# Patient Record
Sex: MALE | Race: BLACK OR AFRICAN AMERICAN | NOT HISPANIC OR LATINO | Employment: OTHER | URBAN - METROPOLITAN AREA
[De-identification: names, ages, dates, MRNs, and addresses within clinical notes are randomized per-mention and may not be internally consistent; named-entity substitution may affect disease eponyms.]

---

## 2017-02-24 RX ORDER — LOSARTAN POTASSIUM AND HYDROCHLOROTHIAZIDE 25; 100 MG/1; MG/1
TABLET ORAL
Qty: 30 TABLET | Refills: 5 | Status: SHIPPED | OUTPATIENT
Start: 2017-02-24 | End: 2018-06-14 | Stop reason: SDUPTHER

## 2017-07-01 RX ORDER — ALBUTEROL SULFATE 90 UG/1
AEROSOL, METERED RESPIRATORY (INHALATION)
Qty: 8.5 G | Refills: 2 | Status: SHIPPED | OUTPATIENT
Start: 2017-07-01 | End: 2017-12-18 | Stop reason: SDUPTHER

## 2017-09-26 ENCOUNTER — OFFICE VISIT (OUTPATIENT)
Dept: INTERNAL MEDICINE | Facility: CLINIC | Age: 52
End: 2017-09-26
Payer: MEDICARE

## 2017-09-26 ENCOUNTER — LAB VISIT (OUTPATIENT)
Dept: LAB | Facility: HOSPITAL | Age: 52
End: 2017-09-26
Attending: INTERNAL MEDICINE
Payer: MEDICARE

## 2017-09-26 VITALS
SYSTOLIC BLOOD PRESSURE: 125 MMHG | DIASTOLIC BLOOD PRESSURE: 81 MMHG | WEIGHT: 175.69 LBS | HEART RATE: 57 BPM | BODY MASS INDEX: 26.63 KG/M2 | HEIGHT: 68 IN

## 2017-09-26 DIAGNOSIS — Z00.00 ANNUAL PHYSICAL EXAM: Primary | ICD-10-CM

## 2017-09-26 DIAGNOSIS — I10 ESSENTIAL HYPERTENSION: ICD-10-CM

## 2017-09-26 DIAGNOSIS — M79.602 PARESTHESIA AND PAIN OF BOTH UPPER EXTREMITIES: ICD-10-CM

## 2017-09-26 DIAGNOSIS — Z12.5 ENCOUNTER FOR SCREENING FOR MALIGNANT NEOPLASM OF PROSTATE: ICD-10-CM

## 2017-09-26 DIAGNOSIS — M79.601 PARESTHESIA AND PAIN OF BOTH UPPER EXTREMITIES: ICD-10-CM

## 2017-09-26 DIAGNOSIS — M47.816 SPONDYLOSIS OF LUMBAR REGION WITHOUT MYELOPATHY OR RADICULOPATHY: ICD-10-CM

## 2017-09-26 DIAGNOSIS — M17.11 PRIMARY OSTEOARTHRITIS OF RIGHT KNEE: ICD-10-CM

## 2017-09-26 DIAGNOSIS — R20.2 PARESTHESIA AND PAIN OF BOTH UPPER EXTREMITIES: ICD-10-CM

## 2017-09-26 DIAGNOSIS — M47.812 SPONDYLOSIS OF CERVICAL REGION WITHOUT MYELOPATHY OR RADICULOPATHY: ICD-10-CM

## 2017-09-26 DIAGNOSIS — Z00.00 ANNUAL PHYSICAL EXAM: ICD-10-CM

## 2017-09-26 DIAGNOSIS — Z12.11 SCREENING FOR COLON CANCER: ICD-10-CM

## 2017-09-26 LAB
ALBUMIN SERPL BCP-MCNC: 3.7 G/DL
ALP SERPL-CCNC: 53 U/L
ALT SERPL W/O P-5'-P-CCNC: 11 U/L
ANION GAP SERPL CALC-SCNC: 7 MMOL/L
AST SERPL-CCNC: 17 U/L
BASOPHILS # BLD AUTO: 0.04 K/UL
BASOPHILS NFR BLD: 0.5 %
BILIRUB SERPL-MCNC: 0.5 MG/DL
BUN SERPL-MCNC: 12 MG/DL
CALCIUM SERPL-MCNC: 9.1 MG/DL
CHLORIDE SERPL-SCNC: 104 MMOL/L
CHOLEST SERPL-MCNC: 181 MG/DL
CHOLEST/HDLC SERPL: 3.4 {RATIO}
CO2 SERPL-SCNC: 28 MMOL/L
COMPLEXED PSA SERPL-MCNC: 1.1 NG/ML
CREAT SERPL-MCNC: 1.2 MG/DL
DIFFERENTIAL METHOD: ABNORMAL
EOSINOPHIL # BLD AUTO: 0.1 K/UL
EOSINOPHIL NFR BLD: 0.7 %
ERYTHROCYTE [DISTWIDTH] IN BLOOD BY AUTOMATED COUNT: 15 %
EST. GFR  (AFRICAN AMERICAN): >60 ML/MIN/1.73 M^2
EST. GFR  (NON AFRICAN AMERICAN): >60 ML/MIN/1.73 M^2
GLUCOSE SERPL-MCNC: 83 MG/DL
HCT VFR BLD AUTO: 39.9 %
HDLC SERPL-MCNC: 53 MG/DL
HDLC SERPL: 29.3 %
HGB BLD-MCNC: 13.5 G/DL
LDLC SERPL CALC-MCNC: 111 MG/DL
LYMPHOCYTES # BLD AUTO: 2.9 K/UL
LYMPHOCYTES NFR BLD: 39.3 %
MCH RBC QN AUTO: 28.2 PG
MCHC RBC AUTO-ENTMCNC: 33.8 G/DL
MCV RBC AUTO: 83 FL
MONOCYTES # BLD AUTO: 0.6 K/UL
MONOCYTES NFR BLD: 7.7 %
NEUTROPHILS # BLD AUTO: 3.8 K/UL
NEUTROPHILS NFR BLD: 51.5 %
NONHDLC SERPL-MCNC: 128 MG/DL
PLATELET # BLD AUTO: 254 K/UL
PMV BLD AUTO: 9.6 FL
POTASSIUM SERPL-SCNC: 4.4 MMOL/L
PROT SERPL-MCNC: 6.8 G/DL
RBC # BLD AUTO: 4.79 M/UL
SODIUM SERPL-SCNC: 139 MMOL/L
TRIGL SERPL-MCNC: 85 MG/DL
TSH SERPL DL<=0.005 MIU/L-ACNC: 1.64 UIU/ML
WBC # BLD AUTO: 7.41 K/UL

## 2017-09-26 PROCEDURE — 84153 ASSAY OF PSA TOTAL: CPT

## 2017-09-26 PROCEDURE — 80053 COMPREHEN METABOLIC PANEL: CPT

## 2017-09-26 PROCEDURE — 84443 ASSAY THYROID STIM HORMONE: CPT

## 2017-09-26 PROCEDURE — 80061 LIPID PANEL: CPT

## 2017-09-26 PROCEDURE — 99213 OFFICE O/P EST LOW 20 MIN: CPT | Mod: PBBFAC,PO | Performed by: INTERNAL MEDICINE

## 2017-09-26 PROCEDURE — 85025 COMPLETE CBC W/AUTO DIFF WBC: CPT | Mod: PO

## 2017-09-26 PROCEDURE — 99214 OFFICE O/P EST MOD 30 MIN: CPT | Mod: S$PBB,,, | Performed by: INTERNAL MEDICINE

## 2017-09-26 PROCEDURE — 36415 COLL VENOUS BLD VENIPUNCTURE: CPT | Mod: PO

## 2017-09-26 PROCEDURE — 99999 PR PBB SHADOW E&M-EST. PATIENT-LVL III: CPT | Mod: PBBFAC,,, | Performed by: INTERNAL MEDICINE

## 2017-09-26 NOTE — PROGRESS NOTES
PAST MEDICAL HISTORY:  Hypertension.  Chronic asthma.  Osteoarthritis of knees.  In regard to the above, cervical degenerative disk disease for which he has had   a left cervical foraminotomy at C6-7 and known lumbar degenerative disk disease   with neuroforaminal stenosis at L4-5, worse on the left, and facet arthropathy   and neuroforaminal stenosis at L5-S1; all this is based on previous MRI.     SOCIAL HISTORY:  Tobacco use, none.  Alcohol use, a couple of beers a day.    REASON FOR VISIT:  This is a 51-year-old male who comes in for a follow-up   visit.  It has been two years since he has been seen.  He states that his   insurance got interrupted in the interim.    He still has ongoing chronic pain involving the right knee.  He has advanced   arthritis.  He has not responded well to Euflexxa injections in the past.    He was scheduled to have a colonoscopy, but again it got disrupted because of   technicalities with his insurance.     He still has problems waking up in the morning with tingling and numbness in his   hands that extends up into his forearms, but it can occur at any time in the   day.  He has a history of cervical degenerative disk disease with left cervical   foraminotomy.  An EMG study that was performed in August 2012 was unrevealing.    PAST MEDICAL HISTORY:  Hypertension.  Chronic asthma.  Osteoarthritis of the knee, right knee surgery with previous medial meniscectomy   and chondroplasty.  Cervical degenerative disk disease with left cervical foraminotomy at C6-7.  Lumbar degenerative disk disease with neural foraminal stenosis and facet   arthropathy.    CURRENT MEDICINES:  Losartan//25 mg a day.    SOCIAL HISTORY:  Tobacco use, none.  Alcohol use, limited.    REVIEW OF SYMPTOMS:  Reports no chest pain, shortness of breath, or abdominal   pain.  Regular bowel function.  Has no difficulty urinating.  No indigestion or   heartburn.  No chronic headaches.    PHYSICAL EXAMINATION:  VITAL  SIGNS:  Weight 175 pounds; it was 198 back in 2015.  HEENT:  Tympanic membranes normal.  Nasal mucosa is clear.  Oropharynx, no   abnormal findings.  NECK:  No thyromegaly.  No masses.  LUNGS:  Clear breath sounds.  Good effort.  HEART:  Regular rate and rhythm.  No murmurs or gallops.  ABDOMEN:  Active bowel sounds, soft, nontender.  No hepatosplenomegaly or   abdominal masses.  PULSES:  2+ carotid, 2+ pedal pulses.  EXTREMITIES:  No edema.  LYMPH GLAND:  No palpable adenopathy.  RECTAL:  Stool is brown, heme negative.  Prostate flat.  MUSCULOSKELETAL:  Negative Tinel's sign.  Does have right knee enlargement and   some warmth and swelling.    IMPRESSION:  1.  General examination.  2.  Hypertension.  3.  Osteoarthritis of the right knee.  4.  Upper extremity paresthesia.  5.  Cervical degenerative disk disease.  6.  Lumbar degenerative disk disease.    PLAN:  Routine labs today.  EMG study.  Screening colonoscopy.  Follow up with   Orthopedics.  Phone review to follow up.    /sc [id number] review      BURNO/ANU  dd: 09/26/2017 14:38:09 (CDT)  td: 09/27/2017 07:05:08 (CDT)  Doc ID   #[id number]  Job ID #[id number]    CC:

## 2017-12-06 RX ORDER — TIZANIDINE 4 MG/1
4 TABLET ORAL 3 TIMES DAILY PRN
Qty: 90 TABLET | Refills: 1 | Status: SHIPPED | OUTPATIENT
Start: 2017-12-06 | End: 2019-05-07

## 2017-12-06 NOTE — TELEPHONE ENCOUNTER
----- Message from Omar Topete sent at 12/6/2017 10:16 AM CST -----  Contact: self [phone]  Patient requesting  a call back from MD to discuss getting referral for knee. Please advise , Thanks

## 2017-12-07 ENCOUNTER — HOSPITAL ENCOUNTER (OUTPATIENT)
Dept: RADIOLOGY | Facility: HOSPITAL | Age: 52
Discharge: HOME | End: 2017-12-07
Attending: PHYSICIAN ASSISTANT
Payer: MEDICARE

## 2017-12-07 ENCOUNTER — OFFICE VISIT (OUTPATIENT)
Dept: SPORTS MEDICINE | Facility: CLINIC | Age: 52
End: 2017-12-07
Payer: MEDICARE

## 2017-12-07 VITALS
DIASTOLIC BLOOD PRESSURE: 73 MMHG | HEART RATE: 73 BPM | BODY MASS INDEX: 26.52 KG/M2 | HEIGHT: 68 IN | SYSTOLIC BLOOD PRESSURE: 123 MMHG | WEIGHT: 175 LBS

## 2017-12-07 DIAGNOSIS — M25.561 CHRONIC PAIN OF RIGHT KNEE: Primary | ICD-10-CM

## 2017-12-07 DIAGNOSIS — G89.29 CHRONIC PAIN OF RIGHT KNEE: Primary | ICD-10-CM

## 2017-12-07 DIAGNOSIS — M25.569 KNEE PAIN, UNSPECIFIED CHRONICITY, UNSPECIFIED LATERALITY: ICD-10-CM

## 2017-12-07 PROCEDURE — 73564 X-RAY EXAM KNEE 4 OR MORE: CPT | Mod: 26,59,RT, | Performed by: RADIOLOGY

## 2017-12-07 PROCEDURE — 99214 OFFICE O/P EST MOD 30 MIN: CPT | Mod: S$PBB,,, | Performed by: PHYSICIAN ASSISTANT

## 2017-12-07 PROCEDURE — 99999 PR PBB SHADOW E&M-EST. PATIENT-LVL III: CPT | Mod: PBBFAC,,, | Performed by: PHYSICIAN ASSISTANT

## 2017-12-07 PROCEDURE — 99213 OFFICE O/P EST LOW 20 MIN: CPT | Mod: PBBFAC,25,PO | Performed by: PHYSICIAN ASSISTANT

## 2017-12-07 PROCEDURE — 73564 X-RAY EXAM KNEE 4 OR MORE: CPT | Mod: 26,LT,, | Performed by: RADIOLOGY

## 2017-12-07 PROCEDURE — 73564 X-RAY EXAM KNEE 4 OR MORE: CPT | Mod: TC,50,PO

## 2017-12-07 NOTE — PROGRESS NOTES
Subjective:          Chief Complaint: Rishi Aranda is a 52 y.o. male who had concerns including Pain of the Right Knee.    Rishi Aranda is a retiree.The intermittent pain started 1-2 years ago and is becoming progressively worse. Had two series of Euflexxa; relief with first series and no relief with the second series. He reports the pain is mostly on the anterior and medial side of his knee. He reports that he received minimal relief from the surgery in 2011.       He reports that the pain is a 7 /10 aching, throbbing and radiating pain and not responding adequately to conservative measures which have included activity modifications, rest, and oral medication (aleve). Is affecting ADLs and limiting desired level of activity. Denies numbness, tingling, and inability to bear weight.  Pain is 10 /10 at its worst    Mechanical symptoms:  Subjective instability: (+)   Worse at the end of the day  Better with rest.   Nocturnal symptoms: (+)    RIGHT knee arthroscopy and medial meniscectomy, September 2011 Dereck Parks MD              Review of Systems   Constitution: Negative for chills and fever.   HENT: Negative for congestion and sore throat.    Eyes: Negative for discharge and double vision.   Cardiovascular: Negative for chest pain, palpitations and syncope.   Respiratory: Negative for cough and shortness of breath.    Endocrine: Negative for cold intolerance and heat intolerance.   Skin: Negative for dry skin and rash.   Musculoskeletal: Positive for joint pain and joint swelling. Negative for falls, muscle cramps and muscle weakness.   Gastrointestinal: Negative for abdominal pain, nausea and vomiting.   Neurological: Negative for focal weakness, numbness and paresthesias.       Pain Related Questions  Over the past 3 days, what was your average pain during activity? (I.e. running, jogging, walking, climbing stairs, getting dressed, ect.): 7  Over the past 3 days, what was your highest pain level?: 7  Over  the past 3 days, what was your lowest pain level? : 4    Other  How many nights a week are you awakened by your affected body part?: 7  Was the patient's HEIGHT measured or patient reported?: Patient Reported  Was the patient's WEIGHT measured or patient reported?: Patient Reported      Objective:        General: Rishi is well-developed, well-nourished, appears stated age, in no acute distress, alert and oriented to time, place and person.     General    Vitals reviewed.  Constitutional: He is oriented to person, place, and time. He appears well-developed and well-nourished. No distress.   Eyes: Conjunctivae and EOM are normal. Pupils are equal, round, and reactive to light.   Neck: Normal range of motion. Neck supple. No JVD present.   Cardiovascular: Normal heart sounds and intact distal pulses.    No murmur heard.  Pulmonary/Chest: Effort normal and breath sounds normal. No respiratory distress.   Abdominal: Soft. Bowel sounds are normal. He exhibits no distension. There is no tenderness.   Neurological: He is alert and oriented to person, place, and time. Coordination normal.   Psychiatric: He has a normal mood and affect. His behavior is normal. Judgment and thought content normal.     General Musculoskeletal Exam   Gait: normal       Right Knee Exam     Inspection   Erythema: absent  Scars: present  Swelling: present  Effusion: effusion  Deformity: deformity  Bruising: absent    Tenderness   The patient is tender to palpation of the medial joint line.    Crepitus   The patient has crepitus of the patella.    Range of Motion   Extension: 0   Right knee flexion: 115.     Tests   Meniscus   Fawn:  Medial - positive Lateral - negative  Ligament Examination Lachman: normal (-1 to 2mm) PCL-Posterior Drawer: normal (0 to 2mm)     MCL - Valgus: normal (0 to 2mm)  LCL - Varus: normalDial Test at 90 degrees: normal (< 5 degrees)  Patella   Patellar Glide (quadrants): Lateral - 2   Medial - 2  Patellar Grind:  negative    Other   Sensation: normal    Left Knee Exam     Inspection   Erythema: absent  Scars: absent  Swelling: absent  Effusion: absent  Deformity: deformity  Bruising: absent    Range of Motion   Extension: 0   Flexion: normal Left knee flexion: 125.     Tests   Meniscus   Fawn:  Medial - negative Lateral - negative  Stability Lachman: normal (-1 to 2mm) PCL-Posterior Drawer: normal (0 to 2mm)  MCL - Valgus: normal (0 to 2mm)  LCL - Varus: normal (0 to 2mm)Dial Test at 90 degrees: normal (< 5 degrees)  Patella   Patellar Glide (Quadrants): Lateral - 2 Medial - 2  Patellar Grind: negative    Other   Sensation: normal    Right Hip Exam     Tests   Bernabe: negative  Left Hip Exam     Tests   Bernabe: negative          Muscle Strength   Right Lower Extremity   Hip Abduction: 5/5   Quadriceps:  5/5   Hamstrin/5   Left Lower Extremity   Hip Abduction: 5/5   Quadriceps:  5/5   Hamstrin/5     Vascular Exam     Right Pulses  Dorsalis Pedis:      2+  Posterior Tibial:      2+        Left Pulses  Dorsalis Pedis:      2+  Posterior Tibial:      2+        Edema  Right Lower Leg: absent  Left Lower Leg: absent    Radiographic Findings 2017:    4 views bilateral    Right knee: DJD with significant narrowing of the patellofemoral and medial tibiofemoral joint space.  No fracture or bone destruction identified.    Left knee: Mild DJD and joint space narrowing medially.  Intramedullary calcification in the distal femur probably enchondroma or bone infarct.  No fracture or bone destruction identified    Xrays of the knees were ordered and reviewed by me today. These findings were discussed and reviewed with the patient.            Assessment:       Encounter Diagnosis   Name Primary?    Knee pain, unspecified chronicity, unspecified laterality Yes          Plan:       1. Will refer to Maxi Chaves MD for surgical options. I reviewed the pathology and natural history of his diagnosis. We have discussed a variety  of treatment options including medications, injections, physical therapy and other alternative treatments. I also explained the indications, risks and benefits of surgery.     The details of possible RIGHT total knee arthroplasty were explained, including the location of probable incisions and a description of likely hardware to be used.  The patient understands the likely convalescence after surgery.  Also, we have thoroughly discussed the risks, benefits and alternatives to surgery, including, but not limited to, the risk of infection, joint stiffness, blood clot (including DVT and/or pulmonary embolus), neurologic and vascular injury.  It was explained that, if tissue has been repaired or reconstructed, there is a chance of failure, which may require further management.    2. Continue to take OTC NSAIDs for pain management until follow-up.  3. Patient was given information on total knee arthroplasty (i.e., pre-op conditioning, anatomy, and recovery).  4. Ice affected area 2x a day for 15 minutes for 1 week, then 1x day for 15 minutes as needed for pain management.  5. Ambulatory Referral to physical therapy to [facility].  6. RTC to see Maxi Chaves MD for further treatment options.    All of the patient's questions were answered and the patient will contact us if they have any questions or concerns in the interim.                      Patient questionnaires may have been collected.

## 2017-12-19 RX ORDER — ALBUTEROL SULFATE 90 UG/1
AEROSOL, METERED RESPIRATORY (INHALATION)
Qty: 8.5 G | Refills: 5 | Status: SHIPPED | OUTPATIENT
Start: 2017-12-19 | End: 2018-04-30 | Stop reason: SDUPTHER

## 2017-12-26 ENCOUNTER — CLINICAL SUPPORT (OUTPATIENT)
Dept: REHABILITATION | Facility: HOSPITAL | Age: 52
End: 2017-12-26
Payer: MEDICARE

## 2017-12-26 DIAGNOSIS — M25.561 CHRONIC PAIN OF RIGHT KNEE: Primary | ICD-10-CM

## 2017-12-26 DIAGNOSIS — G89.29 CHRONIC PAIN OF RIGHT KNEE: Primary | ICD-10-CM

## 2017-12-26 PROCEDURE — G8979 MOBILITY GOAL STATUS: HCPCS | Mod: CK

## 2017-12-26 PROCEDURE — G8978 MOBILITY CURRENT STATUS: HCPCS | Mod: CK

## 2017-12-26 PROCEDURE — 97161 PT EVAL LOW COMPLEX 20 MIN: CPT

## 2017-12-26 NOTE — PROGRESS NOTES
Physical Therapy Evaluation    Name: Rishi Aranda  Clinic Number: [id number]        Diagnosis:   Encounter Diagnosis   Name Primary?    Chronic pain of right knee Yes     Physician: Humberto Royal, *  Treatment Orders: PT Eval and Treat    Past Medical History:   Diagnosis Date    Arthritis     Asthma     Cervical radiculopathy, BUE 8/17/2012    Chronic LBP 8/17/2012    Chronic neck pain 8/17/2012    HTN (hypertension) 8/17/2012    Hypertension      Current Outpatient Prescriptions   Medication Sig    gabapentin (NEURONTIN) 300 MG capsule TAKE ONE CAPSULE BY MOUTH THREE TIMES DAILY .    losartan-hydrochlorothiazide 100-25 mg (HYZAAR) 100-25 mg per tablet TAKE 1 TABLET BY MOUTH EVERY DAY.    PROAIR HFA 90 mcg/actuation inhaler INHALE 2 PUFFS INTO LUNGS FOUR TIMES DAILY AS NEEDED FOR WHEEZE    tiZANidine (ZANAFLEX) 4 MG tablet Take 1 tablet (4 mg total) by mouth 3 (three) times daily as needed.     Current Facility-Administered Medications   Medication    sodium hyaluronate (viscosup) injection 20 mg    sodium hyaluronate (viscosup) injection 20 mg     Review of patient's allergies indicates:   Allergen Reactions    Eric-dur     Theophylline      Other reaction(s): Hives     Precautions: none    Evaluation Date: 12/26/2017   Visit # authorized: 1/10  Authorization expiration: 12/31/2017    Subjective     Onset/JOHN: gradual    Primary concern/ Chief complaints:    Rishi is a 52 y.o. male with PMH of back pain, right knee arthroscopy in 2011 and right knee pain that presents to [facility] secondary to chronic right knee pain.  Injury/surgery occurred on years ago and has gotten worse.  X-ray and MRI was taken and revealed DJD with significant narrowing of the patellofemoral and medial tibiofemoral joint space. Previous treatment included 2 rounds of Euflexxa with minimal assistance, ice, PT, walking and stretching at home. Pt reports that walking too far, staying stationary too  long, sitting with feet unsupported, stair management, standing too long increases right knee pain and reports right knee pain at worst is a 9-10 on the VAS. Pt also reports that with sudden turns, he will feel like his knee wants to buckle and will sometimes snap. Pt reports that the last time he felt that was on the day before yesterday. Pt uses ice, rest to control right knee pain symptoms. Pt has a decreased ability to perform ADLs such as walking, standing, turning, going up and down stairs. Pt reports intermittent numbness and tingling in the right knee and shin. No cultural, environmental, or spiritual barriers identified to treatment or learning.    Pain Scale: Rishi rates pain on a scale of 0-10 to be 10 at worst; 6 currently; 6 at best .    Stairs in the Home: 0    Patient Goals: Pt would like to decrease pain and increase function so he can return to pain-free completion of all normal daily activities.      Objective     Observation: ambulates independently, no assistive device. Gait deviations include antalgic gait, decreased stance time on RLE.    Posture: WNL    Pitting edema: none    Range of Motion:   Knee Left active Left Passive Right Active Right Passive   Flexion 135 138 123 125   Extension 1 1 -3 -3       Lower Extremity Strength  Right LE  Left LE    Knee extension: 4/5 Knee extension: 4+/5   Knee flexion: *pain on lateral knee joint line  4/5 Knee flexion: 4+/5   Hip flexion: 4+/5 Hip flexion: 5/5   Hip extension:  4/5 Hip extension: 4/5   Hip abduction:  *pain at medial knee joint line 4/5 Hip abduction: 4/5   Hip adduction: 4+/5 Hip adduction 4+/5   Ankle dorsiflexion: 5/5 Ankle dorsiflexion: 5/5   Ankle plantarflexion: 5/5 Ankle plantarflexion: 5/5         Special Tests:   Left Right   Valgus Stress Test - -   Varus Stress test - -   Lachman's test - -   Posterior Lachman - -   Kamila's Test - +         Joint Mobility: decreased in R knee into flexion due to pain.  Patellar mobility  decreased in all directions in RLE.    Palpation: ttp at medial joint line of R knee    Sensation: WNL    Flexibility: decreased in quadriceps and hamstrings based on knee AROM/PROM assessment    G-code Reporting:  Category: Mobility  Tool: FOTO knee   Score: 59% impairment  Goal:  CK ( 40%-60% impairment)  Current:  CK ( 40%-60% impairment)      PT Evaluation Completed? Yes  Discussed Plan of Care with patient: Yes    TREATMENT:  Rishi received therapeutic exercises to develop strength and endurance, flexibility for 15 minutes including: hamstring stretch, quad stretch, standing hip flexion, standing hip abduction    Pt. Received cold pack x 10 min. To the R knee. Following treatment.    Instructed pt. Regarding: Proper technique with all exercises. Pt demo good understanding of the education provided. Rishi demonstrated good return demonstration of activities.      Assessment     This is a 52 y.o. male referred to outpatient physical therapy and presents with a medical diagnosis of chronic right knee pain and demonstrates limitations as described in the problem list. Pt will benefit from physcial therapy services in order to maximize pain free and/or functional use of right knee. The following goals were discussed with the patient and patient is in agreement with them as to be addressed in the treatment plan. Pt was given a HEP consisting of hamstring stretch, quad stretch, standing hip flexion, standing hip abduction with OTB (HEP2go code = [id number]). Pt verbally understood the instructions as they were given and demonstrated proper form and technique during therapy. Pt was advised to perform these exercises free of pain, and to stop performing them if pain occurs.     Pt presents with significantly decreased strength at the right knee, and decreased range of motion into flexion and extension of the right knee. Pt would benefit from stretching of quadriceps and hamstrings in order to allow for increases  of active and passive range of motion. Pt would benefit from effleurage to decrease edema as well as from soft tissue management of tight musculature in order to decrease muscle guarding in the right hamstrings, IT band, and quadriceps.    Patient History Examination Clinical Presentation Clinical Decision Making   Comorbidities:  Back pain, R knee arthroscopy in 2011    Personal Factors:  none         Activity and Participation Restriction:  Mobility  General tasks and demands    Body Systems:  Musculoskeletal    Body Regions:  Lower extremities Stable and uncomplicated   Low              Medical necessity is demonstrated by the following IMPAIRMENTS/PROBLEM LIST:   1)Increase in pain level limiting function   2)Decreased range of motion limiting function   3)Decreased strength limiting function   4)Impaired gait pattern   5)Lack of HEP    GOALS: Short Term Goals:  3 weeks  1. Report decreased right knee pain  <   / =  6  /10  to increase tolerance for completion of ADLs.  2. Pt to achieve 120 degrees flexion AROM in the R knee to demonstrate improvements in functional mobility.  3. Increased MMT  for  Quadriceps to 4+/5  to increase tolerance for ADL and work activities.  4. Pt to report a decreased incidence of instability in the right knee to demonstrate improvements in stability and safety in ADLs.  5. Pt to tolerate HEP to improve ROM and independence with ADL's    Long Term Goals: 6 weeks  1. Report decreased right knee pain  <   / =  5  /10  to increase tolerance for completion of ADLs.  2. Pt to achieve WNL degrees flexion AROM in the right knee to demonstrate improvements in functional mobility.  3. Increased MMT  for  Quadriceps to 5/5  to increase tolerance for ADL and work activities.  4. Pt will report at CK level (40%-60% impaired) on FOTO knee questionnaire to demonstrate increase in LE function with every day tasks.   5. Pt to be Independent with HEP to improve ROM and independence with  ADL's      Plan     Pt will be treated by physical therapy 1-3 times a week for 6 weeks for Pt Education, HEP, therapeutic exercises, neuromuscular re-education, joint mobilizations, modalities prn to achieve established goals. Rishi may at times be seen by a PTA as part of the Rehab Team.     Cont PT for 6 weeks.     Reshma Irene, DPT  12/26/2017    I certify the need for these services furnished under this plan of treatment and while under my care.    ______________________________ Physician/Referring Practitioner  Date of Signature

## 2017-12-27 NOTE — PLAN OF CARE
Physical Therapy Evaluation    Name: Rishi Aranda  Clinic Number: [id number]        Diagnosis:   Encounter Diagnosis   Name Primary?    Chronic pain of right knee Yes     Physician: Humberto Royal, *  Treatment Orders: PT Eval and Treat    Past Medical History:   Diagnosis Date    Arthritis     Asthma     Cervical radiculopathy, BUE 8/17/2012    Chronic LBP 8/17/2012    Chronic neck pain 8/17/2012    HTN (hypertension) 8/17/2012    Hypertension      Current Outpatient Prescriptions   Medication Sig    gabapentin (NEURONTIN) 300 MG capsule TAKE ONE CAPSULE BY MOUTH THREE TIMES DAILY .    losartan-hydrochlorothiazide 100-25 mg (HYZAAR) 100-25 mg per tablet TAKE 1 TABLET BY MOUTH EVERY DAY.    PROAIR HFA 90 mcg/actuation inhaler INHALE 2 PUFFS INTO LUNGS FOUR TIMES DAILY AS NEEDED FOR WHEEZE    tiZANidine (ZANAFLEX) 4 MG tablet Take 1 tablet (4 mg total) by mouth 3 (three) times daily as needed.     Current Facility-Administered Medications   Medication    sodium hyaluronate (viscosup) injection 20 mg    sodium hyaluronate (viscosup) injection 20 mg     Review of patient's allergies indicates:   Allergen Reactions    Eric-dur     Theophylline      Other reaction(s): Hives     Precautions: none    Evaluation Date: 12/26/2017   Visit # authorized: 1/10  Authorization expiration: 12/31/2017    Subjective     Onset/JOHN: gradual    Primary concern/ Chief complaints:    Rishi is a 52 y.o. male with PMH of back pain, right knee arthroscopy in 2011 and right knee pain that presents to [facility] secondary to chronic right knee pain.  Injury/surgery occurred on years ago and has gotten worse.  X-ray and MRI was taken and revealed DJD with significant narrowing of the patellofemoral and medial tibiofemoral joint space. Previous treatment included 2 rounds of Euflexxa with minimal assistance, ice, PT, walking and stretching at home. Pt reports that walking too far, staying stationary too  long, sitting with feet unsupported, stair management, standing too long increases right knee pain and reports right knee pain at worst is a 9-10 on the VAS. Pt also reports that with sudden turns, he will feel like his knee wants to buckle and will sometimes snap. Pt reports that the last time he felt that was on the day before yesterday. Pt uses ice, rest to control right knee pain symptoms. Pt has a decreased ability to perform ADLs such as walking, standing, turning, going up and down stairs. Pt reports intermittent numbness and tingling in the right knee and shin. No cultural, environmental, or spiritual barriers identified to treatment or learning.    Pain Scale: Rishi rates pain on a scale of 0-10 to be 10 at worst; 6 currently; 6 at best .    Stairs in the Home: 0    Patient Goals: Pt would like to decrease pain and increase function so he can return to pain-free completion of all normal daily activities.      Objective     Observation: ambulates independently, no assistive device. Gait deviations include antalgic gait, decreased stance time on RLE.    Posture: WNL    Pitting edema: none    Range of Motion:   Knee Left active Left Passive Right Active Right Passive   Flexion 135 138 123 125   Extension 1 1 -3 -3       Lower Extremity Strength  Right LE  Left LE    Knee extension: 4/5 Knee extension: 4+/5   Knee flexion: *pain on lateral knee joint line  4/5 Knee flexion: 4+/5   Hip flexion: 4+/5 Hip flexion: 5/5   Hip extension:  4/5 Hip extension: 4/5   Hip abduction:  *pain at medial knee joint line 4/5 Hip abduction: 4/5   Hip adduction: 4+/5 Hip adduction 4+/5   Ankle dorsiflexion: 5/5 Ankle dorsiflexion: 5/5   Ankle plantarflexion: 5/5 Ankle plantarflexion: 5/5         Special Tests:   Left Right   Valgus Stress Test - -   Varus Stress test - -   Lachman's test - -   Posterior Lachman - -   Kamila's Test - +         Joint Mobility: decreased in R knee into flexion due to pain.  Patellar mobility  decreased in all directions in RLE.    Palpation: ttp at medial joint line of R knee    Sensation: WNL    Flexibility: decreased in quadriceps and hamstrings based on knee AROM/PROM assessment    G-code Reporting:  Category: Mobility  Tool: FOTO knee   Score: 59% impairment  Goal:  CK ( 40%-60% impairment)  Current:  CK ( 40%-60% impairment)      PT Evaluation Completed? Yes  Discussed Plan of Care with patient: Yes    TREATMENT:  Rishi received therapeutic exercises to develop strength and endurance, flexibility for 15 minutes including: hamstring stretch, quad stretch, standing hip flexion, standing hip abduction    Pt. Received cold pack x 10 min. To the R knee. Following treatment.    Instructed pt. Regarding: Proper technique with all exercises. Pt demo good understanding of the education provided. Rishi demonstrated good return demonstration of activities.      Assessment     This is a 52 y.o. male referred to outpatient physical therapy and presents with a medical diagnosis of chronic right knee pain and demonstrates limitations as described in the problem list. Pt will benefit from physcial therapy services in order to maximize pain free and/or functional use of right knee. The following goals were discussed with the patient and patient is in agreement with them as to be addressed in the treatment plan. Pt was given a HEP consisting of hamstring stretch, quad stretch, standing hip flexion, standing hip abduction with OTB (HEP2go code = [id number]). Pt verbally understood the instructions as they were given and demonstrated proper form and technique during therapy. Pt was advised to perform these exercises free of pain, and to stop performing them if pain occurs.     Pt presents with significantly decreased strength at the right knee, and decreased range of motion into flexion and extension of the right knee. Pt would benefit from stretching of quadriceps and hamstrings in order to allow for increases  of active and passive range of motion. Pt would benefit from effleurage to decrease edema as well as from soft tissue management of tight musculature in order to decrease muscle guarding in the right hamstrings, IT band, and quadriceps.    Patient History Examination Clinical Presentation Clinical Decision Making   Comorbidities:  Back pain, R knee arthroscopy in 2011    Personal Factors:  none         Activity and Participation Restriction:  Mobility  General tasks and demands    Body Systems:  Musculoskeletal    Body Regions:  Lower extremities Stable and uncomplicated   Low              Medical necessity is demonstrated by the following IMPAIRMENTS/PROBLEM LIST:   1)Increase in pain level limiting function   2)Decreased range of motion limiting function   3)Decreased strength limiting function   4)Impaired gait pattern   5)Lack of HEP    GOALS: Short Term Goals:  3 weeks  1. Report decreased right knee pain  <   / =  6  /10  to increase tolerance for completion of ADLs.  2. Pt to achieve 120 degrees flexion AROM in the R knee to demonstrate improvements in functional mobility.  3. Increased MMT  for  Quadriceps to 4+/5  to increase tolerance for ADL and work activities.  4. Pt to report a decreased incidence of instability in the right knee to demonstrate improvements in stability and safety in ADLs.  5. Pt to tolerate HEP to improve ROM and independence with ADL's    Long Term Goals: 6 weeks  1. Report decreased right knee pain  <   / =  5  /10  to increase tolerance for completion of ADLs.  2. Pt to achieve WNL degrees flexion AROM in the right knee to demonstrate improvements in functional mobility.  3. Increased MMT  for  Quadriceps to 5/5  to increase tolerance for ADL and work activities.  4. Pt will report at CK level (40%-60% impaired) on FOTO knee questionnaire to demonstrate increase in LE function with every day tasks.   5. Pt to be Independent with HEP to improve ROM and independence with  ADL's      Plan     Pt will be treated by physical therapy 1-3 times a week for 6 weeks for Pt Education, HEP, therapeutic exercises, neuromuscular re-education, joint mobilizations, modalities prn to achieve established goals. Rishi may at times be seen by a PTA as part of the Rehab Team.     Cont PT for 6 weeks.     Reshma Irene, DPT  12/26/2017    I certify the need for these services furnished under this plan of treatment and while under my care.    ______________________________ Physician/Referring Practitioner  Date of Signature

## 2018-01-03 ENCOUNTER — CLINICAL SUPPORT (OUTPATIENT)
Dept: REHABILITATION | Facility: HOSPITAL | Age: 53
End: 2018-01-03
Payer: MEDICARE

## 2018-01-03 DIAGNOSIS — G89.29 CHRONIC PAIN OF RIGHT KNEE: Primary | ICD-10-CM

## 2018-01-03 DIAGNOSIS — M25.561 CHRONIC PAIN OF RIGHT KNEE: Primary | ICD-10-CM

## 2018-01-03 PROCEDURE — 97110 THERAPEUTIC EXERCISES: CPT

## 2018-01-03 PROCEDURE — 97010 HOT OR COLD PACKS THERAPY: CPT

## 2018-01-03 NOTE — PROGRESS NOTES
"                                                    Physical Therapy Progress Note     Name: Rishi Aranda  Clinic Number: [id number]  Diagnosis:   Encounter Diagnosis   Name Primary?    Chronic pain of right knee Yes     Physician: Humberto Royal, *  Treatment Orders: PT Evaluation and Treatment  Past Medical History:   Diagnosis Date    Arthritis     Asthma     Cervical radiculopathy, BUE 8/17/2012    Chronic LBP 8/17/2012    Chronic neck pain 8/17/2012    HTN (hypertension) 8/17/2012    Hypertension        Precautions: standard    Evaluation date: 12/26/17  Visit #: 1/1  Authorization period expiration: 01/03/19    Subjective     Pt reports: moderated R Knee pain that has been present since menisectomy years ago.     Pain Scale: before treatment: number was not reported currently; after treatment: NT    Objective   Therapeutic exercise: Rishi Aranda received therapeutic exercises to develop LLE strengthening/flexibility and hip stabilization for 40minutes including:     Supine:  TKE with strap, hand assist and quad set 15x5"  Quad set w/ heel prop 15x5"  SLR hip flexion w/ quad set 2x10x5"   Bridges w/ glut set 2x10x3"  HS stretch 2x45"    Sidelying:  AAROM SLR hip abduction 2x8x3"  Clamshells 2x10x3"    Prone:  Quad stretch 2x45"     Machine:  B Leg press 70#:  2x10    Manual therapy: Rishi Aranda received the following manual therapy techniques for 0 minutes: np    Modalities: Rishi Aranda received ice to R knee for 10 min post tx session.       Written Home Exercises Provided:   Pt demo good understanding of the education provided during the initial evaluation. Rishi Aranda demonstrated good return demonstration of activities.      Pt. education:  · Posture reeducation, body mechanics, HEP,activity modification/avoidance  · No spiritual or educational barriers to learning provided  · Pt has no cultural, educational or language barriers to learning provided.    Assessment   Tx focused on quad " "activation, hip stabilization and LE flexibility. It was noted that pt reported pain when attempting to perform 4" step ups, hence, exercise was not performed. Will progress as tolerated. Pt will continue to benefit from skilled outpatient physical therapy to address the remaining functional deficits, provide pt/family education, and to maximize pt's level of independence in the home and community environment.      Anticipated barriers to physical therapy: None    Medical necessity is demonstrated by the following IMPAIRMENTS/PROBLEM LIST:              1)Increase in pain level limiting function              2)Decreased range of motion limiting function              3)Decreased strength limiting function              4)Impaired gait pattern              5)Lack of HEP     GOALS: Short Term Goals:  3 weeks  1. Report decreased right knee pain  <   / =  6  /10  to increase tolerance for completion of ADLs.  2. Pt to achieve 120 degrees flexion AROM in the R knee to demonstrate improvements in functional mobility.  3. Increased MMT  for  Quadriceps to 4+/5  to increase tolerance for ADL and work activities.  4. Pt to report a decreased incidence of instability in the right knee to demonstrate improvements in stability and safety in ADLs.  5. Pt to tolerate HEP to improve ROM and independence with ADL's     Long Term Goals: 6 weeks  1. Report decreased right knee pain  <   / =  5  /10  to increase tolerance for completion of ADLs.  2. Pt to achieve WNL degrees flexion AROM in the right knee to demonstrate improvements in functional mobility.  3. Increased MMT  for  Quadriceps to 5/5  to increase tolerance for ADL and work activities.  4. Pt will report at CK level (40%-60% impaired) on FOTO knee questionnaire to demonstrate increase in LE function with every day tasks.   5. Pt to be Independent with HEP to improve ROM and independence with ADL's       · Pt's spiritual, cultural and educational needs considered and pt " agreeable to plan of care and goals as stated below:        Plan   Continue with established Plan of Care towards PT goals.

## 2018-01-05 ENCOUNTER — CLINICAL SUPPORT (OUTPATIENT)
Dept: REHABILITATION | Facility: HOSPITAL | Age: 53
End: 2018-01-05
Attending: PHYSICIAN ASSISTANT
Payer: MEDICARE

## 2018-01-05 DIAGNOSIS — M25.561 CHRONIC PAIN OF RIGHT KNEE: Primary | ICD-10-CM

## 2018-01-05 DIAGNOSIS — G89.29 CHRONIC PAIN OF RIGHT KNEE: Primary | ICD-10-CM

## 2018-01-05 PROCEDURE — 97110 THERAPEUTIC EXERCISES: CPT

## 2018-01-05 NOTE — PROGRESS NOTES
"                                                    Physical Therapy Progress Note     Name: Rishi Aranda  Clinic Number: [id number]  Diagnosis:   Encounter Diagnosis   Name Primary?    Chronic pain of right knee Yes     Physician: Humberto Royal, *  Treatment Orders: PT Evaluation and Treatment  Past Medical History:   Diagnosis Date    Arthritis     Asthma     Cervical radiculopathy, BUE 8/17/2012    Chronic LBP 8/17/2012    Chronic neck pain 8/17/2012    HTN (hypertension) 8/17/2012    Hypertension        Precautions: standard    Evaluation date: 12/26/17  Visit #: 2/12  Authorization period expiration: 01/03/19    Subjective     Pt reports: Feeling sore with the cold weather today, but felt like he did well after his last visit.    Pain Scale: before treatment: 7/10; after treatment: NT    Objective   Therapeutic exercise: Rishi Aranda received therapeutic exercises to develop RLE strengthening/flexibility and hip stabilization for 45 minutes including:     Recumbent bike x8 min, no resistance    Supine:  TKE with strap, hand assist and quad set 15x5"  Quad set w/ heel prop 15x5"  SLR hip flexion w/ quad set 2x10x5"   Bridges w/ glut set 2x10x3"  HS stretch 2x45"    Sidelying:  AAROM SLR hip abduction 2x8x3"  Clamshells 2x10x3"    Prone:  Quad stretch 2x45"     Machine:  B Leg press 70#:  2x10 NP due to time    Modalities: Rishi Aranda received ice to R knee for 5 min post tx session.       Written Home Exercises Provided:   Pt demo good understanding of the education provided during the initial evaluation. Rishi Aranda demonstrated good return demonstration of activities.      Pt. education:  · Posture reeducation, body mechanics, HEP,activity modification/avoidance  · No spiritual or educational barriers to learning provided  · Pt has no cultural, educational or language barriers to learning provided.    Assessment   Tx focused on quad activation, hip stabilization and LE flexibility. Minimal " assist was needed for sidelying SLR today. Will progress as tolerated. Pt will continue to benefit from skilled outpatient physical therapy to address the remaining functional deficits, provide pt/family education, and to maximize pt's level of independence in the home and community environment.      Anticipated barriers to physical therapy: None    Medical necessity is demonstrated by the following IMPAIRMENTS/PROBLEM LIST:              1)Increase in pain level limiting function              2)Decreased range of motion limiting function              3)Decreased strength limiting function              4)Impaired gait pattern              5)Lack of HEP    · Pt's spiritual, cultural and educational needs considered and pt agreeable to plan of care and goals as stated below:      GOALS: Short Term Goals:  3 weeks  1. Report decreased right knee pain  <   / =  6  /10  to increase tolerance for completion of ADLs.  2. Pt to achieve 120 degrees flexion AROM in the R knee to demonstrate improvements in functional mobility.  3. Increased MMT  for  Quadriceps to 4+/5  to increase tolerance for ADL and work activities.  4. Pt to report a decreased incidence of instability in the right knee to demonstrate improvements in stability and safety in ADLs.  5. Pt to tolerate HEP to improve ROM and independence with ADL's     Long Term Goals: 6 weeks  1. Report decreased right knee pain  <   / =  5  /10  to increase tolerance for completion of ADLs.  2. Pt to achieve WNL degrees flexion AROM in the right knee to demonstrate improvements in functional mobility.  3. Increased MMT  for  Quadriceps to 5/5  to increase tolerance for ADL and work activities.  4. Pt will report at CK level (40%-60% impaired) on FOTO knee questionnaire to demonstrate increase in LE function with every day tasks.   5. Pt to be Independent with HEP to improve ROM and independence with ADL's          Plan   Continue with established Plan of Care towards PT  goals.    Reshma Irene, PT  01/05/2018

## 2018-01-10 NOTE — PROGRESS NOTES
"                                                    Physical Therapy Progress Note     Name: Rishi Aranda  Clinic Number: [id number]  Diagnosis:   Encounter Diagnosis   Name Primary?    Chronic pain of right knee Yes     Physician: Humberto Royal, *  Treatment Orders: PT Evaluation and Treatment  Past Medical History:   Diagnosis Date    Arthritis     Asthma     Cervical radiculopathy, BUE 8/17/2012    Chronic LBP 8/17/2012    Chronic neck pain 8/17/2012    HTN (hypertension) 8/17/2012    Hypertension        Precautions: standard    Evaluation date: 12/26/17  Visit #: 3/12  Authorization period expiration: 01/03/19    Subjective     Pt reports: Still hurting, about the same as last time. Has been stretching but still has soreness.     Pain Scale: before treatment: 7/10; after treatment: NT    Objective   Therapeutic exercise: Rishi Aranda received therapeutic exercises to develop RLE strengthening/flexibility and hip stabilization for 45 minutes including:     Recumbent bike x8 min, no resistance    Supine:  TKE with strap, hand assist and quad set 15x5"  Quad set w/ heel prop 15x5"  SLR hip flexion w/ quad set 2x10x5"   Bridges w/ glut set 2x10x3"  HS stretch 2x45"    Sidelying:  AAROM SLR hip abduction 2x8x3"  Clamshells 2x10x3"    Prone:  Quad stretch 2x45"     Machine:  B Leg press 70#:  2x10 NP due to time    Modalities: Rishi Aranda received ice to R knee for 5 min post tx session.       Written Home Exercises Provided:   Pt demo good understanding of the education provided during the initial evaluation. Rishi Aranda demonstrated good return demonstration of activities.      Pt. education:  · Posture reeducation, body mechanics, HEP,activity modification/avoidance  · No spiritual or educational barriers to learning provided  · Pt has no cultural, educational or language barriers to learning provided.    Assessment   Tx focused on quad activation, hip stabilization and LE flexibility. No " assistance was needed for sidelying SLR today. Will progress as tolerated. Pt will continue to benefit from skilled outpatient physical therapy to address the remaining functional deficits, provide pt/family education, and to maximize pt's level of independence in the home and community environment.      Anticipated barriers to physical therapy: None    Medical necessity is demonstrated by the following IMPAIRMENTS/PROBLEM LIST:              1)Increase in pain level limiting function              2)Decreased range of motion limiting function              3)Decreased strength limiting function              4)Impaired gait pattern              5)Lack of HEP    · Pt's spiritual, cultural and educational needs considered and pt agreeable to plan of care and goals as stated below:      GOALS: Short Term Goals:  3 weeks  1. Report decreased right knee pain  <   / =  6  /10  to increase tolerance for completion of ADLs.  2. Pt to achieve 120 degrees flexion AROM in the R knee to demonstrate improvements in functional mobility.  3. Increased MMT  for  Quadriceps to 4+/5  to increase tolerance for ADL and work activities.  4. Pt to report a decreased incidence of instability in the right knee to demonstrate improvements in stability and safety in ADLs.  5. Pt to tolerate HEP to improve ROM and independence with ADL's     Long Term Goals: 6 weeks  1. Report decreased right knee pain  <   / =  5  /10  to increase tolerance for completion of ADLs.  2. Pt to achieve WNL degrees flexion AROM in the right knee to demonstrate improvements in functional mobility.  3. Increased MMT  for  Quadriceps to 5/5  to increase tolerance for ADL and work activities.  4. Pt will report at CK level (40%-60% impaired) on FOTO knee questionnaire to demonstrate increase in LE function with every day tasks.   5. Pt to be Independent with HEP to improve ROM and independence with ADL's          Plan   Continue with established Plan of Care towards PT  goals.    Reshma Irene, PT  01/11/2018

## 2018-01-11 ENCOUNTER — CLINICAL SUPPORT (OUTPATIENT)
Dept: REHABILITATION | Facility: HOSPITAL | Age: 53
End: 2018-01-11
Payer: MEDICARE

## 2018-01-11 DIAGNOSIS — M25.561 CHRONIC PAIN OF RIGHT KNEE: Primary | ICD-10-CM

## 2018-01-11 DIAGNOSIS — G89.29 CHRONIC PAIN OF RIGHT KNEE: Primary | ICD-10-CM

## 2018-01-11 PROCEDURE — 97110 THERAPEUTIC EXERCISES: CPT

## 2018-01-16 ENCOUNTER — CLINICAL SUPPORT (OUTPATIENT)
Dept: REHABILITATION | Facility: HOSPITAL | Age: 53
End: 2018-01-16
Payer: MEDICARE

## 2018-01-16 DIAGNOSIS — G89.29 CHRONIC PAIN OF RIGHT KNEE: Primary | ICD-10-CM

## 2018-01-16 DIAGNOSIS — M25.561 CHRONIC PAIN OF RIGHT KNEE: Primary | ICD-10-CM

## 2018-01-16 PROCEDURE — 97010 HOT OR COLD PACKS THERAPY: CPT

## 2018-01-16 PROCEDURE — 97110 THERAPEUTIC EXERCISES: CPT

## 2018-01-16 NOTE — PROGRESS NOTES
"                                                    Physical Therapy Treatment  Note     Name: Rishi Aranda  Clinic Number: [id number]  Diagnosis:   No diagnosis found.  Physician: Humberto Royal, *  Treatment Orders: PT Evaluation and Treatment  Past Medical History:   Diagnosis Date    Arthritis     Asthma     Cervical radiculopathy, BUE 8/17/2012    Chronic LBP 8/17/2012    Chronic neck pain 8/17/2012    HTN (hypertension) 8/17/2012    Hypertension        Precautions: standard    Evaluation date: 12/26/17  Visit #: 5/12  Authorization period expiration: 01/03/19    Subjective     Pt reports: feeling less pain than usual today, has been feeling intermittent pain in R knee through out the week.     Pain Scale: before treatment: 5/10; after treatment: NT    Objective     Function Status Measures:    Intake Score  1/16/18  Patient's Physical FS Primary Measure  41   37  Risk adjusted Statistical FOTO*   43      Therapeutic exercise: Rishi Aranda received therapeutic exercises to develop RLE strengthening/flexibility and hip stabilization for 60 minutes including:     Recumbent bike x7 min, no resistance    Supine:  TKE with strap, hand assist and quad set 15x5"  Quad set w/ heel prop 15x5"  SLR hip flexion w/ quad set 2x10x5"   Bridges w/ glut set 2x10x3"  HS stretch 2x45"    Sidelying:  AAROM SLR hip abduction 2x8x3"  Clamshells 2x10x3" bilaterally     Prone:  Quad stretch 2x45"     Machine:  B Leg press 80#:  2x10     Modalities: Rishi Aranda received ice to R knee for 10 min post tx session.       Written Home Exercises Provided:   Pt demo good understanding of the education provided during the initial evaluation. Rishi Aranda demonstrated good return demonstration of activities.      Pt. education:  · Posture reeducation, body mechanics, HEP,activity modification/avoidance  · No spiritual or educational barriers to learning provided  · Pt has no cultural, educational or language barriers to " "learning provided.    Assessment   Attempted 4" step ups but could not perform secondary to medial knee pain. Pt, however, did not c/o of any knee pain after any of the other therapeutic exercises attempted today. Tx focused on quad activation, hip stabilization and LE flexibility. No assistance was needed for sidelying SLR today. Will progress as tolerated. Pt will continue to benefit from skilled outpatient physical therapy to address the remaining functional deficits, provide pt/family education, and to maximize pt's level of independence in the home and community environment.      Anticipated barriers to physical therapy: None    Medical necessity is demonstrated by the following IMPAIRMENTS/PROBLEM LIST:              1)Increase in pain level limiting function              2)Decreased range of motion limiting function              3)Decreased strength limiting function              4)Impaired gait pattern              5)Lack of HEP    · Pt's spiritual, cultural and educational needs considered and pt agreeable to plan of care and goals as stated below:      GOALS: Short Term Goals:  3 weeks  1. Report decreased right knee pain  <   / =  6  /10  to increase tolerance for completion of ADLs.  2. Pt to achieve 120 degrees flexion AROM in the R knee to demonstrate improvements in functional mobility.  3. Increased MMT  for  Quadriceps to 4+/5  to increase tolerance for ADL and work activities.  4. Pt to report a decreased incidence of instability in the right knee to demonstrate improvements in stability and safety in ADLs.  5. Pt to tolerate HEP to improve ROM and independence with ADL's     Long Term Goals: 6 weeks  1. Report decreased right knee pain  <   / =  5  /10  to increase tolerance for completion of ADLs.  2. Pt to achieve WNL degrees flexion AROM in the right knee to demonstrate improvements in functional mobility.  3. Increased MMT  for  Quadriceps to 5/5  to increase tolerance for ADL and work " activities.  4. Pt will report at CK level (40%-60% impaired) on FOTO knee questionnaire to demonstrate increase in LE function with every day tasks.   5. Pt to be Independent with HEP to improve ROM and independence with ADL's          Plan   Continue with established Plan of Care towards PT goals.    Michael Edwards, PTA  01/16/2018

## 2018-02-01 ENCOUNTER — CLINICAL SUPPORT (OUTPATIENT)
Dept: REHABILITATION | Facility: HOSPITAL | Age: 53
End: 2018-02-01
Payer: MEDICARE

## 2018-02-01 DIAGNOSIS — G89.29 CHRONIC PAIN OF RIGHT KNEE: Primary | ICD-10-CM

## 2018-02-01 DIAGNOSIS — M25.561 CHRONIC PAIN OF RIGHT KNEE: Primary | ICD-10-CM

## 2018-02-01 PROCEDURE — 97110 THERAPEUTIC EXERCISES: CPT

## 2018-02-01 NOTE — PROGRESS NOTES
"                                                    Physical Therapy Treatment  Note     Name: Rishi Aranda  Clinic Number: [id number]  Diagnosis:   No diagnosis found.  Physician: Humberto Royal, *  Treatment Orders: PT Evaluation and Treatment  Past Medical History:   Diagnosis Date    Arthritis     Asthma     Cervical radiculopathy, BUE 8/17/2012    Chronic LBP 8/17/2012    Chronic neck pain 8/17/2012    HTN (hypertension) 8/17/2012    Hypertension        Precautions: standard    Evaluation date: 12/26/17  Visit #: 6/12  Authorization period expiration: 01/03/19    Subjective     Pt reports: feeling more pain by his kneecap, and weakness in his right quad    Pain Scale: before treatment: 5/10; after treatment: NT    Objective     Function Status Measures:    Intake Score  1/16/18  Patient's Physical FS Primary Measure  41   37  Risk adjusted Statistical FOTO*   43      Therapeutic exercise: Rishi Aranda received therapeutic exercises to develop RLE strengthening/flexibility and hip stabilization for 45 minutes including:     Recumbent bike x7 min, no resistance    Supine:  TKE with strap, hand assist and quad set 15x5"  Quad set w/ heel prop 15x5"  SLR hip flexion w/ quad set 2x10x5"   Bridges w/ glut set 2x10x3"  HS stretch 2x45"    Sidelying:  AAROM SLR hip abduction 2x8x3"  Clamshells 2x10x3" bilaterally     Prone:  Quad stretch 3x1 min"     Machine:  B Leg press 80#:  2x10     Kinesiotaping applied to R knee in teardrop shape to decrease pain and increase quad activation    Modalities: Rishi Aranda received ice to R knee for 10 min post tx session.       Written Home Exercises Provided:   Pt demo good understanding of the education provided during the initial evaluation. Rishi Aranda demonstrated good return demonstration of activities.      Pt. education:  · Posture reeducation, body mechanics, HEP,activity modification/avoidance  · No spiritual or educational barriers to learning " provided  · Pt has no cultural, educational or language barriers to learning provided.    Assessment   Added kinesiotaping to treatment today to decrease pain. Tolerated well, and pt was instructed on how to wear and when to remove. Tx focused on quad activation, hip stabilization and LE flexibility. No assistance was needed for sidelying SLR today. Will progress as tolerated. Pt will continue to benefit from skilled outpatient physical therapy to address the remaining functional deficits, provide pt/family education, and to maximize pt's level of independence in the home and community environment.      Anticipated barriers to physical therapy: None    Medical necessity is demonstrated by the following IMPAIRMENTS/PROBLEM LIST:              1)Increase in pain level limiting function              2)Decreased range of motion limiting function              3)Decreased strength limiting function              4)Impaired gait pattern              5)Lack of HEP    · Pt's spiritual, cultural and educational needs considered and pt agreeable to plan of care and goals as stated below:      GOALS: Short Term Goals:  3 weeks  1. Report decreased right knee pain  <   / =  6  /10  to increase tolerance for completion of ADLs.  2. Pt to achieve 120 degrees flexion AROM in the R knee to demonstrate improvements in functional mobility.  3. Increased MMT  for  Quadriceps to 4+/5  to increase tolerance for ADL and work activities.  4. Pt to report a decreased incidence of instability in the right knee to demonstrate improvements in stability and safety in ADLs.  5. Pt to tolerate HEP to improve ROM and independence with ADL's     Long Term Goals: 6 weeks  1. Report decreased right knee pain  <   / =  5  /10  to increase tolerance for completion of ADLs.  2. Pt to achieve WNL degrees flexion AROM in the right knee to demonstrate improvements in functional mobility.  3. Increased MMT  for  Quadriceps to 5/5  to increase tolerance for  ADL and work activities.  4. Pt will report at CK level (40%-60% impaired) on FOTO knee questionnaire to demonstrate increase in LE function with every day tasks.   5. Pt to be Independent with HEP to improve ROM and independence with ADL's          Plan   Continue with established Plan of Care towards PT goals.    Reshma Irene, PT  02/01/2018

## 2018-02-07 ENCOUNTER — OFFICE VISIT (OUTPATIENT)
Dept: SPORTS MEDICINE | Facility: CLINIC | Age: 53
End: 2018-02-07
Payer: MEDICARE

## 2018-02-07 VITALS
WEIGHT: 175 LBS | HEART RATE: 66 BPM | SYSTOLIC BLOOD PRESSURE: 119 MMHG | BODY MASS INDEX: 26.52 KG/M2 | DIASTOLIC BLOOD PRESSURE: 78 MMHG | HEIGHT: 68 IN

## 2018-02-07 DIAGNOSIS — M17.11 PRIMARY OSTEOARTHRITIS OF RIGHT KNEE: ICD-10-CM

## 2018-02-07 DIAGNOSIS — G89.29 CHRONIC NECK PAIN: ICD-10-CM

## 2018-02-07 DIAGNOSIS — M25.561 CHRONIC PAIN OF RIGHT KNEE: ICD-10-CM

## 2018-02-07 DIAGNOSIS — M54.2 CHRONIC NECK PAIN: ICD-10-CM

## 2018-02-07 DIAGNOSIS — M22.40 CHONDROMALACIA OF PATELLA, UNSPECIFIED LATERALITY: Primary | ICD-10-CM

## 2018-02-07 DIAGNOSIS — G89.29 CHRONIC PAIN OF RIGHT KNEE: ICD-10-CM

## 2018-02-07 PROCEDURE — 3008F BODY MASS INDEX DOCD: CPT | Mod: S$GLB,,, | Performed by: ORTHOPAEDIC SURGERY

## 2018-02-07 PROCEDURE — 99214 OFFICE O/P EST MOD 30 MIN: CPT | Mod: S$GLB,,, | Performed by: ORTHOPAEDIC SURGERY

## 2018-02-07 PROCEDURE — 99999 PR PBB SHADOW E&M-EST. PATIENT-LVL III: CPT | Mod: PBBFAC,,, | Performed by: ORTHOPAEDIC SURGERY

## 2018-02-07 NOTE — PROGRESS NOTES
Subjective:          Chief Complaint: Rishi Aranda is a 52 y.o. male who had concerns including Follow-up of the Right Knee.    Patient is here for an evaluation of his right knee following an appointment with Juan Royal PA-C.      The intermittent pain started 1-2 years ago and is becoming progressively worse. Had two series of Euflexxa; relief with first series and no relief with the second series. He reports the pain is mostly on the anterior and medial side of his knee. He reports that he received minimal relief from the surgery in 2011.       He reports that the pain is a 7 /10 aching, throbbing and radiating pain and not responding adequately to conservative measures which have included activity modifications, rest, and oral medication (aleve). Is affecting ADLs and limiting desired level of activity. Denies numbness, tingling, and inability to bear weight.  Pain is 10 /10 at its worst    Mechanical symptoms:  Subjective instability: (+)   Worse at the end of the day  Better with rest.   Nocturnal symptoms: (+)    RIGHT knee arthroscopy and medial meniscectomy, September 2011 Dereck Parks MD              Review of Systems   Constitution: Negative for chills and fever.   HENT: Negative for congestion and sore throat.    Eyes: Negative for discharge and double vision.   Cardiovascular: Negative for chest pain, palpitations and syncope.   Respiratory: Negative for cough and shortness of breath.    Endocrine: Negative for cold intolerance and heat intolerance.   Skin: Negative for dry skin and rash.   Musculoskeletal: Positive for joint pain and joint swelling. Negative for falls, muscle cramps and muscle weakness.   Gastrointestinal: Negative for abdominal pain, nausea and vomiting.   Neurological: Negative for focal weakness, numbness and paresthesias.       Pain Related Questions  Over the past 3 days, what was your average pain during activity? (I.e. running, jogging, walking, climbing stairs,  getting dressed, ect.): 10  Over the past 3 days, what was your highest pain level?: 10  Over the past 3 days, what was your lowest pain level? : 4    Other  How many nights a week are you awakened by your affected body part?: 7  Was the patient's HEIGHT measured or patient reported?: Patient Reported  Was the patient's WEIGHT measured or patient reported?: Measured      Objective:        General: Rishi is well-developed, well-nourished, appears stated age, in no acute distress, alert and oriented to time, place and person.     General    Vitals reviewed.  Constitutional: He is oriented to person, place, and time. He appears well-developed and well-nourished. No distress.   Eyes: Conjunctivae and EOM are normal. Pupils are equal, round, and reactive to light.   Neck: Normal range of motion. Neck supple. No JVD present.   Cardiovascular: Normal heart sounds and intact distal pulses.    No murmur heard.  Pulmonary/Chest: Effort normal and breath sounds normal. No respiratory distress.   Abdominal: Soft. Bowel sounds are normal. He exhibits no distension. There is no tenderness.   Neurological: He is alert and oriented to person, place, and time. Coordination normal.   Psychiatric: He has a normal mood and affect. His behavior is normal. Judgment and thought content normal.     General Musculoskeletal Exam   Gait: normal       Right Knee Exam     Inspection   Erythema: absent  Scars: present  Swelling: present  Effusion: effusion  Deformity: deformity  Bruising: absent    Tenderness   The patient is tender to palpation of the medial joint line.    Crepitus   The patient has crepitus of the patella.    Range of Motion   Extension: 10   Flexion: 110     Tests   Meniscus   Fawn:  Medial - positive Lateral - negative  Ligament Examination Lachman: normal (-1 to 2mm) PCL-Posterior Drawer: normal (0 to 2mm)     MCL - Valgus: normal (0 to 2mm)  LCL - Varus: normalDial Test at 90 degrees: normal (< 5 degrees)  Patella    Patellar Glide (quadrants): Lateral - 2   Medial - 2  Patellar Grind: negative    Other   Sensation: normal    Left Knee Exam     Inspection   Erythema: absent  Scars: absent  Swelling: absent  Effusion: absent  Deformity: deformity  Bruising: absent    Range of Motion   Extension: 0   Flexion:  130 normal     Tests   Meniscus   Fawn:  Medial - negative Lateral - negative  Stability Lachman: normal (-1 to 2mm) PCL-Posterior Drawer: normal (0 to 2mm)  MCL - Valgus: normal (0 to 2mm)  LCL - Varus: normal (0 to 2mm)Dial Test at 90 degrees: normal (< 5 degrees)  Patella   Patellar Glide (Quadrants): Lateral - 2 Medial - 2  Patellar Grind: negative    Other   Sensation: normal    Right Hip Exam     Tests   Bernabe: negative  Left Hip Exam     Tests   Bernabe: negative          Muscle Strength   Right Lower Extremity   Hip Abduction: 5/5   Quadriceps:  5/5   Hamstrin/5   Left Lower Extremity   Hip Abduction: 5/5   Quadriceps:  5/5   Hamstrin/5     Vascular Exam     Right Pulses  Dorsalis Pedis:      2+  Posterior Tibial:      2+        Left Pulses  Dorsalis Pedis:      2+  Posterior Tibial:      2+        Edema  Right Lower Leg: absent  Left Lower Leg: absent    Radiographic Findings 2018:    4 views bilateral    Right knee: DJD with significant narrowing of the patellofemoral and medial tibiofemoral joint space.  No fracture or bone destruction identified.    Left knee: Mild DJD and joint space narrowing medially.  Intramedullary calcification in the distal femur probably enchondroma or bone infarct.  No fracture or bone destruction identified    Xrays of the knees were ordered and reviewed by me today. These findings were discussed and reviewed with the patient.            Assessment:       Encounter Diagnoses   Name Primary?    Chondromalacia of patella, unspecified laterality Yes    Chronic neck pain     Chronic pain of right knee     Primary osteoarthritis of right knee           Plan:       1.  IKDC, SF-12 and KOOS was filled out today in clinic.     RTC in 6 weeks with Mid-level provider Patient will not fill out IKDC, SF-12 and KOOS on return for pre operative history and physical.    2. We reviewed with Rishi today, the pathology and natural history of his diagnosis. We have discussed a variety of treatment options including medications, physical therapy and other alternative treatments. I also explained the indications, risks and benefits of surgery. After discussion, Rishi decided to proceed with surgery. The decision was made to go forward with     1. Right total knee arthroplasty (conformis)    The details of the surgical procedure were explained, including the location of probable incisions and a description of likely hardware and/or grafts to be used.  The patient understands the likely convalescence after surgery.  Also, we have thoroughly discussed the risks, benefits and alternatives to surgery, including, but not limited to, the risk of infection, joint stiffness, blood clot (including DVT and/or pulmonary embolus), neurologic and vascular injury.  It was explained that, if tissue has been repaired or reconstructed, there is a chance of failure, which may require further management.      All of the patient's questions were answered and informed consent was obtained. The patient will contact us if they have any questions or concerns in the interim.    3. Pre operative clearance by Dr. Colten Carlisle                  Patient questionnaires may have been collected.

## 2018-02-07 NOTE — LETTER
February 7, 2018        Colten Carlisle MD  [address]  [facility] [address]             [facility]  [address]  [facility] [address]  Phone: [phone]   Patient: Rishi Aranda   MR Number: [id number]   YOB: 1965   Date of Visit: 2/7/2018       Dear Dr. Carlisle:    Thank you for referring Rishi Aranda to me for evaluation. Below are the relevant portions of my assessment and plan of care.        Encounter Diagnoses   Name Primary?    Chondromalacia of patella, unspecified laterality Yes    Chronic neck pain     Chronic pain of right knee     Primary osteoarthritis of right knee             1. IKDC, SF-12 and KOOS was filled out today in clinic.     RTC in 6 weeks with Mid-level provider Patient will not fill out IKDC, SF-12 and KOOS on return for pre operative history and physical.    2. We reviewed with Rishi today, the pathology and natural history of his diagnosis. We have discussed a variety of treatment options including medications, physical therapy and other alternative treatments. I also explained the indications, risks and benefits of surgery. After discussion, Rishi decided to proceed with surgery. The decision was made to go forward with     1. Right total knee arthroplasty (conformis)    The details of the surgical procedure were explained, including the location of probable incisions and a description of likely hardware and/or grafts to be used.  The patient understands the likely convalescence after surgery.  Also, we have thoroughly discussed the risks, benefits and alternatives to surgery, including, but not limited to, the risk of infection, joint stiffness, blood clot (including DVT and/or pulmonary embolus), neurologic and vascular injury.  It was explained that, if tissue has been repaired or reconstructed, there is a chance of failure, which may require further management.      All of the patient's questions were answered and informed  consent was obtained. The patient will contact us if they have any questions or concerns in the interim.    3. Pre operative clearance by Dr. Colten Carlisle          If you have questions, please do not hesitate to call me. I look forward to following Rishi along with you.    Sincerely,      Maxi Chaves MD           CC  No Recipients

## 2018-02-16 ENCOUNTER — HOSPITAL ENCOUNTER (OUTPATIENT)
Dept: RADIOLOGY | Facility: HOSPITAL | Age: 53
Discharge: HOME | End: 2018-02-16
Attending: ORTHOPAEDIC SURGERY
Payer: MEDICARE

## 2018-02-16 DIAGNOSIS — M17.11 PRIMARY OSTEOARTHRITIS OF RIGHT KNEE: ICD-10-CM

## 2018-02-16 DIAGNOSIS — G89.29 CHRONIC PAIN OF RIGHT KNEE: ICD-10-CM

## 2018-02-16 DIAGNOSIS — G89.29 CHRONIC NECK PAIN: ICD-10-CM

## 2018-02-16 DIAGNOSIS — M22.40 CHONDROMALACIA OF PATELLA, UNSPECIFIED LATERALITY: ICD-10-CM

## 2018-02-16 DIAGNOSIS — M54.2 CHRONIC NECK PAIN: ICD-10-CM

## 2018-02-16 DIAGNOSIS — M25.561 CHRONIC PAIN OF RIGHT KNEE: ICD-10-CM

## 2018-02-16 PROCEDURE — 73700 CT LOWER EXTREMITY W/O DYE: CPT | Mod: 26,RT,, | Performed by: RADIOLOGY

## 2018-02-16 PROCEDURE — 73700 CT LOWER EXTREMITY W/O DYE: CPT | Mod: TC,RT

## 2018-04-30 RX ORDER — ALBUTEROL SULFATE 90 UG/1
AEROSOL, METERED RESPIRATORY (INHALATION)
Qty: 8.5 G | Refills: 3 | Status: SHIPPED | OUTPATIENT
Start: 2018-04-30 | End: 2018-07-30 | Stop reason: SDUPTHER

## 2018-05-04 ENCOUNTER — TELEPHONE (OUTPATIENT)
Dept: SPORTS MEDICINE | Facility: CLINIC | Age: 53
End: 2018-05-04

## 2018-05-04 NOTE — TELEPHONE ENCOUNTER
----- Message from Sherita Tenorio MA sent at 5/4/2018 11:31 AM CDT -----  Contact: self  You can call him and schedule his surgery.   ----- Message -----  From: Serenity Arrieta  Sent: 5/4/2018  11:06 AM  To: Anastacio GANDARA Staff    Pt called requesting a call back from Sherita regarding possible surgery dates and times with Dr. Chaves. Pt could be reached at [phone]

## 2018-05-04 NOTE — TELEPHONE ENCOUNTER
Called patient and discussed available dates for his knee surgery. Said he will be back in touch once he chooses a date for me to schedule. ITA

## 2018-05-25 ENCOUNTER — PATIENT OUTREACH (OUTPATIENT)
Dept: ADMINISTRATIVE | Facility: HOSPITAL | Age: 53
End: 2018-05-25

## 2018-05-25 DIAGNOSIS — I10 ESSENTIAL HYPERTENSION: Primary | ICD-10-CM

## 2018-05-25 DIAGNOSIS — Z11.59 NEED FOR HEPATITIS C SCREENING TEST: Primary | ICD-10-CM

## 2018-05-25 NOTE — PROGRESS NOTES
Spoke with pt, made aware of need for  Physical in Sept, appt has been scheduled for lab and PCP, appts mailed, pt verbalizes understanding.

## 2018-06-15 RX ORDER — LOSARTAN POTASSIUM AND HYDROCHLOROTHIAZIDE 25; 100 MG/1; MG/1
TABLET ORAL
Qty: 30 TABLET | Refills: 11 | Status: SHIPPED | OUTPATIENT
Start: 2018-06-15 | End: 2019-05-07

## 2018-07-10 ENCOUNTER — TELEPHONE (OUTPATIENT)
Dept: ENDOSCOPY | Facility: HOSPITAL | Age: 53
End: 2018-07-10

## 2018-07-23 RX ORDER — LOSARTAN POTASSIUM AND HYDROCHLOROTHIAZIDE 25; 100 MG/1; MG/1
TABLET ORAL
Qty: 30 TABLET | Refills: 5 | Status: SHIPPED | OUTPATIENT
Start: 2018-07-23 | End: 2019-05-07

## 2018-07-30 RX ORDER — ALBUTEROL SULFATE 90 UG/1
AEROSOL, METERED RESPIRATORY (INHALATION)
Qty: 8.5 G | Refills: 0 | Status: SHIPPED | OUTPATIENT
Start: 2018-07-30 | End: 2018-08-25 | Stop reason: SDUPTHER

## 2018-08-25 RX ORDER — ALBUTEROL SULFATE 90 UG/1
AEROSOL, METERED RESPIRATORY (INHALATION)
Qty: 8.5 G | Refills: 1 | Status: SHIPPED | OUTPATIENT
Start: 2018-08-25 | End: 2018-10-13 | Stop reason: SDUPTHER

## 2018-09-19 ENCOUNTER — OFFICE VISIT (OUTPATIENT)
Dept: INTERNAL MEDICINE | Facility: CLINIC | Age: 53
End: 2018-09-19
Payer: MEDICARE

## 2018-09-19 ENCOUNTER — LAB VISIT (OUTPATIENT)
Dept: LAB | Facility: HOSPITAL | Age: 53
End: 2018-09-19
Attending: INTERNAL MEDICINE
Payer: MEDICARE

## 2018-09-19 VITALS
HEIGHT: 68 IN | SYSTOLIC BLOOD PRESSURE: 133 MMHG | HEART RATE: 51 BPM | OXYGEN SATURATION: 98 % | DIASTOLIC BLOOD PRESSURE: 77 MMHG | BODY MASS INDEX: 26.67 KG/M2 | WEIGHT: 176 LBS

## 2018-09-19 DIAGNOSIS — M47.812 SPONDYLOSIS OF CERVICAL REGION WITHOUT MYELOPATHY OR RADICULOPATHY: ICD-10-CM

## 2018-09-19 DIAGNOSIS — Z12.11 SCREENING FOR COLON CANCER: ICD-10-CM

## 2018-09-19 DIAGNOSIS — I10 ESSENTIAL HYPERTENSION: ICD-10-CM

## 2018-09-19 DIAGNOSIS — Z00.00 ANNUAL PHYSICAL EXAM: Primary | ICD-10-CM

## 2018-09-19 DIAGNOSIS — M17.11 PRIMARY OSTEOARTHRITIS OF RIGHT KNEE: ICD-10-CM

## 2018-09-19 DIAGNOSIS — Z11.59 NEED FOR HEPATITIS C SCREENING TEST: ICD-10-CM

## 2018-09-19 DIAGNOSIS — M47.816 SPONDYLOSIS OF LUMBAR REGION WITHOUT MYELOPATHY OR RADICULOPATHY: ICD-10-CM

## 2018-09-19 LAB
ALBUMIN SERPL BCP-MCNC: 3.7 G/DL
ALP SERPL-CCNC: 47 U/L
ALT SERPL W/O P-5'-P-CCNC: 12 U/L
ANION GAP SERPL CALC-SCNC: 7 MMOL/L
AST SERPL-CCNC: 19 U/L
BASOPHILS # BLD AUTO: 0.03 K/UL
BASOPHILS NFR BLD: 0.5 %
BILIRUB SERPL-MCNC: 0.5 MG/DL
BUN SERPL-MCNC: 11 MG/DL
CALCIUM SERPL-MCNC: 9.4 MG/DL
CHLORIDE SERPL-SCNC: 105 MMOL/L
CHOLEST SERPL-MCNC: 179 MG/DL
CHOLEST/HDLC SERPL: 3.7 {RATIO}
CO2 SERPL-SCNC: 28 MMOL/L
COMPLEXED PSA SERPL-MCNC: 1.1 NG/ML
CREAT SERPL-MCNC: 1.2 MG/DL
DIFFERENTIAL METHOD: ABNORMAL
EOSINOPHIL # BLD AUTO: 0.1 K/UL
EOSINOPHIL NFR BLD: 1.4 %
ERYTHROCYTE [DISTWIDTH] IN BLOOD BY AUTOMATED COUNT: 15 %
EST. GFR  (AFRICAN AMERICAN): >60 ML/MIN/1.73 M^2
EST. GFR  (NON AFRICAN AMERICAN): >60 ML/MIN/1.73 M^2
GLUCOSE SERPL-MCNC: 90 MG/DL
HCT VFR BLD AUTO: 41.4 %
HCV AB SERPL QL IA: NEGATIVE
HDLC SERPL-MCNC: 49 MG/DL
HDLC SERPL: 27.4 %
HGB BLD-MCNC: 13.6 G/DL
IMM GRANULOCYTES # BLD AUTO: 0.01 K/UL
IMM GRANULOCYTES NFR BLD AUTO: 0.2 %
LDLC SERPL CALC-MCNC: 110.8 MG/DL
LYMPHOCYTES # BLD AUTO: 2.7 K/UL
LYMPHOCYTES NFR BLD: 46.1 %
MCH RBC QN AUTO: 28.6 PG
MCHC RBC AUTO-ENTMCNC: 32.9 G/DL
MCV RBC AUTO: 87 FL
MONOCYTES # BLD AUTO: 0.5 K/UL
MONOCYTES NFR BLD: 8.6 %
NEUTROPHILS # BLD AUTO: 2.6 K/UL
NEUTROPHILS NFR BLD: 43.2 %
NONHDLC SERPL-MCNC: 130 MG/DL
NRBC BLD-RTO: 0 /100 WBC
PLATELET # BLD AUTO: 266 K/UL
PMV BLD AUTO: 10.1 FL
POTASSIUM SERPL-SCNC: 3.6 MMOL/L
PROT SERPL-MCNC: 6.6 G/DL
RBC # BLD AUTO: 4.76 M/UL
SODIUM SERPL-SCNC: 140 MMOL/L
TRIGL SERPL-MCNC: 96 MG/DL
TSH SERPL DL<=0.005 MIU/L-ACNC: 3.74 UIU/ML
WBC # BLD AUTO: 5.92 K/UL

## 2018-09-19 PROCEDURE — 84443 ASSAY THYROID STIM HORMONE: CPT

## 2018-09-19 PROCEDURE — 86803 HEPATITIS C AB TEST: CPT

## 2018-09-19 PROCEDURE — 99999 PR PBB SHADOW E&M-EST. PATIENT-LVL III: CPT | Mod: PBBFAC,,, | Performed by: INTERNAL MEDICINE

## 2018-09-19 PROCEDURE — 93010 ELECTROCARDIOGRAM REPORT: CPT | Mod: ,,, | Performed by: INTERNAL MEDICINE

## 2018-09-19 PROCEDURE — 36415 COLL VENOUS BLD VENIPUNCTURE: CPT | Mod: PO

## 2018-09-19 PROCEDURE — 84153 ASSAY OF PSA TOTAL: CPT

## 2018-09-19 PROCEDURE — 80061 LIPID PANEL: CPT

## 2018-09-19 PROCEDURE — 99396 PREV VISIT EST AGE 40-64: CPT | Mod: S$PBB,,, | Performed by: INTERNAL MEDICINE

## 2018-09-19 PROCEDURE — 99213 OFFICE O/P EST LOW 20 MIN: CPT | Mod: PBBFAC,PO,25 | Performed by: INTERNAL MEDICINE

## 2018-09-19 PROCEDURE — 85025 COMPLETE CBC W/AUTO DIFF WBC: CPT

## 2018-09-19 PROCEDURE — 3078F DIAST BP <80 MM HG: CPT | Mod: ,,, | Performed by: INTERNAL MEDICINE

## 2018-09-19 PROCEDURE — 80053 COMPREHEN METABOLIC PANEL: CPT

## 2018-09-19 PROCEDURE — 3075F SYST BP GE 130 - 139MM HG: CPT | Mod: ,,, | Performed by: INTERNAL MEDICINE

## 2018-09-19 PROCEDURE — 93005 ELECTROCARDIOGRAM TRACING: CPT | Mod: PBBFAC,PO | Performed by: INTERNAL MEDICINE

## 2018-09-19 NOTE — PROGRESS NOTES
PAST MEDICAL HISTORY:  Hypertension.  Chronic asthma.  Osteoarthritis of the knee, right knee surgery with previous medial meniscectomy   and chondroplasty.  Cervical degenerative disk disease with left cervical foraminotomy at C6-7.  Lumbar degenerative disk disease with neural foraminal stenosis and facet   arthropathy.    CURRENT MEDICINES:  Losartan//25 mg a day.    SOCIAL HISTORY:  Tobacco use, none.  Alcohol use 12 beers daily      REASON FOR VISIT:  This is a 52-year-old male who comes in for an annual routine   visit.  Overall in general, he has felt well.  Earlier in the year, he met with   Dr. Joshua Chaves of Orthopedics regarding osteoarthritis of the knees.  It was   recommended to pursue total knee replacement.  He still has to decide when he   wants to have it done.  He had some family medical issues to attend to during   the summer.    CURRENT MEDICATIONS:  Losartan /25 mg daily.  Zanaflex 4 mg once a day as needed.  ProAir as needed.    FAMILY HISTORY:  Mother is alive, history of hypertension and stroke.  Father is   , unknown.  One sister, one brother living.  Brother has hypertension.    One brother .    REVIEW OF SYMPTOMS:  Endorses no chest pain, palpitations, shortness of breath,   or abdominal pain.  The patient has regular bowel function.  No difficulty   urinating, no nocturia.  Occasional heartburn.  Still at times will have pain   involving his neck with tingling involving the extremities.  Occasional low back   pain, this is when he will take Zanaflex.    PHYSICAL EXAMINATION:  VITAL SIGNS:  Weight 176 pounds, pulse 52, blood pressure 128/72.  HEENT:  Tympanic membranes normal.  Nasal mucosa is clear.  Oropharynx, no   abnormal findings.  NECK:  No thyromegaly.  No masses.  LUNGS:  Clear breath sounds, good effort.  HEART:  Regular rate and rhythm.  ABDOMEN:  Active bowel sounds, soft, nontender.  No hepatosplenomegaly or   abdominal masses.  PULSES:  2+  carotid, 2+ pedal pulses.  EXTREMITIES:  No edema.  LYMPH GLAND:  No palpable adenopathy.  RECTAL:  Stool is brown.  Prostate minimally enlarged at most.    IMPRESSION:  1.  General examination.  2.  Hypertension.  3.  Cervical degenerative disc disease.  4.  Osteoarthritis of the knee.    PLAN:  Routine labs are pending.  We will put up new screening colonoscopy, also   get a baseline EKG, he has not had this and this is also in preparation for   potential surgery.          /ls [id number] review        BRUNO/ANU  dd: 09/19/2018 09:29:00 (CDT)  td: 09/19/2018 21:24:19 (CDT)  Doc ID   #[id number]  Job ID #[id number]    CC:

## 2018-10-14 RX ORDER — ALBUTEROL SULFATE 90 UG/1
AEROSOL, METERED RESPIRATORY (INHALATION)
Qty: 8.5 G | Refills: 5 | Status: SHIPPED | OUTPATIENT
Start: 2018-10-14 | End: 2019-04-04 | Stop reason: SDUPTHER

## 2018-11-07 ENCOUNTER — PES CALL (OUTPATIENT)
Dept: ADMINISTRATIVE | Facility: CLINIC | Age: 53
End: 2018-11-07

## 2018-12-03 ENCOUNTER — OFFICE VISIT (OUTPATIENT)
Dept: INTERNAL MEDICINE | Facility: CLINIC | Age: 53
End: 2018-12-03
Payer: MEDICARE

## 2018-12-03 VITALS
HEIGHT: 66 IN | BODY MASS INDEX: 26.54 KG/M2 | WEIGHT: 165.13 LBS | SYSTOLIC BLOOD PRESSURE: 124 MMHG | HEART RATE: 56 BPM | DIASTOLIC BLOOD PRESSURE: 76 MMHG

## 2018-12-03 DIAGNOSIS — M47.22 OSTEOARTHRITIS OF SPINE WITH RADICULOPATHY, CERVICAL REGION: ICD-10-CM

## 2018-12-03 DIAGNOSIS — I10 HYPERTENSION, UNSPECIFIED TYPE: ICD-10-CM

## 2018-12-03 DIAGNOSIS — M17.11 PRIMARY OSTEOARTHRITIS OF RIGHT KNEE: ICD-10-CM

## 2018-12-03 DIAGNOSIS — Z00.00 ENCOUNTER FOR PREVENTIVE HEALTH EXAMINATION: Primary | ICD-10-CM

## 2018-12-03 PROCEDURE — 3074F SYST BP LT 130 MM HG: CPT | Mod: S$GLB,,, | Performed by: NURSE PRACTITIONER

## 2018-12-03 PROCEDURE — 99999 PR PBB SHADOW E&M-EST. PATIENT-LVL IV: CPT | Mod: PBBFAC,,, | Performed by: NURSE PRACTITIONER

## 2018-12-03 PROCEDURE — 3078F DIAST BP <80 MM HG: CPT | Mod: S$GLB,,, | Performed by: NURSE PRACTITIONER

## 2018-12-03 PROCEDURE — G0439 PPPS, SUBSEQ VISIT: HCPCS | Mod: S$GLB,,, | Performed by: NURSE PRACTITIONER

## 2018-12-03 NOTE — PATIENT INSTRUCTIONS
Counseling and Referral of Other Preventative  (Italic type indicates deductible and co-insurance are waived)    Patient Name: Rishi Aranda  Today's Date: 12/3/2018    Health Maintenance       Date Due Completion Date    TETANUS VACCINE 10/11/1982 ---    Colonoscopy 10/11/2014 ---    Influenza Vaccine 08/01/2018 ---    Lipid Panel 09/19/2023 9/19/2018        No orders of the defined types were placed in this encounter.    The following information is provided to all patients.  This information is to help you find resources for any of the problems found today that may be affecting your health:                Living healthy guide: www.[facility].[place].[facility]      Understanding Diabetes: www.diabetes.org      Eating healthy: www.cdc.gov/healthyweight      CDC home safety checklist: www.cdc.gov/steadi/patient.html      Agency on Aging: www.goea.[place].[facility]      Alcoholics anonymous (AA): www.aa.org      Physical Activity: www.patricia.nih.gov/ev9yvei      Tobacco use: www.quitwithusla.org

## 2018-12-03 NOTE — PROGRESS NOTES
"Rishi Aranda presented for a  Medicare AWV and comprehensive Health Risk Assessment today. The following components were reviewed and updated:    · Medical history  · Family History  · Social history  · Allergies and Current Medications  · Health Risk Assessment  · Health Maintenance  · Care Team     ** See Completed Assessments for Annual Wellness Visit within the encounter summary.**       The following assessments were completed:  · Living Situation  · CAGE  · Depression Screening  · Timed Get Up and Go  · Whisper Test  · Cognitive Function Screening  · Nutrition Screening  · ADL Screening  · PAQ Screening        Vitals:    12/03/18 0943   BP: 124/76   BP Location: Right arm   Patient Position: Sitting   Pulse: (!) 56   Weight: 74.9 kg (165 lb 2 oz)   Height: 5' 6" (1.676 m)     Body mass index is 26.65 kg/m².  Physical Exam   Constitutional: He is oriented to person, place, and time. He appears well-developed and well-nourished. No distress.   HENT:   Head: Normocephalic and atraumatic.   Eyes: No scleral icterus.   Neck: Normal range of motion.   Cardiovascular: Regular rhythm, normal heart sounds and intact distal pulses. Bradycardia present.   Pulmonary/Chest: Effort normal and breath sounds normal. No respiratory distress.   Abdominal: He exhibits no distension.   Musculoskeletal: Normal range of motion. He exhibits no edema.   Neurological: He is alert and oriented to person, place, and time.   Skin: Skin is warm and dry. He is not diaphoretic.   Psychiatric: He has a normal mood and affect. His behavior is normal.       Diagnoses and health risks identified today and associated recommendations/orders:    1. Encounter for preventive health examination  Assessments completed  Preventative health recommendations reviewed    2. Hypertension, unspecified type  Stable.   Controlled with current medical therapy  Followed by PCP.     3. Osteoarthritis of spine with radiculopathy, cervical region  Stable.   Has hx " of neck surgery  Controlled with current medical therapy  Evaluated by physical medicine and followed by PCP.     4. Primary osteoarthritis of right knee  Stable.   Per patient, he is planing on having knee replaced soon  Followed by [facility]     5. BMI 26.0-26.9,adult  Stable.   Exercise recommendations discussed  Followed by PCP.     Provided Rishi with a 5-10 year written screening schedule and personal prevention plan. Recommendations were developed using the USPSTF age appropriate recommendations. Education, counseling, and referrals were provided as needed. After Visit Summary printed and given to patient which includes a list of additional screenings\tests needed.    Follow-up in about 3 months (around 3/3/2019) for a routine visit with your primary care provider or sooner if problems arise. .    Jennifer Orellana, NP

## 2019-03-25 ENCOUNTER — OFFICE VISIT (OUTPATIENT)
Dept: SPORTS MEDICINE | Facility: CLINIC | Age: 54
End: 2019-03-25
Payer: MEDICARE

## 2019-03-25 VITALS
DIASTOLIC BLOOD PRESSURE: 63 MMHG | BODY MASS INDEX: 26.52 KG/M2 | HEIGHT: 66 IN | HEART RATE: 73 BPM | WEIGHT: 165 LBS | SYSTOLIC BLOOD PRESSURE: 106 MMHG

## 2019-03-25 DIAGNOSIS — M54.40 CHRONIC MIDLINE LOW BACK PAIN WITH SCIATICA, SCIATICA LATERALITY UNSPECIFIED: ICD-10-CM

## 2019-03-25 DIAGNOSIS — I10 ESSENTIAL HYPERTENSION: ICD-10-CM

## 2019-03-25 DIAGNOSIS — G89.29 CHRONIC MIDLINE LOW BACK PAIN WITH SCIATICA, SCIATICA LATERALITY UNSPECIFIED: ICD-10-CM

## 2019-03-25 DIAGNOSIS — M21.161 ACQUIRED GENU VARUM, RIGHT: ICD-10-CM

## 2019-03-25 DIAGNOSIS — M17.11 PRIMARY OSTEOARTHRITIS OF RIGHT KNEE: Primary | ICD-10-CM

## 2019-03-25 PROCEDURE — 99999 PR PBB SHADOW E&M-EST. PATIENT-LVL III: ICD-10-PCS | Mod: PBBFAC,,, | Performed by: ORTHOPAEDIC SURGERY

## 2019-03-25 PROCEDURE — 99214 OFFICE O/P EST MOD 30 MIN: CPT | Mod: S$GLB,,, | Performed by: ORTHOPAEDIC SURGERY

## 2019-03-25 PROCEDURE — 99214 PR OFFICE/OUTPT VISIT, EST, LEVL IV, 30-39 MIN: ICD-10-PCS | Mod: S$GLB,,, | Performed by: ORTHOPAEDIC SURGERY

## 2019-03-25 PROCEDURE — 99999 PR PBB SHADOW E&M-EST. PATIENT-LVL III: CPT | Mod: PBBFAC,,, | Performed by: ORTHOPAEDIC SURGERY

## 2019-03-25 PROCEDURE — 3078F DIAST BP <80 MM HG: CPT | Mod: S$GLB,,, | Performed by: ORTHOPAEDIC SURGERY

## 2019-03-25 PROCEDURE — 3074F SYST BP LT 130 MM HG: CPT | Mod: S$GLB,,, | Performed by: ORTHOPAEDIC SURGERY

## 2019-03-25 PROCEDURE — 3074F PR MOST RECENT SYSTOLIC BLOOD PRESSURE < 130 MM HG: ICD-10-PCS | Mod: S$GLB,,, | Performed by: ORTHOPAEDIC SURGERY

## 2019-03-25 PROCEDURE — 3008F BODY MASS INDEX DOCD: CPT | Mod: S$GLB,,, | Performed by: ORTHOPAEDIC SURGERY

## 2019-03-25 PROCEDURE — 3078F PR MOST RECENT DIASTOLIC BLOOD PRESSURE < 80 MM HG: ICD-10-PCS | Mod: S$GLB,,, | Performed by: ORTHOPAEDIC SURGERY

## 2019-03-25 PROCEDURE — 3008F PR BODY MASS INDEX (BMI) DOCUMENTED: ICD-10-PCS | Mod: S$GLB,,, | Performed by: ORTHOPAEDIC SURGERY

## 2019-03-25 NOTE — PROGRESS NOTES
Subjective:          Chief Complaint: Rishi Aranda is a 53 y.o. male who had concerns including Pain of the Right Knee.    Patient is here for an evaluation of his right knee and to schedule right total knee replacement.      The intermittent pain started 1-2 years ago and is becoming progressively worse. Had two series of Euflexxa; relief with first series and no relief with the second series. He reports the pain is mostly on the anterior and medial side of his knee. He reports that he received minimal relief from the surgery in 2011.       He reports that the pain is a 7 /10 aching, throbbing and radiating pain and not responding adequately to conservative measures which have included activity modifications, rest, and oral medication (aleve). Is affecting ADLs and limiting desired level of activity. Denies numbness, tingling, and inability to bear weight.  Pain is 10 /10 at its worst    Mechanical symptoms:  Subjective instability: (+)   Worse at the end of the day  Better with rest.   Nocturnal symptoms: (+)    RIGHT knee arthroscopy and medial meniscectomy, September 2011 Dereck Parks MD            Review of Systems   Constitution: Negative for chills and fever.   HENT: Negative for congestion and sore throat.    Eyes: Negative for discharge and double vision.   Cardiovascular: Negative for chest pain, palpitations and syncope.   Respiratory: Negative for cough and shortness of breath.    Endocrine: Negative for cold intolerance and heat intolerance.   Skin: Negative for dry skin and rash.   Musculoskeletal: Positive for joint pain and joint swelling. Negative for falls, muscle cramps and muscle weakness.   Gastrointestinal: Negative for abdominal pain, nausea and vomiting.   Neurological: Negative for focal weakness, numbness and paresthesias.       Pain Related Questions  Over the past 3 days, what was your average pain during activity? (I.e. running, jogging, walking, climbing stairs, getting dressed,  ect.): 4  Over the past 3 days, what was your highest pain level?: 4  Over the past 3 days, what was your lowest pain level? : 4    Other  How many nights a week are you awakened by your affected body part?: 7      Objective:        General: Rishi is well-developed, well-nourished, appears stated age, in no acute distress, alert and oriented to time, place and person.     General    Vitals reviewed.  Constitutional: He is oriented to person, place, and time. He appears well-developed and well-nourished. No distress.   HENT:   Mouth/Throat: No oropharyngeal exudate.   Eyes: Conjunctivae and EOM are normal. Pupils are equal, round, and reactive to light. Right eye exhibits no discharge. Left eye exhibits no discharge.   Neck: Normal range of motion. Neck supple. No JVD present.   Cardiovascular: Normal heart sounds and intact distal pulses.    No murmur heard.  Pulmonary/Chest: Effort normal and breath sounds normal. No respiratory distress.   Abdominal: Soft. Bowel sounds are normal. He exhibits no distension. There is no tenderness.   Neurological: He is alert and oriented to person, place, and time. He has normal reflexes. No cranial nerve deficit. Coordination normal.   Psychiatric: He has a normal mood and affect. His behavior is normal. Judgment and thought content normal.     General Musculoskeletal Exam   Gait: normal       Right Knee Exam     Inspection   Erythema: absent  Scars: present  Swelling: present  Effusion: absent  Deformity: absent  Bruising: absent    Tenderness   The patient is tender to palpation of the medial joint line.    Crepitus   The patient has crepitus of the patella.    Range of Motion   Extension: 10   Flexion: 110     Tests   Meniscus   Fawn:  Medial - positive Lateral - negative  Ligament Examination Lachman: normal (-1 to 2mm) PCL-Posterior Drawer: normal (0 to 2mm)     MCL - Valgus: normal (0 to 2mm)  LCL - Varus: normalPivot Shift: normal (Equal)Reverse Pivot Shift: normal  (Equal)Dial Test at 30 degrees: normal (< 5 degrees)Dial Test at 90 degrees: normal (< 5 degrees)  Posterior Sag Test: negative  Posterolateral Corner: unstable (>15 degrees difference)  Patella   Patellar apprehension: negative  Passive Patellar Tilt: neutral  Patellar Tracking: normal  Patellar Glide (quadrants): Lateral - 2   Medial - 2  Q-Angle at 90 degrees: normal  Patellar Grind: negative  J-Sign: none    Other   Meniscal Cyst: absent  Popliteal (Baker's) Cyst: absent  Sensation: normal    Left Knee Exam     Inspection   Erythema: absent  Scars: absent  Swelling: absent  Effusion: absent  Deformity: absent  Bruising: absent    Tenderness   The patient is experiencing no tenderness.     Range of Motion   Extension: 0   Flexion:  130 normal     Tests   Meniscus   Fawn:  Medial - negative Lateral - negative  Stability Lachman: normal (-1 to 2mm) PCL-Posterior Drawer: normal (0 to 2mm)  MCL - Valgus: normal (0 to 2mm)  LCL - Varus: normal (0 to 2mm)Pivot Shift: normal (Equal)Reverse Pivot Shift: normal (Equal)Dial Test at 30 degrees: normal (< 5 degrees)Dial Test at 90 degrees: normal (< 5 degrees)  Posterior Sag Test: negative  Posterolateral Corner: unstable (>15 degrees difference)  Patella   Patellar apprehension: negative  Passive Patellar Tilt: neutral  Patellar Tracking: normal  Patellar Glide (Quadrants): Lateral - 2 Medial - 2  Q-Angle at 90 degrees: normal  Patellar Grind: negative  J-Sign: J sign absent    Other   Meniscal Cyst: absent  Popliteal (Baker's) Cyst: absent  Sensation: normal    Right Hip Exam     Tests   Bernabe: negative  Left Hip Exam     Tests   Bernabe: negative          Muscle Strength   Right Lower Extremity   Hip Abduction: 5/5   Quadriceps:  5/5   Hamstrin/5   Left Lower Extremity   Hip Abduction: 5/5   Quadriceps:  5/5   Hamstrin/5     Reflexes     Left Side  Quadriceps:  2+  Achilles:  2+    Right Side   Quadriceps:  2+  Achilles:  2+    Vascular Exam     Right  Pulses  Dorsalis Pedis:      2+  Posterior Tibial:      2+        Left Pulses  Dorsalis Pedis:      2+  Posterior Tibial:      2+        Edema  Right Lower Leg: absent  Left Lower Leg: absent    Radiographic Findings 03/25/2019:    4 views bilateral    Right knee: DJD with significant narrowing of the patellofemoral and medial tibiofemoral joint space.  No fracture or bone destruction identified.    Left knee: Mild DJD and joint space narrowing medially.  Intramedullary calcification in the distal femur probably enchondroma or bone infarct.  No fracture or bone destruction identified    Xrays of the knees were ordered and reviewed by me today. These findings were discussed and reviewed with the patient.            Assessment:       Encounter Diagnoses   Name Primary?    Primary osteoarthritis of right knee Yes    BMI 26.0-26.9,adult     Acquired genu varum, right     Essential hypertension     Chronic midline low back pain with sciatica, sciatica laterality unspecified           Plan:       1. IKDC, SF-12 and KOOS was filled out today in clinic.     RTC in 4-6 weeks with Mid-level provider Patient will not fill out IKDC, SF-12 and KOOS on return for pre operative history and physical.    2. We reviewed with Rishi today, the pathology and natural history of his diagnosis. We have discussed a variety of treatment options including medications, physical therapy and other alternative treatments. I also explained the indications, risks and benefits of surgery. After discussion, Rishi decided to proceed with surgery. The decision was made to go forward with     1. Right total knee arthroplasty (Conformis)    The details of the surgical procedure were explained, including the location of probable incisions and a description of likely hardware and/or grafts to be used.  The patient understands the likely convalescence after surgery.  Also, we have thoroughly discussed the risks, benefits and alternatives to surgery,  including, but not limited to, the risk of infection, joint stiffness, blood clot (including DVT and/or pulmonary embolus), neurologic and vascular injury.  It was explained that, if tissue has been repaired or reconstructed, there is a chance of failure, which may require further management.      All of the patient's questions were answered and informed consent was obtained. The patient will contact us if they have any questions or concerns in the interim.    3. Pre operative clearance by Dr. Colten Carlisle    4. PT orders placed for Ty Rice at [facility]                  Patient questionnaires may have been collected.

## 2019-03-25 NOTE — PATIENT INSTRUCTIONS
After Knee Replacement: Right After Surgery  Your healthcare team will monitor your progress after your surgery. You may receive general anesthesia (being put to sleep), an epidural nerve block in your spinal canal (numb from the waist down), or a femoral nerve block in your leg (entire leg is numb). Sometimes the femoral nerve block is added to another anesthesia for better pain control. Your team will use support equipment to help you recover. Be sure to let them know how you feel and how well your pain is controlled. You may also receive medicines, such as antibiotics and blood thinners.    Support equipment  Special tubes and machines help you recover after surgery. They may include:  · An intravenous (IV) line to provide needed fluids and medicines.  · A catheter tube to help drain your bladder.  · A drainage tube in your leg to release excess fluid and reduce swelling.  · An ice machine or ice pack to reduce inflammation. The ice machine tube carries cold water to the knee joint.  · A sequential compression machine (SCM) to prevent blood clots by gently squeezing and then releasing your foot or calf.  · A continual passive motion machine (CPM) to increase flexibility by gently moving your knee.  Managing pain  When pain is controlled, youll walk sooner and recover faster. So be honest about how much pain you feel. And dont be afraid to ask for pain medicine when you need it. Your nurse may give you IV or oral pain medicine. Or, you may have a patient-controlled analgesia (PCA) machine. This machine lets you push a button to give yourself a measured dose of pain medicine. Tell your nurse if the medicines dont reduce pain or if you suddenly feel worse.  Date Last Reviewed: 10/1/2015  © 9614-8052 [employer]. [address] [place] [address]. All rights reserved. This information is not intended as a substitute for professional medical care. Always follow your healthcare professional's  instructions.        After Knee Replacement: The First Month  Youll apply the same movement skills you learned in the hospital or rehab center to your exercise program at home. You may also continue meeting with your physical therapist. Following your exercise program brings big rewards. With your knee in shape, youll walk more easily and return to an active life sooner.    Maintaining your exercise program  Exercising is the only way to regain your strength and range of motion. With continued exercise, you may gain even more strength and range of motion than you had before surgery. Thats because before surgery, pain and stiffness may have limited your movement. So make exercise part of your daily routine. Continue meeting with your physical therapist as directed. He or she may add riding a stationary bike, swimming, or other new exercises to your program.  Walking in stride  Walking helps build a more normal, comfortable stride. It also keeps you in shape and helps prevent blood clots. Begin by taking three or four short walks every day. Gradually increase how far, how long, and how many times a day you walk. After your walk, lie down, elevate your knee, and ice it to reduce swelling. Your doctor or physical therapist will instruct you when and where to use your walker, crutches, or cane. He or she will also let you know when you can stop using them.  Date Last Reviewed: 9/20/2015 © 2000-2017 [employer]. [address] [place] [address], [place] [address]. All rights reserved. This information is not intended as a substitute for professional medical care. Always follow your healthcare professional's instructions.        Total Knee Replacement  During total knee replacement surgery, your damaged knee joint is replaced with an artificial joint, called a prosthesis. This surgery almost always reduces joint pain and improves your quality of life.     The parts of the prosthesis are secured to the bones of the  knee. Together they form the new joint.   Before your surgery  You will most likely arrive at the hospital on the morning of the surgery. Be sure to follow all of your doctors instructions on preparing for surgery:  · Follow any directions you are given for taking medicines or for not eating or drinking before surgery.  · At the hospital, your temperature, pulse, breathing, and blood pressure will be checked.  · An IV (intravenous) line will be started to provide fluids and medicines needed during surgery.  The surgical procedure  When the surgical team is ready, youll be taken to the operating room. There youll be given anesthesia to help you sleep through surgery, or to make you numb from the waist down. Then an incision is made on the front or side of your knee. Any damaged bone is cleaned away, and the new joint components are put into place. The incision is closed with surgical staples or stitches.  After your surgery  After surgery, youll be sent to the recovery room. When you are fully awake, youll be moved to your room. The nurses will give you medicines to ease your pain. You may have a catheter (small tube) in your bladder. A continuous passive motion machine may be used on your knee to keep it from getting stiff. A sequential compression machine may be used to prevent blood clots by gently squeezing then releasing your leg. You may be given medicine to prevent blood clots. Soon, healthcare providers will help you get up and moving.  When to call your doctor  Once at home, call your doctor if you have any of the symptoms below:  · An increase in knee pain  · Pain or swelling in the calf or leg  · Unusual redness, heat, or drainage at the incision site  · Fever of 100.4°F (38°C) or higher  · Shaking chills  · Trouble breathing or chest pain (call 911)   Date Last Reviewed: 9/20/2015  © 3021-6347 [employer]. [address] [place] [address], [place] [address]. All rights reserved. This information  is not intended as a substitute for professional medical care. Always follow your healthcare professional's instructions.        Understanding Knee Replacement  The knee is a hingelike joint, formed where the thighbone (femur), shinbone (tibia), and kneecap (patella) meet. It is supported by muscles, tendons, and ligaments and lined with cushioning cartilage. Over time, cartilage can wear away. As it does, the knee becomes stiff and painful. A knee prosthesis (artificial joint) can replace the painful joint and restore movement.    A healthy knee  A healthy knee joint bends easily. Cartilage, a smooth tissue, covers the ends of the thighbone and shinbone and the underside of the kneecap. Healthy cartilage absorbs stress and allows the bones to glide freely over each other. Joint fluid lubricates the cartilage surfaces, making movement even easier.       A problem knee  A problem knee is stiff or painful. Cartilage cracks or wears away due to usage, inflammation, or injury. Worn, roughened cartilage no longer allows the joint to glide freely, so it feels stiff and painful. As more cartilage wears away, exposed bones rub together when the knee bends, causing pain. With time, bone surfaces also become rough, making pain worse.       A knee prosthesis  A knee prosthesis lets your knee bend easily again. The roughened ends of the thighbone and shinbone and the underside of the kneecap are replaced with metal and strong plastic components. With new smooth surfaces, the bones can once again glide freely jwithout arthritic pain. A knee prosthesis does have limitations. But it can let you walk and move with greater comfort.  Date Last Reviewed: 9/20/2015 © 2000-2017 [employer]. [address] [place] [address], [place] [address]. All rights reserved. This information is not intended as a substitute for professional medical care. Always follow your healthcare professional's instructions.

## 2019-03-25 NOTE — LETTER
March 25, 2019        Colten Carlisle MD  [address]  [facility] [address]             [facility]  [address]  [facility] [address]  Phone: [phone]   Patient: Rishi Aranda   MR Number: [id number]   YOB: 1965   Date of Visit: 3/25/2019       Dear Dr. Carlisle:    Thank you for referring Rishi Aranda to me for evaluation. Below are the relevant portions of my assessment and plan of care.        Encounter Diagnoses   Name Primary?    Primary osteoarthritis of right knee Yes    BMI 26.0-26.9,adult     Acquired genu varum, right     Essential hypertension     Chronic midline low back pain with sciatica, sciatica laterality unspecified             1. IKDC, SF-12 and KOOS was filled out today in clinic.     RTC in 4-6 weeks with Mid-level provider Patient will not fill out IKDC, SF-12 and KOOS on return for pre operative history and physical.    2. We reviewed with Rishi today, the pathology and natural history of his diagnosis. We have discussed a variety of treatment options including medications, physical therapy and other alternative treatments. I also explained the indications, risks and benefits of surgery. After discussion, Rishi decided to proceed with surgery. The decision was made to go forward with     1. Right total knee arthroplasty (Conformis)    The details of the surgical procedure were explained, including the location of probable incisions and a description of likely hardware and/or grafts to be used.  The patient understands the likely convalescence after surgery.  Also, we have thoroughly discussed the risks, benefits and alternatives to surgery, including, but not limited to, the risk of infection, joint stiffness, blood clot (including DVT and/or pulmonary embolus), neurologic and vascular injury.  It was explained that, if tissue has been repaired or reconstructed, there is a chance of failure, which may require further  management.      All of the patient's questions were answered and informed consent was obtained. The patient will contact us if they have any questions or concerns in the interim.    3. Pre operative clearance by Dr. Colten Carlisle    4. PT orders placed for Ty Rice at [facility]          If you have questions, please do not hesitate to call me. I look forward to following Rishi along with you.    Sincerely,      Maxi Chaves MD           CC  Rishi Aranda

## 2019-03-27 ENCOUNTER — TELEPHONE (OUTPATIENT)
Dept: SPORTS MEDICINE | Facility: CLINIC | Age: 54
End: 2019-03-27

## 2019-03-28 DIAGNOSIS — M17.10 ARTHRITIS OF KNEE: Primary | ICD-10-CM

## 2019-03-28 DIAGNOSIS — M17.9 OSTEOARTHRITIS OF KNEE, UNSPECIFIED (CODE): ICD-10-CM

## 2019-03-28 DIAGNOSIS — M21.161 ACQUIRED GENU VARUM OF RIGHT LOWER EXTREMITY: ICD-10-CM

## 2019-04-04 RX ORDER — ALBUTEROL SULFATE 90 UG/1
AEROSOL, METERED RESPIRATORY (INHALATION)
Qty: 18 G | Refills: 0 | Status: SHIPPED | OUTPATIENT
Start: 2019-04-04 | End: 2019-05-07

## 2019-04-12 ENCOUNTER — TELEPHONE (OUTPATIENT)
Dept: SPORTS MEDICINE | Facility: CLINIC | Age: 54
End: 2019-04-12

## 2019-04-12 NOTE — TELEPHONE ENCOUNTER
Telephoned patient to reschedule surgery with Dr. Chaves. I've rescheduled his 5/09/19 to 5/14/19. Melly has also been notified of the surgery date change.

## 2019-04-22 DIAGNOSIS — Z12.11 SPECIAL SCREENING FOR MALIGNANT NEOPLASMS, COLON: Primary | ICD-10-CM

## 2019-04-22 RX ORDER — POLYETHYLENE GLYCOL 3350, SODIUM SULFATE ANHYDROUS, SODIUM BICARBONATE, SODIUM CHLORIDE, POTASSIUM CHLORIDE 236; 22.74; 6.74; 5.86; 2.97 G/4L; G/4L; G/4L; G/4L; G/4L
4 POWDER, FOR SOLUTION ORAL ONCE
Qty: 4000 ML | Refills: 0 | Status: SHIPPED | OUTPATIENT
Start: 2019-04-22 | End: 2019-04-22

## 2019-04-30 ENCOUNTER — TELEPHONE (OUTPATIENT)
Dept: INTERNAL MEDICINE | Facility: CLINIC | Age: 54
End: 2019-04-30

## 2019-04-30 NOTE — TELEPHONE ENCOUNTER
----- Message from Chana Burton MA sent at 4/30/2019 10:56 AM CDT -----  Regarding: Medical Clearance  Dr. Carlisle,     Dr. Chaves is recommending surgery for our mutual patient on 05/14/2019. Prior to surgery they will need to have the following completed through your office:     -Letter stating medically cleared for surgery     He will be coming in tomorrow 05/01/2019 for his Pre-op with Humberto Royal PA-C. Please feel free to contact myself, Mr. Royal, or Dr. Chaves with questions.     Thank you,     Chana Burton M.A.   Medical Assistant to Humberto Royal PA-C

## 2019-05-01 ENCOUNTER — OFFICE VISIT (OUTPATIENT)
Dept: SPORTS MEDICINE | Facility: CLINIC | Age: 54
End: 2019-05-01
Payer: MEDICARE

## 2019-05-01 ENCOUNTER — ANESTHESIA EVENT (OUTPATIENT)
Dept: ENDOSCOPY | Facility: HOSPITAL | Age: 54
End: 2019-05-01
Payer: MEDICARE

## 2019-05-01 ENCOUNTER — HOSPITAL ENCOUNTER (OUTPATIENT)
Dept: PREADMISSION TESTING | Facility: OTHER | Age: 54
Discharge: HOME | End: 2019-05-01
Attending: ORTHOPAEDIC SURGERY
Payer: MEDICARE

## 2019-05-01 VITALS
HEIGHT: 66 IN | SYSTOLIC BLOOD PRESSURE: 133 MMHG | HEART RATE: 53 BPM | OXYGEN SATURATION: 98 % | DIASTOLIC BLOOD PRESSURE: 84 MMHG | TEMPERATURE: 98 F | WEIGHT: 165 LBS | BODY MASS INDEX: 26.52 KG/M2

## 2019-05-01 VITALS
DIASTOLIC BLOOD PRESSURE: 84 MMHG | HEART RATE: 59 BPM | WEIGHT: 165 LBS | SYSTOLIC BLOOD PRESSURE: 137 MMHG | HEIGHT: 66 IN | BODY MASS INDEX: 26.52 KG/M2

## 2019-05-01 DIAGNOSIS — M17.11 PRIMARY OSTEOARTHRITIS OF RIGHT KNEE: Primary | ICD-10-CM

## 2019-05-01 DIAGNOSIS — M17.11 PRIMARY OSTEOARTHRITIS OF RIGHT KNEE: ICD-10-CM

## 2019-05-01 PROBLEM — Z12.11 SCREENING FOR COLON CANCER: Status: ACTIVE | Noted: 2019-05-01

## 2019-05-01 LAB
ABO + RH BLD: NORMAL
ANION GAP SERPL CALC-SCNC: 8 MMOL/L (ref 8–16)
BASOPHILS # BLD AUTO: 0.02 K/UL (ref 0–0.2)
BASOPHILS NFR BLD: 0.3 % (ref 0–1.9)
BLD GP AB SCN CELLS X3 SERPL QL: NORMAL
BUN SERPL-MCNC: 10 MG/DL (ref 6–20)
CALCIUM SERPL-MCNC: 9.3 MG/DL (ref 8.7–10.5)
CHLORIDE SERPL-SCNC: 107 MMOL/L (ref 95–110)
CO2 SERPL-SCNC: 26 MMOL/L (ref 23–29)
CREAT SERPL-MCNC: 1 MG/DL (ref 0.5–1.4)
DIFFERENTIAL METHOD: ABNORMAL
EOSINOPHIL # BLD AUTO: 0.1 K/UL (ref 0–0.5)
EOSINOPHIL NFR BLD: 1.1 % (ref 0–8)
ERYTHROCYTE [DISTWIDTH] IN BLOOD BY AUTOMATED COUNT: 14.5 % (ref 11.5–14.5)
EST. GFR  (AFRICAN AMERICAN): >60 ML/MIN/1.73 M^2
EST. GFR  (NON AFRICAN AMERICAN): >60 ML/MIN/1.73 M^2
GLUCOSE SERPL-MCNC: 84 MG/DL (ref 70–110)
HCT VFR BLD AUTO: 41.5 % (ref 40–54)
HGB BLD-MCNC: 13.8 G/DL (ref 14–18)
LYMPHOCYTES # BLD AUTO: 2.5 K/UL (ref 1–4.8)
LYMPHOCYTES NFR BLD: 37.3 % (ref 18–48)
MCH RBC QN AUTO: 29 PG (ref 27–31)
MCHC RBC AUTO-ENTMCNC: 33.3 G/DL (ref 32–36)
MCV RBC AUTO: 87 FL (ref 82–98)
MONOCYTES # BLD AUTO: 0.5 K/UL (ref 0.3–1)
MONOCYTES NFR BLD: 8.2 % (ref 4–15)
NEUTROPHILS # BLD AUTO: 3.5 K/UL (ref 1.8–7.7)
NEUTROPHILS NFR BLD: 52.8 % (ref 38–73)
PLATELET # BLD AUTO: 268 K/UL (ref 150–350)
PMV BLD AUTO: 9.6 FL (ref 9.2–12.9)
POTASSIUM SERPL-SCNC: 4.2 MMOL/L (ref 3.5–5.1)
RBC # BLD AUTO: 4.76 M/UL (ref 4.6–6.2)
SODIUM SERPL-SCNC: 141 MMOL/L (ref 136–145)
WBC # BLD AUTO: 6.57 K/UL (ref 3.9–12.7)

## 2019-05-01 PROCEDURE — 99499 NO LOS: ICD-10-PCS | Mod: S$GLB,,, | Performed by: PHYSICIAN ASSISTANT

## 2019-05-01 PROCEDURE — 99999 PR PBB SHADOW E&M-EST. PATIENT-LVL III: ICD-10-PCS | Mod: PBBFAC,,, | Performed by: PHYSICIAN ASSISTANT

## 2019-05-01 PROCEDURE — 99999 PR PBB SHADOW E&M-EST. PATIENT-LVL III: CPT | Mod: PBBFAC,,, | Performed by: PHYSICIAN ASSISTANT

## 2019-05-01 PROCEDURE — 99499 UNLISTED E&M SERVICE: CPT | Mod: S$GLB,,, | Performed by: PHYSICIAN ASSISTANT

## 2019-05-01 PROCEDURE — 85025 COMPLETE CBC W/AUTO DIFF WBC: CPT

## 2019-05-01 PROCEDURE — 80048 BASIC METABOLIC PNL TOTAL CA: CPT

## 2019-05-01 PROCEDURE — 86901 BLOOD TYPING SEROLOGIC RH(D): CPT

## 2019-05-01 PROCEDURE — 36415 COLL VENOUS BLD VENIPUNCTURE: CPT

## 2019-05-01 RX ORDER — PREGABALIN 75 MG/1
75 CAPSULE ORAL
Status: CANCELLED | OUTPATIENT
Start: 2019-05-01 | End: 2019-05-01

## 2019-05-01 RX ORDER — FAMOTIDINE 20 MG/1
20 TABLET, FILM COATED ORAL
Status: CANCELLED | OUTPATIENT
Start: 2019-05-01 | End: 2019-05-01

## 2019-05-01 RX ORDER — ALBUTEROL SULFATE 0.83 MG/ML
2.5 SOLUTION RESPIRATORY (INHALATION)
Status: CANCELLED | OUTPATIENT
Start: 2019-05-01 | End: 2019-05-01

## 2019-05-01 RX ORDER — SODIUM CHLORIDE, SODIUM LACTATE, POTASSIUM CHLORIDE, CALCIUM CHLORIDE 600; 310; 30; 20 MG/100ML; MG/100ML; MG/100ML; MG/100ML
INJECTION, SOLUTION INTRAVENOUS CONTINUOUS
Status: CANCELLED | OUTPATIENT
Start: 2019-05-01

## 2019-05-01 RX ORDER — SODIUM CHLORIDE 0.9 % (FLUSH) 0.9 %
10 SYRINGE (ML) INJECTION
Status: CANCELLED | OUTPATIENT
Start: 2019-05-01

## 2019-05-01 RX ORDER — OXYCODONE AND ACETAMINOPHEN 10; 325 MG/1; MG/1
1 TABLET ORAL EVERY 6 HOURS PRN
Qty: 42 TABLET | Refills: 0 | Status: SHIPPED | OUTPATIENT
Start: 2019-05-01 | End: 2019-06-13 | Stop reason: SDUPTHER

## 2019-05-01 RX ORDER — MIDAZOLAM HYDROCHLORIDE 1 MG/ML
2 INJECTION INTRAMUSCULAR; INTRAVENOUS
Status: CANCELLED | OUTPATIENT
Start: 2019-05-01 | End: 2019-05-01

## 2019-05-01 RX ORDER — PROMETHAZINE HYDROCHLORIDE 25 MG/1
25 TABLET ORAL EVERY 4 HOURS PRN
Qty: 30 TABLET | Refills: 0 | Status: SHIPPED | OUTPATIENT
Start: 2019-05-01 | End: 2019-12-11 | Stop reason: ALTCHOICE

## 2019-05-01 RX ORDER — LIDOCAINE HYDROCHLORIDE 10 MG/ML
0.5 INJECTION, SOLUTION EPIDURAL; INFILTRATION; INTRACAUDAL; PERINEURAL ONCE
Status: CANCELLED | OUTPATIENT
Start: 2019-05-01 | End: 2019-05-01

## 2019-05-01 RX ORDER — TRAMADOL HYDROCHLORIDE 50 MG/1
50 TABLET ORAL EVERY 6 HOURS PRN
Qty: 30 TABLET | Refills: 0 | Status: SHIPPED | OUTPATIENT
Start: 2019-05-01 | End: 2019-05-22

## 2019-05-01 RX ORDER — ASPIRIN 325 MG
325 TABLET ORAL DAILY
Qty: 42 TABLET | Refills: 0 | Status: SHIPPED | OUTPATIENT
Start: 2019-05-01 | End: 2019-12-11 | Stop reason: ALTCHOICE

## 2019-05-01 RX ORDER — CELECOXIB 200 MG/1
200 CAPSULE ORAL 2 TIMES DAILY
Qty: 60 CAPSULE | Refills: 0 | Status: SHIPPED | OUTPATIENT
Start: 2019-05-01 | End: 2019-06-13 | Stop reason: SDUPTHER

## 2019-05-01 RX ORDER — MUPIROCIN 20 MG/G
OINTMENT TOPICAL
Status: CANCELLED | OUTPATIENT
Start: 2019-05-01

## 2019-05-01 RX ORDER — ACETAMINOPHEN 500 MG
1000 TABLET ORAL
Status: CANCELLED | OUTPATIENT
Start: 2019-05-01 | End: 2019-05-01

## 2019-05-01 NOTE — ANESTHESIA PREPROCEDURE EVALUATION
05/01/2019  Rishi Aranda is a 53 y.o., male.    Anesthesia Evaluation    I have reviewed the Patient Summary Reports.    I have reviewed the Nursing Notes.   I have reviewed the Medications.     Review of Systems  Anesthesia Hx:  No problems with previous Anesthesia  History of prior surgery of interest to airway management or planning: cervical fusion. Previous anesthesia: General Knee scope 2008 [facility] with general anesthesia.  Denies Family Hx of Anesthesia complications.   Denies Personal Hx of Anesthesia complications.   Social:  Non-Smoker    Hematology/Oncology:  Hematology Normal   Oncology Normal     EENT/Dental:EENT/Dental Normal   Cardiovascular:   Exercise tolerance: good Hypertension, well controlled hyperlipidemia    Pulmonary:   Asthma mild    Renal/:  Renal/ Normal     Hepatic/GI:  Hepatic/GI Normal    Musculoskeletal:   Arthritis  Prev ACF - Full ROM Spine Disorders: cervical    Neurological:   Neuromuscular Disease,        Physical Exam  General:  Well nourished    Airway/Jaw/Neck:  Airway Findings: Mouth Opening: Normal Tongue: Normal  General Airway Assessment: Adult  Mallampati: I  Jaw/Neck Findings:  Neck ROM: Extension Decreased, Mild      Dental:  Dental Findings: In tact        Mental Status:  Mental Status Findings:  Cooperative, Alert and Oriented         Anesthesia Plan  Type of Anesthesia, risks & benefits discussed:  Anesthesia Type:  general  Patient's Preference: General  Intra-op Monitoring Plan: standard ASA monitors  Intra-op Monitoring Plan Comments:   Post Op Pain Control Plan: multimodal analgesia  Post Op Pain Control Plan Comments:   Induction:   IV  Beta Blocker:         Informed Consent: Patient understands risks and agrees with Anesthesia plan.  Questions answered. Anesthesia consent signed with patient.  ASA Score: 2     Day of Surgery Review of History &  Physical: I have interviewed and examined the patient. I have reviewed the patient's H&P dated: 5/2/2019. There are no significant changes.  H&P update referred to the surgeon.     Anesthesia Plan Notes: Labs today,EKG in epic,block discussed        Ready For Surgery From Anesthesia Perspective.

## 2019-05-01 NOTE — H&P (VIEW-ONLY)
"Rishi Aranda  is here for a completion of his perioperative paperwork. he  Is scheduled to undergo 1. Right total knee arthroplasty (Conformis)     on 5/14/2019.  He  does need clearance for this procedure.     Per PCP, Colten Carlisle MD, "The patient is cleared for anesthesia and surgery."        Risks, indications and benefits of the surgical procedure were discussed with the patient. All questions with regard to surgery, rehab, expected return to functional activities, activities of daily living and recreational endeavors were answered to his satisfaction.    Patient was informed and understands the risks of surgery are greater for patients with a current condition or history of heart disease, obesity, clotting disorders, recurrent infections, steroid use, current or past smoking, and factors such as sedentary lifestyle and noncompliance with medications, therapy or follow-up. The degree of the increased risk is hard to estimate with any degree of precision.    Once no other questions were asked, a brief history and physical exam was then performed.    PAST MEDICAL HISTORY:   Past Medical History:   Diagnosis Date    Arthritis     Asthma     Cervical radiculopathy, BUE 8/17/2012    Chronic LBP 8/17/2012    Chronic neck pain 8/17/2012    HTN (hypertension) 8/17/2012    Hypertension      PAST SURGICAL HISTORY:   Past Surgical History:   Procedure Laterality Date    KNEE SURGERY      NECK SURGERY       FAMILY HISTORY:   Family History   Problem Relation Age of Onset    Hypertension Mother     Stroke Mother     No Known Problems Sister     Hypertension Brother      SOCIAL HISTORY:   Social History     Socioeconomic History    Marital status:      Spouse name: Not on file    Number of children: Not on file    Years of education: Not on file    Highest education level: Not on file   Occupational History    Not on file   Social Needs    Financial resource strain: Not on file    Food " insecurity:     Worry: Not on file     Inability: Not on file    Transportation needs:     Medical: Not on file     Non-medical: Not on file   Tobacco Use    Smoking status: Never Smoker    Smokeless tobacco: Never Used   Substance and Sexual Activity    Alcohol use: Yes     Alcohol/week: 7.2 oz     Types: 12 Cans of beer per week     Comment: occasionally    Drug use: No    Sexual activity: Not on file   Lifestyle    Physical activity:     Days per week: Not on file     Minutes per session: Not on file    Stress: Not on file   Relationships    Social connections:     Talks on phone: Not on file     Gets together: Not on file     Attends Jainism service: Not on file     Active member of club or organization: Not on file     Attends meetings of clubs or organizations: Not on file     Relationship status: Not on file   Other Topics Concern    Not on file   Social History Narrative    Not on file       MEDICATIONS:   Current Outpatient Medications:     losartan-hydrochlorothiazide 100-25 mg (HYZAAR) 100-25 mg per tablet, TAKE 1 TABLET BY MOUTH EVERY DAY., Disp: 30 tablet, Rfl: 11    losartan-hydrochlorothiazide 100-25 mg (HYZAAR) 100-25 mg per tablet, TAKE 1 TABLET BY MOUTH EVERY DAY., Disp: 30 tablet, Rfl: 5    tiZANidine (ZANAFLEX) 4 MG tablet, Take 1 tablet (4 mg total) by mouth 3 (three) times daily as needed., Disp: 90 tablet, Rfl: 1    VENTOLIN HFA 90 mcg/actuation inhaler, INHALE 2 PUFFS INTO LUNGS FOUR TIMES DAILY AS NEEDED FOR WHEEZE, Disp: 18 g, Rfl: 0  ALLERGIES:   Review of patient's allergies indicates:   Allergen Reactions    Eric-dur     Theophylline      Other reaction(s): Hives       Review of Systems   Constitution: Negative. Negative for chills, fever and night sweats.   HENT: Negative for congestion and headaches.    Eyes: Negative for blurred vision, left vision loss and right vision loss.   Cardiovascular: Negative for chest pain and syncope.   Respiratory: Negative for cough  and shortness of breath.    Endocrine: Negative for polydipsia, polyphagia and polyuria.   Hematologic/Lymphatic: Negative for bleeding problem. Does not bruise/bleed easily.   Skin: Negative for dry skin, itching and rash.   Musculoskeletal: Negative for falls and muscle weakness.   Gastrointestinal: Negative for abdominal pain and bowel incontinence.   Genitourinary: Negative for bladder incontinence and nocturia.   Neurological: Negative for disturbances in coordination, loss of balance and seizures.   Psychiatric/Behavioral: Negative for depression. The patient does not have insomnia.    Allergic/Immunologic: Negative for hives and persistent infections.     PHYSICAL EXAM:  GEN: A&Ox3, WD WN NAD  HEENT: WNL  CHEST: CTAB, no W/R/R  HEART: RRR, no M/R/G   ABD: Soft, NT ND, BS x4 QUADS  MS: Refer to previous note for detailed MS exam  NEURO: CN II-XII intact       The surgical consent was then reviewed with the patient, who agreed with all the contents of the consent form and it was signed. he was then given the [facility] surgery packet to bring with him to [facility] for the anesthesia portion of his perioperative paperwork.     Due to the serious nature of total joint infection and the high prevalence of community acquired MRSA, vancomycin will be used perioperatively.     PHYSICAL THERAPY:  He was also instructed regarding physical therapy and will begin POD # 1-3. He is doing physical therapy at [facility].      POST OP CARE:instructions were reviewed including care of the wound and dressing after surgery and when he can shower.     PAIN MANAGEMENT: Rishi Aranda was also given a pain management regime, which includes the TENS unit given to him by Angelo Zuleta along with the education required for its use. He was also instructed regarding the Polar ice unit that will be in place after surgery and his postoperative pain medications.     PAIN MEDICATION:  Percocet 10/325mg 1 po q 4-6  hours prn pain  Ultram 50 mg one p.o. q.4-6 hours p.r.n. breakthrough pain,   Phenergan 25 mg one p.o. q.4-6 hours p.r.n. nausea and vomiting.  Celebrex 200 mg BID    Patient denies history of seizures.     Patient was instructed to buy and take:  Aspirin 325mg daily x 6 weeks for DVT prophylaxis starting on the evening after surgery.  Patient will also use bilateral TEDs on lower extremities, SCDs during surgery, and early ambulation post-op. If the patient was previously taking 81mg baby aspirin, they were told to not take it will using the above stated aspirin and to restart the 81mg aspirin after completion of the aspirin dose.       Patient was also told to buy over the counter Prilosec medication and take it once daily for GI protection as long as they are taking NSAIDs or Aspirin.    DVT prophylaxis was discussed with the patient today including risk factors for developing DVTs and history of DVTs. The patient was asked if any specific recommendations were given from the doctor/s that did pre-operative surgical clearance.      Patient was asked if they were taking or using OCP pills or devices. If they answered yes, then they were instructed to stop using OCPs at this pre-operative appointment until 2 months post-op to help prevent DVT development. They understand that there are other forms of birth control that do not involve hormones. They expressed understanding that ignoring/not following this instruction could result in a DVT which could turn into a deadly pulmonary embolism.      The patient was told that narcotic pain medications may make them drowsy and instructions were given to not sign legal documents, drive or operate heavy machinery, cars, or equipment while under the influence of narcotic medications.     As there were no other questions to be asked, he was given my business card along with Maxi Chaves MD business card if he has any questions or concerns prior to surger

## 2019-05-01 NOTE — DISCHARGE INSTRUCTIONS
PRE-ADMIT TESTING -  [phone]    [address]  [place]          Your surgery has been scheduled at [facility]. We are pleased to have the opportunity to serve you. For Further Information please call [phone].    On the day of surgery please report to the Information Desk on the 1st floor.    · CONTACT YOUR PHYSICIAN'S OFFICE THE DAY PRIOR TO YOUR SURGERY TO OBTAIN YOUR ARRIVAL TIME.     · The evening before surgery do not eat anything after 9 p.m. ( this includes hard candy, chewing gum and mints).  You may only have GATORADE, POWERADE AND WATER  from 9 p.m. until you leave your home.   DO NOT DRINK ANY LIQUIDS ON THE WAY TO THE HOSPITAL.      SPECIAL MEDICATION INSTRUCTIONS: TAKE medications checked off by the Anesthesiologist on your Medication List.    Angiogram Patients: Take medications as instructed by your physician, including aspirin.     Surgery Patients:    If you take ASPIRIN - Your PHYSICIAN/SURGEON will need to inform you IF/OR when you need to stop taking aspirin prior to your surgery.     Do Not take any medications containing IBUPROFEN.  Do Not Wear any make-up or dark nail polish   (especially eye make-up) to surgery. If you come to surgery with makeup on you will be required to remove the makeup or nail polish.    Do not shave your surgical area at least 5 days prior to your surgery. The surgical prep will be performed at the hospital according to Infection Control regulations.    Leave all valuables at home.   Do Not wear any jewelry or watches, including any metal in body piercings. Jewelry must be removed prior to coming to the hospital.  There is a possibility that rings that are unable to be removed may be cut off if they are on the surgical extremity.    Contact Lens must be removed before surgery. Either do not wear the contact lens or bring a case and solution for storage.  Please bring a container for eyeglasses or dentures as required.  Bring  any paperwork your physician has provided, such as consent forms,  history and physicals, doctor's orders, etc.   Bring comfortable clothes that are loose fitting to wear upon discharge. Take into consideration the type of surgery being performed.  Maintain your diet as advised per your physician the day prior to surgery.      Adequate rest the night before surgery is advised.   Park in the Parking lot behind the hospital or in the [facility] across the street from the parking lot. Parking is complimentary.  If you will be discharged the same day as your procedure, please arrange for a responsible adult to drive you home or to accompany you if traveling by taxi.   YOU WILL NOT BE PERMITTED TO DRIVE OR TO LEAVE THE HOSPITAL ALONE AFTER SURGERY.   It is strongly recommended that you arrange for someone to remain with you for the first 24 hrs following your surgery.       Thank you for your cooperation.  The Staff of [facility].                Bathing Instructions with Hibiclens     Shower the evening before and morning of your procedure with Hibiclens:   Wash your face with water and your regular face wash/soap   Apply Hibiclens directly on your skin or on a wet washcloth and wash gently. When showering: Move away from the shower stream when applying Hibiclens to avoid rinsing off too soon.   Rinse thoroughly with warm water   Do not dilute Hibiclens         Dry off as usual, do not use any deodorant, powder, body lotions, perfume, after shave or cologne.

## 2019-05-01 NOTE — H&P
"Rishi Aranda  is here for a completion of his perioperative paperwork. he  Is scheduled to undergo 1. Right total knee arthroplasty (Conformis)     on 5/14/2019.  He  does need clearance for this procedure.     Per PCP, Colten Carlisle MD, "The patient is cleared for anesthesia and surgery."        Risks, indications and benefits of the surgical procedure were discussed with the patient. All questions with regard to surgery, rehab, expected return to functional activities, activities of daily living and recreational endeavors were answered to his satisfaction.    Patient was informed and understands the risks of surgery are greater for patients with a current condition or history of heart disease, obesity, clotting disorders, recurrent infections, steroid use, current or past smoking, and factors such as sedentary lifestyle and noncompliance with medications, therapy or follow-up. The degree of the increased risk is hard to estimate with any degree of precision.    Once no other questions were asked, a brief history and physical exam was then performed.    PAST MEDICAL HISTORY:   Past Medical History:   Diagnosis Date    Arthritis     Asthma     Cervical radiculopathy, BUE 8/17/2012    Chronic LBP 8/17/2012    Chronic neck pain 8/17/2012    HTN (hypertension) 8/17/2012    Hypertension      PAST SURGICAL HISTORY:   Past Surgical History:   Procedure Laterality Date    KNEE SURGERY      NECK SURGERY       FAMILY HISTORY:   Family History   Problem Relation Age of Onset    Hypertension Mother     Stroke Mother     No Known Problems Sister     Hypertension Brother      SOCIAL HISTORY:   Social History     Socioeconomic History    Marital status:      Spouse name: Not on file    Number of children: Not on file    Years of education: Not on file    Highest education level: Not on file   Occupational History    Not on file   Social Needs    Financial resource strain: Not on file    Food " insecurity:     Worry: Not on file     Inability: Not on file    Transportation needs:     Medical: Not on file     Non-medical: Not on file   Tobacco Use    Smoking status: Never Smoker    Smokeless tobacco: Never Used   Substance and Sexual Activity    Alcohol use: Yes     Alcohol/week: 7.2 oz     Types: 12 Cans of beer per week     Comment: occasionally    Drug use: No    Sexual activity: Not on file   Lifestyle    Physical activity:     Days per week: Not on file     Minutes per session: Not on file    Stress: Not on file   Relationships    Social connections:     Talks on phone: Not on file     Gets together: Not on file     Attends Jewish service: Not on file     Active member of club or organization: Not on file     Attends meetings of clubs or organizations: Not on file     Relationship status: Not on file   Other Topics Concern    Not on file   Social History Narrative    Not on file       MEDICATIONS:   Current Outpatient Medications:     losartan-hydrochlorothiazide 100-25 mg (HYZAAR) 100-25 mg per tablet, TAKE 1 TABLET BY MOUTH EVERY DAY., Disp: 30 tablet, Rfl: 11    losartan-hydrochlorothiazide 100-25 mg (HYZAAR) 100-25 mg per tablet, TAKE 1 TABLET BY MOUTH EVERY DAY., Disp: 30 tablet, Rfl: 5    tiZANidine (ZANAFLEX) 4 MG tablet, Take 1 tablet (4 mg total) by mouth 3 (three) times daily as needed., Disp: 90 tablet, Rfl: 1    VENTOLIN HFA 90 mcg/actuation inhaler, INHALE 2 PUFFS INTO LUNGS FOUR TIMES DAILY AS NEEDED FOR WHEEZE, Disp: 18 g, Rfl: 0  ALLERGIES:   Review of patient's allergies indicates:   Allergen Reactions    Eric-dur     Theophylline      Other reaction(s): Hives       Review of Systems   Constitution: Negative. Negative for chills, fever and night sweats.   HENT: Negative for congestion and headaches.    Eyes: Negative for blurred vision, left vision loss and right vision loss.   Cardiovascular: Negative for chest pain and syncope.   Respiratory: Negative for cough  and shortness of breath.    Endocrine: Negative for polydipsia, polyphagia and polyuria.   Hematologic/Lymphatic: Negative for bleeding problem. Does not bruise/bleed easily.   Skin: Negative for dry skin, itching and rash.   Musculoskeletal: Negative for falls and muscle weakness.   Gastrointestinal: Negative for abdominal pain and bowel incontinence.   Genitourinary: Negative for bladder incontinence and nocturia.   Neurological: Negative for disturbances in coordination, loss of balance and seizures.   Psychiatric/Behavioral: Negative for depression. The patient does not have insomnia.    Allergic/Immunologic: Negative for hives and persistent infections.     PHYSICAL EXAM:  GEN: A&Ox3, WD WN NAD  HEENT: WNL  CHEST: CTAB, no W/R/R  HEART: RRR, no M/R/G   ABD: Soft, NT ND, BS x4 QUADS  MS: Refer to previous note for detailed MS exam  NEURO: CN II-XII intact       The surgical consent was then reviewed with the patient, who agreed with all the contents of the consent form and it was signed. he was then given the [facility] surgery packet to bring with him to [facility] for the anesthesia portion of his perioperative paperwork.     Due to the serious nature of total joint infection and the high prevalence of community acquired MRSA, vancomycin will be used perioperatively.     PHYSICAL THERAPY:  He was also instructed regarding physical therapy and will begin POD # 1-3. He is doing physical therapy at [facility].      POST OP CARE:instructions were reviewed including care of the wound and dressing after surgery and when he can shower.     PAIN MANAGEMENT: Rishi Aranda was also given a pain management regime, which includes the TENS unit given to him by Angelo Zuleta along with the education required for its use. He was also instructed regarding the Polar ice unit that will be in place after surgery and his postoperative pain medications.     PAIN MEDICATION:  Percocet 10/325mg 1 po q 4-6  hours prn pain  Ultram 50 mg one p.o. q.4-6 hours p.r.n. breakthrough pain,   Phenergan 25 mg one p.o. q.4-6 hours p.r.n. nausea and vomiting.  Celebrex 200 mg BID    Patient denies history of seizures.     Patient was instructed to buy and take:  Aspirin 325mg daily x 6 weeks for DVT prophylaxis starting on the evening after surgery.  Patient will also use bilateral TEDs on lower extremities, SCDs during surgery, and early ambulation post-op. If the patient was previously taking 81mg baby aspirin, they were told to not take it will using the above stated aspirin and to restart the 81mg aspirin after completion of the aspirin dose.       Patient was also told to buy over the counter Prilosec medication and take it once daily for GI protection as long as they are taking NSAIDs or Aspirin.    DVT prophylaxis was discussed with the patient today including risk factors for developing DVTs and history of DVTs. The patient was asked if any specific recommendations were given from the doctor/s that did pre-operative surgical clearance.      Patient was asked if they were taking or using OCP pills or devices. If they answered yes, then they were instructed to stop using OCPs at this pre-operative appointment until 2 months post-op to help prevent DVT development. They understand that there are other forms of birth control that do not involve hormones. They expressed understanding that ignoring/not following this instruction could result in a DVT which could turn into a deadly pulmonary embolism.      The patient was told that narcotic pain medications may make them drowsy and instructions were given to not sign legal documents, drive or operate heavy machinery, cars, or equipment while under the influence of narcotic medications.     As there were no other questions to be asked, he was given my business card along with Maxi Chaves MD business card if he has any questions or concerns prior to surger

## 2019-05-02 ENCOUNTER — ANESTHESIA (OUTPATIENT)
Dept: ENDOSCOPY | Facility: HOSPITAL | Age: 54
End: 2019-05-02
Payer: MEDICARE

## 2019-05-02 ENCOUNTER — HOSPITAL ENCOUNTER (OUTPATIENT)
Facility: HOSPITAL | Age: 54
Discharge: HOME | End: 2019-05-02
Attending: INTERNAL MEDICINE | Admitting: INTERNAL MEDICINE
Payer: MEDICARE

## 2019-05-02 VITALS
OXYGEN SATURATION: 98 % | RESPIRATION RATE: 12 BRPM | WEIGHT: 165 LBS | BODY MASS INDEX: 25.9 KG/M2 | DIASTOLIC BLOOD PRESSURE: 78 MMHG | SYSTOLIC BLOOD PRESSURE: 153 MMHG | HEIGHT: 67 IN | TEMPERATURE: 98 F | HEART RATE: 62 BPM

## 2019-05-02 DIAGNOSIS — Z12.11 ENCOUNTER FOR SCREENING COLONOSCOPY: ICD-10-CM

## 2019-05-02 DIAGNOSIS — Z12.11 SCREENING FOR COLON CANCER: Primary | ICD-10-CM

## 2019-05-02 PROCEDURE — G0121 COLON CA SCRN NOT HI RSK IND: HCPCS | Mod: ,,, | Performed by: INTERNAL MEDICINE

## 2019-05-02 PROCEDURE — 37000008 HC ANESTHESIA 1ST 15 MINUTES: Performed by: INTERNAL MEDICINE

## 2019-05-02 PROCEDURE — E9220 PRA ENDO ANESTHESIA: HCPCS | Mod: ,,, | Performed by: NURSE ANESTHETIST, CERTIFIED REGISTERED

## 2019-05-02 PROCEDURE — G0121 COLON CA SCRN NOT HI RSK IND: HCPCS | Performed by: INTERNAL MEDICINE

## 2019-05-02 PROCEDURE — 63600175 PHARM REV CODE 636 W HCPCS: Performed by: NURSE ANESTHETIST, CERTIFIED REGISTERED

## 2019-05-02 PROCEDURE — 37000009 HC ANESTHESIA EA ADD 15 MINS: Performed by: INTERNAL MEDICINE

## 2019-05-02 PROCEDURE — 25000003 PHARM REV CODE 250: Performed by: INTERNAL MEDICINE

## 2019-05-02 PROCEDURE — E9220 PRA ENDO ANESTHESIA: ICD-10-PCS | Mod: ,,, | Performed by: NURSE ANESTHETIST, CERTIFIED REGISTERED

## 2019-05-02 PROCEDURE — G0121 COLON CA SCRN NOT HI RSK IND: ICD-10-PCS | Mod: ,,, | Performed by: INTERNAL MEDICINE

## 2019-05-02 RX ORDER — PROPOFOL 10 MG/ML
VIAL (ML) INTRAVENOUS
Status: DISCONTINUED | OUTPATIENT
Start: 2019-05-02 | End: 2019-05-02

## 2019-05-02 RX ORDER — SODIUM CHLORIDE 9 MG/ML
INJECTION, SOLUTION INTRAVENOUS CONTINUOUS
Status: DISCONTINUED | OUTPATIENT
Start: 2019-05-02 | End: 2019-05-02 | Stop reason: HOSPADM

## 2019-05-02 RX ORDER — PROPOFOL 10 MG/ML
VIAL (ML) INTRAVENOUS CONTINUOUS PRN
Status: DISCONTINUED | OUTPATIENT
Start: 2019-05-02 | End: 2019-05-02

## 2019-05-02 RX ORDER — LIDOCAINE HCL/PF 100 MG/5ML
SYRINGE (ML) INTRAVENOUS
Status: DISCONTINUED | OUTPATIENT
Start: 2019-05-02 | End: 2019-05-02

## 2019-05-02 RX ADMIN — LIDOCAINE HYDROCHLORIDE 80 MG: 20 INJECTION, SOLUTION INTRAVENOUS at 10:05

## 2019-05-02 RX ADMIN — PROPOFOL 250 MCG/KG/MIN: 10 INJECTION, EMULSION INTRAVENOUS at 10:05

## 2019-05-02 RX ADMIN — SODIUM CHLORIDE: 0.9 INJECTION, SOLUTION INTRAVENOUS at 10:05

## 2019-05-02 RX ADMIN — PROPOFOL 80 MG: 10 INJECTION, EMULSION INTRAVENOUS at 10:05

## 2019-05-02 NOTE — DISCHARGE INSTRUCTIONS
Colonoscopy     A camera attached to a flexible tube with a viewing lens is used to take video pictures.     Colonoscopy is a test to view the inside of your lower digestive tract (colon and rectum). Sometimes it can show the last part of the small intestine (ileum). During the test, small pieces of tissue may be removed for testing. This is called a biopsy. Small growths, such as polyps, may also be removed.   Why is colonoscopy done?  The test is done to help look for colon cancer. And it can help find the source of abdominal pain, bleeding, and changes in bowel habits. It may be needed once a year, depending on factors such as your:  · Age  · Health history  · Family health history  · Symptoms  · Results from any prior colonoscopy  Risks and possible complications  These include:  · Bleeding               · A puncture or tear in the colon   · Risks of anesthesia  · A cancer lesion not being seen  Getting ready   To prepare for the test:  · Talk with your healthcare provider about the risks of the test (see below). Also ask your healthcare provider about alternatives to the test.  · Tell your healthcare provider about any medicines you take. Also tell him or her about any health conditions you may have.  · Make sure your rectum and colon are empty for the test. Follow the diet and bowel prep instructions exactly. If you dont, the test may need to be rescheduled.  · Plan for a friend or family member to drive you home after the test.     Colonoscopy provides an inside view of the entire colon.     You may discuss the results with your doctor right away or at a future visit.  During the test   The test is usually done in the hospital on an outpatient basis. This means you go home the same day. The procedure takes about 30 minutes. During that time:  · You are given relaxing (sedating) medicine through an IV line. You may be drowsy, or fully asleep.  · The healthcare provider will first give you a physical exam to  check for anal and rectal problems.  · Then the anus is lubricated and the scope inserted.  · If you are awake, you may have a feeling similar to needing to have a bowel movement. You may also feel pressure as air is pumped into the colon. Its OK to pass gas during the procedure.  · Biopsy, polyp removal, or other treatments may be done during the test.  After the test   You may have gas right after the test. It can help to try to pass it to help prevent later bloating. Your healthcare provider may discuss the results with you right away. Or you may need to schedule a follow-up visit to talk about the results. After the test, you can go back to your normal eating and other activities. You may be tired from the sedation and need to rest for a few hours.  Date Last Reviewed: 11/1/2016 © 2000-2017 [employer] [address] [place] [address], [place] [address]. All rights reserved. This information is not intended as a substitute for professional medical care. Always follow your healthcare professional's instructions.

## 2019-05-02 NOTE — ANESTHESIA POSTPROCEDURE EVALUATION
Anesthesia Post Evaluation    Patient: Rishi Aranda    Procedure(s) Performed: Procedure(s) (LRB):  COLONOSCOPY (N/A)    Final Anesthesia Type: general  Patient location during evaluation: PACU  Patient participation: Yes- Able to Participate  Level of consciousness: awake and alert  Post-procedure vital signs: reviewed and stable  Pain management: adequate  Airway patency: patent  PONV status at discharge: No PONV  Anesthetic complications: no      Cardiovascular status: blood pressure returned to baseline  Respiratory status: spontaneous ventilation and room air  Hydration status: euvolemic  Follow-up not needed.          Vitals Value Taken Time   /78 5/2/2019 11:05 AM   Temp 36.7 °C (98.1 °F) 5/2/2019 10:42 AM   Pulse 62 5/2/2019 11:05 AM   Resp 12 5/2/2019 11:05 AM   SpO2 98 % 5/2/2019 11:05 AM         No case tracking events are documented in the log.      Pain/Cata Score: Cata Score: 10 (5/2/2019 11:09 AM)

## 2019-05-02 NOTE — TRANSFER OF CARE
"Anesthesia Transfer of Care Note    Patient: Rishi Aranda    Procedure(s) Performed: Procedure(s) (LRB):  COLONOSCOPY (N/A)    Patient location: PACU    Anesthesia Type: general    Transport from OR: Transported from OR on room air with adequate spontaneous ventilation    Post pain: adequate analgesia    Post assessment: no apparent anesthetic complications and tolerated procedure well    Post vital signs: stable    Level of consciousness: sedated    Nausea/Vomiting: no nausea/vomiting    Complications: none    Transfer of care protocol was followed      Last vitals:   Visit Vitals  BP (!) 152/80 (BP Location: Left arm, Patient Position: Lying)   Pulse 67   Temp 36.7 °C (98.1 °F) (Oral)   Resp 12   Ht 5' 7" (1.702 m)   Wt 74.8 kg (165 lb)   SpO2 99%   BMI 25.84 kg/m²     "

## 2019-05-02 NOTE — H&P
Short Stay Endoscopy History and Physical    PCP - Colten Carlisle MD  Referring Physician - Colten Carlisle MD  [address] [place] [address]    Procedure - Colonoscopy  ASA - per anesthesia  Mallampati - per anesthesia  History of Anesthesia problems - no  Family history Anesthesia problems -  no   Plan of anesthesia - General    HPI  53 y.o. male  Reason for procedure: screening colonoscopy      ROS:  Constitutional: No fevers, chills, No weight loss  CV: No chest pain  Pulm: No cough, No shortness of breath  GI: see HPI    Medical History:  has a past medical history of Arthritis, Asthma, Cervical radiculopathy, BUE (8/17/2012), Chronic LBP (8/17/2012), Chronic neck pain (8/17/2012), HTN (hypertension) (8/17/2012), and Hypertension.    Surgical History:  has a past surgical history that includes Knee surgery and Neck surgery.    Family History: family history includes Hypertension in his brother and mother; No Known Problems in his sister; Stroke in his mother.. Otherwise no colon cancer, inflammatory bowel disease, or GI malignancies.    Social History:  reports that he has never smoked. He has never used smokeless tobacco. He reports that he drinks about 7.2 oz of alcohol per week. He reports that he does not use drugs.    Review of patient's allergies indicates:   Allergen Reactions    Eric-dur     Theophylline      Other reaction(s): Hives       Medications:   Medications Prior to Admission   Medication Sig Dispense Refill Last Dose    aspirin 325 MG tablet Take 1 tablet (325 mg total) by mouth once daily. for 42 doses 42 tablet 0     celecoxib (CELEBREX) 200 MG capsule Take 1 capsule (200 mg total) by mouth 2 (two) times daily. 60 capsule 0     losartan-hydrochlorothiazide 100-25 mg (HYZAAR) 100-25 mg per tablet TAKE 1 TABLET BY MOUTH EVERY DAY. 30 tablet 11 Taking    losartan-hydrochlorothiazide 100-25 mg (HYZAAR) 100-25 mg per tablet TAKE 1 TABLET BY MOUTH EVERY DAY. 30 tablet 5  Taking    oxyCODONE-acetaminophen (PERCOCET)  mg per tablet Take 1 tablet by mouth every 6 (six) hours as needed for Pain. 42 tablet 0     promethazine (PHENERGAN) 25 MG tablet Take 1 tablet (25 mg total) by mouth every 4 (four) hours as needed for Nausea. 30 tablet 0     tiZANidine (ZANAFLEX) 4 MG tablet Take 1 tablet (4 mg total) by mouth 3 (three) times daily as needed. 90 tablet 1 Taking    traMADol (ULTRAM) 50 mg tablet Take 1 tablet (50 mg total) by mouth every 6 (six) hours as needed for Pain. 30 tablet 0     VENTOLIN HFA 90 mcg/actuation inhaler INHALE 2 PUFFS INTO LUNGS FOUR TIMES DAILY AS NEEDED FOR WHEEZE 18 g 0        Physical Exam:    Vital Signs: There were no vitals filed for this visit.    General Appearance: Well appearing in no acute distress  Abdomen: Soft, non tender, non distended with normal bowel sounds, no masses    Labs:  Lab Results   Component Value Date    WBC 6.57 05/01/2019    HGB 13.8 (L) 05/01/2019    HCT 41.5 05/01/2019     05/01/2019    CHOL 179 09/19/2018    TRIG 96 09/19/2018    HDL 49 09/19/2018    ALT 12 09/19/2018    AST 19 09/19/2018     05/01/2019    K 4.2 05/01/2019     05/01/2019    CREATININE 1.0 05/01/2019    BUN 10 05/01/2019    CO2 26 05/01/2019    TSH 3.737 09/19/2018    PSA 1.1 09/19/2018       I have explained the risks and benefits of this endoscopic procedure to the patient including but not limited to bleeding, inflammation, infection, perforation, and death.      Noemí Boss MD

## 2019-05-02 NOTE — [employer] PATIENT INSTRUCTIONS
Discharge Summary/Instructions after an Endoscopic Procedure  Patient Name: Rishi Aranda  Patient MRN: [id number]  Patient YOB: 1965  Thursday, May 02, 2019  Noemí Boss MD  RESTRICTIONS:  During your procedure today, you received medications for sedation.  These   medications may affect your judgment, balance and coordination.  Therefore,   for 24 hours, you have the following restrictions:   - DO NOT drive a car, operate machinery, make legal/financial decisions,   sign important papers or drink alcohol.    ACTIVITY:  Today: no heavy lifting, straining or running due to procedural   sedation/anesthesia.  The following day: return to full activity including work.  DIET:  Eat and drink normally unless instructed otherwise.     TREATMENT FOR COMMON SIDE EFFECTS:  - Mild abdominal pain, nausea, belching, bloating or excessive gas:  rest,   eat lightly and use a heating pad.  - Sore Throat: treat with throat lozenges and/or gargle with warm salt   water.  - Because air was used during the procedure, expelling large amounts of air   from your rectum or belching is normal.  - If a bowel prep was taken, you may not have a bowel movement for 1-3 days.    This is normal.  SYMPTOMS TO WATCH FOR AND REPORT TO YOUR PHYSICIAN:  1. Abdominal pain or bloating, other than gas cramps.  2. Chest pain.  3. Back pain.  4. Signs of infection such as: chills or fever occurring within 24 hours   after the procedure.  5. Rectal bleeding, which would show as bright red, maroon, or black stools.   (A tablespoon of blood from the rectum is not serious, especially if   hemorrhoids are present.)  6. Vomiting.  7. Weakness or dizziness.  GO DIRECTLY TO THE NEAREST EMERGENCY ROOM IF YOU HAVE ANY OF THE FOLLOWING:      Difficulty breathing              Chills and/or fever over 101 F   Persistent vomiting and/or vomiting blood   Severe abdominal pain   Severe chest pain   Black, tarry stools   Bleeding- more than one  tablespoon   Any other symptom or condition that you feel may need urgent attention  Your doctor recommends these additional instructions:  If any biopsies were taken, your doctors clinic will contact you in 1 to 2   weeks with any results.  - Discharge patient to home.   - Patient has a contact number available for emergencies.  The signs and   symptoms of potential delayed complications were discussed with the   patient.  Return to normal activities tomorrow.  Written discharge   instructions were provided to the patient.   - Resume previous diet.   - Continue present medications.   - Repeat colonoscopy in 10 years for screening purposes.   - Return to referring physician.   For questions, problems or results please call your physician - Noemí Boss MD at Work:  [phone].  [facility], EMERGENCY ROOM PHONE NUMBER: [phone]  IF A COMPLICATION OR EMERGENCY SITUATION ARISES AND YOU ARE UNABLE TO REACH   YOUR PHYSICIAN - GO DIRECTLY TO THE EMERGENCY ROOM.  Noemí Boss MD  5/2/2019 10:39:38 AM  This report has been verified and signed electronically.  [employer]

## 2019-05-06 RX ORDER — ALBUTEROL SULFATE 90 UG/1
AEROSOL, METERED RESPIRATORY (INHALATION)
Qty: 8.5 G | Refills: 5 | Status: CANCELLED | OUTPATIENT
Start: 2019-05-06

## 2019-05-06 RX ORDER — ALBUTEROL SULFATE 90 UG/1
AEROSOL, METERED RESPIRATORY (INHALATION)
Qty: 8.5 G | Refills: 5 | Status: SHIPPED | OUTPATIENT
Start: 2019-05-06 | End: 2019-05-07

## 2019-05-06 NOTE — TELEPHONE ENCOUNTER
"----- Message from Travis Castellanos sent at 5/6/2019 11:36 AM CDT -----  Contact: Patient [phone]  RX request - refill or new RX.  Is this a refill or new RX:  Refill  RX name and strength:  inhaler "Pro Air"    Pharmacy name and phone # [id number] - [place] [address] [address] [facility] [address] [phone] (Phone)  [phone] (Fax)    Comments:  Patient stating is completely out of Rx above would like to be sent asap    Please call an advise  Thank you    "

## 2019-05-06 NOTE — PROGRESS NOTES
PAST MEDICAL HISTORY:  Hypertension.  Chronic asthma.  Osteoarthritis of the knee, right knee surgery with previous medial meniscectomy and chondroplasty.  Cervical degenerative disk disease with left cervical foraminotomy at C6-7.  Lumbar degenerative disk disease with neural foraminal stenosis and facet arthropathy.        SOCIAL HISTORY:  Tobacco use, none.          This is a 53-year-old male who comes in for Internal Medicine evaluation and   preop clearance.  The patient is scheduled on 05/14/2019, to undergo right total   knee replacement by Dr. Chaves due to osteoarthritis.    PAST MEDICAL HISTORY:  Outlined above, primary being hypertension and chronic   asthma.    CURRENT MEDICATIONS:  Albuterol as needed.    On the medication sheet, prescriptions of Celebrex, oxycodone, Promethazine,   Tramadol was sent in to his pharmacy for postop.    Of note is that he was on the medicine Losartan /25 mg, but his last   medication taken was around 04/28/2019, so for about nine days, he stopped the   medication because of getting a letter from his pharmacy regarding the recall on   certain losartan medications.    REVIEW OF SYMPTOMS:  No chest pain, palpitation, shortness of breath, or   abdominal pain.  He has regular bowel function.  No difficulty urinating, no   dysuria.    Regarding asthma, he does not feel short of breath, but at times once or twice a   day, he will use a puff of albuterol because of  hearing wheezing.  He is not   aware of having indigestion or heartburn.    On 05/01/2019, a CBC and basic metabolic profile was normal.    PHYSICAL EXAMINATION:  VITAL SIGNS:  His weight is 165 pounds, pulse 60, blood pressure by me 122/68   (this is also without taking the losartan HCT medicine for about nine days.  NECK:  No thyromegaly.  No masses.  LUNGS:  Clear breath sounds.  There is no wheezing.  Air flow is good.  HEART:  Regular rate and rhythm.  No murmurs or gallops.  ABDOMEN:  Active bowel sounds,  soft, nontender.  No hepatosplenomegaly or   abdominal masses.  2+ carotid pulses, no bruits.  EXTREMITIES:  No edema.    IMPRESSION:  1. Hypertension, optimal control without the use of medication.  2. Chronic asthma, clinically stable.    PLAN:  Electrocardiogram was performed showed a sinus bradycardia with   occasional PAC and for one month give him a trial of Symbicort two puffs twice a   day in place of using albuterol and see if this may help in regard to his   wheezing and asthma.  He will keep me informed.  Reflux precautions were   discussed, so as to avoid any episodes of LPR.    The patient is cleared for anesthesia and surgery.        JAM/HN  dd: 05/07/2019 08:23:00 (CDT)  td: 05/07/2019 23:45:52 (CDT)  Doc ID   #[id number]  Job ID #[id number]    CC:              Addendum  A repeat blood pressure was 132/78    Will start him on generic Avapro/irbesartan 75 mg once a day in place of losartan HCT so as to keep the blood pressure stable          ECG revealed normal sinus rhythm with PACs    He is cleared for anesthesia and surgery    He can hold his irbesartan prescription the day of surgery and resume postop

## 2019-05-07 ENCOUNTER — OFFICE VISIT (OUTPATIENT)
Dept: INTERNAL MEDICINE | Facility: CLINIC | Age: 54
End: 2019-05-07
Payer: MEDICARE

## 2019-05-07 VITALS
SYSTOLIC BLOOD PRESSURE: 122 MMHG | HEART RATE: 66 BPM | OXYGEN SATURATION: 98 % | BODY MASS INDEX: 25.9 KG/M2 | DIASTOLIC BLOOD PRESSURE: 68 MMHG | WEIGHT: 165 LBS | HEIGHT: 67 IN

## 2019-05-07 DIAGNOSIS — Z01.818 PREOP EXAMINATION: ICD-10-CM

## 2019-05-07 DIAGNOSIS — J45.909 CHRONIC ASTHMA, UNSPECIFIED ASTHMA SEVERITY, UNSPECIFIED WHETHER COMPLICATED, UNSPECIFIED WHETHER PERSISTENT: ICD-10-CM

## 2019-05-07 DIAGNOSIS — I10 ESSENTIAL HYPERTENSION: Primary | ICD-10-CM

## 2019-05-07 PROCEDURE — 99214 OFFICE O/P EST MOD 30 MIN: CPT | Mod: 25,S$GLB,, | Performed by: INTERNAL MEDICINE

## 2019-05-07 PROCEDURE — 3074F SYST BP LT 130 MM HG: CPT | Mod: S$GLB,,, | Performed by: INTERNAL MEDICINE

## 2019-05-07 PROCEDURE — 3074F PR MOST RECENT SYSTOLIC BLOOD PRESSURE < 130 MM HG: ICD-10-PCS | Mod: S$GLB,,, | Performed by: INTERNAL MEDICINE

## 2019-05-07 PROCEDURE — 99999 PR PBB SHADOW E&M-EST. PATIENT-LVL IV: ICD-10-PCS | Mod: PBBFAC,,, | Performed by: INTERNAL MEDICINE

## 2019-05-07 PROCEDURE — 3008F PR BODY MASS INDEX (BMI) DOCUMENTED: ICD-10-PCS | Mod: S$GLB,,, | Performed by: INTERNAL MEDICINE

## 2019-05-07 PROCEDURE — 99999 PR PBB SHADOW E&M-EST. PATIENT-LVL IV: CPT | Mod: PBBFAC,,, | Performed by: INTERNAL MEDICINE

## 2019-05-07 PROCEDURE — 3078F DIAST BP <80 MM HG: CPT | Mod: S$GLB,,, | Performed by: INTERNAL MEDICINE

## 2019-05-07 PROCEDURE — 93005 EKG 12-LEAD: ICD-10-PCS | Mod: S$GLB,,, | Performed by: INTERNAL MEDICINE

## 2019-05-07 PROCEDURE — 93010 EKG 12-LEAD: ICD-10-PCS | Mod: S$GLB,,, | Performed by: INTERNAL MEDICINE

## 2019-05-07 PROCEDURE — 99214 PR OFFICE/OUTPT VISIT, EST, LEVL IV, 30-39 MIN: ICD-10-PCS | Mod: 25,S$GLB,, | Performed by: INTERNAL MEDICINE

## 2019-05-07 PROCEDURE — 3078F PR MOST RECENT DIASTOLIC BLOOD PRESSURE < 80 MM HG: ICD-10-PCS | Mod: S$GLB,,, | Performed by: INTERNAL MEDICINE

## 2019-05-07 PROCEDURE — 93010 ELECTROCARDIOGRAM REPORT: CPT | Mod: S$GLB,,, | Performed by: INTERNAL MEDICINE

## 2019-05-07 PROCEDURE — 93005 ELECTROCARDIOGRAM TRACING: CPT | Mod: S$GLB,,, | Performed by: INTERNAL MEDICINE

## 2019-05-07 PROCEDURE — 3008F BODY MASS INDEX DOCD: CPT | Mod: S$GLB,,, | Performed by: INTERNAL MEDICINE

## 2019-05-07 RX ORDER — ALBUTEROL SULFATE 90 UG/1
2 AEROSOL, METERED RESPIRATORY (INHALATION) EVERY 6 HOURS PRN
Qty: 1 EACH | Refills: 1
Start: 2019-05-07 | End: 2020-06-24

## 2019-05-07 RX ORDER — IRBESARTAN 75 MG/1
75 TABLET ORAL DAILY
Qty: 30 TABLET | Refills: 11 | Status: SHIPPED | OUTPATIENT
Start: 2019-05-07 | End: 2020-12-16

## 2019-05-07 RX ORDER — BUDESONIDE AND FORMOTEROL FUMARATE DIHYDRATE 80; 4.5 UG/1; UG/1
2 AEROSOL RESPIRATORY (INHALATION) 2 TIMES DAILY
Qty: 10.2 G | Refills: 0 | Status: SHIPPED | OUTPATIENT
Start: 2019-05-07 | End: 2021-10-04

## 2019-05-09 ENCOUNTER — TELEPHONE (OUTPATIENT)
Dept: ENDOSCOPY | Facility: HOSPITAL | Age: 54
End: 2019-05-09

## 2019-05-13 ENCOUNTER — TELEPHONE (OUTPATIENT)
Dept: SPORTS MEDICINE | Facility: CLINIC | Age: 54
End: 2019-05-13

## 2019-05-13 NOTE — TELEPHONE ENCOUNTER
Spoke to the patient. See previous note.    ----- Message from Federico Hathaway sent at 5/13/2019  1:50 PM CDT -----  Contact: Self   Pt is requesting a call back in reference to the time he is supposed to report for surgery on     Tomorrow. Pt needs to know ASAP so he can arrange a ride ahead of time.     [phone]

## 2019-05-14 ENCOUNTER — ANESTHESIA (OUTPATIENT)
Dept: SURGERY | Facility: OTHER | Age: 54
End: 2019-05-14
Payer: MEDICARE

## 2019-05-14 ENCOUNTER — ANESTHESIA EVENT (OUTPATIENT)
Dept: SURGERY | Facility: OTHER | Age: 54
End: 2019-05-14
Payer: MEDICARE

## 2019-05-14 ENCOUNTER — HOSPITAL ENCOUNTER (OUTPATIENT)
Facility: OTHER | Age: 54
LOS: 1 days | Discharge: HOME | End: 2019-05-15
Attending: ORTHOPAEDIC SURGERY | Admitting: ORTHOPAEDIC SURGERY
Payer: MEDICARE

## 2019-05-14 DIAGNOSIS — M17.11 PRIMARY OSTEOARTHRITIS OF RIGHT KNEE: ICD-10-CM

## 2019-05-14 LAB
HCT VFR BLD AUTO: 37.8 % (ref 40–54)
HGB BLD-MCNC: 12.6 G/DL (ref 14–18)

## 2019-05-14 PROCEDURE — 97161 PT EVAL LOW COMPLEX 20 MIN: CPT

## 2019-05-14 PROCEDURE — C1713 ANCHOR/SCREW BN/BN,TIS/BN: HCPCS | Performed by: ORTHOPAEDIC SURGERY

## 2019-05-14 PROCEDURE — 27201423 OPTIME MED/SURG SUP & DEVICES STERILE SUPPLY: Performed by: ORTHOPAEDIC SURGERY

## 2019-05-14 PROCEDURE — 85018 HEMOGLOBIN: CPT

## 2019-05-14 PROCEDURE — 36415 COLL VENOUS BLD VENIPUNCTURE: CPT

## 2019-05-14 PROCEDURE — 25000242 PHARM REV CODE 250 ALT 637 W/ HCPCS: Performed by: ANESTHESIOLOGY

## 2019-05-14 PROCEDURE — 71000033 HC RECOVERY, INTIAL HOUR: Performed by: ORTHOPAEDIC SURGERY

## 2019-05-14 PROCEDURE — 85014 HEMATOCRIT: CPT

## 2019-05-14 PROCEDURE — 25000003 PHARM REV CODE 250: Performed by: ANESTHESIOLOGY

## 2019-05-14 PROCEDURE — 25000242 PHARM REV CODE 250 ALT 637 W/ HCPCS: Performed by: NURSE ANESTHETIST, CERTIFIED REGISTERED

## 2019-05-14 PROCEDURE — C1776 JOINT DEVICE (IMPLANTABLE): HCPCS | Performed by: ORTHOPAEDIC SURGERY

## 2019-05-14 PROCEDURE — 25000003 PHARM REV CODE 250: Performed by: ORTHOPAEDIC SURGERY

## 2019-05-14 PROCEDURE — 97110 THERAPEUTIC EXERCISES: CPT

## 2019-05-14 PROCEDURE — 36000710: Performed by: ORTHOPAEDIC SURGERY

## 2019-05-14 PROCEDURE — 27447 PR TOTAL KNEE ARTHROPLASTY: ICD-10-PCS | Mod: RT,,, | Performed by: ORTHOPAEDIC SURGERY

## 2019-05-14 PROCEDURE — 63600175 PHARM REV CODE 636 W HCPCS: Performed by: ANESTHESIOLOGY

## 2019-05-14 PROCEDURE — 25000003 PHARM REV CODE 250: Performed by: PHYSICIAN ASSISTANT

## 2019-05-14 PROCEDURE — 97116 GAIT TRAINING THERAPY: CPT

## 2019-05-14 PROCEDURE — 63600175 PHARM REV CODE 636 W HCPCS: Performed by: PHYSICIAN ASSISTANT

## 2019-05-14 PROCEDURE — 27447 TOTAL KNEE ARTHROPLASTY: CPT | Mod: AS,RT,, | Performed by: PHYSICIAN ASSISTANT

## 2019-05-14 PROCEDURE — 27447 TOTAL KNEE ARTHROPLASTY: CPT | Mod: RT,,, | Performed by: ORTHOPAEDIC SURGERY

## 2019-05-14 PROCEDURE — 63600175 PHARM REV CODE 636 W HCPCS: Performed by: ORTHOPAEDIC SURGERY

## 2019-05-14 PROCEDURE — 37000008 HC ANESTHESIA 1ST 15 MINUTES: Performed by: ORTHOPAEDIC SURGERY

## 2019-05-14 PROCEDURE — 25000003 PHARM REV CODE 250: Performed by: NURSE ANESTHETIST, CERTIFIED REGISTERED

## 2019-05-14 PROCEDURE — 94640 AIRWAY INHALATION TREATMENT: CPT

## 2019-05-14 PROCEDURE — 36000711: Performed by: ORTHOPAEDIC SURGERY

## 2019-05-14 PROCEDURE — 94761 N-INVAS EAR/PLS OXIMETRY MLT: CPT

## 2019-05-14 PROCEDURE — 27447 PR TOTAL KNEE ARTHROPLASTY: ICD-10-PCS | Mod: AS,RT,, | Performed by: PHYSICIAN ASSISTANT

## 2019-05-14 PROCEDURE — 63600175 PHARM REV CODE 636 W HCPCS: Performed by: NURSE ANESTHETIST, CERTIFIED REGISTERED

## 2019-05-14 PROCEDURE — 37000009 HC ANESTHESIA EA ADD 15 MINS: Performed by: ORTHOPAEDIC SURGERY

## 2019-05-14 PROCEDURE — C9290 INJ, BUPIVACAINE LIPOSOME: HCPCS | Performed by: ORTHOPAEDIC SURGERY

## 2019-05-14 PROCEDURE — 71000039 HC RECOVERY, EACH ADD'L HOUR: Performed by: ORTHOPAEDIC SURGERY

## 2019-05-14 DEVICE — IMPLANTABLE DEVICE: Type: IMPLANTABLE DEVICE | Site: KNEE | Status: FUNCTIONAL

## 2019-05-14 DEVICE — SMARTSET GHV GENTAMICIN HIGH VISCOSITY BONE CEMENT 40G
Type: IMPLANTABLE DEVICE | Site: KNEE | Status: FUNCTIONAL
Brand: SMARTSET

## 2019-05-14 RX ORDER — ALBUTEROL SULFATE 90 UG/1
AEROSOL, METERED RESPIRATORY (INHALATION)
Status: DISCONTINUED | OUTPATIENT
Start: 2019-05-14 | End: 2019-05-14

## 2019-05-14 RX ORDER — ONDANSETRON 2 MG/ML
INJECTION INTRAMUSCULAR; INTRAVENOUS
Status: DISCONTINUED | OUTPATIENT
Start: 2019-05-14 | End: 2019-05-14

## 2019-05-14 RX ORDER — DEXAMETHASONE SODIUM PHOSPHATE 4 MG/ML
INJECTION, SOLUTION INTRA-ARTICULAR; INTRALESIONAL; INTRAMUSCULAR; INTRAVENOUS; SOFT TISSUE
Status: DISCONTINUED | OUTPATIENT
Start: 2019-05-14 | End: 2019-05-14

## 2019-05-14 RX ORDER — TRANEXAMIC ACID 100 MG/ML
1000 INJECTION, SOLUTION INTRAVENOUS
Status: COMPLETED | OUTPATIENT
Start: 2019-05-14 | End: 2019-05-14

## 2019-05-14 RX ORDER — ALBUTEROL SULFATE 90 UG/1
2 AEROSOL, METERED RESPIRATORY (INHALATION) EVERY 6 HOURS PRN
Status: DISCONTINUED | OUTPATIENT
Start: 2019-05-14 | End: 2019-05-15 | Stop reason: HOSPADM

## 2019-05-14 RX ORDER — ROPIVACAINE/EPI/CLONIDINE/KET 2.46-0.005
SYRINGE (ML) INJECTION ONCE
Status: DISCONTINUED | OUTPATIENT
Start: 2019-05-14 | End: 2019-05-15 | Stop reason: HOSPADM

## 2019-05-14 RX ORDER — HYDROMORPHONE HYDROCHLORIDE 2 MG/ML
0.4 INJECTION, SOLUTION INTRAMUSCULAR; INTRAVENOUS; SUBCUTANEOUS EVERY 5 MIN PRN
Status: DISCONTINUED | OUTPATIENT
Start: 2019-05-14 | End: 2019-05-14 | Stop reason: HOSPADM

## 2019-05-14 RX ORDER — ACETAMINOPHEN 500 MG
1000 TABLET ORAL
Status: COMPLETED | OUTPATIENT
Start: 2019-05-14 | End: 2019-05-14

## 2019-05-14 RX ORDER — VANCOMYCIN HCL IN 5 % DEXTROSE 1G/250ML
1000 PLASTIC BAG, INJECTION (ML) INTRAVENOUS
Status: COMPLETED | OUTPATIENT
Start: 2019-05-14 | End: 2019-05-14

## 2019-05-14 RX ORDER — MIDAZOLAM HYDROCHLORIDE 1 MG/ML
5 INJECTION INTRAMUSCULAR; INTRAVENOUS ONCE AS NEEDED
Status: DISCONTINUED | OUTPATIENT
Start: 2019-05-14 | End: 2019-05-14 | Stop reason: HOSPADM

## 2019-05-14 RX ORDER — CYCLOBENZAPRINE HCL 10 MG
10 TABLET ORAL
Status: DISCONTINUED | OUTPATIENT
Start: 2019-05-14 | End: 2019-05-15 | Stop reason: HOSPADM

## 2019-05-14 RX ORDER — IRBESARTAN 75 MG/1
75 TABLET ORAL DAILY
Status: DISCONTINUED | OUTPATIENT
Start: 2019-05-14 | End: 2019-05-14

## 2019-05-14 RX ORDER — MUPIROCIN 20 MG/G
1 OINTMENT TOPICAL 2 TIMES DAILY
Status: DISCONTINUED | OUTPATIENT
Start: 2019-05-14 | End: 2019-05-15 | Stop reason: HOSPADM

## 2019-05-14 RX ORDER — CEFAZOLIN SODIUM 1 G/3ML
2 INJECTION, POWDER, FOR SOLUTION INTRAMUSCULAR; INTRAVENOUS
Status: DISCONTINUED | OUTPATIENT
Start: 2019-05-14 | End: 2019-05-14 | Stop reason: HOSPADM

## 2019-05-14 RX ORDER — DOCUSATE SODIUM 100 MG/1
100 CAPSULE, LIQUID FILLED ORAL 2 TIMES DAILY
Status: DISCONTINUED | OUTPATIENT
Start: 2019-05-14 | End: 2019-05-15 | Stop reason: HOSPADM

## 2019-05-14 RX ORDER — CEFAZOLIN SODIUM 2 G/50ML
2 SOLUTION INTRAVENOUS
Status: COMPLETED | OUTPATIENT
Start: 2019-05-14 | End: 2019-05-15

## 2019-05-14 RX ORDER — ASPIRIN 325 MG
325 TABLET ORAL DAILY
Status: DISCONTINUED | OUTPATIENT
Start: 2019-05-14 | End: 2019-05-15 | Stop reason: HOSPADM

## 2019-05-14 RX ORDER — PREGABALIN 75 MG/1
75 CAPSULE ORAL
Status: COMPLETED | OUTPATIENT
Start: 2019-05-14 | End: 2019-05-14

## 2019-05-14 RX ORDER — MIDAZOLAM HYDROCHLORIDE 1 MG/ML
2 INJECTION INTRAMUSCULAR; INTRAVENOUS
Status: COMPLETED | OUTPATIENT
Start: 2019-05-14 | End: 2019-05-14

## 2019-05-14 RX ORDER — NAPROXEN SODIUM 220 MG
220 TABLET ORAL
COMMUNITY
End: 2019-12-11 | Stop reason: ALTCHOICE

## 2019-05-14 RX ORDER — ROCURONIUM BROMIDE 10 MG/ML
INJECTION, SOLUTION INTRAVENOUS
Status: DISCONTINUED | OUTPATIENT
Start: 2019-05-14 | End: 2019-05-14

## 2019-05-14 RX ORDER — DIPHENHYDRAMINE HYDROCHLORIDE 50 MG/ML
25 INJECTION INTRAMUSCULAR; INTRAVENOUS EVERY 6 HOURS PRN
Status: DISCONTINUED | OUTPATIENT
Start: 2019-05-14 | End: 2019-05-14 | Stop reason: HOSPADM

## 2019-05-14 RX ORDER — TRAMADOL HYDROCHLORIDE 50 MG/1
50 TABLET ORAL
Status: DISCONTINUED | OUTPATIENT
Start: 2019-05-14 | End: 2019-05-14

## 2019-05-14 RX ORDER — FENTANYL CITRATE 50 UG/ML
100 INJECTION, SOLUTION INTRAMUSCULAR; INTRAVENOUS EVERY 5 MIN PRN
Status: DISCONTINUED | OUTPATIENT
Start: 2019-05-14 | End: 2019-05-14 | Stop reason: HOSPADM

## 2019-05-14 RX ORDER — MUPIROCIN 20 MG/G
OINTMENT TOPICAL
Status: DISCONTINUED | OUTPATIENT
Start: 2019-05-14 | End: 2019-05-14 | Stop reason: HOSPADM

## 2019-05-14 RX ORDER — SODIUM CHLORIDE 0.9 % (FLUSH) 0.9 %
3 SYRINGE (ML) INJECTION
Status: DISCONTINUED | OUTPATIENT
Start: 2019-05-14 | End: 2019-05-15 | Stop reason: HOSPADM

## 2019-05-14 RX ORDER — FAMOTIDINE 20 MG/1
20 TABLET, FILM COATED ORAL
Status: COMPLETED | OUTPATIENT
Start: 2019-05-14 | End: 2019-05-14

## 2019-05-14 RX ORDER — SODIUM CHLORIDE 9 MG/ML
INJECTION, SOLUTION INTRAVENOUS ONCE
Status: COMPLETED | OUTPATIENT
Start: 2019-05-14 | End: 2019-05-14

## 2019-05-14 RX ORDER — NEOSTIGMINE METHYLSULFATE 1 MG/ML
INJECTION, SOLUTION INTRAVENOUS
Status: DISCONTINUED | OUTPATIENT
Start: 2019-05-14 | End: 2019-05-14

## 2019-05-14 RX ORDER — MEPERIDINE HYDROCHLORIDE 25 MG/ML
12.5 INJECTION INTRAMUSCULAR; INTRAVENOUS; SUBCUTANEOUS ONCE AS NEEDED
Status: DISCONTINUED | OUTPATIENT
Start: 2019-05-14 | End: 2019-05-14 | Stop reason: HOSPADM

## 2019-05-14 RX ORDER — ONDANSETRON 8 MG/1
8 TABLET, ORALLY DISINTEGRATING ORAL EVERY 8 HOURS PRN
Status: DISCONTINUED | OUTPATIENT
Start: 2019-05-14 | End: 2019-05-15 | Stop reason: HOSPADM

## 2019-05-14 RX ORDER — HYDROMORPHONE HYDROCHLORIDE 1 MG/ML
0.5 INJECTION, SOLUTION INTRAMUSCULAR; INTRAVENOUS; SUBCUTANEOUS
Status: DISCONTINUED | OUTPATIENT
Start: 2019-05-14 | End: 2019-05-15 | Stop reason: HOSPADM

## 2019-05-14 RX ORDER — ALBUTEROL SULFATE 0.83 MG/ML
2.5 SOLUTION RESPIRATORY (INHALATION)
Status: COMPLETED | OUTPATIENT
Start: 2019-05-14 | End: 2019-05-14

## 2019-05-14 RX ORDER — BUDESONIDE AND FORMOTEROL FUMARATE DIHYDRATE 80; 4.5 UG/1; UG/1
2 AEROSOL RESPIRATORY (INHALATION) 2 TIMES DAILY
Status: DISCONTINUED | OUTPATIENT
Start: 2019-05-14 | End: 2019-05-14

## 2019-05-14 RX ORDER — OXYCODONE HYDROCHLORIDE 5 MG/1
10 TABLET ORAL EVERY 12 HOURS
Status: DISCONTINUED | OUTPATIENT
Start: 2019-05-14 | End: 2019-05-15 | Stop reason: HOSPADM

## 2019-05-14 RX ORDER — TRANEXAMIC ACID 100 MG/ML
1000 INJECTION, SOLUTION INTRAVENOUS ONCE
Status: COMPLETED | OUTPATIENT
Start: 2019-05-14 | End: 2019-05-14

## 2019-05-14 RX ORDER — ROPIVACAINE/EPI/CLONIDINE/KET 2.46-0.005
SYRINGE (ML) INJECTION
Status: DISCONTINUED | OUTPATIENT
Start: 2019-05-14 | End: 2019-05-14 | Stop reason: HOSPADM

## 2019-05-14 RX ORDER — SODIUM CHLORIDE, SODIUM LACTATE, POTASSIUM CHLORIDE, CALCIUM CHLORIDE 600; 310; 30; 20 MG/100ML; MG/100ML; MG/100ML; MG/100ML
INJECTION, SOLUTION INTRAVENOUS CONTINUOUS
Status: DISCONTINUED | OUTPATIENT
Start: 2019-05-14 | End: 2019-05-14

## 2019-05-14 RX ORDER — ROPIVACAINE HYDROCHLORIDE 5 MG/ML
INJECTION, SOLUTION EPIDURAL; INFILTRATION; PERINEURAL
Status: COMPLETED | OUTPATIENT
Start: 2019-05-14 | End: 2019-05-14

## 2019-05-14 RX ORDER — GLYCOPYRROLATE 0.2 MG/ML
INJECTION INTRAMUSCULAR; INTRAVENOUS
Status: DISCONTINUED | OUTPATIENT
Start: 2019-05-14 | End: 2019-05-14

## 2019-05-14 RX ORDER — SODIUM CHLORIDE 9 MG/ML
INJECTION, SOLUTION INTRAVENOUS CONTINUOUS PRN
Status: DISCONTINUED | OUTPATIENT
Start: 2019-05-14 | End: 2019-05-14

## 2019-05-14 RX ORDER — BACITRACIN 50000 [IU]/1
INJECTION, POWDER, FOR SOLUTION INTRAMUSCULAR
Status: DISCONTINUED | OUTPATIENT
Start: 2019-05-14 | End: 2019-05-14 | Stop reason: HOSPADM

## 2019-05-14 RX ORDER — LIDOCAINE HCL/PF 100 MG/5ML
SYRINGE (ML) INTRAVENOUS
Status: DISCONTINUED | OUTPATIENT
Start: 2019-05-14 | End: 2019-05-14

## 2019-05-14 RX ORDER — ONDANSETRON 2 MG/ML
4 INJECTION INTRAMUSCULAR; INTRAVENOUS DAILY PRN
Status: DISCONTINUED | OUTPATIENT
Start: 2019-05-14 | End: 2019-05-14 | Stop reason: HOSPADM

## 2019-05-14 RX ORDER — LIDOCAINE HYDROCHLORIDE 10 MG/ML
0.5 INJECTION, SOLUTION EPIDURAL; INFILTRATION; INTRACAUDAL; PERINEURAL ONCE
Status: DISCONTINUED | OUTPATIENT
Start: 2019-05-14 | End: 2019-05-14 | Stop reason: HOSPADM

## 2019-05-14 RX ORDER — OXYCODONE AND ACETAMINOPHEN 10; 325 MG/1; MG/1
1 TABLET ORAL EVERY 6 HOURS PRN
Status: DISCONTINUED | OUTPATIENT
Start: 2019-05-14 | End: 2019-05-15 | Stop reason: HOSPADM

## 2019-05-14 RX ORDER — PROPOFOL 10 MG/ML
VIAL (ML) INTRAVENOUS
Status: DISCONTINUED | OUTPATIENT
Start: 2019-05-14 | End: 2019-05-14

## 2019-05-14 RX ORDER — SODIUM CHLORIDE 0.9 % (FLUSH) 0.9 %
10 SYRINGE (ML) INJECTION
Status: DISCONTINUED | OUTPATIENT
Start: 2019-05-14 | End: 2019-05-14 | Stop reason: HOSPADM

## 2019-05-14 RX ORDER — OXYCODONE HYDROCHLORIDE 5 MG/1
5 TABLET ORAL
Status: DISCONTINUED | OUTPATIENT
Start: 2019-05-14 | End: 2019-05-14 | Stop reason: HOSPADM

## 2019-05-14 RX ADMIN — SODIUM CHLORIDE, SODIUM LACTATE, POTASSIUM CHLORIDE, AND CALCIUM CHLORIDE: .6; .31; .03; .02 INJECTION, SOLUTION INTRAVENOUS at 08:05

## 2019-05-14 RX ADMIN — HYDROMORPHONE HYDROCHLORIDE 0.4 MG: 2 INJECTION, SOLUTION INTRAMUSCULAR; INTRAVENOUS; SUBCUTANEOUS at 01:05

## 2019-05-14 RX ADMIN — FAMOTIDINE 20 MG: 20 TABLET, FILM COATED ORAL at 08:05

## 2019-05-14 RX ADMIN — MUPIROCIN 1 G: 20 OINTMENT TOPICAL at 08:05

## 2019-05-14 RX ADMIN — DOCUSATE SODIUM 100 MG: 100 CAPSULE, LIQUID FILLED ORAL at 08:05

## 2019-05-14 RX ADMIN — FENTANYL CITRATE 100 MCG: 50 INJECTION, SOLUTION INTRAMUSCULAR; INTRAVENOUS at 10:05

## 2019-05-14 RX ADMIN — CARBOXYMETHYLCELLULOSE SODIUM 2 DROP: 2.5 SOLUTION/ DROPS OPHTHALMIC at 10:05

## 2019-05-14 RX ADMIN — GLYCOPYRROLATE 0.8 MG: 0.2 INJECTION, SOLUTION INTRAMUSCULAR; INTRAVENOUS at 12:05

## 2019-05-14 RX ADMIN — MUPIROCIN: 20 OINTMENT TOPICAL at 08:05

## 2019-05-14 RX ADMIN — ONDANSETRON 4 MG: 2 INJECTION INTRAMUSCULAR; INTRAVENOUS at 12:05

## 2019-05-14 RX ADMIN — OXYCODONE HYDROCHLORIDE AND ACETAMINOPHEN 1 TABLET: 10; 325 TABLET ORAL at 04:05

## 2019-05-14 RX ADMIN — PREGABALIN 75 MG: 75 CAPSULE ORAL at 08:05

## 2019-05-14 RX ADMIN — LIDOCAINE HYDROCHLORIDE 40 MG: 20 INJECTION, SOLUTION INTRAVENOUS at 12:05

## 2019-05-14 RX ADMIN — ALBUTEROL SULFATE 2.5 MG: 2.5 SOLUTION RESPIRATORY (INHALATION) at 07:05

## 2019-05-14 RX ADMIN — FENTANYL CITRATE 50 MCG: 50 INJECTION, SOLUTION INTRAMUSCULAR; INTRAVENOUS at 11:05

## 2019-05-14 RX ADMIN — OXYCODONE HYDROCHLORIDE 10 MG: 5 TABLET ORAL at 08:05

## 2019-05-14 RX ADMIN — FENTANYL CITRATE 100 MCG: 50 INJECTION, SOLUTION INTRAMUSCULAR; INTRAVENOUS at 09:05

## 2019-05-14 RX ADMIN — ONDANSETRON 8 MG: 8 TABLET, ORALLY DISINTEGRATING ORAL at 04:05

## 2019-05-14 RX ADMIN — LIDOCAINE HYDROCHLORIDE 50 MG: 20 INJECTION, SOLUTION INTRAVENOUS at 10:05

## 2019-05-14 RX ADMIN — VANCOMYCIN HYDROCHLORIDE 1000 MG: 1 INJECTION, POWDER, LYOPHILIZED, FOR SOLUTION INTRAVENOUS at 08:05

## 2019-05-14 RX ADMIN — SODIUM CHLORIDE: 0.9 INJECTION, SOLUTION INTRAVENOUS at 04:05

## 2019-05-14 RX ADMIN — NEOSTIGMINE METHYLSULFATE 5 MG: 1 INJECTION INTRAVENOUS at 12:05

## 2019-05-14 RX ADMIN — OXYCODONE HYDROCHLORIDE 5 MG: 5 TABLET ORAL at 01:05

## 2019-05-14 RX ADMIN — ALBUTEROL SULFATE 4 PUFF: 90 AEROSOL, METERED RESPIRATORY (INHALATION) at 12:05

## 2019-05-14 RX ADMIN — ALBUTEROL SULFATE 4 PUFF: 90 AEROSOL, METERED RESPIRATORY (INHALATION) at 10:05

## 2019-05-14 RX ADMIN — SODIUM CHLORIDE: 0.9 INJECTION, SOLUTION INTRAVENOUS at 10:05

## 2019-05-14 RX ADMIN — ROPIVACAINE HYDROCHLORIDE 30 ML: 5 INJECTION, SOLUTION EPIDURAL; INFILTRATION; PERINEURAL at 09:05

## 2019-05-14 RX ADMIN — CEFAZOLIN SODIUM 2 G: 2 SOLUTION INTRAVENOUS at 05:05

## 2019-05-14 RX ADMIN — ACETAMINOPHEN 1000 MG: 500 TABLET, FILM COATED ORAL at 08:05

## 2019-05-14 RX ADMIN — GLYCOPYRROLATE 0.4 MG: 0.2 INJECTION, SOLUTION INTRAMUSCULAR; INTRAVENOUS at 10:05

## 2019-05-14 RX ADMIN — ROCURONIUM BROMIDE 50 MG: 10 INJECTION, SOLUTION INTRAVENOUS at 10:05

## 2019-05-14 RX ADMIN — PROPOFOL 200 MG: 10 INJECTION, EMULSION INTRAVENOUS at 10:05

## 2019-05-14 RX ADMIN — TRANEXAMIC ACID 1 G: 100 INJECTION, SOLUTION INTRAVENOUS at 10:05

## 2019-05-14 RX ADMIN — MIDAZOLAM HYDROCHLORIDE 4 MG: 1 INJECTION, SOLUTION INTRAMUSCULAR; INTRAVENOUS at 09:05

## 2019-05-14 RX ADMIN — SODIUM CHLORIDE 1000 MG: 9 INJECTION, SOLUTION INTRAVENOUS at 06:05

## 2019-05-14 RX ADMIN — ASPIRIN 325 MG ORAL TABLET 325 MG: 325 PILL ORAL at 04:05

## 2019-05-14 RX ADMIN — DEXAMETHASONE SODIUM PHOSPHATE 12 MG: 4 INJECTION, SOLUTION INTRAMUSCULAR; INTRAVENOUS at 10:05

## 2019-05-14 NOTE — ANESTHESIA POSTPROCEDURE EVALUATION
Anesthesia Post Evaluation    Patient: Rishi Aranda    Procedure(s) Performed: Procedure(s) (LRB):  ARTHROPLASTY, KNEE, TOTAL,Medial and Lateral compartment (Right)    Final Anesthesia Type: general  Patient location during evaluation: PACU  Patient participation: Yes- Able to Participate  Level of consciousness: awake and alert  Post-procedure vital signs: reviewed and stable  Pain management: adequate  Airway patency: patent  PONV status at discharge: No PONV  Anesthetic complications: no      Cardiovascular status: blood pressure returned to baseline and stable  Respiratory status: unassisted, spontaneous ventilation and nasal cannula  Hydration status: euvolemic  Follow-up not needed.          Vitals Value Taken Time   /72 5/14/2019  1:49 PM   Temp 36.4 °C (97.6 °F) 5/14/2019 12:56 PM   Pulse 63 5/14/2019  1:49 PM   Resp 20 5/14/2019  1:15 PM   SpO2 98 % 5/14/2019  1:49 PM   Vitals shown include unvalidated device data.      No case tracking events are documented in the log.      Pain/Cata Score: Pain Rating Prior to Med Admin: 8 (5/14/2019  1:43 PM)  Cata Score: 8 (5/14/2019 12:56 PM)

## 2019-05-14 NOTE — PT/OT/SLP EVAL
Physical Therapy Evaluation    Patient Name:  Rishi Aranda   MRN:  [id number]    Recommendations:     Discharge Recommendations:  outpatient PT   Discharge Equipment Recommendations: walker, rolling, commode   Barriers to discharge: none    Assessment:     Rishi Aranda is a 53 y.o. 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  He presents with the following impairments/functional limitations:  weakness, impaired endurance, impaired functional mobilty, gait instability, pain, impaired balance, decreased lower extremity function, decreased ROM .  Able to amb with RW and A.  Anticipate dc tomorrow after am session    Rehab Prognosis: Good; patient would benefit from acute skilled PT services to address these deficits and reach maximum level of function.    Recent Surgery: Procedure(s) (LRB):  ARTHROPLASTY, KNEE, TOTAL,Medial and Lateral compartment (Right) Day of Surgery    Plan:     During this hospitalization, patient to be seen BID to address the identified rehab impairments via gait training, therapeutic activities, therapeutic exercises and progress toward the following goals:    · Plan of Care Expires:  06/13/19    Subjective     Chief Complaint: knee pain  Patient/Family Comments/goals: ready to get up ,  feels good to stretch it out.  Goals-PLOF   Pain/Comfort:  · Pain Rating 1: 5/10  · Location - Side 1: Right  · Location - Orientation 1: generalized  · Location 1: knee  · Pain Addressed 1: Pre-medicate for activity, Reposition, Distraction, Cessation of Activity, Nurse notified  · Pain Rating Post-Intervention 1: 5/10    Patients cultural, spiritual, Shinto conflicts given the current situation: no    Living Environment:  Pt lives with wife in 1 story house with 5 CARMELA and L HR.  Has shower stall with small lip to step into with built in seat and low toilet   Prior  to admission, patients level of function was mod I for ADL and amb without AD, drives, is on disability .  Equipment used at home: none.  DME owned (not currently used): none.  Upon discharge, patient will have assistance from family-wife has 1 last week of work.    Objective:     Communicated with nurse prior to session.  Patient found sitting up in bed  with FCD, SCD, peripheral IV  upon PT entry to room.    General Precautions: Standard, fall   Orthopedic Precautions:RLE weight bearing as tolerated   Braces: N/A     Exams:  · Cognitive Exam:  Patient is oriented to Person, Place, Time and Situation  · Gross Motor Coordination:  Decreased timing on RLE  · Postural Exam:  Patient presented with the following abnormalities:    · -       Rounded shoulders  · Sensation:    · -       Intact  light/touch BLE  · Skin Integrity/Edema:      · -       Skin integrity: R knee in surgical bandage   · -       Edema: R knee  · RLE ROM: Deficits: decreased in knee , ankle WFL  · RLE Strength: Deficits: fair quad set, assist for SLR, ankle WFL  · LLE ROM: WFL  · LLE Strength: WFL    Functional Mobility:  · Bed Mobility:     · Supine to Sit: minimum assistance  · Transfers:     · Sit to Stand:  contact guard assistance with rolling walker  · Gait: pt amb 70' with RW and CGA, decreased matthias, step and stride length.  step to gait with instruction in reciprocal gait.    · Balance: fair standing      Therapeutic Activities and Exercises:   PT eval   Pt performed 1 set of 10 reps of RLE  2. Quad sets  3. Ankle pumps  With assist  4. Hip abd/add  5. SLR  6. Knee flexion (heel slides)  7. Short arc quads  Pt required verbal cues, tactile cues and visual cues for technique in order to complete.     Gait as above    AM-PAC 6 CLICK MOBILITY  Total Score:18     Patient left up in chair with all lines intact, call button in reach and nurse and wife present.    GOALS:   Multidisciplinary Problems     Physical Therapy Goals        Problem:  Physical Therapy Goal    Goal Priority Disciplines Outcome Goal Variances Interventions   Physical Therapy Goal     PT, PT/OT Ongoing (interventions implemented as appropriate)     Description:  Goals to be met by: 19    Patient will increase functional independence with mobility by performin. Supine to sit with supervision.   2. Sit to supine with supervision.   3. Sit<>stand transfer with supervision using rolling walker.   4. Gait > 150 feet with SBA using rolling walker.   5. Ascend/descend 5 stairs with L handrails with CGA   6.  Able to perform 1 set of 10 reps of TKR ex with assist as needed                    History:     Past Medical History:   Diagnosis Date    Arthritis     Asthma     Cervical radiculopathy, BUE 2012    Chronic LBP 2012    Chronic neck pain 2012    HTN (hypertension) 2012    Hypertension        Past Surgical History:   Procedure Laterality Date    COLONOSCOPY N/A 2019    Performed by Noemí Boss [facility] at [facility] ([facility])    KNEE SURGERY      NECK SURGERY         Time Tracking:     PT Received On: 19  PT Start Time: 1725     PT Stop Time: 1805  PT Total Time (min): 40 min     Billable Minutes: Evaluation 15, Gait Training 12 and Therapeutic Exercise 13      [place], PT  2019

## 2019-05-14 NOTE — OR NURSING
C/o rt knee hamstring pain.  Rates pain a 10.  Moves toes rt foot and has positive sensation to rt foot and toes.

## 2019-05-14 NOTE — BRIEF OP NOTE
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 No  Was the patient administered a VTE Prophylaxis? No. Short procedure; low risk  Estimated Blood Loss: minimal           Specimens (From admission, onward)    None        Implants:   Implant Name Type Inv. Item Serial No.  Lot No. LRB No. Used   CEMENT BONE IMPLANT - [id number]  CEMENT BONE IMPLANT  [employer] [id number] Right 2   tibial tray implant   [id number] CONFORMIS  Right 1   femoral implant   [id number] [employer]FORMIS  Right 1   itotal ipoly XE [id number] patella implant    [employer] [id number] Right 1   7mm medial insert   [id number] [employer]IS  Right 1   lateral insert B   [id number] [employer]IS  Right 1       Complications: none           Disposition: PACU - hemodynamically stable.           Condition: Stable    Attestation:  I was present for the entire procedure.

## 2019-05-14 NOTE — TRANSFER OF CARE
"Anesthesia Transfer of Care Note    Patient: Rishi Aranda    Procedure(s) Performed: Procedure(s) (LRB):  ARTHROPLASTY, KNEE, TOTAL,Medial and Lateral compartment (Right)    Patient location: PACU    Anesthesia Type: general    Transport from OR: Transported from OR on 2-3 L/min O2 by NC with adequate spontaneous ventilation    Post pain: adequate analgesia    Post assessment: no apparent anesthetic complications    Post vital signs: stable    Level of consciousness: awake    Nausea/Vomiting: no nausea/vomiting    Complications: none    Transfer of care protocol was followed      Last vitals:   Visit Vitals  /73 (BP Location: Right arm, Patient Position: Lying)   Pulse 71   Temp 36.4 °C (97.6 °F) (Oral)   Resp 16   Ht 5' 6" (1.676 m)   Wt 74.8 kg (165 lb)   SpO2 100%   BMI 26.63 kg/m²     "

## 2019-05-14 NOTE — DISCHARGE INSTRUCTIONS
[address], [place]                                                                                          [phone]                   Postoperative Instructions for Knee Surgery                 Your Surgery Included:   Open               Arthroscopic   [] Ligament Repair       [] Diagnostic           [] ACL     [] PCL     [] MCL     [] PLLC      [] Synovectomy / Plica Removal [] Meniscal Cartilage Repair / Transplantation      [] Lysis of Adhesions / Manipulation [] Articular Cartilage Repair      [] Interval Release           [] Microfracture       [] OATS   [] ACI      [] Meniscectomy           [] Osteochondral Allograft      [] Meniscal Cartilage Repair  [] Patellar Realignment       [] Debridement / Chondroplasty         [] Lateral Release   [] Ligament Repair      [] Articular Cartilage Repair          [] Extensor Mechanism             []   Microfracture  []  OATS         []  Cartilage Biopsy [] Tendon Repair          [] Ligament Reconstruction          [] Patella                  [] Quadriceps             []   ACL    []   PCL  [] High Tibial Osteotomy       [] PRP Arthrocentesis  [x]  Total knee Joint Replacement         [] Amniox Arthrocentesis           [] Unicompartmental   [] Patellofemoral                  Call our office ([phone]) immediately or message through [facility] if you experience any of the following:       Excessive bleeding or pus like drainage at the incision site       Uncontrollable pain not relieved by pain medication       Excessive swelling or redness at the incision site       Fever above 101.5 degrees not controlled with Tylenol or Motrin       Shortness of Breath or severe calf pain       Any foul odor or blistering from the surgery site    FOR EMERGENCIES: [facility] is the best way to contact us. If on the weekend, page the  at [phone] who will direct your call appropriately.    1.   Pain Management: A cold therapy  cuff, pain medications, local injections, TENs unit, and in some cases, regional anesthesia injections are used to manage your post-operative pain. The decision to use each of these options is based on their risks and benefits.     Medications: You were given one or more of the following medication prescriptions during your preoperative appointment. Follow the instructions on the bottles.     Narcotic Medication (usually Percocet, Roxicodone, or Norco): Begin taking the medication before your knee starts to hurt. Some patients do not like to take any medication, but if you wait until your pain is severe before taking, you will be very uncomfortable for several hours waiting for the narcotic to work. Always take with food.     Nausea / Vomiting: For this issue, we prescribe Phenergan or Zofran, use this medication as directed as needed for nausea.     Cold Therapy: You may have been sent home with a [employer]® cold therapy unit and wrap for your knee. Fill with ice and water to the indicated fill line. You can use 20-30 minutes on then off, several times a day. This will help relieve pain and control swelling. Do not sleep with on.     Regional Anesthesia Injections (Blocks): You may have been given a regional nerve block either before or after surgery. This may make your entire leg numb for 24-36 hours.                            * Proceed with caution when bearing weight on your leg.     2.   Diet: Eat a bland diet for the first day after surgery. Progress your diet as tolerated. Constipation may occur with Narcotic usage. We recommend Colace 100mg twice a day while taking narcotics.    3.   Activity: Limit your activity during the first 48 hours, keep your leg elevated with pillows under your heel. After the first 48 hours at home, increase your activity level based on your symptoms.    4.   Dressing: (c) The soft, bulky dressing will be removed the morning after surgery. The Aquacel dressing (tan, long adhesive  "bandage) will remain on until the 1st post operative appointment. Do not remove. It is normal for some blood to be seen on the dressings. It is also normal for you to see apparent bruising on the skin around your incisions. If you are concerned by the drainage or the appearance of your wound site, you can send a picture via [facility].    5.   Shower: (c) You may shower on the 3rd day after surgery. The Aquacel bandage is to be covered with saran wrap before showering. Do not submerge limb in any water for 4 weeks or until incisions completely healed.  Do not get bandage wet.    6.   Your procedure did not require a post-operative brace.    7.   Your procedure did not require a Continuous Passive Motion (CPM) device.    8.   Weight Bearing: You may have been sent home with crutches. If instructed (see below), use these crutches at all times unless at complete rest.      [] Non-weight bearing for     0 weeks (you may touch your toes to the floor)      [] Partial weight bearing for  0 weeks    [] 25% Body Weight   [] 50% Body Weight      [x] Full weight bearing            [x]  NOW    []  after 0 weeks     9.  Knee Exercises: Begin these exercises the first day after surgery in order to help you regain your motion and strength. You may do the following marked exercises:     [x] Quad Sets - Begin activating your quadriceps muscle by driving your          knee downward into full knee extension while seated on a table or bed   with a towel rolled and propped under your heel     [x] Straight Leg Raise (SLR) - While nereyda your quadriceps muscle, lift     your fully extended leg to the level of your non-operative knee (as shown)     [x] Heel Slides - With the knee straight, slide your heel slowly toward your   buttocks, hold at the endpoint for 10-15 seconds, then slowly straighten     [x] Ankle pumps - With your knee straight, move your ankle in a "pumping"    fashion to activate your calf and leg muscles      10.  " Physical Therapy: Physical therapy is an essential component to your recovery from surgery. Your physical therapy will start in 1-3 days.    FIRST POSTOPERATIVE VISIT: As scheduled.

## 2019-05-14 NOTE — ANESTHESIA PREPROCEDURE EVALUATION
05/14/2019  Rishi Aranda is a 53 y.o., male.    Anesthesia Evaluation    I have reviewed the Patient Summary Reports.    I have reviewed the Nursing Notes.   I have reviewed the Medications.     Review of Systems  Anesthesia Hx:  No problems with previous Anesthesia  Denies Family Hx of Anesthesia complications.   Denies Personal Hx of Anesthesia complications.   Social:  Non-Smoker    Hematology/Oncology:  Hematology Normal   Oncology Normal     EENT/Dental:EENT/Dental Normal   Cardiovascular:   Hypertension    Pulmonary:   Asthma mild    Renal/:  Renal/ Normal     Hepatic/GI:  Hepatic/GI Normal    Musculoskeletal:  Musculoskeletal Normal    Neurological:  Neurology Normal    Endocrine:  Endocrine Normal    Dermatological:  Skin Normal    Psych:  Psychiatric Normal           Physical Exam  General:  Well nourished    Airway/Jaw/Neck:  Airway Findings: Tongue: Normal Mallampati: II      Dental:  Dental Findings: In tact        Mental Status:  Mental Status Findings:  Cooperative, Alert and Oriented         Anesthesia Plan  Type of Anesthesia, risks & benefits discussed:  Anesthesia Type:  general  Patient's Preference:   Intra-op Monitoring Plan: standard ASA monitors  Intra-op Monitoring Plan Comments:   Post Op Pain Control Plan: multimodal analgesia and peripheral nerve block  Post Op Pain Control Plan Comments:   Induction:   IV  Beta Blocker:         Informed Consent: Patient understands risks and agrees with Anesthesia plan.  Questions answered. Anesthesia consent signed with patient.  ASA Score: 2     Day of Surgery Review of History & Physical:    H&P update referred to the surgeon.         Ready For Surgery From Anesthesia Perspective.

## 2019-05-14 NOTE — NURSING
Pt arrived to floor via bed with WIN Pereira. VS taken and stable on RA and afebrile. IVF started, SCDs applied, oriented to room, call light placed within reach, bed low and locked, and family at bedside. Pt complains of pain 6/10. CDI. No acute distress noted at this time. Will continue to monitor.

## 2019-05-14 NOTE — PLAN OF CARE
SW met with pt at bedside to complete discharge assessment, verified PCP and uses [facility] on [address].  Pt's spouse, Yary present and daughter will provide transportation home. Pt had shower bench, built in,needs RW, BSC.  Pt has outpt PT scheduled for 5/16/19 at 8am at [facility].  SW gave pt information on the [facility] program.     05/14/19 7593   Discharge Assessment   Assessment Type Discharge Planning Assessment   Confirmed/corrected address and phone number on facesheet? Yes   Assessment information obtained from? Patient   Communicated expected length of stay with patient/caregiver no   Prior to hospitilization cognitive status: Alert/Oriented   Prior to hospitalization functional status: Independent   Current cognitive status: Alert/Oriented   Current Functional Status: Needs Assistance;Assistive Equipment   Lives With spouse   Able to Return to Prior Arrangements yes   Is patient able to care for self after discharge? Unable to determine at this time (comments)   Who are your caregiver(s) and their phone number(s)?   (spouse)   Readmission Within the Last 30 Days no previous admission in last 30 days   Patient currently being followed by outpatient case management? No   Patient currently receives any other outside agency services? No   Equipment Currently Used at Home   (shower bench built in)   Do you have any problems affording any of your prescribed medications? No   Is the patient taking medications as prescribed? yes   Does the patient have transportation home? Yes   Transportation Anticipated family or friend will provide   Does the patient receive services at the [facility]? No   Discharge Plan A Home   DME Needed Upon Discharge  bedside commode;ashley hayes   Patient/Family in Agreement with Plan yes

## 2019-05-14 NOTE — OPERATIVE NOTE ADDENDUM
Certification of Assistant at Surgery       Surgery Date: 5/14/2019     Participating Surgeons:  Surgeon(s) and Role:     * Maxi Chaves MD - Primary    Procedures:  Procedure(s) (LRB):  ARTHROPLASTY, KNEE, TOTAL,Medial and Lateral compartment (Right)    Assistant Surgeon's Certification of Necessity:  I understand that section 1842 (b) (6) (d) of the Social Security Act generally prohibits Medicare Part B reasonable charge payment for the services of assistants at surgery in teaching hospitals when qualified residents are available to furnish such services. I certify that the services for which payment is claimed were medically necessary, and that no qualified resident was available to perform the services. I further understand that these services are subject to post-payment review by the Medicare carrier.      Chaparrita Iraheta PA-C    05/14/2019  12:48 PM

## 2019-05-14 NOTE — PLAN OF CARE
Problem: Physical Therapy Goal  Goal: Physical Therapy Goal  Goals to be met by: 19    Patient will increase functional independence with mobility by performin. Supine to sit with supervision.   2. Sit to supine with supervision.   3. Sit<>stand transfer with supervision using rolling walker.   4. Gait > 150 feet with SBA using rolling walker.   5. Ascend/descend 5 stairs with L handrails with CGA   6.  Able to perform 1 set of 10 reps of TKR ex with assist as needed  Outcome: Ongoing (interventions implemented as appropriate)  PT eval.  Able to amb with RW and assist.  Anticipate dc tomorrow   REC:  OP PT set up May 16  DME:  RW and BSC

## 2019-05-14 NOTE — ANESTHESIA PROCEDURE NOTES
Right ACB    Patient location during procedure: holding area   Block not for primary anesthetic.  Reason for block: at surgeon's request and post-op pain management   Post-op Pain Location: Right knee  Timeout: 5/14/2019 9:03 AM   End time: 5/14/2019 9:11 AM  Staffing  Anesthesiologist: Kyle Salinas MD  Performed: anesthesiologist   Preanesthetic Checklist  Completed: patient identified, site marked, surgical consent, pre-op evaluation, timeout performed, IV checked, risks and benefits discussed and monitors and equipment checked  Peripheral Block  Patient position: supine  Prep: ChloraPrep and site prepped and draped  Patient monitoring: heart rate and continuous pulse ox  Block type: adductor canal  Laterality: right  Injection technique: single shot  Needle  Needle type: Echogenic   Needle gauge: 21 G  Needle length: 4 in  Needle localization: anatomical landmarks and ultrasound guidance   -ultrasound image captured on disc.  Assessment  Injection assessment: negative aspiration, negative parasthesia and local visualized surrounding nerve  Paresthesia pain: none  Heart rate change: no  Slow fractionated injection: yes

## 2019-05-15 ENCOUNTER — TELEPHONE (OUTPATIENT)
Dept: SPORTS MEDICINE | Facility: CLINIC | Age: 54
End: 2019-05-15

## 2019-05-15 VITALS
RESPIRATION RATE: 16 BRPM | OXYGEN SATURATION: 99 % | HEIGHT: 66 IN | SYSTOLIC BLOOD PRESSURE: 150 MMHG | HEART RATE: 58 BPM | WEIGHT: 165 LBS | DIASTOLIC BLOOD PRESSURE: 86 MMHG | TEMPERATURE: 99 F | BODY MASS INDEX: 26.52 KG/M2

## 2019-05-15 LAB
HCT VFR BLD AUTO: 35.9 % (ref 40–54)
HGB BLD-MCNC: 12.2 G/DL (ref 14–18)

## 2019-05-15 PROCEDURE — 99900035 HC TECH TIME PER 15 MIN (STAT)

## 2019-05-15 PROCEDURE — 97535 SELF CARE MNGMENT TRAINING: CPT

## 2019-05-15 PROCEDURE — 97165 OT EVAL LOW COMPLEX 30 MIN: CPT

## 2019-05-15 PROCEDURE — 85014 HEMATOCRIT: CPT

## 2019-05-15 PROCEDURE — 85018 HEMOGLOBIN: CPT

## 2019-05-15 PROCEDURE — 36415 COLL VENOUS BLD VENIPUNCTURE: CPT

## 2019-05-15 PROCEDURE — 94761 N-INVAS EAR/PLS OXIMETRY MLT: CPT

## 2019-05-15 PROCEDURE — 25000003 PHARM REV CODE 250: Performed by: PHYSICIAN ASSISTANT

## 2019-05-15 PROCEDURE — 63600175 PHARM REV CODE 636 W HCPCS: Performed by: PHYSICIAN ASSISTANT

## 2019-05-15 PROCEDURE — 97116 GAIT TRAINING THERAPY: CPT

## 2019-05-15 PROCEDURE — 97110 THERAPEUTIC EXERCISES: CPT

## 2019-05-15 RX ADMIN — OXYCODONE HYDROCHLORIDE 10 MG: 5 TABLET ORAL at 08:05

## 2019-05-15 RX ADMIN — OXYCODONE HYDROCHLORIDE AND ACETAMINOPHEN 1 TABLET: 10; 325 TABLET ORAL at 12:05

## 2019-05-15 RX ADMIN — DOCUSATE SODIUM 100 MG: 100 CAPSULE, LIQUID FILLED ORAL at 08:05

## 2019-05-15 RX ADMIN — MUPIROCIN 1 G: 20 OINTMENT TOPICAL at 08:05

## 2019-05-15 RX ADMIN — OXYCODONE HYDROCHLORIDE AND ACETAMINOPHEN 1 TABLET: 10; 325 TABLET ORAL at 05:05

## 2019-05-15 RX ADMIN — ASPIRIN 325 MG ORAL TABLET 325 MG: 325 PILL ORAL at 08:05

## 2019-05-15 RX ADMIN — CEFAZOLIN SODIUM 2 G: 2 SOLUTION INTRAVENOUS at 12:05

## 2019-05-15 NOTE — ASSESSMENT & PLAN NOTE
Status post right total knee arthroplasty with Dr. Chaves on 5/14/19    - WBAT  - PT/OT  - Multimodal pain control  - Rasheeda-op antibiotics  - DVT prophylaxis with SCDs, ambulation, Teds, and Aspirin    Dispo: Anticipate DC home today after cleared by PT.

## 2019-05-15 NOTE — PT/OT/SLP PROGRESS
Physical Therapy Treatment    Patient Name:  Rishi Aranda   MRN:  [id number]    Recommendations:     Discharge Recommendations:  outpatient PT   Discharge Equipment Recommendations: walker, rolling, commode   Barriers to discharge: Inaccessible home    Assessment:     Rishi Aranda is a 53 y.o. male admitted with a medical diagnosis of <principal problem not specified>.  He presents with the following impairments/functional limitations:  weakness, impaired endurance, impaired functional mobilty, gait instability, pain, impaired balance, decreased lower extremity function, decreased ROM .  Progressing toward goals .    Rehab Prognosis: Good; patient would benefit from acute skilled PT services to address these deficits and reach maximum level of function.    Recent Surgery: Procedure(s) (LRB):  ARTHROPLASTY, KNEE, TOTAL,Medial and Lateral compartment (Right) Day of Surgery    Plan:     During this hospitalization, patient to be seen BID to address the identified rehab impairments via gait training, therapeutic activities, therapeutic exercises and progress toward the following goals:    · Plan of Care Expires:  06/13/19    Subjective     Chief Complaint: knee pain  Patient/Family Comments/goals: I feel it more than before  Pain/Comfort:  · Pain Rating 1: 5/10  · Location - Side 1: Right  · Location - Orientation 1: generalized  · Location 1: knee  · Pain Addressed 1: Pre-medicate for activity, Reposition, Distraction, Cessation of Activity, Nurse notified  · Pain Rating Post-Intervention 1: 6/10      Objective:     Communicated with nurse prior to session.  Patient found up in chair with FCD, SCD, peripheral IV upon PT entry to room.     General Precautions: Standard, fall   Orthopedic Precautions:RLE weight bearing as tolerated   Braces: N/A     Functional Mobility:  · Bed Mobility:     · Sit to Supine: stand by assistance  · Transfers:     · Sit to Stand:  stand by assistance with rolling walker  · Gait: pt amb 160'  with RW and CGA/SBA   · Balance: F+ standing       AM-PAC 6 CLICK MOBILITY  Turning over in bed (including adjusting bedclothes, sheets and blankets)?: 3  Sitting down on and standing up from a chair with arms (e.g., wheelchair, bedside commode, etc.): 3  Moving from lying on back to sitting on the side of the bed?: 3  Moving to and from a bed to a chair (including a wheelchair)?: 3  Need to walk in hospital room?: 3  Climbing 3-5 steps with a railing?: 3  Basic Mobility Total Score: 18       Therapeutic Activities and Exercises:   gait as above  Pt performed 1 set of 10 reps of RLE  Sitting  Hip flex  Long arc quads   In supine   2. Quad sets  3. Ankle pumps  4. Hip abd/add  5. SLR  6. Knee flexion (heel slides)  7. Short arc quads  Pt required verbal cues, tactile cues and visual cues for technique in order to complete.       Patient left HOB elevated with all lines intact, call button in reach, bed alarm on, nurse notified and family present..    GOALS:   Multidisciplinary Problems     Physical Therapy Goals        Problem: Physical Therapy Goal    Goal Priority Disciplines Outcome Goal Variances Interventions   Physical Therapy Goal     PT, PT/OT Ongoing (interventions implemented as appropriate)     Description:  Goals to be met by: 19    Patient will increase functional independence with mobility by performin. Supine to sit with supervision.   2. Sit to supine with supervision.   3. Sit<>stand transfer with supervision using rolling walker.   4. Gait > 150 feet with SBA using rolling walker.   5. Ascend/descend 5 stairs with L handrails with CGA   6.  Able to perform 1 set of 10 reps of TKR ex with assist as needed                    Time Tracking:     PT Received On: 19  PT Start Time:      PT Stop Time:   PT Total Time (min): 27 min     Billable Minutes: Gait Training 12 and Therapeutic Exercise 15    Treatment Type: Treatment  PT/PTA: PT     PTA Visit Number: 0     La Marrufo  PT  05/14/2019

## 2019-05-15 NOTE — PLAN OF CARE
Problem: Adult Inpatient Plan of Care  Goal: Readiness for Transition of Care  Outcome: Ongoing (interventions implemented as appropriate)  OT eval completed. Patient without further goals for inpatient OT. Aware of progression of activities after dc home.

## 2019-05-15 NOTE — PLAN OF CARE
Problem: Adult Inpatient Plan of Care  Goal: Plan of Care Review  Outcome: Ongoing (interventions implemented as appropriate)  Patient on room air saturation 99% with no distress BS clear prn DPI not needed at this time will monitor.

## 2019-05-15 NOTE — PT/OT/SLP PROGRESS
Physical Therapy Treatment  Dc summary    Patient Name:  Rishi Aranda   MRN:  [id number]    Recommendations:     Discharge Recommendations:  outpatient PT   Discharge Equipment Recommendations: commode, walker, rolling   Barriers to discharge: None    Assessment:     Rishi Aranda is a 53 y.o. male admitted with a medical diagnosis of Primary osteoarthritis of right knee.  He presents with the following impairments/functional limitations:  weakness, impaired endurance, gait instability, impaired balance, decreased ROM, decreased lower extremity function, impaired functional mobilty, pain, orthopedic precautions .  All goals met.  Safe for dc    Rehab Prognosis: Good; patient would benefit from acute skilled PT services to address these deficits and reach maximum level of function.    Recent Surgery: Procedure(s) (LRB):  ARTHROPLASTY, KNEE, TOTAL,Medial and Lateral compartment (Right) 1 Day Post-Op    Plan:   Home today with OP tomorrow at 8am  During this hospitalization, patient to be seen BID to address the identified rehab impairments via gait training, therapeutic activities, therapeutic exercises and progress toward the following goals:    · Plan of Care Expires:  06/13/19    Subjective     Chief Complaint: knee pain  Patient/Family Comments/goals: I have been working this knee all night long  Pain/Comfort:  · Pain Rating 1: 6/10  · Location - Side 1: Right  · Location - Orientation 1: generalized  · Location 1: knee  · Pain Addressed 1: Pre-medicate for activity, Reposition, Distraction, Cessation of Activity, Nurse notified  · Pain Rating Post-Intervention 1: 7/10      Objective:     Communicated with nurse prior to session.  Patient found up in chair with peripheral IV, cryotherapy upon PT entry to room.     General Precautions: Standard, fall   Orthopedic Precautions:RLE weight bearing as tolerated   Braces: N/A     Functional Mobility:  · Bed Mobility:     · Supine to Sit: supervision  · Sit to Supine:  supervision  · Transfers:     · Sit to Stand:  supervision with rolling walker  · Gait: pt amb 250' x 2 with RW and S.  decreased matthias but reciprocal gait  · Balance: good standing  · Stairs:  Pt ascended/descended 5 stair(s) with No Assistive Device with left handrail with Contact Guard Assistance.       AM-PAC 6 CLICK MOBILITY  Turning over in bed (including adjusting bedclothes, sheets and blankets)?: 4  Sitting down on and standing up from a chair with arms (e.g., wheelchair, bedside commode, etc.): 3  Moving from lying on back to sitting on the side of the bed?: 3  Moving to and from a bed to a chair (including a wheelchair)?: 3  Need to walk in hospital room?: 3  Climbing 3-5 steps with a railing?: 3  Basic Mobility Total Score: 19       Therapeutic Activities and Exercises:   Pt performed 1 set of 10 reps of RLE   sitting   Hip flex  Long arc quads     2. Quad sets  3. Ankle pumps  4. Hip abd/add  5. SLR  6. Knee flexion (heel slides)  7. Short arc quads  Pt required verbal cues, tactile cues and visual cues for technique in order to complete.       Patient left up in chair with all lines intact, call button in reach and nurse notified..    GOALS:   Multidisciplinary Problems     Physical Therapy Goals     Not on file          Multidisciplinary Problems (Resolved)        Problem: Physical Therapy Goal    Goal Priority Disciplines Outcome Goal Variances Interventions   Physical Therapy Goal   (Resolved)     PT, PT/OT Outcome(s) achieved     Description:  Goals to be met by: 19    Patient will increase functional independence with mobility by performin. Supine to sit with supervision.   2. Sit to supine with supervision.   3. Sit<>stand transfer with supervision using rolling walker.   4. Gait > 150 feet with SBA using rolling walker.   5. Ascend/descend 5 stairs with L handrails with CGA   6.  Able to perform 1 set of 10 reps of TKR ex with assist as needed     All goals met 5/15                     Time Tracking:     PT Received On: 05/15/19  PT Start Time: 1037     PT Stop Time: 1132  PT Total Time (min): 55 min     Billable Minutes: Gait Training 24 and Therapeutic Exercise 31    Treatment Type: Treatment  PT/PTA: PT     PTA Visit Number: 0     La Marrufo, PT  05/15/2019

## 2019-05-15 NOTE — PROGRESS NOTES
"[facility]  Orthopedics  Progress Note    Patient Name: Rishi Aranda  MRN: [id number]  Admission Date: 5/14/2019  Hospital Length of Stay: 1 days  Attending Provider: Maxi Chaves MD  Primary Care Provider: Colten Carlisle MD  Follow-up For: Procedure(s) (LRB):  ARTHROPLASTY, KNEE, TOTAL,Medial and Lateral compartment (Right)    Post-Operative Day: 1 Day Post-Op  Subjective:     Principal Problem:Primary osteoarthritis of right knee    Principal Orthopedic Problem: Status post right total knee arthroplasty with Dr. Chaves on 5/14/19    Interval History: No acute events overnight. Pain well controlled. Ambulated well with PT yesterday after surgery. Denies fever, chills, chest pain, SOB, N/V/D. Tolerating diet and voiding voluntarily. Would like to go home today.     Review of patient's allergies indicates:   Allergen Reactions    Eric-dur     Theophylline      Other reaction(s): Hives       Current Facility-Administered Medications   Medication    albuterol inhaler 2 puff    aspirin tablet 325 mg    cyclobenzaprine tablet 10 mg    docusate sodium capsule 100 mg    HYDROmorphone injection 0.5 mg    mupirocin 2 % ointment 1 g    ondansetron disintegrating tablet 8 mg    oxyCODONE immediate release tablet 10 mg    oxyCODONE-acetaminophen  mg per tablet 1 tablet    ropivacaine-epi-clonid-ketorol (MUSA) 2.46-0.005- 0.0008-0.3mg/mL solution Syrg    sodium chloride 0.9% flush 3 mL     Objective:     Vital Signs (Most Recent):  Temp: 98.5 °F (36.9 °C) (05/15/19 0600)  Pulse: (!) 58 (05/15/19 0600)  Resp: 20 (05/15/19 0600)  BP: (!) 153/87 (05/15/19 0600)  SpO2: 99 % (05/15/19 0600) Vital Signs (24h Range):  Temp:  [97.6 °F (36.4 °C)-98.6 °F (37 °C)] 98.5 °F (36.9 °C)  Pulse:  [54-71] 58  Resp:  [16-20] 20  SpO2:  [95 %-100 %] 99 %  BP: (109-153)/(63-88) 153/87     Weight: 74.8 kg (165 lb)  Height: 5' 6" (167.6 cm)  Body mass index is 26.63 kg/m².      Intake/Output Summary (Last 24 " hours) at 5/15/2019 0721  Last data filed at 5/15/2019 0600  Gross per 24 hour   Intake 2580 ml   Output 2200 ml   Net 380 ml                 Right Knee Exam     Comments:  Post op dressing in place, clean, dry, and intact  Ankle and toes up and downgoing  Sensation intact to light touch throughout  Foot warm and well perfused    Can perform straight leg raise  SCDs, Teds, Polar care in place      Significant Labs:   BMP: No results for input(s): GLU, NA, K, CL, CO2, BUN, CREATININE, CALCIUM, MG in the last 48 hours.  CBC:   Recent Labs   Lab 05/14/19  1406 05/15/19  0503   HGB 12.6* 12.2*   HCT 37.8* 35.9*     All pertinent labs within the past 24 hours have been reviewed.        Assessment/Plan:     * Primary osteoarthritis of right knee  Status post right total knee arthroplasty with Dr. Chaves on 5/14/19    - WBAT  - PT/OT  - Multimodal pain control  - Rasheeda-op antibiotics  - DVT prophylaxis with SCDs, ambulation, Teds, and Aspirin    Dispo: Anticipate DC home today after cleared by PT.           Arya Limon MD  Orthopedics  [facility]

## 2019-05-15 NOTE — TELEPHONE ENCOUNTER
Left telephone message regarding appointment cancellation 5/20/19 with .Rescheduled initial Post Op visit with TALIB HER 5/27/19.

## 2019-05-15 NOTE — OP NOTE
DATE OF PROCEDURE:  5/14/2019    ATTENDING SURGEON: Surgeon(s) and Role:     * Maxi Chaves MD - Primary     FIRST ASSISTANT:Chaparrita Iraheta PA-C    No resident or fellow was available throughout the entire procedure as a result it was medically necessary for Chaparrita Iraheta PA-C to perform first assistant duties.      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    COMPLICATIONS: NONE    URINE OUTPUT: 0 mL    ESTIMATED BLOOD LOSS: less than 100 mL    CONDITION:  Good      INDICATIONS FOR OPERATIVE PROCEDURES:  Rishi Aranda is a 53 y.o. male with history of right knee pain and pathology. He noted significant problems in the area of concern with problems on activities of daily living and aggressive use of the right leg. As a result of these problems and problems with overall activity level, the patient was deemed to be an appropriate candidate for operative intervention. There was significant Genu Varum noted and based on the patient's age and functional level, the patient was deemed to be an appropriate candidate for use of Depuy total knee products.    IMPLANTS UTILIZED:   ConforMIS tibial femoral implant  ConforMIS total tibial tray  ConforMIS 7 mm medial insert  ConforMIS lateral B insert  ConforMIS 41mm x 10 mm patellar component  Smartset Gentamicin bone Cement x 2    FINDINGS:     ARTICULAR CARTILAGE LESION(S):  Medial Femoral Condyle: ICRS Grade 4A      Size: 3 x 6 cm  Medial tibial plateau: ICRS Grade 4A      Size: 3.5 x 3.5 cm        Lateral Femoral Condyle:  ICRS Grade 4A      Size: 3.0 x 3.5 cm  Lateral tibial plateau: ICRS Grade 4A      Size: 3.0 x 3.5 cm        Patellar surface: ICRS Grade 4A      Size: 3.0 x 3.5 cm  Trochlear groove: ICRS Grade 4A      Size: 3.5 x 3.5 cm    EXAMINATION UNDER ANESTHESIA:   Extension 20 degrees  Flexion 110 degrees  Lachman Maneuver:  Negative  Anterior Drawer: Negative  Posterior Drawer:  Negative    DESCRIPTION OF PROCEDURE:   The patient was brought into the Operating Room and placed in supine position. Upon application of femoral block in the preoperative holding area, the patient underwentGeneral to stabilize the area. The patient was given the appropriate dose of antibiotics based on body weight. Timeout was utilized to verify the Right side as the operative side. Both upper extremities were placed in comfortable position. The nonoperative leg was carefully padded along the heel and peroneal nerve regions. Irene catheter was applied. Operative leg was then prepped and draped in a sterile fashion with ChloraPrep material with a bump under the right hip and popliteal post along the right side of the table. Once prepped and draped in sterile fashion, Coban was placed on the knee. A medial based incision was carried down the skin down to fat and fascia. A medial subvastus approach was performed and the patella was subluxed lateral  Flaps were raised superiorly and inferiorly as well as medially and laterally along the region of concern.      Attention was turned to the distal femur and the # 1 preparation device from ConforMIS was used to create the distal lug holes for the implant and # 2 guide. We then drilled the distal femoral region as medially and laterally along the region of concern. We placed a # 2 cutting block, which was applied made the distal pin holes, which were then removed simultaneously and drilled proximal pin holes and pins to stabilize the distal femoral cutting block. This was left in place and the saw blade  used to create the distal femoral cut. Bone was removed. We then assessed our cut with a #3 guide from the ConforMIS set. The cut was found to be excellent. As a result, we turned our attention to the proximal tibia.    ACL structure was resected. The PCL structure was recessed. Medial and lateral meniscus remnants were removed with the Bovie cautery. We then applied the # 4 cutting system directly over the area of concern. We used to currettes to remove the cartilage from the proximal tibia down to the subchondral bone level. Extramedullary alignment tricia was used to verify appropriate alignment. The cutting guide was pinned into position and an oscillating saw used, we made the proximal cut. We used the # 3 guide to assess our extension gap and the # 6 guide to assess our flexion gap. There gaps were symmetric flexion and extension gaps with appropriate alingment as well.    With this performed, we then visualized the distal femus once again and placed the # 7 femoral cutting guide into position and pinned this with the appropriate distal femoral rotation. The previously placed pin holes along the distal femur were used to position this cutting guide and the oblique pin holes created and the pins placed to hold the guide in the appropriated rotation. The central pins were removed. Anterior and posterior condylar and chamfer cuts were created as well as the distal lug holes for the femoral component. The # 8/9 femoral guide was applied and the final posterior chamfer cuts were created. We placed the trial femoral and tibial components demonstrating great stability in flexion and extension with varus and valgus load as well as great flexion without shift of the tibial component.    We re-exposed the proximal tibia, placed the preparation tray for the tibial Region and pinned this into position. The central tower was applied and the proximal tibia drilled centrally followed by application of the trial keel. We then  removed the trial keel.We exposed the patella, sized the patella, demonstrating the 41 mm patellar component would most normalize the patient's anatomy. As a result, we removed approximately 10 mm of bone, drilled 3 peg holes and placed the trial patella fit in excellent fashion. The knee was taken through range of motion demonstrating excellent tracking and stability. As a result, trial components were left in place. An Esmarch was then used to exsanguinate the limb. Tourniquet was inflated. Trial components were removed. We exposed the femoral, proximal tibial plateau and patellar regions. Pulsavac was used to remove all blood elements and the areas dried and prepared for cementing. We then mixed cement and placed cement first at the tibial region and placed the tibial component position and extruded cement was removed. We then turned our attention to the femoral and patellar regions. These dried areas were then cemented with extruded cement removed from the femoral component as well as applied cement to the patellar component. The we then performed trial reduction with the trial inserts applied. Knee was taken to extension demonstrating excellent stability with no signs of hyperextension remaining. The tourniquet was released and all bleeding sites were cauterized. A 1:1 iodine Saline mixture was applied to the knee and allowed to sit for 3-5 minutes. Final pulsavac lavage was performed with application of 3 liters of fluid through the knee. Trial components were removed and the actual 7 mm medial insert was applied along with the size B lateral insert. Prior to placement of these inserts Exparel mixture was applied to the posterior capsular, medial subvastus region and anterior fatpad regions of the knee with a 21 gauge spinal needle. Further irrigation performed along the area of concern.  Following placement of the drain, we then closed the synovial layer and parapatellar tendon region with a series of #1  Vicryl sutures placed in figure-of-eight fashion. We then closed subcutaneous tissues with 3-0 Vicryl sutures placed in inverted fashion. Skin was closed with running 3-0 Monocryl sutures placed in subcuticular fashion along with application of Dermabond ointment and aquacel. We did apply intraarticular Exparel for postoperative pain control. We applied Xeroform gauze, ABD pads, cast padding, sterile electrode pads proximally and distally, a long-leg CHENTE hose stocking and cooling unit.     The patient was allowed to recover from the anesthetic. was removed. The patient was taken to Recovery Room in stable condition. At the completion case,  all instrument and sponge counts were correct. Subcutaneous tissues were closed with 3-0 Vicryl sutures placed in inverted fashion. Skin was closed with running 4-0 Monocryl sutures placed in subcuticular fashion along with application of Dermabond ointment and Dermabond tape. We then applied Xeroform gauze, ABD pads, cast padding, a long-leg CHENTE hose stocking and cooling unit. The patient was allowed to recover from the anesthetic. LMA was removed. The patient was taken to Recovery Room in stable condition. At the completion of the case, all instrument and sponge counts were correct.    NOTE: I was present and scrubbed for the key portions of the procedure.    PHYSICAL THERAPY:  The patient should begin physical therapy on postoperative day # 3 and will be advanced to outpatient therapy as soon as Possible following discharge.    Weight bearing:as tolerated  right leg  Range of Motion:Full normal motion symmetric to opposite side    Immediate specific exercises should include:extension exercises, quadriceps load with a heel roll, prone hang procedures with 5-10 lbs. ankle weights.    Gait program should include: active extension with a heel-to-toe gait working on maintaining extension    Discharge home when stable  Follow-up as scheduled  Condition Stable  Activity as above

## 2019-05-15 NOTE — PLAN OF CARE
Angeles from [facility] gave approval to pull rolling walker & bedside commode from our supply - [employer] to deliver to bedside      05/15/19 0846   Final Note   Assessment Type Final Discharge Note   Anticipated Discharge Disposition Home  (outpatient PT )   What phone number can be called within the next 1-3 days to see how you are doing after discharge? [phone]   Hospital Follow Up  Appt(s) scheduled? Yes   Discharge plans and expectations educations in teach back method with documentation complete? Yes   Right Care Referral Info   Post Acute Recommendation No Care

## 2019-05-15 NOTE — PLAN OF CARE
Problem: Adult Inpatient Plan of Care  Goal: Plan of Care Review  Outcome: Ongoing (interventions implemented as appropriate)  Free from falls, injury, or skin breakdown this hospital admission. Pt eager & in agreement w/ DC. VU of DC instructions and the need to attend follow-up appointment--paperwork passed & explained. Scripts filled and delivered to bedside. IV removed w/ cath tip intact, WNL. Voiding, ambulating, & tolerating PO well. Dressing to R Knee CDI. BLE pulses intact.To be DCd home w/ family--will be escorted downstairs via  transport team once dressed, ready & ride arrives. Pt discharged in no distress.

## 2019-05-15 NOTE — PT/OT/SLP EVAL
Occupational Therapy   Evaluation and Discharge Note    Name: Rishi Aranda  MRN: [id number]  Admitting Diagnosis:  Primary osteoarthritis of right knee 1 Day Post-Op    Recommendations:     Discharge Recommendations: outpatient PT  Discharge Equipment Recommendations:  commode, walker, rolling(delivered to room)  Barriers to discharge:  None    Assessment:     Rishi Aranda is a 53 y.o. male with a medical diagnosis of Primary osteoarthritis of right knee. At this time, patient is functioning at their prior level of function and does not require further acute OT services.     Plan:     During this hospitalization, patient does not require further acute OT services.  Please re-consult if situation changes.    · Plan of Care Reviewed with: patient    Subjective     Chief Complaint: I'm hurting in the knee. Thank god I don't have to have the other one done.  Patient/Family Comments/goals: to play golf.    Occupational Profile:  Pt lives with wife in 1 story house with 5 CARMELA and L HR.  Has shower stall with small lip to step into with built in seat and low toilet   Prior to admission, patients level of function was mod I for ADL and amb without AD, drives, is on disability; enjoys playing golf and fishing.  Equipment used at home: none.  Owns no DME.  Upon discharge, patient will have assistance from family-wife has 1 last week of work.    Pain/Comfort:  C/o pain prior and after therapy to be 7/10 in right knee. Addressed by distraction.    Patients cultural, spiritual, Zoroastrianism conflicts given the current situation: no    Objective:     Communicated with: RN prior to session.  Patient found up in chair with peripheral IV, cryotherapy upon OT entry to room.    General Precautions: Standard,     Orthopedic Precautions:RLE weight bearing as tolerated   Braces: N/A     Occupational Performance:    Bed Mobility:    · Did not occur; patient up in chair pre and post eval/treat.    Functional Mobility/Transfers:  · Patient  completed Sit <> Stand Transfer with stand by assistance  with  rolling walker  . Step to gait when turning; attempting to perform reciprocal steps otherwise.  · Functional Mobility: in room with SBA and use of RW. Reviewed management with walker during functional task, like carrying items. Patient verbalized understanding re compensating.    Activities of Daily Living:  · Upper Body Dressing: stand by assistance in standing  · Lower Body Dressing: stand by assistance to don pants, doff socks and don shoes.Educated about improtance to avoid placing torque on knee    Cognitive/Visual Perceptual:  intact    Physical Exam:  Balance:    -       good dynamic standign and sitting balance. Tends to bear weight through (L)LE.  Postural examination/scapula alignment:    -       No postural abnormalities identified  Skin integrity: incision covered  Edema:  Mild (R)LE  Sensation:    -       Intact  Motor Planning:    -       decreased ability to carry items due to use or RW. Decreased reach into lower cabinets due to decreased knee ROM    AMPAC 6 Click ADL:  AMPAC Total Score: 22    Treatment & Education:  eval and education re progression of activities, functional mobility and management of RW during functional tasks.  Education:    Patient left up in chair with all lines intact    GOALS:   Multidisciplinary Problems     Occupational Therapy Goals     Not on file                History:     Past Medical History:   Diagnosis Date    Arthritis     Asthma     Cervical radiculopathy, BUE 8/17/2012    Chronic LBP 8/17/2012    Chronic neck pain 8/17/2012    HTN (hypertension) 8/17/2012    Hypertension        Past Surgical History:   Procedure Laterality Date    COLONOSCOPY N/A 5/2/2019    [facility]em[facility]    KNEE SURGERY      NECK SURGERY         Time Tracking:     OT Date of Treatment: 05/15/19  OT Start Time: 1155  OT Stop Time: 1221  OT Total Time (min): 26 min    Billable  Minutes:Evaluation 15  Self Care/Home Management 11    J JAIMIE Davenport/YOSELIN  5/15/2019

## 2019-05-15 NOTE — PLAN OF CARE
Problem: Physical Therapy Goal  Goal: Physical Therapy Goal  Goals to be met by: 19    Patient will increase functional independence with mobility by performin. Supine to sit with supervision.   2. Sit to supine with supervision.   3. Sit<>stand transfer with supervision using rolling walker.   4. Gait > 150 feet with SBA using rolling walker.   5. Ascend/descend 5 stairs with L handrails with CGA   6.  Able to perform 1 set of 10 reps of TKR ex with assist as needed     All goals met 5/15  Outcome: Outcome(s) achieved Date Met: 05/15/19  All goals met.  Ready for dc  REC:  OP tomorrow at 8 am  DME:  RW and 3 in 1 delivered

## 2019-05-15 NOTE — PLAN OF CARE
Resting comfortably in bed at this time.  VSS on RA and afebrile this shift.  Tolerating pain on PO.  Good appetite and good UOP this shift, independent to urinal/ t. Repositions self independently in bed. Out of bed with assist only. Polar care to right knee. Incision with no signs of infection noted. Free from injury or skin breakdown; Fall precautions maintained and call light in reach.  POC updated questions answered and comments acknowledged.  Purposeful hourly rounding completed this shift.

## 2019-05-15 NOTE — SUBJECTIVE & OBJECTIVE
"Principal Problem:Primary osteoarthritis of right knee    Principal Orthopedic Problem: Status post right total knee arthroplasty with Dr. Chaves on 5/14/19    Interval History: No acute events overnight. Pain well controlled. Ambulated well with PT yesterday after surgery. Denies fever, chills, chest pain, SOB, N/V/D. Tolerating diet and voiding voluntarily. Would like to go home today.     Review of patient's allergies indicates:   Allergen Reactions    Eric-dur     Theophylline      Other reaction(s): Hives       Current Facility-Administered Medications   Medication    albuterol inhaler 2 puff    aspirin tablet 325 mg    cyclobenzaprine tablet 10 mg    docusate sodium capsule 100 mg    HYDROmorphone injection 0.5 mg    mupirocin 2 % ointment 1 g    ondansetron disintegrating tablet 8 mg    oxyCODONE immediate release tablet 10 mg    oxyCODONE-acetaminophen  mg per tablet 1 tablet    ropivacaine-epi-clonid-ketorol (MUSA) 2.46-0.005- 0.0008-0.3mg/mL solution Syrg    sodium chloride 0.9% flush 3 mL     Objective:     Vital Signs (Most Recent):  Temp: 98.5 °F (36.9 °C) (05/15/19 0600)  Pulse: (!) 58 (05/15/19 0600)  Resp: 20 (05/15/19 0600)  BP: (!) 153/87 (05/15/19 0600)  SpO2: 99 % (05/15/19 0600) Vital Signs (24h Range):  Temp:  [97.6 °F (36.4 °C)-98.6 °F (37 °C)] 98.5 °F (36.9 °C)  Pulse:  [54-71] 58  Resp:  [16-20] 20  SpO2:  [95 %-100 %] 99 %  BP: (109-153)/(63-88) 153/87     Weight: 74.8 kg (165 lb)  Height: 5' 6" (167.6 cm)  Body mass index is 26.63 kg/m².      Intake/Output Summary (Last 24 hours) at 5/15/2019 0721  Last data filed at 5/15/2019 0600  Gross per 24 hour   Intake 2580 ml   Output 2200 ml   Net 380 ml                 Right Knee Exam     Comments:  Post op dressing in place, clean, dry, and intact  Ankle and toes up and downgoing  Sensation intact to light touch throughout  Foot warm and well perfused    Can perform straight leg raise  SCDs, Teds, Polar care in " place      Significant Labs:   BMP: No results for input(s): GLU, NA, K, CL, CO2, BUN, CREATININE, CALCIUM, MG in the last 48 hours.  CBC:   Recent Labs   Lab 05/14/19  1406 05/15/19  0503   HGB 12.6* 12.2*   HCT 37.8* 35.9*     All pertinent labs within the past 24 hours have been reviewed.

## 2019-05-15 NOTE — DISCHARGE SUMMARY
"[facility]  Orthopedics  Discharge Summary      Patient Name: Rishi Aranda  MRN: [id number]  Admission Date: 5/14/2019  Hospital Length of Stay: 1 days  Discharge Date and Time:  05/15/2019 7:29 AM  Attending Physician: Maxi Chaves MD   Discharging Provider: Arya Limon MD  Primary Care Provider: Colten Carlisle MD    HPI:   Status post right total knee arthroplasty with Dr. Chaves on 5/14/19    Procedure(s) (LRB):  ARTHROPLASTY, KNEE, TOTAL,Medial and Lateral compartment (Right)      Hospital Course:  Patient underwent knee surgery and was transferred to PACU in stable condition.  In PACU, patient received appropriate post-operative care and was transferred to medical floor for neurovascular monitoring and pain control.  There patient progressed appropriately. Once patient was ready, male was discharged home with plans for physical therapy and follow-up with the operative surgeon.    Diet: Regular          Significant Diagnostic Studies: Labs:   BMP: No results for input(s): GLU, NA, K, CL, CO2, BUN, CREATININE, CALCIUM, MG in the last 48 hours. and CBC   Recent Labs   Lab 05/14/19  1406 05/15/19  0503   HGB 12.6* 12.2*   HCT 37.8* 35.9*       Pending Diagnostic Studies:     None        Final Active Diagnoses:    Diagnosis Date Noted POA    PRINCIPAL PROBLEM:  Primary osteoarthritis of right knee [M17.11] 02/07/2018 Yes      Problems Resolved During this Admission:      Discharged Condition: good    Disposition: Home or Self Care    Follow Up:    Patient Instructions:      WALKER FOR HOME USE     Order Specific Question Answer Comments   Type of Walker: Adult (5'4"-6'6")    With wheels? Yes    Height: 5' 6" (1.676 m)    Weight: 74.8 kg (165 lb)    Length of need (1-99 months): 99    Does patient have medical equipment at home? none    Please check all that apply: Patient's condition impairs ambulation.    Please check all that apply: Patient is unable to safely ambulate without equipment.  " "  Please check all that apply: Patient needs help to get in and out of chair.    Please check all that apply: Walker will be used for gait training.      COMMODE FOR HOME USE     Order Specific Question Answer Comments   Type: Standard    Height: 5' 6" (1.676 m)    Weight: 74.8 kg (165 lb)    Does patient have medical equipment at home? none    Length of need (1-99 months): 99      Medications:  Reconciled Home Medications:      Medication List      CONTINUE taking these medications    albuterol 90 mcg/actuation inhaler  Commonly known as:  PROVENTIL/VENTOLIN HFA  Inhale 2 puffs into the lungs every 6 (six) hours as needed for Wheezing.     aspirin 325 MG tablet  Take 1 tablet (325 mg total) by mouth once daily. for 42 doses     budesonide-formoterol 80-4.5 mcg 80-4.5 mcg/actuation Hfaa  Commonly known as:  SYMBICORT  Inhale 2 puffs into the lungs 2 (two) times daily. Controller     celecoxib 200 MG capsule  Commonly known as:  CeleBREX  Take 1 capsule (200 mg total) by mouth 2 (two) times daily.     irbesartan 75 MG tablet  Commonly known as:  AVAPRO  Take 1 tablet (75 mg total) by mouth once daily.     naproxen sodium 220 MG tablet  Commonly known as:  ANAPROX  Take 220 mg by mouth every 12 (twelve) hours.     oxyCODONE-acetaminophen  mg per tablet  Commonly known as:  PERCOCET  Take 1 tablet by mouth every 6 (six) hours as needed for Pain.     promethazine 25 MG tablet  Commonly known as:  PHENERGAN  Take 1 tablet (25 mg total) by mouth every 4 (four) hours as needed for Nausea.            Arya Limon MD  Orthopedics  [facility]  "

## 2019-05-16 ENCOUNTER — CLINICAL SUPPORT (OUTPATIENT)
Dept: REHABILITATION | Facility: HOSPITAL | Age: 54
End: 2019-05-16
Payer: MEDICARE

## 2019-05-16 ENCOUNTER — DOCUMENTATION ONLY (OUTPATIENT)
Dept: SPORTS MEDICINE | Facility: CLINIC | Age: 54
End: 2019-05-16

## 2019-05-16 DIAGNOSIS — M25.561 ACUTE PAIN OF RIGHT KNEE: ICD-10-CM

## 2019-05-16 DIAGNOSIS — R29.898 DECREASED STRENGTH INVOLVING KNEE JOINT: ICD-10-CM

## 2019-05-16 PROCEDURE — 97161 PT EVAL LOW COMPLEX 20 MIN: CPT

## 2019-05-16 PROCEDURE — 97110 THERAPEUTIC EXERCISES: CPT

## 2019-05-16 NOTE — PROGRESS NOTES
Pt stopped Ellen outside of physical therapy to ask questions related to pain management.    I discussed with the patient on how to take the Percocet and the Tramadol for severe pain.    I also provided the patient with a pain management instruction sheet.     All of the patient's questions were answered and the patient will contact us if they have any questions or concerns in the interim.

## 2019-05-16 NOTE — PLAN OF CARE
[facility]  Physical Therapy Initial Evaluation    Name: Rishi Aranda  Clinic Number: [id number]    Therapy Diagnosis:   Encounter Diagnoses   Name Primary?    Acute pain of right knee     Decreased strength involving knee joint      Physician: Humberto Royal, *    Physician Orders: PT Eval and Treat   Medical Diagnosis from Referral:   Diagnosis   M17.11 (ICD-10-CM) - Primary osteoarthritis of right knee   Evaluation Date: 5/16/2019  Authorization Period Expiration: 12/31/19  Plan of Care Expiration: 9/5/19  Visit # / Visits authorized: 1/ 12    Time In: 0800  Time Out: 0900  Total Billable Time: 50 minutes    Precautions: Standard,   The patient should begin physical therapy on postoperative day # 3 and will be advanced to outpatient therapy as soon as Possible following discharge.     Weight bearing:as tolerated  right leg  Range of Motion:Full normal motion symmetric to opposite side     Immediate specific exercises should include:extension exercises, quadriceps load with a heel roll, prone hang procedures with 5-10 lbs. ankle weights.     Gait program should include: active extension with a heel-to-toe gait working on maintaining extension     DOS: 5/14/19  Procedure: ARTHROPLASTY, KNEE, TOTAL,Medial and Lateral compartment (Right)    Subjective   Date of onset: years DOS 5/14/19  History of current condition - Rishi reports: He decided to have surgery now due to wear and tear and pain in the knee and was educated about his options and decided surgery. He has been a little stiff since surgery and felt that since leaving the hospital that he might has lost some of his previous ROM. He has been dealing with the pain using ice, medication and at home TENS unit. He denies difficulty going up and down stairs, but walking longer distances can be tough.       Past Medical History:   Diagnosis Date    Arthritis     Asthma     Cervical radiculopathy, BUE 8/17/2012    Chronic LBP  8/17/2012    Chronic neck pain 8/17/2012    HTN (hypertension) 8/17/2012    Hypertension      Rishi Aranda  has a past surgical history that includes Knee surgery; Neck surgery; Colonoscopy (N/A, 5/2/2019); and Total knee arthroplasty (Right, 5/14/2019).    Rishi has a current medication list which includes the following prescription(s): albuterol, aspirin, budesonide-formoterol 80-4.5 mcg, celecoxib, irbesartan, naproxen sodium, oxycodone-acetaminophen, promethazine, and tramadol.    Review of patient's allergies indicates:   Allergen Reactions    Eric-dur     Theophylline      Other reaction(s): Hives        Imaging, X-ray: 5/14/19 (after surgery)  FINDINGS:  Right knee now shows postoperative findings of total knee arthroplasty with prostheses in satisfactory position and alignment.  No acute fracture or dislocation is identified.   some air is present in the soft tissues consistent with recent surgery.  External splint is present.      Prior Therapy: Previous therapy, pre-hab for knee previous year  Social History: 1 story with stairs to get into home lives with their spouse  Occupation: Been out of work for about 5 years  Prior Level of Function: Full ADL's without pain or difficulty  Current Level of Function: pain with ambulation    Pain:  Current 5/10, worst 10/10, best 4/10   Location: right knee   Description: Aching, Dull, Burning, Sharp and Shooting  Aggravating Factors: sudden movements  Easing Factors: pain medication, ice and TENS unit    Pts goals: Wants to be running again, no pain, returning to golf    Objective     Observation: Patient is in no acute distres.  Post-op dressing/brace in place.    Gait: decreased stance time on RLE, ambulating into clinic with RW, walking a decreased gait speed     Functional tests:               SL Squat: Not performed 2/2 post-op.              DL Squat: Not performed 2/2 post-op.              Step-down: Not performed 2/2 post-op.              SLS EO: Not  "performed 2/2 post-op.              SLS EC: Not performed 2/2 post-op.     Range of Motion:   Knee Right Passive Left Passive   Flexion 69 146   Extension Lacking 3  1 hyper      Lower Extremity Strength:  Formal MMT not performed 2/2 POD# and increased pain.  good quad activation noted R LE.     Special Tests:  Not performed 2/2 post-op.     Joint Mobility: restricted motion as expected post-op.     Palpation: warm located medial aspect of knee, decreased mobility of patella during quad activation     Sensation:  Intact; diminished 2/2 residual nerve block.     Flexibility:  Grossly hypomobile quad, hamstring and gastroc as expected on post-op R LE     Edema: moderate edema located R knee as expected post-op        CMS Impairment/Limitation/Restriction for FOTO Knee Survey    Therapist reviewed FOTO scores for Rishi Aranda on 5/16/2019.   FOTO documents entered into [employer] - see Media section.    Limitation Score: 72%         TREATMENT   Treatment Time In: 08:20  Treatment Time Out: 08:40  Total Treatment time separate from Evaluation: 20 minutes    Rishi received therapeutic exercises to develop strength, endurance and ROM for 20 minutes including:  Propped quad set 10x 10" hold  Heel slides with belt assist 10 x 10" hold  Ankle pumps against OTB x 30  Seated HSS/HCS 10 x 10 second hold  Propped on towel roll x 3 min    Rishi received cold pack for 10 minutes to reduce edema and decrease pain.    Home Exercises and Patient Education Provided    Education provided re: POC, goals for first two weeks post surgery, goals for therapy, role of therapy for care, exercises/HEP    Written Home Exercises Provided: yes.  Exercises were reviewed and Rishi was able to demonstrate them prior to the end of the session.   Pt received a written copy of exercises to perform at home. Rishi demonstrated good  understanding of the education provided.     See EMR under patient instructions for exercises given.   Assessment   Rishi is a " 53 y.o. male referred to outpatient Physical Therapy with a medical diagnosis of post op total arthroscopic knee replacement. Pt presents today 2 days post op with increased pain, reduced ROM and strength leading to difficulty with ADL's, and an altered gait pattern. Skilled therapy is required to regain strength, range of motion and return gait pattern to normal walking pattern. Initial treatment will focus on achieving proper quad control, improving knee extension and flexion ROM to match with LLE.    Pt prognosis is Good.   Pt will benefit from skilled outpatient Physical Therapy to address the deficits stated above and in the chart below, provide pt/family education, and to maximize pt's level of independence.     Plan of care discussed with patient: Yes  Pt's spiritual, cultural and educational needs considered and patient is agreeable to the plan of care and goals as stated below:      Anticipated Barriers for therapy: none     Medical Necessity is demonstrated by the following  History  Co-morbidities and personal factors that may impact the plan of care Co-morbidities:   HTN    Personal Factors:   no deficits     low   Examination  Body Structures and Functions, activity limitations and participation restrictions that may impact the plan of care Body Regions:   lower extremities    Body Systems:    gross symmetry  ROM  strength  balance  gait    Participation Restrictions:   Unable to work    Activity limitations:   Learning and applying knowledge  no deficits    General Tasks and Commands  no deficits    Communication  no deficits    Mobility  walking    Self care  no deficits    Domestic Life  no deficits    Interactions/Relationships  no deficits    Life Areas  no deficits    Community and Social Life  community life  recreation and leisure         high   Clinical Presentation stable and uncomplicated low   Decision Making/ Complexity Score: low     GOALS: Short Term Goals:  8 weeks  1.Report decreased R  knee pain  < / =  3/10  to increase tolerance for ADL's and increased strengthening exercises  2. Increase knee ROM to 0-120 in order to be able to perform ADLs without difficulty.  3. Increase strength to 3+/5 MMT grade in RLE  to increase tolerance for ADL and work activities.  4. Pt to tolerate HEP to improve ROM and independence with ADL's    Long Term Goals: 16 weeks  1.Report decreased R knee pain < / = 0/10  to increase tolerance for ADL's and improved walking  2.Patient goal: Patient will be able to return to golf without pain  3.Increase strength to >/= 4+/5 in RLE  to increase tolerance for ADL and work activities.  4. Pt will report at CJ level (20-40% impaired) on FOTO knee to demonstrate increase in LE function with every day tasks.        Plan   Plan of care Certification: 5/16/2019 to 9/5/19.    Outpatient Physical Therapy 2 times weekly for 16 weeks to include the following interventions: Electrical Stimulation IFC, Gait Training, Manual Therapy, Moist Heat/ Ice, Neuromuscular Re-ed, Patient Education, Therapeutic Activites and Therapeutic Exercise.     Shane Ellis, SPT    I certify that I was present in the room directing the student in service delivery and guiding them using my skilled judgment. As the co-signing therapist I have reviewed the students documentation and am responsible for the treatment, assessment, and plan.        Arlene Ortiz, PT, DPT, OCS

## 2019-05-21 ENCOUNTER — CLINICAL SUPPORT (OUTPATIENT)
Dept: REHABILITATION | Facility: HOSPITAL | Age: 54
End: 2019-05-21
Payer: MEDICARE

## 2019-05-21 DIAGNOSIS — R29.898 DECREASED STRENGTH INVOLVING KNEE JOINT: ICD-10-CM

## 2019-05-21 DIAGNOSIS — M25.561 ACUTE PAIN OF RIGHT KNEE: ICD-10-CM

## 2019-05-21 PROCEDURE — 97110 THERAPEUTIC EXERCISES: CPT

## 2019-05-21 PROCEDURE — 97140 MANUAL THERAPY 1/> REGIONS: CPT

## 2019-05-21 NOTE — PROGRESS NOTES
"  Physical Therapy Daily Treatment Note     Name: Rishi Aranda  Clinic Number: [id number]    Therapy Diagnosis:   Encounter Diagnoses   Name Primary?    Acute pain of right knee     Decreased strength involving knee joint      Physician: Humberto oRyal, *    Visit Date: 5/21/2019  Physician Orders: PT Eval and Treat   Medical Diagnosis from Referral:   Diagnosis   M17.11 (ICD-10-CM) - Primary osteoarthritis of right knee   Evaluation Date: 5/16/2019  Authorization Period Expiration: 12/31/19  Plan of Care Expiration: 9/5/19  Visit # / Visits authorized: 2/ 12    Time In: 0800  Time Out: 0855  Total Billable Time: 25 minutes    Precautions: Standard,   The patient should begin physical therapy on postoperative day # 3 and will be advanced to outpatient therapy as soon as Possible following discharge.     Weight bearing:as tolerated  right leg  Range of Motion:Full normal motion symmetric to opposite side     Immediate specific exercises should include:extension exercises, quadriceps load with a heel roll, prone hang procedures with 5-10 lbs. ankle weights.     Gait program should include: active extension with a heel-to-toe gait working on maintaining extension     DOS: 5/14/19  Procedure: ARTHROPLASTY, KNEE, TOTAL,Medial and Lateral compartment (Right)    Subjective     Pt reports: Pt had a rough night last night and had a lot of pain and trouble sleeping.  He was compliant with home exercise program.  Response to previous treatment: Eval was previous treatment  Functional change: Eval was previous treatments    Pain: 7/10  Location: right knee      Objective       Knee extension: 3 degrees of hyper extension  Knee flexion: 87 degrees    Rishi received therapeutic exercises to develop strength, endurance, ROM and flexibility for 25 minutes including:  Heel prop for 3 minutes  SAQ on small foam roll 3 x 10  Seated knee flexion stretch with strap 15 x 5" hold  Supine hip flexor stretch off table 3 x 30"  Seated " "HSS/HCS 15 x 10" hold    Rishi received the following manual therapy techniques: Joint mobilizations and Soft tissue Mobilization were applied to the: patella and R knee for 20 minutes, including:  Patellar glides medial/lateral/superior/inferior  Prone knee stretch  Propped knee with over pressure      Rishi received cold pack for 10 minutes to to decrease circulation, pain, and swelling.      Home Exercises Provided and Patient Education Provided     Education provided:   - POC, continue HEP    Written Home Exercises Provided: yes.  Exercises were reviewed and Rishi was able to demonstrate them prior to the end of the session.  Rishi demonstrated good  understanding of the education provided.     See EMR under Media for exercises provided 5/16/19.    Assessment     Patient tolerated treatment well today. He was able to improve his R knee range of motion compared to initial evaluation reaching 3 degrees of hyperextension and 87 degrees of flexion. Patient shows adequate quad control during quad sets.    Rishi is progressing well towards his goals.   Pt prognosis is Good.     Pt will continue to benefit from skilled outpatient physical therapy to address the deficits listed in the problem list box on initial evaluation, provide pt/family education and to maximize pt's level of independence in the home and community environment.     Pt's spiritual, cultural and educational needs considered and pt agreeable to plan of care and goals.    Anticipated barriers to physical therapy: none    GOALS: Short Term Goals:  8 weeks  1.Report decreased R knee pain  < / =  3/10  to increase tolerance for ADL's and increased strengthening exercises. Progressing, not met  2. Increase knee ROM to 0-120 in order to be able to perform ADLs without difficulty. Progressing, not met  3. Increase strength to 3+/5 MMT grade in RLE  to increase tolerance for ADL and work activities. Progressing, not met  4. Pt to tolerate HEP to improve ROM " and independence with ADL's Progressing, not met     Long Term Goals: 16 weeks  1.Report decreased R knee pain < / = 0/10  to increase tolerance for ADL's and improved walking Progressing, not met  2.Patient goal: Patient will be able to return to golf without pain Progressing, not met  3.Increase strength to >/= 4+/5 in RLE  to increase tolerance for ADL and work activities.Progressing, not met  4. Pt will report at CJ level (20-40% impaired) on FOTO knee to demonstrate increase in LE function with every day tasks. Progressing, not met    Plan     Cont POC, focus on knee ROM initially and quad activation    Shane Ellis, SPT    I certify that I was present in the room directing the student in service delivery and guiding them using my skilled judgment. As the co-signing therapist I have reviewed the students documentation and am responsible for the treatment, assessment, and plan.      Arlene Ortiz, PT, DPT, OCS

## 2019-05-23 ENCOUNTER — CLINICAL SUPPORT (OUTPATIENT)
Dept: REHABILITATION | Facility: HOSPITAL | Age: 54
End: 2019-05-23
Payer: MEDICARE

## 2019-05-23 DIAGNOSIS — R29.898 DECREASED STRENGTH INVOLVING KNEE JOINT: ICD-10-CM

## 2019-05-23 DIAGNOSIS — M25.561 ACUTE PAIN OF RIGHT KNEE: ICD-10-CM

## 2019-05-23 PROCEDURE — 97110 THERAPEUTIC EXERCISES: CPT

## 2019-05-23 PROCEDURE — 97140 MANUAL THERAPY 1/> REGIONS: CPT

## 2019-05-23 NOTE — PROGRESS NOTES
"  Physical Therapy Daily Treatment Note     Name: Rishi Aranda  Clinic Number: [id number]    Therapy Diagnosis:   Encounter Diagnoses   Name Primary?    Acute pain of right knee     Decreased strength involving knee joint      Physician: Humberto Royal, *    Visit Date: 5/23/2019  Physician Orders: PT Eval and Treat   Medical Diagnosis from Referral:   Diagnosis   M17.11 (ICD-10-CM) - Primary osteoarthritis of right knee   Evaluation Date: 5/16/2019  Authorization Period Expiration: 12/31/19  Plan of Care Expiration: 9/5/19  Visit # / Visits authorized: 3/ 12    Time In: 0800  Time Out: 0910  Total Billable Time: 55 minutes    Precautions: Standard,   The patient should begin physical therapy on postoperative day # 3 and will be advanced to outpatient therapy as soon as Possible following discharge.     Weight bearing:as tolerated  right leg  Range of Motion:Full normal motion symmetric to opposite side     Immediate specific exercises should include:extension exercises, quadriceps load with a heel roll, prone hang procedures with 5-10 lbs. ankle weights.     Gait program should include: active extension with a heel-to-toe gait working on maintaining extension     DOS: 5/14/19  Procedure: ARTHROPLASTY, KNEE, TOTAL,Medial and Lateral compartment (Right)    Subjective     Pt reports: Pt states that his knee is doing better but has more leg pain.  States ankle is swollen.  Denies redness or increased temperature.    He was compliant with home exercise program.  Response to previous treatment: no adverse effects  Functional change: none    Pain: 8/10  Location: right knee      Objective       Magana's test: negative     Knee extension: 0 degrees of extension  Knee flexion: 95 degrees    Rishi received therapeutic exercises to develop strength, endurance, ROM and flexibility for 45 minutes including:  Knee extension stretch 4# thigh/ 4# shin w/ ankle prop x 5 min R  Quad set (1/2 FR under knee) 30 x 3" R  LAQ 30 " "x 3"  R  Self knee flexion stretch off EOM 10 x 10" R  SAQ on small foam roll 3 x 10 NP  Heel slides with strap 10 x 10" hold R  SLR 3 x 10 R  SL hip abd 3 x 10 R  Prone hip extension 3 x 10 R  Prone hang 4# x 4 min  Gait training with SPC x 2 laps  Supine hip flexor stretch off table 3 x 30" NP  Seated HSS/HCS 3 x 30" R    Rishi received the following manual therapy techniques: Joint mobilizations and Soft tissue Mobilization were applied to the: patella and R knee for 10 minutes, including:  Patellar glides medial/lateral/superior/inferior  Seated knee flexion stretch off EOM  A/P joint mobs for knee extension      Rishi received cold pack for 10 minutes to to decrease circulation, pain, and swelling.      Home Exercises Provided and Patient Education Provided     Education provided:   - POC, continue HEP    Written Home Exercises Provided: yes.  Exercises were reviewed and Rishi was able to demonstrate them prior to the end of the session.  Rishi demonstrated good  understanding of the education provided.     See EMR under Media for exercises provided 5/16/19.    Assessment     Pt with good tolerance to treatment today.  Pt with improved ROM today reaching 0-95 degrees.  He presents with fair quad activation, with min extensor lag noted during SLR.  Increased muscular fatigue noted throughout exercises.  Initiated gait training today with SPC.  Pt ambulates with ER of R LE, possibly secondary to increased swelling of R ankle causing decreased R DF ROM.  Will continue to work on quad activation to further improve strength and maintain extension ROM.      Rishi is progressing well towards his goals.   Pt prognosis is Good.     Pt will continue to benefit from skilled outpatient physical therapy to address the deficits listed in the problem list box on initial evaluation, provide pt/family education and to maximize pt's level of independence in the home and community environment.     Pt's spiritual, cultural and " educational needs considered and pt agreeable to plan of care and goals.    Anticipated barriers to physical therapy: none    GOALS: Short Term Goals:  8 weeks  1.Report decreased R knee pain  < / =  3/10  to increase tolerance for ADL's and increased strengthening exercises. Progressing, not met  2. Increase knee ROM to 0-120 in order to be able to perform ADLs without difficulty. Progressing, not met  3. Increase strength to 3+/5 MMT grade in RLE  to increase tolerance for ADL and work activities. Progressing, not met  4. Pt to tolerate HEP to improve ROM and independence with ADL's Progressing, not met     Long Term Goals: 16 weeks  1.Report decreased R knee pain < / = 0/10  to increase tolerance for ADL's and improved walking Progressing, not met  2.Patient goal: Patient will be able to return to golf without pain Progressing, not met  3.Increase strength to >/= 4+/5 in RLE  to increase tolerance for ADL and work activities.Progressing, not met  4. Pt will report at CJ level (20-40% impaired) on FOTO knee to demonstrate increase in LE function with every day tasks. Progressing, not met    Plan     Cont POC, focus on knee ROM initially and quad activation     Arlene Ortiz, PT, DPT, OCS

## 2019-05-27 ENCOUNTER — OFFICE VISIT (OUTPATIENT)
Dept: SPORTS MEDICINE | Facility: CLINIC | Age: 54
End: 2019-05-27
Payer: MEDICARE

## 2019-05-27 VITALS
WEIGHT: 165 LBS | HEART RATE: 58 BPM | SYSTOLIC BLOOD PRESSURE: 146 MMHG | DIASTOLIC BLOOD PRESSURE: 86 MMHG | HEIGHT: 66 IN | BODY MASS INDEX: 26.52 KG/M2

## 2019-05-27 DIAGNOSIS — Z96.651 S/P TOTAL KNEE ARTHROPLASTY, RIGHT: ICD-10-CM

## 2019-05-27 DIAGNOSIS — Z09 SURGERY FOLLOW-UP EXAMINATION: ICD-10-CM

## 2019-05-27 DIAGNOSIS — M17.11 PRIMARY OSTEOARTHRITIS OF RIGHT KNEE: Primary | ICD-10-CM

## 2019-05-27 PROCEDURE — 99024 PR POST-OP FOLLOW-UP VISIT: ICD-10-PCS | Mod: S$GLB,,, | Performed by: PHYSICIAN ASSISTANT

## 2019-05-27 PROCEDURE — 99999 PR PBB SHADOW E&M-EST. PATIENT-LVL III: ICD-10-PCS | Mod: PBBFAC,,, | Performed by: PHYSICIAN ASSISTANT

## 2019-05-27 PROCEDURE — 99024 POSTOP FOLLOW-UP VISIT: CPT | Mod: S$GLB,,, | Performed by: PHYSICIAN ASSISTANT

## 2019-05-27 PROCEDURE — 99999 PR PBB SHADOW E&M-EST. PATIENT-LVL III: CPT | Mod: PBBFAC,,, | Performed by: PHYSICIAN ASSISTANT

## 2019-05-27 NOTE — PROGRESS NOTES
Subjective:          Chief Complaint: Rishi Aranda is a 53 y.o. male who had no chief complaint listed for this encounter.    Pt presents for 2 week post-op evaluation. Pt has no complaints at this time. He is doing PT here at [facility] and progressing as scheduled. Pt is taking pain meds as needed. Denies fever, chills, discharge from wound site, and N/V.    DATE OF PROCEDURE:  5/14/2019     ATTENDING SURGEON: Surgeon(s) and Role:     * Maxi Chaves MD - Primary     FIRST ASSISTANT:Chaparrita Iraheta PA-C     No resident or fellow was available throughout the entire procedure as a result it was medically necessary for Chaparrita Iraheta PA-C to perform first assistant duties.        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   HENT: Negative for congestion and sore throat.    Eyes: Negative for discharge and double vision.   Cardiovascular: Negative for chest pain, palpitations and syncope.   Respiratory: Negative for cough and shortness of breath.    Endocrine: Negative for cold intolerance and heat intolerance.   Skin: Negative for dry skin and rash.   Musculoskeletal: Positive for joint pain and joint swelling.   Gastrointestinal: Negative for abdominal pain, nausea and vomiting.   Neurological: Negative for focal weakness, numbness and paresthesias.                   Objective:        General: Rihsi is well-developed, well-nourished, appears stated age, in no acute distress, alert and oriented to time, place and person.     General    Nursing note and vitals reviewed.  Constitutional: He is oriented to person, place, and time. He appears well-developed  and well-nourished. No distress.   HENT:   Head: Normocephalic and atraumatic.   Nose: Nose normal.   Eyes: Conjunctivae and EOM are normal.   Neck: Normal range of motion. Neck supple.   Cardiovascular: Normal rate, regular rhythm and intact distal pulses.    Pulmonary/Chest: Effort normal and breath sounds normal. No respiratory distress.   Abdominal: Soft. Bowel sounds are normal. He exhibits no distension. There is no tenderness.   Neurological: He is alert and oriented to person, place, and time. He has normal reflexes.   Psychiatric: He has a normal mood and affect. His behavior is normal. Thought content normal.     General Musculoskeletal Exam   Gait: antalgic       Right Knee Exam     Inspection   Erythema: absent  Scars: present  Swelling: present  Effusion: absent  Deformity: absent  Bruising: absent    Tenderness   The patient is tender to palpation of the condyle.    Range of Motion   Extension: 0   Flexion: 130     Other   Sensation: normal    Comments:  Incision clean/dry/intact  Aquacel intact  No sign of infection  Mild swelling  Compartments soft  Neurovascular status intact in extremity      Left Knee Exam     Range of Motion   Extension: 0   Flexion: 130     Other   Sensation: normal    Vascular Exam     Right Pulses  Dorsalis Pedis:      2+  Posterior Tibial:      2+        Left Pulses  Dorsalis Pedis:      2+  Posterior Tibial:      2+        Edema  Right Lower Leg: absent  Left Lower Leg: absent    Post op Radiographic Findings:    Right knee now shows postoperative findings of total knee arthroplasty with prostheses in satisfactory position and alignment.  No acute fracture or dislocation is identified.   some air is present in the soft tissues consistent with recent surgery.  External splint is present.    Xrays of the right knee were ordered and reviewed by me today. These findings were discussed and reviewed with the patient.          Assessment:       Encounter Diagnoses   Name Primary?     Primary osteoarthritis of right knee Yes    S/P total knee arthroplasty, right     Surgery follow-up examination           Plan:       1. Provided patient with operative note.  2. Removed suture tails.  3. May shower now without covering incisions.  4. Continue  mg once daily and Celebrex BID for 4 wks.  5. Continue PT per protocol.  6. IKDC, SF-12 and KOOS was not filled out today in clinic.     RTC in 4 weeks with Dr. Maxi Chaves Patient will fill out IKDC, SF-12 and KOOS on return and bilateral radiographs.    All of the patient's questions were answered and the patient will contact us if they have any questions or concerns in the interim.                [facility] patient questionnaires have been collected today.

## 2019-05-28 ENCOUNTER — CLINICAL SUPPORT (OUTPATIENT)
Dept: REHABILITATION | Facility: HOSPITAL | Age: 54
End: 2019-05-28
Payer: MEDICARE

## 2019-05-28 DIAGNOSIS — R29.898 DECREASED STRENGTH INVOLVING KNEE JOINT: ICD-10-CM

## 2019-05-28 DIAGNOSIS — M25.561 ACUTE PAIN OF RIGHT KNEE: ICD-10-CM

## 2019-05-28 PROCEDURE — 97110 THERAPEUTIC EXERCISES: CPT

## 2019-05-28 PROCEDURE — 97116 GAIT TRAINING THERAPY: CPT

## 2019-05-28 NOTE — PROGRESS NOTES
"  Physical Therapy Daily Treatment Note     Name: Rishi Aranda  Clinic Number: [id number]    Therapy Diagnosis:   Encounter Diagnoses   Name Primary?    Acute pain of right knee     Decreased strength involving knee joint      Physician: Humberto Royal, *    Visit Date: 5/28/2019  Physician Orders: PT Eval and Treat   Medical Diagnosis from Referral:   Diagnosis   M17.11 (ICD-10-CM) - Primary osteoarthritis of right knee   Evaluation Date: 5/16/2019  Authorization Period Expiration: 12/31/19  Plan of Care Expiration: 9/5/19  Visit # / Visits authorized: 4/ 12    Time In: 0803  Time Out: 0908  Total Billable Time: 35 minutes    Precautions: Standard,   The patient should begin physical therapy on postoperative day # 3 and will be advanced to outpatient therapy as soon as Possible following discharge.     Weight bearing:as tolerated  right leg  Range of Motion:Full normal motion symmetric to opposite side     Immediate specific exercises should include:extension exercises, quadriceps load with a heel roll, prone hang procedures with 5-10 lbs. ankle weights.     Gait program should include: active extension with a heel-to-toe gait working on maintaining extension     DOS: 5/14/19  Procedure: ARTHROPLASTY, KNEE, TOTAL,Medial and Lateral compartment (Right)    Subjective     Pt states feeling stiff and sore in R knee.    He was compliant with home exercise program.  Response to previous treatment: no adverse effects  Functional change: none    Pain: 8/10  Location: right knee      Objective         ROM: 5/28/19  Knee flexion: 95 degrees    Rishi received therapeutic exercises to develop strength, endurance, ROM and flexibility for 15 minutes including:    LAQ 30 x 3"  R  SLR 3 x 10 R  HSS 2 min strap  GSS 2 min plinthe  Heel slides 10 x 10 sec hold   EOT knee flexion 3 x 30 sec     Not performed today:   Supine hip flexor stretch off table 3 x 30" NP  Seated HSS/HCS 3 x 30" R  SL hip abd 3 x 10 R  Prone hip " "extension 3 x 10 R  Prone hang 4# x 4 min  Knee extension stretch 4# thigh/ 4# shin w/ ankle prop x 5 min R  Quad set (1/2 FR under knee) 30 x 3" R    Manual Treatment:  10 min   Gait training with SPC x 2 laps  Wt shifting 30 x 3 sec hold as sheeba    Rishi received the following manual therapy techniques: Joint mobilizations and Soft tissue Mobilization were applied to the: patella and R knee for 10 minutes, including:  Patellar glides medial/lateral/superior/inferior  Seated knee flexion stretch off EOM  A/P joint mobs for knee extension      Rishi received cold pack for 10 minutes to to decrease circulation, pain, and swelling.      Home Exercises Provided and Patient Education Provided     Education provided:   - POC, continue HEP    Written Home Exercises Provided: yes.  Exercises were reviewed and Rishi was able to demonstrate them prior to the end of the session.  Rishi demonstrated good  understanding of the education provided.     See EMR under Media for exercises provided 5/16/19.    Assessment   Pt cont to need skilled care to improve knee flex/ ext as well as quad/ hip strength for ADLs.  Pt sheeba tx well pn level remained same prior to and after tx session.   Pt showed improved balance and strength during therex.  Pt cont to have slight swing gait on occasion and tend to cross over mid line when amb with a cane.         Rishi is progressing well towards his goals.   Pt prognosis is Good.     Pt will continue to benefit from skilled outpatient physical therapy to address the deficits listed in the problem list box on initial evaluation, provide pt/family education and to maximize pt's level of independence in the home and community environment.     Pt's spiritual, cultural and educational needs considered and pt agreeable to plan of care and goals.    Anticipated barriers to physical therapy: none    GOALS: Short Term Goals:  8 weeks  1.Report decreased R knee pain  < / =  3/10  to increase tolerance for " ADL's and increased strengthening exercises. Progressing, not met  2. Increase knee ROM to 0-120 in order to be able to perform ADLs without difficulty. Progressing, not met  3. Increase strength to 3+/5 MMT grade in RLE  to increase tolerance for ADL and work activities. Progressing, not met  4. Pt to tolerate HEP to improve ROM and independence with ADL's Progressing, not met     Long Term Goals: 16 weeks  1.Report decreased R knee pain < / = 0/10  to increase tolerance for ADL's and improved walking Progressing, not met  2.Patient goal: Patient will be able to return to golf without pain Progressing, not met  3.Increase strength to >/= 4+/5 in RLE  to increase tolerance for ADL and work activities.Progressing, not met  4. Pt will report at CJ level (20-40% impaired) on FOTO knee to demonstrate increase in LE function with every day tasks. Progressing, not met    Plan     Cont to progress towards goals set by PT.  Work to improve ROM, gait and strength next visit.       Meng Lanier, PTA

## 2019-05-30 ENCOUNTER — CLINICAL SUPPORT (OUTPATIENT)
Dept: REHABILITATION | Facility: HOSPITAL | Age: 54
End: 2019-05-30
Payer: MEDICARE

## 2019-05-30 DIAGNOSIS — M25.561 ACUTE PAIN OF RIGHT KNEE: ICD-10-CM

## 2019-05-30 DIAGNOSIS — R29.898 DECREASED STRENGTH INVOLVING KNEE JOINT: ICD-10-CM

## 2019-05-30 PROCEDURE — 97116 GAIT TRAINING THERAPY: CPT

## 2019-05-30 PROCEDURE — 97110 THERAPEUTIC EXERCISES: CPT

## 2019-05-30 NOTE — PROGRESS NOTES
"  Physical Therapy Daily Treatment Note     Name: Rishi Aranda  Clinic Number: [id number]    Therapy Diagnosis:   Encounter Diagnoses   Name Primary?    Acute pain of right knee     Decreased strength involving knee joint      Physician: Humberto Royal, *    Visit Date: 5/30/2019  Physician Orders: PT Eval and Treat   Medical Diagnosis from Referral:   Diagnosis   M17.11 (ICD-10-CM) - Primary osteoarthritis of right knee   Evaluation Date: 5/16/2019  Authorization Period Expiration: 12/31/19  Plan of Care Expiration: 9/5/19  Visit # / Visits authorized: 5/ 12    Time In: 0806  Time Out: 0915   Total Billable Time: 55 minutes    Precautions: Standard,   The patient should begin physical therapy on postoperative day # 3 and will be advanced to outpatient therapy as soon as Possible following discharge.     Weight bearing:as tolerated  right leg  Range of Motion:Full normal motion symmetric to opposite side     Immediate specific exercises should include:extension exercises, quadriceps load with a heel roll, prone hang procedures with 5-10 lbs. ankle weights.     Gait program should include: active extension with a heel-to-toe gait working on maintaining extension     DOS: 5/14/19  Procedure: ARTHROPLASTY, KNEE, TOTAL,Medial and Lateral compartment (Right)    Subjective     Pt cont to have some stiffness and soreness in R knee.     He was compliant with home exercise program.  Response to previous treatment: no adverse effects  Functional change: none    Pain: 5/10  Location: right knee      Objective       Pt amb into clinic w/ SPC     Rishi received therapeutic exercises to develop strength, endurance, ROM and flexibility for 45 minutes including:  HSS 2 min strap  GSS 2 min plinthe  Heel slides 20 x 5 sec hold   QS 30 x 3 sec hold  LAQ 30 2#   SLR 3 x 10 R  LLR 3 x 10   QS 30 x 3 sec hold   Seated knee ext stretch on 1/2 foam roll 3 min       Not performed today:   Supine hip flexor stretch off table 3 x 30" " "NP  Seated HSS/HCS 3 x 30" R  SL hip abd 3 x 10 R  Prone hip extension 3 x 10 R  Prone hang 4# x 4 min  Knee extension stretch 4# thigh/ 4# shin w/ ankle prop x 5 min R  Quad set (1/2 FR under knee) 30 x 3" R    Manual Treatment:  10 min   Gait training with SPC x 2 laps heel-toe walk  Cone hesitation walk 5 cone 3 laps single UE assist     Rishi received the following manual therapy techniques: Joint mobilizations and Soft tissue Mobilization were applied to the: patella and R knee for 5 minutes, including:  Patellar glides medial/lateral/superior/inferior  Seated knee flexion stretch off EOM  A/P joint mobs for knee extension      Rishi received cold pack for 10 minutes to to decrease circulation, pain, and swelling.      Home Exercises Provided and Patient Education Provided     Education provided:   - Pt edu on proper exercise technique.      Written Home Exercises Provided: yes.  Exercises were reviewed and Rishi was able to demonstrate them prior to the end of the session.  Rishi demonstrated good  understanding of the education provided.     See EMR under Media for exercises provided 5/16/19.    Assessment     Pt displayed quad strength and endurance during therex.  Pt cont to have glute med weakness and knee ext deficit.  Pt sheeba tx w/ no c/o pn.  Pt arrived 5 min late to tx today.    Rishi is progressing well towards his goals.   Pt prognosis is Good.     Pt will continue to benefit from skilled outpatient physical therapy to address the deficits listed in the problem list box on initial evaluation, provide pt/family education and to maximize pt's level of independence in the home and community environment.     Pt's spiritual, cultural and educational needs considered and pt agreeable to plan of care and goals.    Anticipated barriers to physical therapy: none    GOALS: Short Term Goals:  8 weeks  1.Report decreased R knee pain  < / =  3/10  to increase tolerance for ADL's and increased strengthening " exercises. Progressing, not met  2. Increase knee ROM to 0-120 in order to be able to perform ADLs without difficulty. Progressing, not met  3. Increase strength to 3+/5 MMT grade in RLE  to increase tolerance for ADL and work activities. Progressing, not met  4. Pt to tolerate HEP to improve ROM and independence with ADL's Progressing, not met     Long Term Goals: 16 weeks  1.Report decreased R knee pain < / = 0/10  to increase tolerance for ADL's and improved walking Progressing, not met  2.Patient goal: Patient will be able to return to golf without pain Progressing, not met  3.Increase strength to >/= 4+/5 in RLE  to increase tolerance for ADL and work activities.Progressing, not met  4. Pt will report at CJ level (20-40% impaired) on FOTO knee to demonstrate increase in LE function with every day tasks. Progressing, not met    Plan     Cont to progress towards goals set by PT.  Work to improve ROM, gait and strength next visit.       Meng Lanier, PTA

## 2019-06-04 ENCOUNTER — CLINICAL SUPPORT (OUTPATIENT)
Dept: REHABILITATION | Facility: HOSPITAL | Age: 54
End: 2019-06-04
Payer: MEDICARE

## 2019-06-04 DIAGNOSIS — R29.898 DECREASED STRENGTH INVOLVING KNEE JOINT: ICD-10-CM

## 2019-06-04 DIAGNOSIS — M25.561 ACUTE PAIN OF RIGHT KNEE: ICD-10-CM

## 2019-06-04 PROCEDURE — 97110 THERAPEUTIC EXERCISES: CPT

## 2019-06-04 PROCEDURE — 97140 MANUAL THERAPY 1/> REGIONS: CPT

## 2019-06-04 NOTE — PROGRESS NOTES
Physical Therapy Daily Treatment Note     Name: Rishi Aranda  Clinic Number: [id number]    Therapy Diagnosis:   Encounter Diagnoses   Name Primary?    Acute pain of right knee     Decreased strength involving knee joint      Physician: Humberto Royal, *    Visit Date: 6/4/2019  Physician Orders: PT Eval and Treat   Medical Diagnosis from Referral:   Diagnosis   M17.11 (ICD-10-CM) - Primary osteoarthritis of right knee   Evaluation Date: 5/16/2019  Authorization Period Expiration: 12/31/19  Plan of Care Expiration: 9/5/19  Visit # / Visits authorized: 6/ 12    Time In: 0805  Time Out: 0925   Total Billable Time: 55 minutes    Precautions: Standard,   The patient should begin physical therapy on postoperative day # 3 and will be advanced to outpatient therapy as soon as Possible following discharge.     Weight bearing:as tolerated  right leg  Range of Motion:Full normal motion symmetric to opposite side     Immediate specific exercises should include:extension exercises, quadriceps load with a heel roll, prone hang procedures with 5-10 lbs. ankle weights.     Gait program should include: active extension with a heel-to-toe gait working on maintaining extension     DOS: 5/14/19  Procedure: ARTHROPLASTY, KNEE, TOTAL,Medial and Lateral compartment (Right)    Subjective     Pt states that he was in a lot of pain over the weekend.  States that his ankle and foot swelled up and he almost went back to the doctor.  Reports that he had more ankle pain than knee pain.  Today, continues to have knee/ankle pain today but better than it was over the weekend.       He was compliant with home exercise program.  Response to previous treatment: no adverse effects  Functional change: ambulating with SPC.    Pain: 5/10  Location: right knee      Objective       Range of Motion:   Knee Right Passive Left Passive   Flexion 101 146   Extension Lacking 2 1 hyper       TREATMENT:   Rishi received therapeutic exercises to develop  "strength, endurance, ROM and flexibility for 45 minutes including:  Knee extension stretch 4# thigh/ 4# shin w/ ankle prop x 5 min R  Quad set (1/2 FR under knee) 30 x 3" R  Seated HSS/HCS 3 x 30" R  Ankle propped with BTB PF/DF x 30 each  Seated knee flexion at EOM w/ LAQ 20 x 5" each  Heel slides 20 x 5 sec hold   Bike (seat height 7) x 5 min bkwds full revolutions then x 3 fwd full revolutions  SLR 3 x 10 R NP  SL hip abd 3 x 10 R NP  Prone hip extension 3 x 10 R NP  Prone hang 4# x 4 min       Gait Trainin min   Gait training with SPC x 2 laps heel-toe walk  Cone hesitation walk 5 cone 3 laps single UE assist       Rishi received the following manual therapy techniques: Joint mobilizations and Soft tissue Mobilization were applied to the: patella and R knee for 15 minutes, including:  Patellar glides medial/lateral/superior/inferior  Seated knee flexion stretch off EOM  A/P joint mobs for knee extension      Rishi received cold pack for 10 minutes to to decrease circulation, pain, and swelling.      Home Exercises Provided and Patient Education Provided     Education provided:   - Pt edu on proper exercise technique.      Written Home Exercises Provided: yes.  Exercises were reviewed and Rishi was able to demonstrate them prior to the end of the session.  Rishi demonstrated good  understanding of the education provided.     See EMR under Media for exercises provided 19.    Assessment     Pt tolerated treatment fair today with increased c/o pain during knee flexion and extension stretching.  Pt reached 101 degrees of knee flexion today, therefore initiated bike today with full revolutions backwards.  After 5 minutes of full revolutions backwards, pt was able to perform full revolutions forwards.  Pt continues to lack full extension, still lacking 2 degrees to full extension.  Progress as tolerated, working on knee extension ROM.    Rishi is progressing well towards his goals.   Pt prognosis is Good. "     Pt will continue to benefit from skilled outpatient physical therapy to address the deficits listed in the problem list box on initial evaluation, provide pt/family education and to maximize pt's level of independence in the home and community environment.     Pt's spiritual, cultural and educational needs considered and pt agreeable to plan of care and goals.    Anticipated barriers to physical therapy: none    GOALS: Short Term Goals:  8 weeks  1.Report decreased R knee pain  < / =  3/10  to increase tolerance for ADL's and increased strengthening exercises. Progressing, not met  2. Increase knee ROM to 0-120 in order to be able to perform ADLs without difficulty. Progressing, not met  3. Increase strength to 3+/5 MMT grade in RLE  to increase tolerance for ADL and work activities. Progressing, not met  4. Pt to tolerate HEP to improve ROM and independence with ADL's Progressing, not met     Long Term Goals: 16 weeks  1.Report decreased R knee pain < / = 0/10  to increase tolerance for ADL's and improved walking Progressing, not met  2.Patient goal: Patient will be able to return to golf without pain Progressing, not met  3.Increase strength to >/= 4+/5 in RLE  to increase tolerance for ADL and work activities.Progressing, not met  4. Pt will report at CJ level (20-40% impaired) on FOTO knee to demonstrate increase in LE function with every day tasks. Progressing, not met    Plan     Cont to progress towards goals set by PT.  Work to improve ROM, gait and strength next visit.       Arlene Ortiz, PT, DPT, OCS

## 2019-06-06 ENCOUNTER — CLINICAL SUPPORT (OUTPATIENT)
Dept: REHABILITATION | Facility: HOSPITAL | Age: 54
End: 2019-06-06
Payer: MEDICARE

## 2019-06-06 DIAGNOSIS — R29.898 DECREASED STRENGTH INVOLVING KNEE JOINT: ICD-10-CM

## 2019-06-06 DIAGNOSIS — M25.561 ACUTE PAIN OF RIGHT KNEE: ICD-10-CM

## 2019-06-06 PROCEDURE — 97110 THERAPEUTIC EXERCISES: CPT

## 2019-06-06 PROCEDURE — 97140 MANUAL THERAPY 1/> REGIONS: CPT

## 2019-06-06 NOTE — PROGRESS NOTES
"  Physical Therapy Daily Treatment Note     Name: Rishi Aranda  Clinic Number: [id number]    Therapy Diagnosis:   Encounter Diagnoses   Name Primary?    Acute pain of right knee     Decreased strength involving knee joint      Physician: Humberto Royal, *    Visit Date: 6/6/2019  Physician Orders: PT Eval and Treat   Medical Diagnosis from Referral:   Diagnosis   M17.11 (ICD-10-CM) - Primary osteoarthritis of right knee   Evaluation Date: 5/16/2019  Authorization Period Expiration: 12/31/19  Plan of Care Expiration: 9/5/19  Visit # / Visits authorized: 7/ 12    Time In: 0800  Time Out: 0910   Total Billable Time: 60 minutes    Precautions: Standard,   The patient should begin physical therapy on postoperative day # 3 and will be advanced to outpatient therapy as soon as Possible following discharge.     Weight bearing:as tolerated  right leg  Range of Motion:Full normal motion symmetric to opposite side     Immediate specific exercises should include:extension exercises, quadriceps load with a heel roll, prone hang procedures with 5-10 lbs. ankle weights.     Gait program should include: active extension with a heel-to-toe gait working on maintaining extension     DOS: 5/14/19  Procedure: ARTHROPLASTY, KNEE, TOTAL,Medial and Lateral compartment (Right)    Subjective     Pt states that he continues to have knee pain and feels like he has stiffness.     He was compliant with home exercise program.  Response to previous treatment: no adverse effects  Functional change: ambulating with SPC.    Pain: 5/10  Location: right knee      Objective       Range of Motion:   Knee Right Passive Left Passive   Flexion 108 146   Extension Lacking 2 1 hyper       TREATMENT:   Rishi received therapeutic exercises to develop strength, endurance, ROM and flexibility for 35 minutes including:  Knee extension stretch 5# thigh/ 5# shin w/ ankle prop x 5 min R  Quad set (1/2 FR under knee) 30 x 3" R  Seated HSS/HCS 3 x 30" R  Ankle " "propped with BTB PF/DF x 30 each NP  Seated knee flexion at EOM w/ LAQ 20 x 5" each  Knee flexion stretch on step 20 x 5"   Heel slides 20 x 5 sec hold NP  Bike (seat height 7) x 5 min bkwds full revolutions then x 5 fwd full revolutions  SLR 3 x 10 R NP  SL hip abd 3 x 10 R NP  Prone hip extension 3 x 10 R NP  Prone hang 4# x 4 min NP      Gait Trainin min   Gait training with SPC x 2 laps heel-toe walk  Cone hesitation walk 5 cone 3 laps single UE assist       Rishi received the following manual therapy techniques: Joint mobilizations and Soft tissue Mobilization were applied to the: patella and R knee for 25 minutes, including:  Patellar glides medial/lateral/superior/inferior  Supine knee flexion stretch   A/P joint mobs for knee extension      Rishi received cold pack for 10 minutes to to decrease circulation, pain, and swelling.      Home Exercises Provided and Patient Education Provided     Education provided:   - Pt edu on proper exercise technique.      Written Home Exercises Provided: yes.  Exercises were reviewed and Rishi was able to demonstrate them prior to the end of the session.  Rishi demonstrated good  understanding of the education provided.     See EMR under Media for exercises provided 19.    Assessment     Pt tolerated treatment fair today with increased c/o pain during knee flexion and extension stretching.  Patellar mobility was very hypomobile today.  Worked a lot on patellar mobs for both flexion and extension.  Pt able to reach 108 degrees of knee flexion. However, still lacking 2 degrees to full extension.  Progress as tolerated, working on knee extension ROM.    Rishi is progressing well towards his goals.   Pt prognosis is Good.     Pt will continue to benefit from skilled outpatient physical therapy to address the deficits listed in the problem list box on initial evaluation, provide pt/family education and to maximize pt's level of independence in the home and community " environment.     Pt's spiritual, cultural and educational needs considered and pt agreeable to plan of care and goals.    Anticipated barriers to physical therapy: none    GOALS: Short Term Goals:  8 weeks  1.Report decreased R knee pain  < / =  3/10  to increase tolerance for ADL's and increased strengthening exercises. Progressing, not met  2. Increase knee ROM to 0-120 in order to be able to perform ADLs without difficulty. Progressing, not met  3. Increase strength to 3+/5 MMT grade in RLE  to increase tolerance for ADL and work activities. Progressing, not met  4. Pt to tolerate HEP to improve ROM and independence with ADL's Progressing, not met     Long Term Goals: 16 weeks  1.Report decreased R knee pain < / = 0/10  to increase tolerance for ADL's and improved walking Progressing, not met  2.Patient goal: Patient will be able to return to golf without pain Progressing, not met  3.Increase strength to >/= 4+/5 in RLE  to increase tolerance for ADL and work activities.Progressing, not met  4. Pt will report at CJ level (20-40% impaired) on FOTO knee to demonstrate increase in LE function with every day tasks. Progressing, not met    Plan     Cont to progress towards goals set by PT.  Work to improve ROM, gait and strength next visit.       Arlene Ortiz, PT, DPT, OCS

## 2019-06-11 ENCOUNTER — CLINICAL SUPPORT (OUTPATIENT)
Dept: REHABILITATION | Facility: HOSPITAL | Age: 54
End: 2019-06-11
Payer: MEDICARE

## 2019-06-11 DIAGNOSIS — R29.898 DECREASED STRENGTH INVOLVING KNEE JOINT: ICD-10-CM

## 2019-06-11 DIAGNOSIS — M25.561 ACUTE PAIN OF RIGHT KNEE: ICD-10-CM

## 2019-06-11 PROCEDURE — 97110 THERAPEUTIC EXERCISES: CPT

## 2019-06-11 PROCEDURE — 97140 MANUAL THERAPY 1/> REGIONS: CPT

## 2019-06-11 NOTE — PROGRESS NOTES
"  Physical Therapy Daily Treatment Note     Name: Rishi Aranda  Clinic Number: [id number]    Therapy Diagnosis:   Encounter Diagnoses   Name Primary?    Acute pain of right knee     Decreased strength involving knee joint      Physician: Humberto Royal, *    Visit Date: 6/11/2019  Physician Orders: PT Eval and Treat   Medical Diagnosis from Referral:   Diagnosis   M17.11 (ICD-10-CM) - Primary osteoarthritis of right knee   Evaluation Date: 5/16/2019  Authorization Period Expiration: 12/31/19  Plan of Care Expiration: 9/5/19  Visit # / Visits authorized: 8/ 12    Time In: 0800  Time Out: 0920   Total Billable Time: 60 minutes    Precautions: Standard,   The patient should begin physical therapy on postoperative day # 3 and will be advanced to outpatient therapy as soon as Possible following discharge.   Weight bearing:as tolerated  right leg  Range of Motion:Full normal motion symmetric to opposite side   Immediate specific exercises should include:extension exercises, quadriceps load with a heel roll, prone hang procedures with 5-10 lbs. ankle weights.   Gait program should include: active extension with a heel-to-toe gait working on maintaining extension     DOS: 5/14/19  Procedure: ARTHROPLASTY, KNEE, TOTAL,Medial and Lateral compartment (Right)    Subjective     Pt states that the biggest problem continues to be swelling in lower leg and ankle.  He was compliant with home exercise program.  Response to previous treatment: no adverse effects  Functional change: ambulating with SPC.    Pain: 5/10  Location: right knee      Objective       Range of Motion:   Knee Right Passive Left Passive   Flexion 113 146   Extension Lacking 2 1 hyper       TREATMENT:   Rishi received therapeutic exercises to develop strength, endurance, ROM and flexibility for 35 minutes including:  Knee extension stretch 5# thigh/ 5# shin w/ ankle prop x 5 min R  SAQ 30 x 5" R  Ankle propped with BTB PF/DF x 30 each NP  Seated knee " "flexion at EOM w/ LAQ 20 x 5" each  Knee flexion stretch on step 20 x 5"   Heel slides 20 x 5 sec hold NP  Bike (seat height 7) x 8 min fwd full revolutions  Prone hang 4# x 4 min   Seated HSS/HCS 3 x 30" R    SLR 3 x 10 R NP  SL hip abd 3 x 10 R NP  Prone hip extension 3 x 10 R NP      Gait Trainin min   Gait training with SPC x 2 laps heel-toe walk  Cone hesitation walk 5 cone 3 laps single UE assist       Rishi received the following manual therapy techniques: Joint mobilizations and Soft tissue Mobilization were applied to the: patella and R knee for 25 minutes, including:  Patellar glides medial/lateral/superior/inferior  Prone knee flexion stretch   A/P joint mobs for knee extension  Edema massage with leg propped      Rishi received cold pack for 10 minutes to to decrease circulation, pain, and swelling.      Home Exercises Provided and Patient Education Provided     Education provided:   - Pt edu on proper exercise technique.      Written Home Exercises Provided: yes.  Exercises were reviewed and Rishi was able to demonstrate them prior to the end of the session.  Rishi demonstrated good  understanding of the education provided.     See EMR under Media for exercises provided 19.    Assessment     Pt tolerated treatment well today with improved patellar mobility. However, continued lack of full knee extension ROM, despite pt being compliant with HEP and extension stretches.  Pt able to perform full revolutions on bike today going forward.  Will continue to progress as tolerated, focusing on knee extension stretching.    Rishi is progressing well towards his goals.   Pt prognosis is Good.     Pt will continue to benefit from skilled outpatient physical therapy to address the deficits listed in the problem list box on initial evaluation, provide pt/family education and to maximize pt's level of independence in the home and community environment.     Pt's spiritual, cultural and educational needs " considered and pt agreeable to plan of care and goals.    Anticipated barriers to physical therapy: none    GOALS: Short Term Goals:  8 weeks  1.Report decreased R knee pain  < / =  3/10  to increase tolerance for ADL's and increased strengthening exercises. Progressing, not met  2. Increase knee ROM to 0-120 in order to be able to perform ADLs without difficulty. Progressing, not met  3. Increase strength to 3+/5 MMT grade in RLE  to increase tolerance for ADL and work activities. Progressing, not met  4. Pt to tolerate HEP to improve ROM and independence with ADL's Progressing, not met     Long Term Goals: 16 weeks  1.Report decreased R knee pain < / = 0/10  to increase tolerance for ADL's and improved walking Progressing, not met  2.Patient goal: Patient will be able to return to golf without pain Progressing, not met  3.Increase strength to >/= 4+/5 in RLE  to increase tolerance for ADL and work activities.Progressing, not met  4. Pt will report at CJ level (20-40% impaired) on FOTO knee to demonstrate increase in LE function with every day tasks. Progressing, not met    Plan     Cont to progress towards goals set by PT.  Work to improve ROM, gait and strength next visit.       Arlene Ortiz, PT, DPT, OCS

## 2019-06-12 DIAGNOSIS — M17.9 OSTEOARTHRITIS OF KNEE, UNSPECIFIED (CODE): Primary | ICD-10-CM

## 2019-06-12 DIAGNOSIS — M17.11 PRIMARY OSTEOARTHRITIS OF RIGHT KNEE: ICD-10-CM

## 2019-06-12 DIAGNOSIS — Z96.651 S/P TOTAL KNEE ARTHROPLASTY, RIGHT: ICD-10-CM

## 2019-06-12 DIAGNOSIS — Z09 SURGERY FOLLOW-UP EXAMINATION: ICD-10-CM

## 2019-06-12 RX ORDER — OXYCODONE AND ACETAMINOPHEN 10; 325 MG/1; MG/1
1 TABLET ORAL EVERY 8 HOURS PRN
Qty: 30 TABLET | Refills: 0 | OUTPATIENT
Start: 2019-06-12

## 2019-06-12 RX ORDER — CELECOXIB 200 MG/1
200 CAPSULE ORAL 2 TIMES DAILY
Qty: 60 CAPSULE | Refills: 0 | OUTPATIENT
Start: 2019-06-12

## 2019-06-12 NOTE — TELEPHONE ENCOUNTER
----- Message from Jovita Chow sent at 6/12/2019  4:43 PM CDT -----  Contact: patient   Please call pt at [phone]    Refill request for oxyCODONE-acetaminophen (PERCOCET)  mg per tablet, Tramadol and celecoxib (CELEBREX) 200 MG capsule    Use [facility]    Patient is completely out of meds     2nd call request    Thank you

## 2019-06-13 DIAGNOSIS — M17.10 ARTHRITIS OF KNEE: ICD-10-CM

## 2019-06-13 DIAGNOSIS — Z09 SURGERY FOLLOW-UP EXAMINATION: ICD-10-CM

## 2019-06-13 DIAGNOSIS — M17.11 PRIMARY OSTEOARTHRITIS OF RIGHT KNEE: ICD-10-CM

## 2019-06-13 DIAGNOSIS — Z96.651 S/P TOTAL KNEE ARTHROPLASTY, RIGHT: Primary | ICD-10-CM

## 2019-06-13 RX ORDER — CELECOXIB 200 MG/1
200 CAPSULE ORAL 2 TIMES DAILY
Qty: 60 CAPSULE | Refills: 0 | Status: SHIPPED | OUTPATIENT
Start: 2019-06-13 | End: 2019-12-11 | Stop reason: SDUPTHER

## 2019-06-13 RX ORDER — OXYCODONE AND ACETAMINOPHEN 10; 325 MG/1; MG/1
1 TABLET ORAL EVERY 6 HOURS PRN
Qty: 42 TABLET | Refills: 0 | Status: SHIPPED | OUTPATIENT
Start: 2019-06-13 | End: 2019-07-03 | Stop reason: SDUPTHER

## 2019-06-13 RX ORDER — CELECOXIB 200 MG/1
200 CAPSULE ORAL 2 TIMES DAILY
OUTPATIENT
Start: 2019-06-13

## 2019-06-13 NOTE — TELEPHONE ENCOUNTER
Post op pt needs pain medication. LVM for patient explain medication will be available at [facility]

## 2019-06-18 ENCOUNTER — CLINICAL SUPPORT (OUTPATIENT)
Dept: REHABILITATION | Facility: HOSPITAL | Age: 54
End: 2019-06-18
Payer: MEDICARE

## 2019-06-18 DIAGNOSIS — R29.898 DECREASED STRENGTH INVOLVING KNEE JOINT: ICD-10-CM

## 2019-06-18 DIAGNOSIS — M25.561 ACUTE PAIN OF RIGHT KNEE: ICD-10-CM

## 2019-06-18 PROCEDURE — 97110 THERAPEUTIC EXERCISES: CPT

## 2019-06-18 PROCEDURE — 97140 MANUAL THERAPY 1/> REGIONS: CPT

## 2019-06-18 NOTE — PROGRESS NOTES
"  Physical Therapy Daily Treatment Note     Name: Rishi Aranda  Clinic Number: [id number]    Therapy Diagnosis:   Encounter Diagnoses   Name Primary?    Acute pain of right knee     Decreased strength involving knee joint      Physician: Humberto Royal, *    Visit Date: 6/18/2019  Physician Orders: PT Eval and Treat   Medical Diagnosis from Referral:   Diagnosis   M17.11 (ICD-10-CM) - Primary osteoarthritis of right knee   Evaluation Date: 5/16/2019  Authorization Period Expiration: 12/31/19  Plan of Care Expiration: 9/5/19  Visit # / Visits authorized: 9/ 12    Time In: 0820  Time Out: 0920   Total Billable Time: 25 minutes    Precautions: Standard,   The patient should begin physical therapy on postoperative day # 3 and will be advanced to outpatient therapy as soon as Possible following discharge.   Weight bearing:as tolerated  right leg  Range of Motion:Full normal motion symmetric to opposite side   Immediate specific exercises should include:extension exercises, quadriceps load with a heel roll, prone hang procedures with 5-10 lbs. ankle weights.   Gait program should include: active extension with a heel-to-toe gait working on maintaining extension     DOS: 5/14/19  Procedure: ARTHROPLASTY, KNEE, TOTAL,Medial and Lateral compartment (Right)    Subjective     Pt states that he is doing okay today.    He was compliant with home exercise program.  Response to previous treatment: no adverse effects  Functional change: ambulating with SPC.    Pain: 4/10  Location: right knee      Objective       Range of Motion:   Knee Right Passive Left Passive   Flexion 117 146   Extension Lacking 1 1 hyper       TREATMENT:   Rishi received therapeutic exercises to develop strength, endurance, ROM and flexibility for 35 minutes including:  Knee extension stretch 5# thigh/ 5# shin w/ ankle prop x 5 min R  Quad set with 1/2 FR under knee 30 x 5"   Ankle propped with BTB PF/DF x 30 each NP  Knee flexion stretch on step 20 x " "5"   Bike (seat height 7) x 8 min fwd full revolutions  Prone hang 5# x 5 min     Seated HSS/HCS 3 x 30" R NP  SLR 3 x 10 R NP  SL hip abd 3 x 10 R NP  Prone hip extension 3 x 10 R NP      Gait Trainin min   Gait training with SPC x 2 laps heel-toe walk  Cone hesitation walk 5 cone 3 laps single UE assist       Rishi received the following manual therapy techniques: Joint mobilizations and Soft tissue Mobilization were applied to the: patella and R knee for 15 minutes, including:  Patellar glides medial/lateral/superior/inferior  Prone knee flexion stretch   A/P joint mobs for knee extension  Edema massage with leg propped NP      Rishi received cold pack for 10 minutes to to decrease circulation, pain, and swelling.      Home Exercises Provided and Patient Education Provided     Education provided:   - Pt edu on proper exercise technique.      Written Home Exercises Provided: yes.  Exercises were reviewed and Rishi was able to demonstrate them prior to the end of the session.  Rishi demonstrated good  understanding of the education provided.     See EMR under Media for exercises provided 19.    Assessment     Pt arrived to treatment 20 min late today.  Pt continues to lack full extension upon arrival but able to reach lacking 1 degree by the end of treatment.  Pt with improved knee flexion ROM as well, gaining 4 degrees since last visit.  Continue to progress as tolerated, focusing on ROM.      Rishi is progressing well towards his goals.   Pt prognosis is Good.     Pt will continue to benefit from skilled outpatient physical therapy to address the deficits listed in the problem list box on initial evaluation, provide pt/family education and to maximize pt's level of independence in the home and community environment.     Pt's spiritual, cultural and educational needs considered and pt agreeable to plan of care and goals.    Anticipated barriers to physical therapy: none    GOALS: Short Term Goals:  8 " weeks  1.Report decreased R knee pain  < / =  3/10  to increase tolerance for ADL's and increased strengthening exercises. Progressing, not met  2. Increase knee ROM to 0-120 in order to be able to perform ADLs without difficulty. Progressing, not met  3. Increase strength to 3+/5 MMT grade in RLE  to increase tolerance for ADL and work activities. Progressing, not met  4. Pt to tolerate HEP to improve ROM and independence with ADL's Progressing, not met     Long Term Goals: 16 weeks  1.Report decreased R knee pain < / = 0/10  to increase tolerance for ADL's and improved walking Progressing, not met  2.Patient goal: Patient will be able to return to golf without pain Progressing, not met  3.Increase strength to >/= 4+/5 in RLE  to increase tolerance for ADL and work activities.Progressing, not met  4. Pt will report at CJ level (20-40% impaired) on FOTO knee to demonstrate increase in LE function with every day tasks. Progressing, not met    Plan     Cont to progress towards goals set by PT.  Work to improve ROM, gait and strength next visit.       Arlene Ortiz, PT, DPT, OCS

## 2019-06-25 ENCOUNTER — CLINICAL SUPPORT (OUTPATIENT)
Dept: REHABILITATION | Facility: HOSPITAL | Age: 54
End: 2019-06-25
Payer: MEDICARE

## 2019-06-25 DIAGNOSIS — R29.898 DECREASED STRENGTH INVOLVING KNEE JOINT: ICD-10-CM

## 2019-06-25 DIAGNOSIS — M25.561 ACUTE PAIN OF RIGHT KNEE: ICD-10-CM

## 2019-06-25 PROCEDURE — 97110 THERAPEUTIC EXERCISES: CPT

## 2019-06-25 PROCEDURE — 97140 MANUAL THERAPY 1/> REGIONS: CPT

## 2019-06-25 NOTE — PROGRESS NOTES
"  Physical Therapy Daily Treatment Note     Name: Rishi Aranda  Clinic Number: [id number]    Therapy Diagnosis:   Encounter Diagnoses   Name Primary?    Acute pain of right knee     Decreased strength involving knee joint      Physician: Humberto Royal, *    Visit Date: 6/25/2019  Physician Orders: PT Eval and Treat   Medical Diagnosis from Referral:   Diagnosis   M17.11 (ICD-10-CM) - Primary osteoarthritis of right knee   Evaluation Date: 5/16/2019  Authorization Period Expiration: 12/31/19  Plan of Care Expiration: 9/5/19  Visit # / Visits authorized: 10/ 12    Time In: 0810  Time Out: 0910   Total Billable Time: 25 minutes    Precautions: Standard,   The patient should begin physical therapy on postoperative day # 3 and will be advanced to outpatient therapy as soon as Possible following discharge.   Weight bearing:as tolerated  right leg  Range of Motion:Full normal motion symmetric to opposite side   Immediate specific exercises should include:extension exercises, quadriceps load with a heel roll, prone hang procedures with 5-10 lbs. ankle weights.   Gait program should include: active extension with a heel-to-toe gait working on maintaining extension     DOS: 5/14/19  Procedure: ARTHROPLASTY, KNEE, TOTAL,Medial and Lateral compartment (Right)    Subjective     Pt states that he is doing okay today.    He was compliant with home exercise program.  Response to previous treatment: no adverse effects  Functional change: ambulating with SPC.    Pain: 6/10  Location: right knee      Objective       Range of Motion:   Knee Right Passive Left Passive   Flexion 115 146   Extension Lacking 5 1 hyper       TREATMENT:   Rishi received therapeutic exercises to develop strength, endurance, ROM and flexibility for 40 minutes including:  Knee extension stretch 5# thigh/ 5# shin w/ ankle prop x 5 min R  Seated HSS/HCS 3 x 30" R   Quad set 30 x 5"   Heel slides 20 x 10"   Bike (seat height 3) x 10 min fwd full " "revolutions  Prone quad stretch 3 x 30"   Prone hang 5# x 5 min       SLR 3 x 10 R NP  SL hip abd 3 x 10 R NP  Prone hip extension 3 x 10 R NP      Gait Trainin min   Gait training with SPC x 2 laps heel-toe walk  Cone hesitation walk 5 cone 3 laps single UE assist       Rishi received the following manual therapy techniques: Joint mobilizations and Soft tissue Mobilization were applied to the: patella and R knee for 15 minutes, including:  Patellar glides medial/lateral/superior/inferior  Prone knee flexion stretch   A/P joint mobs for knee extension  Edema massage with leg propped NP      Rishi received cold pack for 10 minutes to to decrease circulation, pain, and swelling.      Home Exercises Provided and Patient Education Provided     Education provided:   - Pt edu on proper exercise technique.      Written Home Exercises Provided: yes.  Exercises were reviewed and Rishi was able to demonstrate them prior to the end of the session.  Rishi demonstrated good  understanding of the education provided.     See EMR under Media for exercises provided 19.    Assessment     Pt tolerated treatment fair today with decreased ROM compared to last visit, possibly secondary to missing a week of therapy.  Pt continues to lack around 5 degrees and only reached 115 degrees of knee flexion today.  Pt with improved quad activation after verbal cueing.  Instructed pt in correct order to do HEP at home - knee extension, quad activation, knee flexion, then knee extension again.  Pt is with good understanding.  Progress as tolerated, focusing on knee extension ROM.      Rishi is progressing well towards his goals.   Pt prognosis is Good.     Pt will continue to benefit from skilled outpatient physical therapy to address the deficits listed in the problem list box on initial evaluation, provide pt/family education and to maximize pt's level of independence in the home and community environment.     Pt's spiritual, cultural " and educational needs considered and pt agreeable to plan of care and goals.    Anticipated barriers to physical therapy: none    GOALS: Short Term Goals:  8 weeks  1.Report decreased R knee pain  < / =  3/10  to increase tolerance for ADL's and increased strengthening exercises. Progressing, not met  2. Increase knee ROM to 0-120 in order to be able to perform ADLs without difficulty. Progressing, not met  3. Increase strength to 3+/5 MMT grade in RLE  to increase tolerance for ADL and work activities. Progressing, not met  4. Pt to tolerate HEP to improve ROM and independence with ADL's Progressing, not met     Long Term Goals: 16 weeks  1.Report decreased R knee pain < / = 0/10  to increase tolerance for ADL's and improved walking Progressing, not met  2.Patient goal: Patient will be able to return to golf without pain Progressing, not met  3.Increase strength to >/= 4+/5 in RLE  to increase tolerance for ADL and work activities.Progressing, not met  4. Pt will report at CJ level (20-40% impaired) on FOTO knee to demonstrate increase in LE function with every day tasks. Progressing, not met    Plan     Cont to progress towards goals set by PT.  Work to improve ROM, gait and strength next visit.       Arlene Ortiz, PT, DPT, OCS

## 2019-06-27 ENCOUNTER — CLINICAL SUPPORT (OUTPATIENT)
Dept: REHABILITATION | Facility: HOSPITAL | Age: 54
End: 2019-06-27
Payer: MEDICARE

## 2019-06-27 DIAGNOSIS — M25.561 ACUTE PAIN OF RIGHT KNEE: ICD-10-CM

## 2019-06-27 DIAGNOSIS — R29.898 DECREASED STRENGTH INVOLVING KNEE JOINT: ICD-10-CM

## 2019-06-27 PROCEDURE — 97140 MANUAL THERAPY 1/> REGIONS: CPT

## 2019-06-27 PROCEDURE — 97110 THERAPEUTIC EXERCISES: CPT

## 2019-06-27 NOTE — PROGRESS NOTES
"  Physical Therapy Daily Treatment Note     Name: Rishi Aranda  Clinic Number: [id number]    Therapy Diagnosis:   Encounter Diagnoses   Name Primary?    Acute pain of right knee     Decreased strength involving knee joint      Physician: Humberto Royal, *    Visit Date: 6/27/2019  Physician Orders: PT Eval and Treat   Medical Diagnosis from Referral:   Diagnosis   M17.11 (ICD-10-CM) - Primary osteoarthritis of right knee   Evaluation Date: 5/16/2019  Authorization Period Expiration: 12/31/19  Plan of Care Expiration: 9/5/19  Visit # / Visits authorized: 11/ 12    Time In: 0900  Time Out: 1010   Total Billable Time: 25 minutes    Precautions: Standard,   The patient should begin physical therapy on postoperative day # 3 and will be advanced to outpatient therapy as soon as Possible following discharge.   Weight bearing:as tolerated  right leg  Range of Motion:Full normal motion symmetric to opposite side   Immediate specific exercises should include:extension exercises, quadriceps load with a heel roll, prone hang procedures with 5-10 lbs. ankle weights.   Gait program should include: active extension with a heel-to-toe gait working on maintaining extension     DOS: 5/14/19  Procedure: ARTHROPLASTY, KNEE, TOTAL,Medial and Lateral compartment (Right)    Subjective     Pt states that he is doing okay today.    He was compliant with home exercise program.  Response to previous treatment: no adverse effects  Functional change: ambulating with SPC.    Pain: 4/10  Location: right knee      Objective       Range of Motion:   Knee Right Passive Left Passive   Flexion 120 146   Extension 0 1 hyper       TREATMENT:   Rishi received therapeutic exercises to develop strength, endurance, ROM and flexibility for 40 minutes including:  Knee extension stretch 5# thigh/ 5# shin w/ ankle prop x 5 min R  Seated HSS/HCS 5 x 30" R   Quad set 30 x 5"   LAQ 2# x 30  Heel slides 20 x 10" NP  Bike (seat height 3) x 10 min fwd full " "revolutions  Prone quad stretch 3 x 30"   Prone hang 5# x 5 min       SLR 3 x 10 R NP  SL hip abd 3 x 10 R NP  Prone hip extension 3 x 10 R NP      Gait Trainin min   Gait training with SPC x 2 laps heel-toe walk  Cone hesitation walk 5 cone 3 laps single UE assist       Rishi received the following manual therapy techniques: Joint mobilizations and Soft tissue Mobilization were applied to the: patella and R knee for 20 minutes, including:  Patellar glides medial/lateral/superior/inferior  Prone knee flexion stretch   A/P joint mobs for knee extension  Edema massage with leg propped NP      Rishi received cold pack for 10 minutes to to decrease circulation, pain, and swelling.      Home Exercises Provided and Patient Education Provided     Education provided:   - Pt edu on proper exercise technique.      Written Home Exercises Provided: yes.  Exercises were reviewed and Rishi was able to demonstrate them prior to the end of the session.  Rishi demonstrated good  understanding of the education provided.     See EMR under Media for exercises provided 19.    Assessment     Pt with improved tolerance to treatment today and improved ROM with 0-120 degrees today.  However, pt continues to lack full extension upon arrival.  After self and manual stretching, pt was able to reach 0 degrees.  Will continue to progress as tolerated.    Rishi is progressing well towards his goals.   Pt prognosis is Good.     Pt will continue to benefit from skilled outpatient physical therapy to address the deficits listed in the problem list box on initial evaluation, provide pt/family education and to maximize pt's level of independence in the home and community environment.     Pt's spiritual, cultural and educational needs considered and pt agreeable to plan of care and goals.    Anticipated barriers to physical therapy: none    GOALS: Short Term Goals:  8 weeks  1.Report decreased R knee pain  < / =  3/10  to increase tolerance " for ADL's and increased strengthening exercises. Progressing, not met  2. Increase knee ROM to 0-120 in order to be able to perform ADLs without difficulty. Progressing, not met  3. Increase strength to 3+/5 MMT grade in RLE  to increase tolerance for ADL and work activities. Progressing, not met  4. Pt to tolerate HEP to improve ROM and independence with ADL's Progressing, not met     Long Term Goals: 16 weeks  1.Report decreased R knee pain < / = 0/10  to increase tolerance for ADL's and improved walking Progressing, not met  2.Patient goal: Patient will be able to return to golf without pain Progressing, not met  3.Increase strength to >/= 4+/5 in RLE  to increase tolerance for ADL and work activities.Progressing, not met  4. Pt will report at CJ level (20-40% impaired) on FOTO knee to demonstrate increase in LE function with every day tasks. Progressing, not met    Plan     Cont to progress towards goals set by PT.  Work to improve ROM, gait and strength next visit.       Arlene Ortiz, PT, DPT, OCS

## 2019-07-01 NOTE — PROGRESS NOTES
Subjective:          Chief Complaint: Rishi Aranda is a 53 y.o. male who had concerns including Post-op Evaluation of the Right Knee.    Pt presents for 8 week post-op evaluation. Pt has no complaints at this time. He is doing PT here at [facility] and progressing as scheduled. He notes that he has swelling in his lower leg and ankle. He rates his pain as 5/10. Pt is taking pain meds as needed. Denies fever, chills, discharge from wound site, and N/V.    DATE OF PROCEDURE:  5/14/2019     ATTENDING SURGEON: Surgeon(s) and Role:     * Maxi Chaves MD - Primary     FIRST ASSISTANT:Chaparrita Iraheta PA-C     No resident or fellow was available throughout the entire procedure as a result it was medically necessary for Chaparrita Iraheta PA-C to perform first assistant duties.        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   HENT: Negative for congestion and sore throat.    Eyes: Negative for discharge and double vision.   Cardiovascular: Negative for chest pain, palpitations and syncope.   Respiratory: Negative for cough and shortness of breath.    Endocrine: Negative for cold intolerance and heat intolerance.   Skin: Negative for dry skin and rash.   Musculoskeletal: Positive for joint pain and joint swelling.   Gastrointestinal: Negative for abdominal pain, nausea and vomiting.   Neurological: Negative for focal weakness, numbness and paresthesias.                   Objective:        General: Rishi is well-developed, well-nourished, appears stated age, in no acute distress, alert and oriented to time, place and person.     General    Nursing note and vitals  reviewed.  Constitutional: He is oriented to person, place, and time. He appears well-developed and well-nourished. No distress.   HENT:   Head: Normocephalic and atraumatic.   Nose: Nose normal.   Mouth/Throat: No oropharyngeal exudate.   Eyes: Conjunctivae and EOM are normal. Right eye exhibits no discharge. Left eye exhibits no discharge.   Neck: Normal range of motion. Neck supple.   Cardiovascular: Normal rate, regular rhythm and intact distal pulses.    Pulmonary/Chest: Effort normal and breath sounds normal. No respiratory distress.   Abdominal: Soft. Bowel sounds are normal. He exhibits no distension. There is no tenderness.   Neurological: He is alert and oriented to person, place, and time. He has normal reflexes. No cranial nerve deficit. Coordination normal.   Psychiatric: He has a normal mood and affect. His behavior is normal. Judgment and thought content normal.     General Musculoskeletal Exam   Gait: abnormal       Right Knee Exam     Inspection   Erythema: absent  Scars: present  Swelling: present  Effusion: absent  Deformity: absent  Bruising: absent    Tenderness   The patient is tender to palpation of the patellar tendon.    Range of Motion   Extension: 0   Flexion:  120 abnormal     Tests   Meniscus   Fawn:  Medial - negative Lateral - negative  Ligament Examination Lachman: normal (-1 to 2mm) PCL-Posterior Drawer: normal (0 to 2mm)     MCL - Valgus: normal (0 to 2mm)  LCL - Varus: normalPivot Shift: normal (Equal)Reverse Pivot Shift: normal (Equal)Dial Test at 30 degrees: normal (< 5 degrees)Dial Test at 90 degrees: normal (< 5 degrees)  Posterior Sag Test: negative  Posterolateral Corner: unstable (>15 degrees difference)  Patella   Patellar apprehension: negative  Passive Patellar Tilt: neutral  Patellar Tracking: normal  Patellar Glide (quadrants): Lateral - 1   Medial - 2  Q-Angle at 90 degrees: normal  Patellar Grind: negative  J-Sign: none    Other   Meniscal Cyst: absent  Popliteal  (Baker's) Cyst: absent  Sensation: normal    Comments:  Incision clean/dry/intact  Aquacel intact  No sign of infection  Mild swelling  Compartments soft  Neurovascular status intact in extremity      Left Knee Exam     Inspection   Erythema: absent  Scars: absent  Swelling: absent  Effusion: absent  Deformity: absent  Bruising: absent    Tenderness   The patient is experiencing no tenderness.     Range of Motion   Extension: 0   Flexion: 130     Tests   Meniscus   Fawn:  Medial - negative Lateral - negative  Stability Lachman: normal (-1 to 2mm) PCL-Posterior Drawer: normal (0 to 2mm)  MCL - Valgus: normal (0 to 2mm)  LCL - Varus: normal (0 to 2mm)Pivot Shift: normal (Equal)Reverse Pivot Shift: normal (Equal)Dial Test at 30 degrees: normal (< 5 degrees)Dial Test at 90 degrees: normal (< 5 degrees)  Posterior Sag Test: negative  Posterolateral Corner: unstable (>15 degrees difference)  Patella   Patellar apprehension: negative  Passive Patellar Tilt: neutral  Patellar Tracking: normal  Patellar Glide (Quadrants): Lateral - 1 Medial - 2  Q-Angle at 90 degrees: normal  Patellar Grind: negative  J-Sign: J sign absent    Other   Meniscal Cyst: absent  Popliteal (Baker's) Cyst: absent  Sensation: normal    Right Hip Exam     Tests   Bernabe: negative  Left Hip Exam     Tests   Bernabe: negative          Muscle Strength   Right Lower Extremity   Hip Abduction: 5/5   Quadriceps:  4/5   Hamstrin/5   Left Lower Extremity   Hip Abduction: 5/5   Quadriceps:  5/5   Hamstrin/5     Reflexes     Left Side  Quadriceps:  2+  Achilles:  2+    Right Side   Quadriceps:  2+  Achilles:  2+    Vascular Exam     Right Pulses  Dorsalis Pedis:      2+  Posterior Tibial:      2+        Left Pulses  Dorsalis Pedis:      2+  Posterior Tibial:      2+        Edema  Right Lower Leg: absent  Left Lower Leg: absent    Post op Radiographic Findings:    Right knee now shows postoperative findings of total knee arthroplasty with prostheses in  satisfactory position and alignment.  No acute fracture or dislocation is identified.   some air is present in the soft tissues consistent with recent surgery.  External splint is present.    Xrays of the right knee were ordered and reviewed by me today. These findings were discussed and reviewed with the patient.    EXAMINATION:  XR KNEE ORTHO BILAT WITH FLEXION    CLINICAL HISTORY:  Pain in right knee    TECHNIQUE:  AP standing of both knees, PA flexion standing views of both knees, and Merchant views of both knees were performed.  Lateral views of both knees were also performed.    COMPARISON:  Non 05/14/2019 e    FINDINGS:  Right knee arthroplasty identified in a satisfactory and unchanged position as compared to the previous study.    Mild DJD involving the left knee with narrowing of the medial tibiofemoral joint space.  Small calcified density noted in the left femoral shaft probably a an enchondroma versus bone infarct.  No acute fracture or dislocation.      Impression       See above             Assessment:       Encounter Diagnoses   Name Primary?    Pain in both knees, unspecified chronicity Yes    S/P total knee arthroplasty, right     Surgery follow-up examination     Primary osteoarthritis of left knee     Lower extremity edema           Plan:       1.IKDC, SF-12 and KOOS was not filled out today in clinic.     RTC in 6 weeks with Dr. Maxi Chaves. Patient will fill out IKDC, SF-12 and KOOS on return.    2. Continue PT per protocol.    3.  Continue Celebrex BID    4. Continue CHENTE hose but prescribed compression stockings today from [facility] ([facility])    All of the patient's questions were answered and the patient will contact us if they have any questions or concerns in the interim.    5. Refilled percocet today every 24 hours as needed    6. HEP 93261 - Maxi Chaves MD, instructed and demonstrated a CORE and gluteal HEP. The patient then demonstrated understanding of exercises and proper  technique. This program was performed for 15 minutes.                 [facility] patient questionnaires have been collected today.

## 2019-07-02 ENCOUNTER — CLINICAL SUPPORT (OUTPATIENT)
Dept: REHABILITATION | Facility: HOSPITAL | Age: 54
End: 2019-07-02
Payer: MEDICARE

## 2019-07-02 DIAGNOSIS — M25.561 ACUTE PAIN OF RIGHT KNEE: ICD-10-CM

## 2019-07-02 DIAGNOSIS — R29.898 DECREASED STRENGTH INVOLVING KNEE JOINT: ICD-10-CM

## 2019-07-02 PROCEDURE — 97110 THERAPEUTIC EXERCISES: CPT

## 2019-07-02 PROCEDURE — 97140 MANUAL THERAPY 1/> REGIONS: CPT

## 2019-07-02 NOTE — PROGRESS NOTES
"  Physical Therapy Daily Treatment Note     Name: Rishi Aranda  Clinic Number: [id number]    Therapy Diagnosis:   Encounter Diagnoses   Name Primary?    Acute pain of right knee     Decreased strength involving knee joint      Physician: Humberto Royal, *    Visit Date: 7/2/2019  Physician Orders: PT Eval and Treat   Medical Diagnosis from Referral:   Diagnosis   M17.11 (ICD-10-CM) - Primary osteoarthritis of right knee   Evaluation Date: 5/16/2019  Authorization Period Expiration: 12/31/19  Plan of Care Expiration: 9/5/19  Visit # / Visits authorized: 12/ 12    Time In:0905  Time Out: 1005   Total Billable Time: 23 minutes    Precautions: Standard,   The patient should begin physical therapy on postoperative day # 3 and will be advanced to outpatient therapy as soon as Possible following discharge.   Weight bearing:as tolerated  right leg  Range of Motion:Full normal motion symmetric to opposite side   Immediate specific exercises should include:extension exercises, quadriceps load with a heel roll, prone hang procedures with 5-10 lbs. ankle weights.   Gait program should include: active extension with a heel-to-toe gait working on maintaining extension     DOS: 5/14/19  Procedure: ARTHROPLASTY, KNEE, TOTAL,Medial and Lateral compartment (Right)    Subjective     Pt states that he is doing okay has some stiffness in the knee.    He was compliant with home exercise program.  Response to previous treatment: no adverse effects  Functional change: ambulating with SPC.    Pain: 4/10  Location: right knee      Objective       Range of Motion:   Knee Right Passive Left Passive   Flexion 125 146   Extension 0 1 hyper       TREATMENT:   Rishi received therapeutic exercises to develop strength, endurance, ROM and flexibility for 40 minutes including:  Knee extension stretch 5# thigh/ 5# shin w/ ankle prop x 5 min R  Seated HSS/HCS 5 x 30" R   Quad set 30 x 5"   LAQ 2# x 30  Heel slides 20 x 10" NP  Bike (seat height " "3) x 10 min fwd full revolutions  Prone quad stretch 3 x 30"   Prone hang 5# x 5 min       SLR 3 x 10 R NP  SL hip abd 3 x 10 R NP  Prone hip extension 3 x 10 R NP      Gait Trainin min   Gait training with SPC x 2 laps heel-toe walk  Cone hesitation walk 5 cone 3 laps single UE assist       Rishi received the following manual therapy techniques: Joint mobilizations and Soft tissue Mobilization were applied to the: patella and R knee for 10 minutes, including:  Patellar glides medial/lateral/superior/inferior  Prone knee flexion stretch   A/P joint mobs for knee extension  Edema massage with leg propped NP      Rishi received cold pack for 10 minutes to to decrease circulation, pain, and swelling.      Home Exercises Provided and Patient Education Provided     Education provided:   - Pt edu on proper exercise technique.      Written Home Exercises Provided: yes.  Exercises were reviewed and Rsihi was able to demonstrate them prior to the end of the session.  Rishi demonstrated good  understanding of the education provided.     See EMR under Media for exercises provided 19.    Assessment     Pt sheeba treatment well. Pt has good quad contraction will minimal cueing. PT verbalized discomfort during knee extension, at time stopping therapy to bend knee. Decreased knee ext at rest but able to passively ext to 0 degrees. Edema noted R ankle and calf. PT and PA are aware of this.     Rishi is progressing well towards his goals.   Pt prognosis is Good.     Pt will continue to benefit from skilled outpatient physical therapy to address the deficits listed in the problem list box on initial evaluation, provide pt/family education and to maximize pt's level of independence in the home and community environment.     Pt's spiritual, cultural and educational needs considered and pt agreeable to plan of care and goals.    Anticipated barriers to physical therapy: none    GOALS: Short Term Goals:  8 weeks  1.Report " decreased R knee pain  < / =  3/10  to increase tolerance for ADL's and increased strengthening exercises. Progressing, not met  2. Increase knee ROM to 0-120 in order to be able to perform ADLs without difficulty. Progressing, not met  3. Increase strength to 3+/5 MMT grade in RLE  to increase tolerance for ADL and work activities. Progressing, not met  4. Pt to tolerate HEP to improve ROM and independence with ADL's Progressing, not met     Long Term Goals: 16 weeks  1.Report decreased R knee pain < / = 0/10  to increase tolerance for ADL's and improved walking Progressing, not met  2.Patient goal: Patient will be able to return to golf without pain Progressing, not met  3.Increase strength to >/= 4+/5 in RLE  to increase tolerance for ADL and work activities.Progressing, not met  4. Pt will report at CJ level (20-40% impaired) on FOTO knee to demonstrate increase in LE function with every day tasks. Progressing, not met    Plan     Cont to progress towards goals set by PT.  Work to improve ROM, gait and strength next visit.       Michael Mitchell, REEMAA.    Lauryn Erazo, PTA,   7/1/19

## 2019-07-03 ENCOUNTER — HOSPITAL ENCOUNTER (OUTPATIENT)
Dept: RADIOLOGY | Facility: HOSPITAL | Age: 54
Discharge: HOME | End: 2019-07-03
Attending: ORTHOPAEDIC SURGERY
Payer: MEDICARE

## 2019-07-03 ENCOUNTER — OFFICE VISIT (OUTPATIENT)
Dept: SPORTS MEDICINE | Facility: CLINIC | Age: 54
End: 2019-07-03
Payer: MEDICARE

## 2019-07-03 ENCOUNTER — TELEPHONE (OUTPATIENT)
Dept: SPORTS MEDICINE | Facility: CLINIC | Age: 54
End: 2019-07-03

## 2019-07-03 VITALS
DIASTOLIC BLOOD PRESSURE: 84 MMHG | BODY MASS INDEX: 26.52 KG/M2 | HEART RATE: 54 BPM | HEIGHT: 66 IN | SYSTOLIC BLOOD PRESSURE: 158 MMHG | WEIGHT: 165 LBS

## 2019-07-03 DIAGNOSIS — Z09 SURGERY FOLLOW-UP EXAMINATION: ICD-10-CM

## 2019-07-03 DIAGNOSIS — M25.561 PAIN IN BOTH KNEES, UNSPECIFIED CHRONICITY: ICD-10-CM

## 2019-07-03 DIAGNOSIS — Z96.651 S/P TOTAL KNEE ARTHROPLASTY, RIGHT: ICD-10-CM

## 2019-07-03 DIAGNOSIS — M25.562 PAIN IN BOTH KNEES, UNSPECIFIED CHRONICITY: ICD-10-CM

## 2019-07-03 DIAGNOSIS — M25.561 PAIN IN BOTH KNEES, UNSPECIFIED CHRONICITY: Primary | ICD-10-CM

## 2019-07-03 DIAGNOSIS — M25.562 PAIN IN BOTH KNEES, UNSPECIFIED CHRONICITY: Primary | ICD-10-CM

## 2019-07-03 DIAGNOSIS — M17.12 PRIMARY OSTEOARTHRITIS OF LEFT KNEE: ICD-10-CM

## 2019-07-03 DIAGNOSIS — R60.0 LOWER EXTREMITY EDEMA: ICD-10-CM

## 2019-07-03 PROCEDURE — 99024 PR POST-OP FOLLOW-UP VISIT: ICD-10-PCS | Mod: S$GLB,,, | Performed by: ORTHOPAEDIC SURGERY

## 2019-07-03 PROCEDURE — 99024 POSTOP FOLLOW-UP VISIT: CPT | Mod: S$GLB,,, | Performed by: ORTHOPAEDIC SURGERY

## 2019-07-03 PROCEDURE — 97110 THERAPEUTIC EXERCISES: CPT | Mod: GP,S$GLB,, | Performed by: ORTHOPAEDIC SURGERY

## 2019-07-03 PROCEDURE — 73564 XR KNEE ORTHO BILAT WITH FLEXION: ICD-10-PCS | Mod: 26,50,, | Performed by: RADIOLOGY

## 2019-07-03 PROCEDURE — 73564 X-RAY EXAM KNEE 4 OR MORE: CPT | Mod: 26,50,, | Performed by: RADIOLOGY

## 2019-07-03 PROCEDURE — 99999 PR PBB SHADOW E&M-EST. PATIENT-LVL III: CPT | Mod: PBBFAC,,, | Performed by: ORTHOPAEDIC SURGERY

## 2019-07-03 PROCEDURE — 97110 PR THERAPEUTIC EXERCISES: ICD-10-PCS | Mod: GP,S$GLB,, | Performed by: ORTHOPAEDIC SURGERY

## 2019-07-03 PROCEDURE — 73564 X-RAY EXAM KNEE 4 OR MORE: CPT | Mod: TC,50,FY,PO

## 2019-07-03 PROCEDURE — 99999 PR PBB SHADOW E&M-EST. PATIENT-LVL III: ICD-10-PCS | Mod: PBBFAC,,, | Performed by: ORTHOPAEDIC SURGERY

## 2019-07-03 RX ORDER — OXYCODONE AND ACETAMINOPHEN 10; 325 MG/1; MG/1
1 TABLET ORAL
Qty: 42 TABLET | Refills: 0 | Status: SHIPPED | OUTPATIENT
Start: 2019-07-03 | End: 2019-12-11 | Stop reason: ALTCHOICE

## 2019-07-03 NOTE — TELEPHONE ENCOUNTER
----- Message from Eric Barrera sent at 7/3/2019 11:11 AM CDT -----  Contact: [place] [phone]  [place] [phone]  ----- Message -----  From: Luisa Gonzalez MA  Sent: 7/3/2019  11:06 AM  To: Eric Barrera    That number says its not a working number  ----- Message -----  From: Eric Barrera  Sent: 7/3/2019  11:01 AM  To: Lele Ramirez was calling from [employer] she would like a call back to get the provider NPI number for a order that was faxed over for the patient physical therapy.

## 2019-07-09 ENCOUNTER — CLINICAL SUPPORT (OUTPATIENT)
Dept: REHABILITATION | Facility: HOSPITAL | Age: 54
End: 2019-07-09
Attending: ORTHOPAEDIC SURGERY
Payer: MEDICARE

## 2019-07-09 DIAGNOSIS — M25.562 PAIN IN BOTH KNEES, UNSPECIFIED CHRONICITY: ICD-10-CM

## 2019-07-09 DIAGNOSIS — M25.561 PAIN IN BOTH KNEES, UNSPECIFIED CHRONICITY: ICD-10-CM

## 2019-07-09 DIAGNOSIS — R29.898 DECREASED STRENGTH INVOLVING KNEE JOINT: ICD-10-CM

## 2019-07-16 ENCOUNTER — CLINICAL SUPPORT (OUTPATIENT)
Dept: REHABILITATION | Facility: HOSPITAL | Age: 54
End: 2019-07-16
Payer: MEDICARE

## 2019-07-16 ENCOUNTER — TELEPHONE (OUTPATIENT)
Dept: SPORTS MEDICINE | Facility: CLINIC | Age: 54
End: 2019-07-16

## 2019-07-16 DIAGNOSIS — M25.562 PAIN IN BOTH KNEES, UNSPECIFIED CHRONICITY: ICD-10-CM

## 2019-07-16 DIAGNOSIS — R29.898 DECREASED STRENGTH INVOLVING KNEE JOINT: ICD-10-CM

## 2019-07-16 DIAGNOSIS — M25.561 PAIN IN BOTH KNEES, UNSPECIFIED CHRONICITY: ICD-10-CM

## 2019-07-16 PROCEDURE — 97140 MANUAL THERAPY 1/> REGIONS: CPT

## 2019-07-16 PROCEDURE — 97110 THERAPEUTIC EXERCISES: CPT

## 2019-07-16 NOTE — TELEPHONE ENCOUNTER
Contacted [facility] to request prior authorization for additional visits for this patient. Faxed over clinicals to     [phone]  Ref # [id number]    The request is for 12 additional visits. This will begin on 8/10 and go through 10/10. [facility] cannot create two simultaneous authorizations therefore the current 4 PT visits will remain until 8/9.  ----- Message from Maxi Chaves MD sent at 7/16/2019  6:29 AM CDT -----  Can you folks get authorization for more physical therapy.   ----- Message -----  From: Arlene Ortiz, PT  Sent: 7/15/2019   9:31 AM  To: Maxi Chaves MD, Humberto Royal PA-C    Hi Dr. Chaves,     I'm seeing Rishi Aranda (post op TKR on 5/14/19) in physical therapy.  I just received a call saying that his insurance is only approving him for 4 more visits.  I was told that his referring doctor needs to call Mariela at [phone] (ext [phone]) in order to get more visits.  I definitely think that Rishi will need more than just 4 visits, as he continues to lack knee ROM and functional mobility.    Thanks,     Arlene Ortiz, PT, DPT, OCS  [facility]

## 2019-07-16 NOTE — PROGRESS NOTES
"  Physical Therapy Daily Treatment Note     Name: Rishi Aranda  Clinic Number: [id number]    Therapy Diagnosis:   Encounter Diagnoses   Name Primary?    Pain in both knees, unspecified chronicity     Decreased strength involving knee joint      Physician: Humberto Royal, *    Visit Date: 7/16/2019  Physician Orders: PT Eval and Treat   Medical Diagnosis from Referral:   Diagnosis   M17.11 (ICD-10-CM) - Primary osteoarthritis of right knee   Evaluation Date: 5/16/2019  Authorization Period Expiration: 08/09/19  Plan of Care Expiration: 9/5/19  Visit # / Visits authorized: 1/ 4  Visit # total: 13    Time In:0810  Time Out: 0915   Total Billable Time: 60 minutes    Precautions: Standard,   The patient should begin physical therapy on postoperative day # 3 and will be advanced to outpatient therapy as soon as Possible following discharge.   Weight bearing:as tolerated  right leg  Range of Motion:Full normal motion symmetric to opposite side   Immediate specific exercises should include:extension exercises, quadriceps load with a heel roll, prone hang procedures with 5-10 lbs. ankle weights.   Gait program should include: active extension with a heel-to-toe gait working on maintaining extension     DOS: 5/14/19  Procedure: ARTHROPLASTY, KNEE, TOTAL,Medial and Lateral compartment (Right)    Subjective     Pt states that he is doing pretty good.  Continues to have stiffness in the morning.   He was compliant with home exercise program.  Response to previous treatment: no adverse effects  Functional change: ambulating with SPC.    Pain: 3/10  Location: right knee      Objective       Range of Motion:   Knee Right Passive Left Passive   Flexion 120 146   Extension 1 lacking 1 hyper       TREATMENT:   Rishi received therapeutic exercises to develop strength, endurance, ROM and flexibility for 50 minutes including:  Knee extension stretch 5# thigh/ 5# shin w/ ankle prop x 5 min R  Seated HSS/HCS 5 x 30" R   Quad set 30 " "x 5"   SAQ 2# 3 x 10  LAQ 2# x 30  Heel slides 20 x 10"   Bike (seat height 3) x 10 min fwd full revolutions  Prone quad stretch 3 x 30"   Prone hang 5# x 5 min       SLR 3 x 10 R NP  SL hip abd 3 x 10 R NP  Prone hip extension 3 x 10 R NP      Gait Trainin min   Gait training with SPC x 2 laps heel-toe walk  Cone hesitation walk 5 cone 3 laps single UE assist       Rishi received the following manual therapy techniques: Joint mobilizations and Soft tissue Mobilization were applied to the: patella and R knee for 10 minutes, including:  Patellar glides medial/lateral/superior/inferior  Prone knee flexion stretch   A/P joint mobs for knee extension  Edema massage with leg propped NP      Rishi received cold pack for 10 minutes to to decrease circulation, pain, and swelling. NP      Home Exercises Provided and Patient Education Provided     Education provided:   - Pt edu on proper exercise technique.      Written Home Exercises Provided: yes.  Exercises were reviewed and Rishi was able to demonstrate them prior to the end of the session.  Rihsi demonstrated good  understanding of the education provided.     See EMR under Media for exercises provided 19.    Assessment     Pt returns to physical therapy after 2 weeks of not coming secondary to insurance denial.  Pt is now authorized for 4 visits for the next month.  Pt lost 1 degree of knee extension and 5 degrees of flexion during the past 2 weeks.  Worked a lot on regaining ROM today.  Pt continues to ambulate with increased knee flexion at initial contact.  Will work on gait next session.    Rishi is progressing well towards his goals.   Pt prognosis is Good.     Pt will continue to benefit from skilled outpatient physical therapy to address the deficits listed in the problem list box on initial evaluation, provide pt/family education and to maximize pt's level of independence in the home and community environment.     Pt's spiritual, cultural and " educational needs considered and pt agreeable to plan of care and goals.    Anticipated barriers to physical therapy: none    GOALS: Short Term Goals:  8 weeks  1.Report decreased R knee pain  < / =  3/10  to increase tolerance for ADL's and increased strengthening exercises. Progressing, not met  2. Increase knee ROM to 0-120 in order to be able to perform ADLs without difficulty. Progressing, not met  3. Increase strength to 3+/5 MMT grade in RLE  to increase tolerance for ADL and work activities. Progressing, not met  4. Pt to tolerate HEP to improve ROM and independence with ADL's Progressing, not met     Long Term Goals: 16 weeks  1.Report decreased R knee pain < / = 0/10  to increase tolerance for ADL's and improved walking Progressing, not met  2.Patient goal: Patient will be able to return to golf without pain Progressing, not met  3.Increase strength to >/= 4+/5 in RLE  to increase tolerance for ADL and work activities.Progressing, not met  4. Pt will report at CJ level (20-40% impaired) on FOTO knee to demonstrate increase in LE function with every day tasks. Progressing, not met    Plan     Cont to progress towards goals set by PT.  Work to improve ROM, gait and strength next visit.      Arlene Ortiz, PT, DPT, OCS

## 2019-07-17 ENCOUNTER — TELEPHONE (OUTPATIENT)
Dept: SPORTS MEDICINE | Facility: CLINIC | Age: 54
End: 2019-07-17

## 2019-07-17 NOTE — TELEPHONE ENCOUNTER
Spoke to Mariela regarding the PT visits. She cannot add an additional visits at this time.    ----- Message from Jovita Chow sent at 7/17/2019  9:46 AM CDT -----  Contact: Mariela/[employer]  Attn [place]    Please call Mariela at [phone] x [phone]    Mariela is returning your call regarding patient physical therapy authorization    Thank you

## 2019-07-18 ENCOUNTER — CLINICAL SUPPORT (OUTPATIENT)
Dept: REHABILITATION | Facility: HOSPITAL | Age: 54
End: 2019-07-18
Payer: MEDICARE

## 2019-07-18 DIAGNOSIS — M25.561 PAIN IN BOTH KNEES, UNSPECIFIED CHRONICITY: ICD-10-CM

## 2019-07-18 DIAGNOSIS — M25.562 PAIN IN BOTH KNEES, UNSPECIFIED CHRONICITY: ICD-10-CM

## 2019-07-18 DIAGNOSIS — R29.898 DECREASED STRENGTH INVOLVING KNEE JOINT: ICD-10-CM

## 2019-07-18 PROCEDURE — 97110 THERAPEUTIC EXERCISES: CPT

## 2019-07-18 PROCEDURE — 97140 MANUAL THERAPY 1/> REGIONS: CPT

## 2019-07-18 NOTE — PROGRESS NOTES
"  Physical Therapy Daily Treatment Note     Name: Rishi Aranda  Clinic Number: [id number]    Therapy Diagnosis:   Encounter Diagnoses   Name Primary?    Pain in both knees, unspecified chronicity     Decreased strength involving knee joint      Physician: Humberto Royal, *    Visit Date: 7/18/2019  Physician Orders: PT Eval and Treat   Medical Diagnosis from Referral:   Diagnosis   M17.11 (ICD-10-CM) - Primary osteoarthritis of right knee   Evaluation Date: 5/16/2019  Authorization Period Expiration: 08/09/19  Plan of Care Expiration: 9/5/19  Visit # / Visits authorized: 2/ 4  Visit # total: 14    Time In:0800  Time Out: 0915   Total Billable Time: 60 minutes    Precautions: Standard,   The patient should begin physical therapy on postoperative day # 3 and will be advanced to outpatient therapy as soon as Possible following discharge.   Weight bearing:as tolerated  right leg  Range of Motion:Full normal motion symmetric to opposite side   Immediate specific exercises should include:extension exercises, quadriceps load with a heel roll, prone hang procedures with 5-10 lbs. ankle weights.   Gait program should include: active extension with a heel-to-toe gait working on maintaining extension     DOS: 5/14/19  Procedure: ARTHROPLASTY, KNEE, TOTAL,Medial and Lateral compartment (Right)    Subjective     Pt states that he walked a lot yesterday and his ankle is hurting today.    He was compliant with home exercise program.  Response to previous treatment: no adverse effects  Functional change: ambulating with SPC.    Pain: 3/10  Location: right knee      Objective       Range of Motion:   Knee Right Passive Left Passive   Flexion 120 146   Extension 1 lacking 1 hyper       TREATMENT:   Rishi received therapeutic exercises to develop strength, endurance, ROM and flexibility for 50 minutes including:  Knee extension stretch 5# thigh/ 5# shin w/ ankle prop x 5 min R  Seated HSS/HCS 5 x 30" R   Quad set 30 x 5"   SAQ " "2# 3 x 10 NP  LAQ 2# x 30   Heel slides 20 x 10" NP  Bike (seat height 3) x 10 min fwd full revolutions  Ankle approximation mobility 25# kettle bell on red box 20 x 5"   Prone quad stretch 3 x 30"   Prone hang 5# x 5 min       SLR 3 x 10 R NP  SL hip abd 3 x 10 R NP  Prone hip extension 3 x 10 R NP      Gait Trainin min   Gait training with SPC x 2 laps heel-toe walk NP  Cone hesitation walk 5 cone 3 laps single UE assist NP  Rocker stance with R foot forward x 30  Rocker stance with L foot forward x 30      Rishi received the following manual therapy techniques: Joint mobilizations and Soft tissue Mobilization were applied to the: patella and R knee for 10 minutes, including:  Patellar glides medial/lateral/superior/inferior  Prone knee flexion stretch NP  A/P joint mobs for knee extension  Ankle mobilizations - AP/PA TC joint mobs  Roller stick to HS/calf  Edema massage with leg propped NP      Rishi received cold pack for 10 minutes to to decrease circulation, pain, and swelling. NP      Home Exercises Provided and Patient Education Provided     Education provided:   - Pt edu on proper exercise technique.      Written Home Exercises Provided: yes.  Exercises were reviewed and Rishi was able to demonstrate them prior to the end of the session.  Rishi demonstrated good  understanding of the education provided.     See EMR under Media for exercises provided 19.    Assessment     Pt tolerated treatment session well today with addition of ankle mobilization techniques.  Pt with continues to lack full extension.  Worked on ROM and quad activation with rocker stance to further improve heel to toe gait.  Pt with improved gait afterwards, however continues to have some instability in ankle.  Work on balance and gait next session.      Rishi is progressing well towards his goals.   Pt prognosis is Good.     Pt will continue to benefit from skilled outpatient physical therapy to address the deficits listed in the " problem list box on initial evaluation, provide pt/family education and to maximize pt's level of independence in the home and community environment.     Pt's spiritual, cultural and educational needs considered and pt agreeable to plan of care and goals.    Anticipated barriers to physical therapy: none    GOALS: Short Term Goals:  8 weeks  1.Report decreased R knee pain  < / =  3/10  to increase tolerance for ADL's and increased strengthening exercises. Progressing, not met  2. Increase knee ROM to 0-120 in order to be able to perform ADLs without difficulty. Progressing, not met  3. Increase strength to 3+/5 MMT grade in RLE  to increase tolerance for ADL and work activities. Progressing, not met  4. Pt to tolerate HEP to improve ROM and independence with ADL's Progressing, not met     Long Term Goals: 16 weeks  1.Report decreased R knee pain < / = 0/10  to increase tolerance for ADL's and improved walking Progressing, not met  2.Patient goal: Patient will be able to return to golf without pain Progressing, not met  3.Increase strength to >/= 4+/5 in RLE  to increase tolerance for ADL and work activities.Progressing, not met  4. Pt will report at CJ level (20-40% impaired) on FOTO knee to demonstrate increase in LE function with every day tasks. Progressing, not met    Plan     Cont to progress towards goals set by PT.  Work to improve ROM, gait and strength next visit.      Arlene Ortiz, PT, DPT, OCS

## 2019-07-23 ENCOUNTER — CLINICAL SUPPORT (OUTPATIENT)
Dept: REHABILITATION | Facility: HOSPITAL | Age: 54
End: 2019-07-23
Payer: MEDICARE

## 2019-07-23 DIAGNOSIS — M25.561 PAIN IN BOTH KNEES, UNSPECIFIED CHRONICITY: ICD-10-CM

## 2019-07-23 DIAGNOSIS — R29.898 DECREASED STRENGTH INVOLVING KNEE JOINT: ICD-10-CM

## 2019-07-23 DIAGNOSIS — M25.562 PAIN IN BOTH KNEES, UNSPECIFIED CHRONICITY: ICD-10-CM

## 2019-07-23 PROCEDURE — 97110 THERAPEUTIC EXERCISES: CPT

## 2019-07-23 PROCEDURE — 97140 MANUAL THERAPY 1/> REGIONS: CPT

## 2019-07-23 NOTE — PROGRESS NOTES
"  Physical Therapy Daily Treatment Note     Name: Rishi Aranda  Clinic Number: [id number]    Therapy Diagnosis:   Encounter Diagnoses   Name Primary?    Pain in both knees, unspecified chronicity     Decreased strength involving knee joint      Physician: Humberto Royal, *    Visit Date: 7/23/2019  Physician Orders: PT Eval and Treat   Medical Diagnosis from Referral:   Diagnosis   M17.11 (ICD-10-CM) - Primary osteoarthritis of right knee   Evaluation Date: 5/16/2019  Authorization Period Expiration: 08/09/19  Plan of Care Expiration: 9/5/19  Visit # / Visits authorized: 3/ 4  Visit # total: 15    Time In:0815  Time Out: 0915   Total Billable Time: 25 minutes    Precautions: Standard,   The patient should begin physical therapy on postoperative day # 3 and will be advanced to outpatient therapy as soon as Possible following discharge.   Weight bearing:as tolerated  right leg  Range of Motion:Full normal motion symmetric to opposite side   Immediate specific exercises should include:extension exercises, quadriceps load with a heel roll, prone hang procedures with 5-10 lbs. ankle weights.   Gait program should include: active extension with a heel-to-toe gait working on maintaining extension     DOS: 5/14/19  Procedure: ARTHROPLASTY, KNEE, TOTAL,Medial and Lateral compartment (Right)    Subjective     Pt states that he is doing okay.  He was compliant with home exercise program.  Response to previous treatment: no adverse effects  Functional change: ambulating with SPC.    Pain: 2/10  Location: right knee      Objective       Range of Motion:   Knee Right Passive Left Passive   Flexion 120 146   Extension 0 1 hyper       TREATMENT:   Rishi received therapeutic exercises to develop strength, endurance, ROM and flexibility for 50 minutes including:  Knee extension stretch 5# thigh/ 5# shin w/ ankle prop x 5 min R  Seated HSS/HCS 5 x 30" R   Quad set w/ ankle  30 x 5"   SAQ 2# 3 x 10 NP  LAQ 2# x 30 NP  Heel " "slides 20 x 5"  Bike (seat height 3) x 10 min fwd full revolutions  Ankle approximation mobility 25# kettle bell on 16" box 20 x 5"   SLS on airex 3 x 30"   Prone quad stretch 3 x 30"   Prone hang 5# x 5 min       SLR 3 x 10 R NP  SL hip abd 3 x 10 R NP  Prone hip extension 3 x 10 R NP      Gait Trainin min   Gait training with SPC x 2 laps heel-toe walk NP  Cone hesitation walk 5 cone 3 laps single UE assist NP  Rocker stance with R foot forward x 30  Rocker stance with L foot forward x 30      Rishi received the following manual therapy techniques: Joint mobilizations and Soft tissue Mobilization were applied to the: patella and R knee for 10 minutes, including:  Patellar glides medial/lateral/superior/inferior  Prone knee flexion stretch NP  A/P joint mobs for knee extension  Ankle mobilizations - AP/PA TC joint mobs NP  Roller stick to HS/calf  Edema massage with leg propped NP      Rishi received cold pack for 10 minutes to to decrease circulation, pain, and swelling. NP      Home Exercises Provided and Patient Education Provided     Education provided:   - Pt edu on proper exercise technique.      Written Home Exercises Provided: yes.  Exercises were reviewed and Rishi was able to demonstrate them prior to the end of the session.  Rishi demonstrated good  understanding of the education provided.     See EMR under Media for exercises provided 19.    Assessment     Pt tolerated treatment session well today. Pt continues to present with lack of full knee extension upon arrival.  Improved ROM noted after manual and self stretching with 0-120 degrees.  Pt with improved ankle mobility as well.  Initiated balance activities today to further improve ankle stability.  Pt to come just once this week and next week secondary to insurance restrictions.  Will resume 2x/week after 8/10/19 when new auth starts.      Rishi is progressing well towards his goals.   Pt prognosis is Good.     Pt will continue to benefit " from skilled outpatient physical therapy to address the deficits listed in the problem list box on initial evaluation, provide pt/family education and to maximize pt's level of independence in the home and community environment.     Pt's spiritual, cultural and educational needs considered and pt agreeable to plan of care and goals.    Anticipated barriers to physical therapy: none    GOALS: Short Term Goals:  8 weeks  1.Report decreased R knee pain  < / =  3/10  to increase tolerance for ADL's and increased strengthening exercises. Progressing, not met  2. Increase knee ROM to 0-120 in order to be able to perform ADLs without difficulty. Progressing, not met  3. Increase strength to 3+/5 MMT grade in RLE  to increase tolerance for ADL and work activities. Progressing, not met  4. Pt to tolerate HEP to improve ROM and independence with ADL's Progressing, not met     Long Term Goals: 16 weeks  1.Report decreased R knee pain < / = 0/10  to increase tolerance for ADL's and improved walking Progressing, not met  2.Patient goal: Patient will be able to return to golf without pain Progressing, not met  3.Increase strength to >/= 4+/5 in RLE  to increase tolerance for ADL and work activities.Progressing, not met  4. Pt will report at CJ level (20-40% impaired) on FOTO knee to demonstrate increase in LE function with every day tasks. Progressing, not met    Plan     Cont to progress towards goals set by PT.  Work to improve ROM, gait and strength next visit.      Arlene Ortiz, PT, DPT, OCS

## 2019-08-01 ENCOUNTER — CLINICAL SUPPORT (OUTPATIENT)
Dept: REHABILITATION | Facility: HOSPITAL | Age: 54
End: 2019-08-01
Payer: MEDICARE

## 2019-08-01 DIAGNOSIS — M25.562 PAIN IN BOTH KNEES, UNSPECIFIED CHRONICITY: ICD-10-CM

## 2019-08-01 DIAGNOSIS — M25.561 PAIN IN BOTH KNEES, UNSPECIFIED CHRONICITY: ICD-10-CM

## 2019-08-01 DIAGNOSIS — R29.898 DECREASED STRENGTH INVOLVING KNEE JOINT: ICD-10-CM

## 2019-08-01 PROCEDURE — 97110 THERAPEUTIC EXERCISES: CPT

## 2019-08-01 PROCEDURE — 97140 MANUAL THERAPY 1/> REGIONS: CPT

## 2019-08-01 NOTE — PROGRESS NOTES
"  Physical Therapy Daily Treatment Note     Name: Rishi Aranda  Clinic Number: [id number]    Therapy Diagnosis:   Encounter Diagnoses   Name Primary?    Pain in both knees, unspecified chronicity     Decreased strength involving knee joint      Physician: Humberto Royal, *    Visit Date: 8/1/2019  Physician Orders: PT Eval and Treat   Medical Diagnosis from Referral:   Diagnosis   M17.11 (ICD-10-CM) - Primary osteoarthritis of right knee   Evaluation Date: 5/16/2019  Authorization Period Expiration: 08/09/19  Plan of Care Expiration: 9/5/19  Visit # / Visits authorized: 4/ 4  Visit # total: 16    Time In:0800  Time Out: 0930   Total Billable Time: 25 minutes    Precautions: Standard,   The patient should begin physical therapy on postoperative day # 3 and will be advanced to outpatient therapy as soon as Possible following discharge.   Weight bearing:as tolerated  right leg  Range of Motion:Full normal motion symmetric to opposite side   Immediate specific exercises should include:extension exercises, quadriceps load with a heel roll, prone hang procedures with 5-10 lbs. ankle weights.   Gait program should include: active extension with a heel-to-toe gait working on maintaining extension     DOS: 5/14/19  Procedure: ARTHROPLASTY, KNEE, TOTAL,Medial and Lateral compartment (Right)    Subjective     Pt states that he feels stiff and ankle feels weak.  He was compliant with home exercise program.  Response to previous treatment: no adverse effects  Functional change: ambulating with SPC.    Pain: 2/10  Location: right knee      Objective       Range of Motion:   Knee Right Passive Left Passive   Flexion 120 146   Extension 0 1 hyper       TREATMENT:   Rishi received therapeutic exercises to develop strength, endurance, ROM and flexibility for 70 minutes including:  Knee extension stretch 5# thigh/ 5# shin w/ ankle prop x 5 min R  Seated HSS/HCS 3 x 30" R   Quad set 30 x 10"   SAQ 2# 3 x 10 NP  LAQ 2# x 30 " "NP  Heel slides 20 x 10"  Bike (seat height 3) x 10 min fwd full revolutions  Ankle approximation mobility 26# kettle bell on 16" box 20 x 5"  GTB ankle 4 ways x 30 each   SLS on airex 3 x 30"   Mini squats x 30  Calf raises x 30  Calf stretch on slant x 2 min  Prone quad stretch 3 x 30"   Prone hang 5# x 5 min       SLR 3 x 10 R NP  SL hip abd 3 x 10 R NP  Prone hip extension 3 x 10 R NP      Gait Trainin min   Gait training with SPC x 2 laps heel-toe walk NP  Cone hesitation walk 5 cone 3 laps single UE assist NP  Rocker stance with R foot forward x 30  Rocker stance with L foot forward x 30      Rishi received the following manual therapy techniques: Joint mobilizations and Soft tissue Mobilization were applied to the: patella and R knee for 15 minutes, including:  Patellar glides medial/lateral/superior/inferior  Prone knee flexion stretch NP  A/P joint mobs for knee extension  Ankle mobilizations - AP/PA TC joint mobs NP  Roller stick to HS/calf NP  Edema massage with leg propped NP      Rishi received cold pack for 10 minutes to to decrease circulation, pain, and swelling. NP      Home Exercises Provided and Patient Education Provided     Education provided:   - Pt edu on proper exercise technique.      Written Home Exercises Provided: yes.  Exercises were reviewed and Rishi was able to demonstrate them prior to the end of the session.  Rishi demonstrated good  understanding of the education provided.     See EMR under Media for exercises provided 19.    Assessment     Pt tolerated treatment session well today. Pt initially presented with decreased patellar mobility, however after mobilizations, improvements noted with improved ROM.  Worked on ankle strengthening today secondary to pt's c/o weakness.  Also initiated squats today to further improve functional mobility.  Will resume 2x/week after 8/10/19 when new auth starts.      Rishi is progressing well towards his goals.   Pt prognosis is Good. "     Pt will continue to benefit from skilled outpatient physical therapy to address the deficits listed in the problem list box on initial evaluation, provide pt/family education and to maximize pt's level of independence in the home and community environment.     Pt's spiritual, cultural and educational needs considered and pt agreeable to plan of care and goals.    Anticipated barriers to physical therapy: none    GOALS: Short Term Goals:  8 weeks  1.Report decreased R knee pain  < / =  3/10  to increase tolerance for ADL's and increased strengthening exercises. Progressing, not met  2. Increase knee ROM to 0-120 in order to be able to perform ADLs without difficulty. Progressing, not met  3. Increase strength to 3+/5 MMT grade in RLE  to increase tolerance for ADL and work activities. Progressing, not met  4. Pt to tolerate HEP to improve ROM and independence with ADL's Progressing, not met     Long Term Goals: 16 weeks  1.Report decreased R knee pain < / = 0/10  to increase tolerance for ADL's and improved walking Progressing, not met  2.Patient goal: Patient will be able to return to golf without pain Progressing, not met  3.Increase strength to >/= 4+/5 in RLE  to increase tolerance for ADL and work activities.Progressing, not met  4. Pt will report at CJ level (20-40% impaired) on FOTO knee to demonstrate increase in LE function with every day tasks. Progressing, not met    Plan     Cont to progress towards goals set by PT.  Work to improve ROM, gait and strength next visit.      Arlene Ortiz, PT, DPT, OCS

## 2019-08-13 NOTE — PROGRESS NOTES
Subjective:          Chief Complaint: Rishi Aranda is a 53 y.o. male who had concerns including Pain of the Right Knee.    Pt presents for 3 month post-op evaluation. Pt has no complaints at this time. He is doing PT here at \[facility]\"" and progressing as scheduled. He notes that the swelling in his lower leg and ankle has improved. He rates his pain as 4/10. Pt is no longer taking pain medicaition. He presents today ambulating with a cane. He reports that he has more pain at night when laying down. He takes Celebrex on occasion.  Denies fever, chills, discharge from wound site, and N/V.    DATE OF PROCEDURE:  5/14/2019     ATTENDING SURGEON: Surgeon(s) and Role:     * Maxi Chaves MD - Primary     FIRST ASSISTANT:Chaparrita Iraheta PA-C     No resident or fellow was available throughout the entire procedure as a result it was medically necessary for Chaparrita Iraheta PA-C to perform first assistant duties.        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   HENT: Negative for congestion and sore throat.    Eyes: Negative for discharge and double vision.   Cardiovascular: Negative for chest pain, palpitations and syncope.   Respiratory: Negative for cough and shortness of breath.    Endocrine: Negative for cold intolerance and heat intolerance.   Skin: Negative for dry skin and rash.   Musculoskeletal: Positive for joint pain and joint swelling.   Gastrointestinal: Negative for abdominal pain, nausea and vomiting.   Neurological: Negative for focal weakness, numbness and paresthesias.                   Objective:        General: Rishi is well-developed,  well-nourished, appears stated age, in no acute distress, alert and oriented to time, place and person.     General    Nursing note and vitals reviewed.  Constitutional: He is oriented to person, place, and time. He appears well-developed and well-nourished. No distress.   HENT:   Head: Normocephalic and atraumatic.   Nose: Nose normal.   Mouth/Throat: No oropharyngeal exudate.   Eyes: Conjunctivae and EOM are normal. Right eye exhibits no discharge. Left eye exhibits no discharge.   Neck: Normal range of motion. Neck supple.   Cardiovascular: Normal rate, regular rhythm and intact distal pulses.    Pulmonary/Chest: Effort normal and breath sounds normal. No respiratory distress.   Abdominal: Soft. Bowel sounds are normal. He exhibits no distension. There is no tenderness.   Neurological: He is alert and oriented to person, place, and time. He has normal reflexes. No cranial nerve deficit. Coordination normal.   Psychiatric: He has a normal mood and affect. His behavior is normal. Judgment and thought content normal.     General Musculoskeletal Exam   Gait: abnormal       Right Knee Exam     Inspection   Erythema: absent  Scars: present  Swelling: absent  Effusion: absent  Deformity: absent  Bruising: absent    Tenderness   The patient is tender to palpation of the patellar tendon.    Range of Motion   Extension: abnormal Right knee extension grade: +3.   Flexion:  120 abnormal     Tests   Meniscus   Fawn:  Medial - negative Lateral - negative  Ligament Examination Lachman: normal (-1 to 2mm) PCL-Posterior Drawer: normal (0 to 2mm)     MCL - Valgus: normal (0 to 2mm)  LCL - Varus: normalPivot Shift: normal (Equal)Reverse Pivot Shift: normal (Equal)Dial Test at 30 degrees: normal (< 5 degrees)Dial Test at 90 degrees: normal (< 5 degrees)  Posterior Sag Test: negative  Posterolateral Corner: unstable (>15 degrees difference)  Patella   Patellar apprehension: negative  Passive Patellar Tilt: neutral  Patellar  Tracking: normal  Patellar Glide (quadrants): Lateral - 1   Medial - 2  Q-Angle at 90 degrees: normal  Patellar Grind: negative  J-Sign: none    Other   Meniscal Cyst: absent  Popliteal (Baker's) Cyst: absent  Muscle Tightness: hamstring tightness and quadriceps tightness  Sensation: normal    Comments:  Incision clean/dry/intact  Aquacel intact  No sign of infection  Mild swelling  Compartments soft  Neurovascular status intact in extremity      Left Knee Exam     Inspection   Erythema: absent  Scars: absent  Swelling: absent  Effusion: absent  Deformity: absent  Bruising: absent    Tenderness   The patient is experiencing no tenderness.     Range of Motion   Extension: 0   Flexion: 130     Tests   Meniscus   Fawn:  Medial - negative Lateral - negative  Stability Lachman: normal (-1 to 2mm) PCL-Posterior Drawer: normal (0 to 2mm)  MCL - Valgus: normal (0 to 2mm)  LCL - Varus: normal (0 to 2mm)Pivot Shift: normal (Equal)Reverse Pivot Shift: normal (Equal)Dial Test at 30 degrees: normal (< 5 degrees)Dial Test at 90 degrees: normal (< 5 degrees)  Posterior Sag Test: negative  Posterolateral Corner: unstable (>15 degrees difference)  Patella   Patellar apprehension: negative  Passive Patellar Tilt: neutral  Patellar Tracking: normal  Patellar Glide (Quadrants): Lateral - 1 Medial - 2  Q-Angle at 90 degrees: normal  Patellar Grind: negative  J-Sign: J sign absent    Other   Meniscal Cyst: absent  Popliteal (Baker's) Cyst: absent  Sensation: normal    Right Hip Exam     Tests   Bernabe: negative  Left Hip Exam     Tests   Bernabe: negative          Muscle Strength   Right Lower Extremity   Hip Abduction: 5/5   Quadriceps:  4/5   Hamstrin/5   Left Lower Extremity   Hip Abduction: 5/5   Quadriceps:  5/5   Hamstrin/5     Reflexes     Left Side  Quadriceps:  2+  Achilles:  2+    Right Side   Quadriceps:  2+  Achilles:  2+    Vascular Exam     Right Pulses  Dorsalis Pedis:      2+  Posterior Tibial:       2+        Left Pulses  Dorsalis Pedis:      2+  Posterior Tibial:      2+        Edema  Right Lower Leg: absent  Left Lower Leg: absent    Post op Radiographic Findings:    Right knee now shows postoperative findings of total knee arthroplasty with prostheses in satisfactory position and alignment.  No acute fracture or dislocation is identified.   some air is present in the soft tissues consistent with recent surgery.  External splint is present.    Xrays of the right knee were ordered and reviewed by me today. These findings were discussed and reviewed with the patient.    EXAMINATION:  XR KNEE ORTHO BILAT WITH FLEXION    CLINICAL HISTORY:  Pain in right knee    TECHNIQUE:  AP standing of both knees, PA flexion standing views of both knees, and Merchant views of both knees were performed.  Lateral views of both knees were also performed.    COMPARISON:  Non 05/14/2019 e    FINDINGS:  Right knee arthroplasty identified in a satisfactory and unchanged position as compared to the previous study.    Mild DJD involving the left knee with narrowing of the medial tibiofemoral joint space.  Small calcified density noted in the left femoral shaft probably a an enchondroma versus bone infarct.  No acute fracture or dislocation.      Impression       See above             Assessment:       Encounter Diagnoses   Name Primary?    Surgery follow-up examination Yes    S/P total knee arthroplasty, right     Decreased strength involving knee joint     Primary osteoarthritis of left knee           Plan:       1.IKDC, SF-12 and KOOS was filled out today in clinic.     RTC in 3 months with Dr. Maxi Chaves. Patient will fill out IKDC, SF-12 and KOOS and bilateral knee series on return.     2. Continue HEP/PT per protocol.  Prone Hangs, Quad sets  30-40 minutes on the Exercyle  HEP 48670 - Maxi Chaves MD and SMA Mahendra, instructed and demonstrated a CORE HEP. The patient then demonstrated understanding of exercises and  proper technique. This program was performed for 17 minutes.       3.  Continue Celebrex BID      All of the patient's questions were answered and the patient will contact us if they have any questions or concerns in the interim.                      [facility] patient questionnaires have been collected today.     [facility] DIVISION OF KIDNEY DISEASES AND HYPERTENSION -- FOLLOW UP NOTE  --------------------------------------------------------------------------------  Chief Complaint:/subjective: MARIKA    24 hour events: Pt. seen and examined at bedside this AM. Pt. states that he feels well. States his SOB has resolved. Kishan MARQUEZ, N/V/F/C.    PAST HISTORY  --------------------------------------------------------------------------------  No significant changes to PMH, PSH, FHx, SHx, unless otherwise noted    ALLERGIES & MEDICATIONS  --------------------------------------------------------------------------------  Allergies    No Known Allergies    Intolerances    Standing Inpatient Medications  amLODIPine   Tablet 10 milliGRAM(s) Oral daily  aspirin enteric coated 81 milliGRAM(s) Oral daily  bisacodyl Suppository 10 milliGRAM(s) Rectal once  collagenase Ointment 1 Application(s) Topical daily  dextrose 5%. 1000 milliLiter(s) IV Continuous <Continuous>  dextrose 50% Injectable 12.5 Gram(s) IV Push once  dextrose 50% Injectable 25 Gram(s) IV Push once  dextrose 50% Injectable 25 Gram(s) IV Push once  dronabinol 2.5 milliGRAM(s) Oral two times a day  ergocalciferol 75499 Unit(s) Oral every week  heparin  Injectable 5000 Unit(s) SubCutaneous every 8 hours  hydrALAZINE 100 milliGRAM(s) Oral three times a day  insulin glargine Injectable (LANTUS) 18 Unit(s) SubCutaneous once  insulin glargine Injectable (LANTUS) 38 Unit(s) SubCutaneous at bedtime  insulin lispro (HumaLOG) corrective regimen sliding scale   SubCutaneous three times a day before meals  insulin lispro (HumaLOG) corrective regimen sliding scale   SubCutaneous three times a day before meals  insulin lispro (HumaLOG) corrective regimen sliding scale   SubCutaneous at bedtime  insulin lispro Injectable (HumaLOG) 22 Unit(s) SubCutaneous three times a day before meals  ondansetron Injectable 4 milliGRAM(s) IV Push once  piperacillin/tazobactam IVPB. 3.375 Gram(s) IV Intermittent every 8 hours  polyethylene glycol 3350 17 Gram(s) Oral every 12 hours  senna 2 Tablet(s) Oral at bedtime    REVIEW OF SYSTEMS  --------------------------------------------------------------------------------  Gen: No lethargy  Respiratory: No dyspnea  CV: No chest pain  GI: No abdominal pain  MSK: + LE edema  Neuro: No dizziness    All other systems were reviewed and are negative, except as noted.    VITALS/PHYSICAL EXAM  --------------------------------------------------------------------------------  T(C): 37.2 (06-11-19 @ 05:15), Max: 37.2 (06-10-19 @ 22:44)  HR: 82 (06-11-19 @ 05:15) (71 - 88)  BP: 145/76 (06-11-19 @ 05:15) (129/74 - 160/70)  RR: 17 (06-11-19 @ 05:15) (17 - 18)  SpO2: 94% (06-11-19 @ 05:15) (92% - 94%)  Wt(kg): --    06-10-19 @ 07:01  -  06-11-19 @ 07:00  --------------------------------------------------------  IN: 1360 mL / OUT: 775 mL / NET: 585 mL    Physical Exam:  	Gen: NAD,    	HEENT: Supple neck  	Pulm: CTA B/L  	CV: RRR, S1S2; no rub  	Abd: +BS, soft,    	: No suprapubic tenderness  	Ext: pedal edema; right foot bandaged  	Neuro: alert  	Psych: awake    LABS/STUDIES  --------------------------------------------------------------------------------              7.9    8.6   >-----------<  414      [06-11-19 @ 07:08]              24.5     136  |  104  |  24  ----------------------------<  145      [06-11-19 @ 07:04]  4.9   |  20  |  2.12        Ca     8.8     [06-11-19 @ 07:04]    Creatinine Trend:  SCr 2.12 [06-11 @ 07:04]  SCr 2.33 [06-10 @ 06:26]  SCr 2.33 [06-09 @ 06:19]  SCr 2.52 [06-08 @ 06:45]  SCr 2.65 [06-07 @ 06:29]

## 2019-08-14 ENCOUNTER — HOSPITAL ENCOUNTER (OUTPATIENT)
Dept: RADIOLOGY | Facility: HOSPITAL | Age: 54
Discharge: HOME | End: 2019-08-14
Attending: ORTHOPAEDIC SURGERY
Payer: MEDICARE

## 2019-08-14 ENCOUNTER — OFFICE VISIT (OUTPATIENT)
Dept: SPORTS MEDICINE | Facility: CLINIC | Age: 54
End: 2019-08-14
Payer: MEDICARE

## 2019-08-14 VITALS — DIASTOLIC BLOOD PRESSURE: 95 MMHG | HEART RATE: 49 BPM | SYSTOLIC BLOOD PRESSURE: 153 MMHG | TEMPERATURE: 98 F

## 2019-08-14 DIAGNOSIS — M25.562 PAIN IN BOTH KNEES, UNSPECIFIED CHRONICITY: ICD-10-CM

## 2019-08-14 DIAGNOSIS — R29.898 DECREASED STRENGTH INVOLVING KNEE JOINT: ICD-10-CM

## 2019-08-14 DIAGNOSIS — M25.561 PAIN IN BOTH KNEES, UNSPECIFIED CHRONICITY: ICD-10-CM

## 2019-08-14 DIAGNOSIS — M25.562 PAIN IN BOTH KNEES, UNSPECIFIED CHRONICITY: Primary | ICD-10-CM

## 2019-08-14 DIAGNOSIS — Z09 SURGERY FOLLOW-UP EXAMINATION: Primary | ICD-10-CM

## 2019-08-14 DIAGNOSIS — M25.561 PAIN IN BOTH KNEES, UNSPECIFIED CHRONICITY: Primary | ICD-10-CM

## 2019-08-14 DIAGNOSIS — M17.12 PRIMARY OSTEOARTHRITIS OF LEFT KNEE: ICD-10-CM

## 2019-08-14 DIAGNOSIS — Z96.651 S/P TOTAL KNEE ARTHROPLASTY, RIGHT: ICD-10-CM

## 2019-08-14 PROBLEM — M17.11 PRIMARY OSTEOARTHRITIS OF RIGHT KNEE: Status: RESOLVED | Noted: 2018-02-07 | Resolved: 2019-08-14

## 2019-08-14 PROCEDURE — 99024 PR POST-OP FOLLOW-UP VISIT: ICD-10-PCS | Mod: S$GLB,,, | Performed by: ORTHOPAEDIC SURGERY

## 2019-08-14 PROCEDURE — 99999 PR PBB SHADOW E&M-EST. PATIENT-LVL III: CPT | Mod: PBBFAC,,, | Performed by: ORTHOPAEDIC SURGERY

## 2019-08-14 PROCEDURE — 99999 PR PBB SHADOW E&M-EST. PATIENT-LVL III: ICD-10-PCS | Mod: PBBFAC,,, | Performed by: ORTHOPAEDIC SURGERY

## 2019-08-14 PROCEDURE — 97110 THERAPEUTIC EXERCISES: CPT | Mod: GP,S$GLB,, | Performed by: ORTHOPAEDIC SURGERY

## 2019-08-14 PROCEDURE — 73564 X-RAY EXAM KNEE 4 OR MORE: CPT | Mod: 26,50,, | Performed by: RADIOLOGY

## 2019-08-14 PROCEDURE — 73564 X-RAY EXAM KNEE 4 OR MORE: CPT | Mod: TC,50,FY,PO

## 2019-08-14 PROCEDURE — 99024 POSTOP FOLLOW-UP VISIT: CPT | Mod: S$GLB,,, | Performed by: ORTHOPAEDIC SURGERY

## 2019-08-14 PROCEDURE — 73564 XR KNEE ORTHO BILAT WITH FLEXION: ICD-10-PCS | Mod: 26,50,, | Performed by: RADIOLOGY

## 2019-08-14 PROCEDURE — 97110 PR THERAPEUTIC EXERCISES: ICD-10-PCS | Mod: GP,S$GLB,, | Performed by: ORTHOPAEDIC SURGERY

## 2019-08-30 ENCOUNTER — TELEPHONE (OUTPATIENT)
Dept: INTERNAL MEDICINE | Facility: CLINIC | Age: 54
End: 2019-08-30

## 2019-08-30 RX ORDER — METHYLPREDNISOLONE 4 MG/1
TABLET ORAL
Qty: 1 PACKAGE | Refills: 0 | Status: SHIPPED | OUTPATIENT
Start: 2019-08-30 | End: 2019-12-11 | Stop reason: ALTCHOICE

## 2019-08-30 RX ORDER — AMOXICILLIN 875 MG/1
875 TABLET, FILM COATED ORAL 2 TIMES DAILY
Qty: 20 TABLET | Refills: 0 | Status: SHIPPED | OUTPATIENT
Start: 2019-08-30 | End: 2019-12-11 | Stop reason: ALTCHOICE

## 2019-08-30 NOTE — TELEPHONE ENCOUNTER
Pt states he has cold with congestion with yellow mucus light runny nose symptoms started Saturday he tried otc medication no relief in symptoms cough and cold mucinex he want to try antibiotic to help with the symptoms

## 2019-08-30 NOTE — TELEPHONE ENCOUNTER
----- Message from Ivethxander Barragan sent at 8/30/2019  9:14 AM CDT -----  Contact: self/[phone]  .1 Patient would like to get medical advice.  Symptoms (please be specific): chest congestion, head congestion, cold coughing and hard breathing because the cold, body ache  How long has patient had these symptoms: 08/26/19  Pharmacy name and phone#:[id number] - [place] [address] [address] [facility] AT [facility] [address] [phone] (Phone) [phone] (Fax)  Any drug allergies: onfile  Comments: Patient would like a courtesy call, and some antibiotics. Patient would like to get medical advice. thank you!!!

## 2019-09-10 ENCOUNTER — PES CALL (OUTPATIENT)
Dept: ADMINISTRATIVE | Facility: CLINIC | Age: 54
End: 2019-09-10

## 2019-10-07 PROBLEM — Z09 SURGERY FOLLOW-UP EXAMINATION: Status: RESOLVED | Noted: 2019-07-03 | Resolved: 2019-10-07

## 2019-11-18 ENCOUNTER — OFFICE VISIT (OUTPATIENT)
Dept: SPORTS MEDICINE | Facility: CLINIC | Age: 54
End: 2019-11-18
Payer: MEDICARE

## 2019-11-18 ENCOUNTER — HOSPITAL ENCOUNTER (OUTPATIENT)
Dept: RADIOLOGY | Facility: HOSPITAL | Age: 54
Discharge: HOME | End: 2019-11-18
Attending: ORTHOPAEDIC SURGERY
Payer: MEDICARE

## 2019-11-18 VITALS
BODY MASS INDEX: 26.52 KG/M2 | DIASTOLIC BLOOD PRESSURE: 86 MMHG | WEIGHT: 165 LBS | HEIGHT: 66 IN | HEART RATE: 62 BPM | SYSTOLIC BLOOD PRESSURE: 130 MMHG

## 2019-11-18 DIAGNOSIS — M25.561 RIGHT KNEE PAIN, UNSPECIFIED CHRONICITY: ICD-10-CM

## 2019-11-18 DIAGNOSIS — M25.561 RIGHT KNEE PAIN, UNSPECIFIED CHRONICITY: Primary | ICD-10-CM

## 2019-11-18 DIAGNOSIS — Z96.651 S/P TOTAL KNEE ARTHROPLASTY, RIGHT: ICD-10-CM

## 2019-11-18 PROCEDURE — 73564 XR KNEE ORTHO BILAT WITH FLEXION: ICD-10-PCS | Mod: 26,50,, | Performed by: RADIOLOGY

## 2019-11-18 PROCEDURE — 3075F SYST BP GE 130 - 139MM HG: CPT | Mod: S$GLB,,, | Performed by: ORTHOPAEDIC SURGERY

## 2019-11-18 PROCEDURE — 3079F DIAST BP 80-89 MM HG: CPT | Mod: S$GLB,,, | Performed by: ORTHOPAEDIC SURGERY

## 2019-11-18 PROCEDURE — 3075F PR MOST RECENT SYSTOLIC BLOOD PRESS GE 130-139MM HG: ICD-10-PCS | Mod: S$GLB,,, | Performed by: ORTHOPAEDIC SURGERY

## 2019-11-18 PROCEDURE — 73564 X-RAY EXAM KNEE 4 OR MORE: CPT | Mod: 26,50,, | Performed by: RADIOLOGY

## 2019-11-18 PROCEDURE — 99214 PR OFFICE/OUTPT VISIT, EST, LEVL IV, 30-39 MIN: ICD-10-PCS | Mod: S$GLB,,, | Performed by: ORTHOPAEDIC SURGERY

## 2019-11-18 PROCEDURE — 3079F PR MOST RECENT DIASTOLIC BLOOD PRESSURE 80-89 MM HG: ICD-10-PCS | Mod: S$GLB,,, | Performed by: ORTHOPAEDIC SURGERY

## 2019-11-18 PROCEDURE — 3008F PR BODY MASS INDEX (BMI) DOCUMENTED: ICD-10-PCS | Mod: S$GLB,,, | Performed by: ORTHOPAEDIC SURGERY

## 2019-11-18 PROCEDURE — 99214 OFFICE O/P EST MOD 30 MIN: CPT | Mod: S$GLB,,, | Performed by: ORTHOPAEDIC SURGERY

## 2019-11-18 PROCEDURE — 3008F BODY MASS INDEX DOCD: CPT | Mod: S$GLB,,, | Performed by: ORTHOPAEDIC SURGERY

## 2019-11-18 PROCEDURE — 73564 X-RAY EXAM KNEE 4 OR MORE: CPT | Mod: TC,50

## 2019-11-18 PROCEDURE — 99999 PR PBB SHADOW E&M-EST. PATIENT-LVL III: ICD-10-PCS | Mod: PBBFAC,,, | Performed by: ORTHOPAEDIC SURGERY

## 2019-11-18 PROCEDURE — 99999 PR PBB SHADOW E&M-EST. PATIENT-LVL III: CPT | Mod: PBBFAC,,, | Performed by: ORTHOPAEDIC SURGERY

## 2019-11-18 RX ORDER — CELECOXIB 200 MG/1
200 CAPSULE ORAL 2 TIMES DAILY
Qty: 60 CAPSULE | Refills: 6 | Status: SHIPPED | OUTPATIENT
Start: 2019-11-18 | End: 2019-12-18

## 2019-11-18 NOTE — PROGRESS NOTES
Subjective:          Chief Complaint: Rishi Aranda is a 54 y.o. male who had concerns including Post-op Evaluation of the Right Knee.    Pt presents for 6 month post-op evaluation. Pt has no complaints at this time. He is completing his HEP as instructed. He notes that the swelling in his lower leg and ankle has improved. He rates his pain as 0/10. Pt is no longer taking pain medicaition. He is no longer using a cane for ambulation. He has d/c'd taking Celebrex.  Denies fever, chills, discharge from wound site, and N/V.    DATE OF PROCEDURE:  5/14/2019     ATTENDING SURGEON: Surgeon(s) and Role:     * Maxi Chaves MD - Primary     FIRST ASSISTANT:Chaparrita Iraheta PA-C     No resident or fellow was available throughout the entire procedure as a result it was medically necessary for Chaparrita Iraheta PA-C to perform first assistant duties.        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 Negative for chills and fever.   HENT: Negative.  Negative for congestion and sore throat.    Eyes: Negative.  Negative for discharge and double vision.   Cardiovascular: Negative.  Negative for chest pain, palpitations and syncope.   Respiratory: Negative.  Negative for cough and shortness of breath.    Endocrine: Negative.  Negative for cold intolerance and heat intolerance.   Hematologic/Lymphatic: Negative.    Skin: Negative.  Negative for dry skin and rash.   Musculoskeletal: Positive for joint pain and joint swelling.   Gastrointestinal: Negative.  Negative for abdominal pain, nausea and vomiting.   Genitourinary: Negative.    Neurological: Negative.  Negative for focal weakness,  numbness and paresthesias.   Psychiatric/Behavioral: Negative.    Allergic/Immunologic: Negative.        Pain Related Questions  Over the past 3 days, what was your average pain during activity? (I.e. running, jogging, walking, climbing stairs, getting dressed, ect.): 2  Over the past 3 days, what was your highest pain level?: 7  Over the past 3 days, what was your lowest pain level? : 0    Other  How many nights a week are you awakened by your affected body part?: 7  Was the patient's HEIGHT measured or patient reported?: Patient Reported  Was the patient's WEIGHT measured or patient reported?: Measured      Objective:        General: Rishi is well-developed, well-nourished, appears stated age, in no acute distress, alert and oriented to time, place and person.     General    Nursing note and vitals reviewed.  Constitutional: He is oriented to person, place, and time. He appears well-developed and well-nourished. He appears distressed.   HENT:   Head: Normocephalic and atraumatic.   Nose: Nose normal.   Mouth/Throat: No oropharyngeal exudate.   Eyes: Conjunctivae and EOM are normal. Right eye exhibits no discharge. Left eye exhibits no discharge.   Neck: Normal range of motion. Neck supple.   Cardiovascular: Normal rate, regular rhythm and intact distal pulses.    Pulmonary/Chest: Effort normal and breath sounds normal. No respiratory distress.   Abdominal: Soft. Bowel sounds are normal. He exhibits no distension. There is no tenderness.   Neurological: He is alert and oriented to person, place, and time. He has normal reflexes. No cranial nerve deficit. Coordination normal.   Psychiatric: He has a normal mood and affect. His behavior is normal. Judgment and thought content normal.     General Musculoskeletal Exam   Gait: abnormal       Right Knee Exam     Inspection   Erythema: absent  Scars: present  Swelling: absent  Effusion: absent  Deformity: absent  Bruising: absent    Tenderness   The patient is tender to  palpation of the patellar tendon.    Range of Motion   Extension: abnormal Right knee extension grade: +3.   Flexion: abnormal Right knee flexion: 135.     Tests   Meniscus   Fawn:  Medial - negative Lateral - negative  Ligament Examination Lachman: normal (-1 to 2mm) PCL-Posterior Drawer: normal (0 to 2mm)     MCL - Valgus: normal (0 to 2mm)  LCL - Varus: normalPivot Shift: normal (Equal)Reverse Pivot Shift: normal (Equal)Dial Test at 30 degrees: normal (< 5 degrees)Dial Test at 90 degrees: normal (< 5 degrees)  Posterior Sag Test: negative  Posterolateral Corner: unstable (>15 degrees difference)  Patella   Patellar apprehension: negative  Passive Patellar Tilt: neutral  Patellar Tracking: normal  Patellar Glide (quadrants): Lateral - 1   Medial - 2  Q-Angle at 90 degrees: normal  Patellar Grind: negative  J-Sign: none    Other   Meniscal Cyst: absent  Popliteal (Baker's) Cyst: absent  Muscle Tightness: hamstring tightness and quadriceps tightness  Sensation: normal    Comments:  Incision clean/dry/intact  Aquacel intact  No sign of infection  Mild swelling  Compartments soft  Neurovascular status intact in extremity      Left Knee Exam     Inspection   Erythema: absent  Scars: absent  Swelling: absent  Effusion: absent  Deformity: absent  Bruising: absent    Tenderness   The patient is experiencing no tenderness.     Range of Motion   Extension: 0   Left knee flexion: 135.     Tests   Meniscus   Fawn:  Medial - negative Lateral - negative  Stability Lachman: normal (-1 to 2mm) PCL-Posterior Drawer: normal (0 to 2mm)  MCL - Valgus: normal (0 to 2mm)  LCL - Varus: normal (0 to 2mm)Pivot Shift: normal (Equal)Reverse Pivot Shift: normal (Equal)Dial Test at 30 degrees: normal (< 5 degrees)Dial Test at 90 degrees: normal (< 5 degrees)  Posterior Sag Test: negative  Posterolateral Corner: unstable (>15 degrees difference)  Patella   Patellar apprehension: negative  Passive Patellar Tilt: neutral  Patellar  Tracking: normal  Patellar Glide (Quadrants): Lateral - 1 Medial - 2  Q-Angle at 90 degrees: normal  Patellar Grind: negative  J-Sign: J sign absent    Other   Meniscal Cyst: absent  Popliteal (Baker's) Cyst: absent  Sensation: normal    Right Hip Exam     Tests   Bernabe: negative  Left Hip Exam     Tests   Bernabe: negative          Muscle Strength   Right Lower Extremity   Hip Abduction: 5/5   Quadriceps:  4/5   Hamstrin/5   Left Lower Extremity   Hip Abduction: 5/5   Quadriceps:  5/5   Hamstrin/5     Reflexes     Left Side  Quadriceps:  2+  Achilles:  2+    Right Side   Quadriceps:  2+  Achilles:  2+    Vascular Exam     Right Pulses  Dorsalis Pedis:      2+  Posterior Tibial:      2+        Left Pulses  Dorsalis Pedis:      2+  Posterior Tibial:      2+        Edema  Right Lower Leg: absent  Left Lower Leg: absent    Post op Radiographic Findings:    Right knee now shows postoperative findings of total knee arthroplasty with prostheses in satisfactory position and alignment.  No acute fracture or dislocation is identified.   some air is present in the soft tissues consistent with recent surgery.  External splint is present.    Xrays of the right knee were ordered and reviewed by me today. These findings were discussed and reviewed with the patient.    EXAMINATION:  XR KNEE ORTHO BILAT WITH FLEXION    CLINICAL HISTORY:  Pain in right knee    TECHNIQUE:  AP standing of both knees, PA flexion standing views of both knees, and Merchant views of both knees were performed.  Lateral views of both knees were also performed.    COMPARISON:  Non 2019 e    FINDINGS:  Right knee arthroplasty identified in a satisfactory and unchanged position as compared to the previous study.    Mild DJD involving the left knee with narrowing of the medial tibiofemoral joint space.  Small calcified density noted in the left femoral shaft probably a an enchondroma versus bone infarct.  No acute fracture or dislocation.       Impression       See above             Assessment:       Encounter Diagnoses   Name Primary?    Right knee pain, unspecified chronicity Yes    S/P total knee arthroplasty, right           Plan:       1.IKDC, SF-12 and KOOS was filled out today in clinic.     RTC in 6 months with Dr. Maxi Chaves for 1 year follow up. Patient will fill out IKDC, SF-12 and KOOS and bilateral knee series on return.     2. Continue HEP/PT per protocol.  30-40 minutes on the Exercyle    3.  Continue Celebrex BID    4. Activities as tolerated  All of the patient's questions were answered and the patient will contact us if they have any questions or concerns in the interim.                      [facility] patient questionnaires have been collected today.

## 2019-11-19 RX ORDER — ALBUTEROL SULFATE 90 UG/1
AEROSOL, METERED RESPIRATORY (INHALATION)
Qty: 18 G | Refills: 1 | Status: SHIPPED | OUTPATIENT
Start: 2019-11-19 | End: 2020-04-08

## 2019-12-11 ENCOUNTER — OFFICE VISIT (OUTPATIENT)
Dept: INTERNAL MEDICINE | Facility: CLINIC | Age: 54
End: 2019-12-11
Payer: MEDICARE

## 2019-12-11 VITALS
OXYGEN SATURATION: 99 % | WEIGHT: 163.13 LBS | BODY MASS INDEX: 26.22 KG/M2 | SYSTOLIC BLOOD PRESSURE: 124 MMHG | HEART RATE: 54 BPM | HEIGHT: 66 IN | DIASTOLIC BLOOD PRESSURE: 64 MMHG

## 2019-12-11 DIAGNOSIS — M54.12 CERVICAL RADICULOPATHY: ICD-10-CM

## 2019-12-11 DIAGNOSIS — I10 HYPERTENSION, UNSPECIFIED TYPE: ICD-10-CM

## 2019-12-11 DIAGNOSIS — Z96.651 S/P TOTAL KNEE ARTHROPLASTY, RIGHT: ICD-10-CM

## 2019-12-11 DIAGNOSIS — Z00.00 ENCOUNTER FOR PREVENTIVE HEALTH EXAMINATION: Primary | ICD-10-CM

## 2019-12-11 DIAGNOSIS — M47.22 OSTEOARTHRITIS OF SPINE WITH RADICULOPATHY, CERVICAL REGION: ICD-10-CM

## 2019-12-11 PROBLEM — Z12.11 ENCOUNTER FOR SCREENING COLONOSCOPY: Status: RESOLVED | Noted: 2019-05-02 | Resolved: 2019-12-11

## 2019-12-11 PROBLEM — Z12.11 SCREENING FOR COLON CANCER: Status: RESOLVED | Noted: 2019-05-01 | Resolved: 2019-12-11

## 2019-12-11 PROCEDURE — G0439 PR MEDICARE ANNUAL WELLNESS SUBSEQUENT VISIT: ICD-10-PCS | Mod: S$GLB,,, | Performed by: NURSE PRACTITIONER

## 2019-12-11 PROCEDURE — G0439 PPPS, SUBSEQ VISIT: HCPCS | Mod: S$GLB,,, | Performed by: NURSE PRACTITIONER

## 2019-12-11 PROCEDURE — 3074F SYST BP LT 130 MM HG: CPT | Mod: S$GLB,,, | Performed by: NURSE PRACTITIONER

## 2019-12-11 PROCEDURE — 3078F DIAST BP <80 MM HG: CPT | Mod: S$GLB,,, | Performed by: NURSE PRACTITIONER

## 2019-12-11 PROCEDURE — 3078F PR MOST RECENT DIASTOLIC BLOOD PRESSURE < 80 MM HG: ICD-10-PCS | Mod: S$GLB,,, | Performed by: NURSE PRACTITIONER

## 2019-12-11 PROCEDURE — 3074F PR MOST RECENT SYSTOLIC BLOOD PRESSURE < 130 MM HG: ICD-10-PCS | Mod: S$GLB,,, | Performed by: NURSE PRACTITIONER

## 2019-12-11 PROCEDURE — 99999 PR PBB SHADOW E&M-EST. PATIENT-LVL IV: ICD-10-PCS | Mod: PBBFAC,,, | Performed by: NURSE PRACTITIONER

## 2019-12-11 PROCEDURE — 99999 PR PBB SHADOW E&M-EST. PATIENT-LVL IV: CPT | Mod: PBBFAC,,, | Performed by: NURSE PRACTITIONER

## 2019-12-11 NOTE — PATIENT INSTRUCTIONS
Counseling and Referral of Other Preventative  (Italic type indicates deductible and co-insurance are waived)    Patient Name: Rishi Aranda  Today's Date: 12/11/2019    Health Maintenance       Date Due Completion Date    TETANUS VACCINE 10/11/1982 ---    Shingles Vaccine (1 of 2) 10/11/2014 ---    Influenza Vaccine (1) 12/23/2020 (Originally 9/1/2019) ---    Lipid Panel 09/19/2023 9/19/2018    Colonoscopy 05/02/2029 5/2/2019        No orders of the defined types were placed in this encounter.    The following information is provided to all patients.  This information is to help you find resources for any of the problems found today that may be affecting your health:                Living healthy guide: www.[facility].[place].gov      Understanding Diabetes: www.diabetes.org      Eating healthy: www.cdc.gov/healthyweight      [facility] home safety checklist: www.cdc.gov/steadi/patient.html      Agency on Aging: www.goea.[place].gov      Alcoholics anonymous (AA): www.aa.org      Physical Activity: www.patricia.nih.gov/rr0likg      Tobacco use: www.quitwithusla.org

## 2019-12-11 NOTE — PROGRESS NOTES
"Rishi Aranda presented for a  Medicare AWV and comprehensive Health Risk Assessment today. The following components were reviewed and updated:    · Medical history  · Family History  · Social history  · Allergies and Current Medications  · Health Risk Assessment  · Health Maintenance  · Care Team     ** See Completed Assessments for Annual Wellness Visit within the encounter summary.**       The following assessments were completed:  · Living Situation  · CAGE  · Depression Screening  · Timed Get Up and Go  · Whisper Test  Cognitive Function Screening    ·   · Nutrition Screening  · ADL Screening  · PAQ Screening    Vitals:    12/11/19 0907   BP: 124/64   Pulse: (!) 54   SpO2: 99%   Weight: 74 kg (163 lb 2.3 oz)   Height: 5' 6" (1.676 m)     Body mass index is 26.33 kg/m².  Physical Exam   Constitutional: He is oriented to person, place, and time. He appears well-developed and well-nourished.   HENT:   Head: Normocephalic and atraumatic.   Nose: Nose normal.   Eyes: Conjunctivae and EOM are normal.   Cardiovascular: Normal rate and intact distal pulses.   Pulmonary/Chest: Effort normal and breath sounds normal.   Neurological: He is alert and oriented to person, place, and time.   Skin: Skin is warm and dry.   Psychiatric: He has a normal mood and affect. His behavior is normal. Judgment and thought content normal.   Nursing note and vitals reviewed.        Diagnoses and health risks identified today and associated recommendations/orders:    1. Encounter for preventive health examination  Assessment performed. Health maintenance updated. Chart review completed.  Discussed vaccines, deferred today.    2. Hypertension, unspecified type  Chronic. Stable. Followed by PCP.     3. BMI 26.0-26.9,adult  Chronic. Stable. Followed by PCP.     4. Osteoarthritis of spine with radiculopathy, cervical region  Chronic. Stable. Followed by PCP.     5. Cervical radiculopathy, BUE  Chronic. Stable. Followed by PCP.     6. S/P total " knee arthroplasty, right  Noted.      Provided Rishi with a 5-10 year written screening schedule and personal prevention plan. Recommendations were developed using the USPSTF age appropriate recommendations. Education, counseling, and referrals were provided as needed. After Visit Summary printed and given to patient which includes a list of additional screenings\tests needed.    Follow up for Annual Wellness Visit in 1 year, F/U with PCP as instructed, ;sooner if problems.    HAI Gandhi

## 2020-04-08 RX ORDER — ALBUTEROL SULFATE 90 UG/1
AEROSOL, METERED RESPIRATORY (INHALATION)
Qty: 18 G | Refills: 1 | Status: SHIPPED | OUTPATIENT
Start: 2020-04-08 | End: 2020-07-02

## 2020-05-14 ENCOUNTER — TELEPHONE (OUTPATIENT)
Dept: SPORTS MEDICINE | Facility: CLINIC | Age: 55
End: 2020-05-14

## 2020-06-24 ENCOUNTER — NURSE TRIAGE (OUTPATIENT)
Dept: ADMINISTRATIVE | Facility: CLINIC | Age: 55
End: 2020-06-24

## 2020-06-24 RX ORDER — ALBUTEROL SULFATE 90 UG/1
AEROSOL, METERED RESPIRATORY (INHALATION)
Qty: 18 G | Refills: 0 | Status: SHIPPED | OUTPATIENT
Start: 2020-06-24 | End: 2023-04-10

## 2020-06-24 NOTE — TELEPHONE ENCOUNTER
Patient calling regarding his script for his ventolin inhaler, states he called his pharmacy around 2 pm and they did not yet have it. E scrip verified, pharmacy receipt confirmed today at 5:13 pm. No other concerns or questions at this time,

## 2020-06-24 NOTE — TELEPHONE ENCOUNTER
"  Reason for Disposition   Pharmacy calling with prescription question and triager answers question    Additional Information   Negative: Drug overdose and nurse unable to answer question   Negative: Caller requesting information not related to medicine   Negative: Caller requesting a prescription for Strep throat and has a positive culture result   Negative: Rash while taking a medication or within 3 days of stopping it   Negative: Immunization reaction suspected   Negative: [1] Asthma AND [2] having symptoms of asthma (cough, wheezing, etc)   Negative: MORE THAN A DOUBLE DOSE of a prescription or over-the-counter (OTC) drug   Negative: [1] DOUBLE DOSE (an extra dose or lesser amount) of over-the-counter (OTC) drug AND [2] any symptoms (e.g., dizziness, nausea, pain, sleepiness)   Negative: [1] DOUBLE DOSE (an extra dose or lesser amount) of prescription drug AND [2] any symptoms (e.g., dizziness, nausea, pain, sleepiness)   Negative: Took another person's prescription drug   Negative: [1] DOUBLE DOSE (an extra dose or lesser amount) of prescription drug AND [2] NO symptoms (Exception: a double dose of antibiotics)   Negative: Diabetes drug error or overdose (e.g., insulin or extra dose)   Negative: [1] Request for URGENT new prescription or refill of "essential" medication (i.e., likelihood of harm to patient if not taken) AND [2] triager unable to fill per unit policy   Negative: [1] Prescription not at pharmacy AND [2] was prescribed today by PCP   Negative: Pharmacy calling with prescription questions and triager unable to answer question   Negative: Caller has urgent medication question about med that PCP prescribed and triager unable to answer question   Negative: Caller has NON-URGENT medication question about med that PCP prescribed and triager unable to answer question   Negative: Caller requesting a NON-URGENT new prescription or refill and triager unable to refill per unit policy   " Negative: Caller has medication question about med not prescribed by PCP and triager unable to answer question (e.g., compatibility with other med, storage)   Negative: [1] DOUBLE DOSE (an extra dose or lesser amount) of over-the-counter (OTC) drug AND [2] NO symptoms   Negative: [1] DOUBLE DOSE (an extra dose or lesser amount) of antibiotic drug AND [2] NO symptoms   Negative: Caller has medication question only, adult not sick, and triager answers question   Negative: Caller has medication question, adult has minor symptoms, caller declines triage, and triager answers question   Negative: Caller requesting information about medication during pregnancy; adult is not ill and triager answers question   Negative: Caller requesting information about medication use with breastfeeding; neither adult nor infant is ill, and triager answers question   Negative: Caller requesting a refill, no triage required, and triager able to refill per unit policy    Protocols used: MEDICATION QUESTION CALL-A-

## 2020-07-12 ENCOUNTER — PATIENT OUTREACH (OUTPATIENT)
Dept: ADMINISTRATIVE | Facility: OTHER | Age: 55
End: 2020-07-12

## 2020-07-13 ENCOUNTER — OFFICE VISIT (OUTPATIENT)
Dept: SPORTS MEDICINE | Facility: CLINIC | Age: 55
End: 2020-07-13
Payer: MEDICARE

## 2020-07-13 ENCOUNTER — HOSPITAL ENCOUNTER (OUTPATIENT)
Dept: RADIOLOGY | Facility: HOSPITAL | Age: 55
Discharge: HOME | End: 2020-07-13
Attending: ORTHOPAEDIC SURGERY
Payer: MEDICARE

## 2020-07-13 VITALS
WEIGHT: 163 LBS | HEIGHT: 66 IN | BODY MASS INDEX: 26.2 KG/M2 | SYSTOLIC BLOOD PRESSURE: 135 MMHG | HEART RATE: 57 BPM | DIASTOLIC BLOOD PRESSURE: 84 MMHG

## 2020-07-13 DIAGNOSIS — M17.12 PRIMARY OSTEOARTHRITIS OF LEFT KNEE: ICD-10-CM

## 2020-07-13 DIAGNOSIS — M25.561 RIGHT KNEE PAIN, UNSPECIFIED CHRONICITY: ICD-10-CM

## 2020-07-13 DIAGNOSIS — Z96.651 S/P TOTAL KNEE ARTHROPLASTY, RIGHT: ICD-10-CM

## 2020-07-13 DIAGNOSIS — M25.561 RIGHT KNEE PAIN, UNSPECIFIED CHRONICITY: Primary | ICD-10-CM

## 2020-07-13 PROCEDURE — 99214 PR OFFICE/OUTPT VISIT, EST, LEVL IV, 30-39 MIN: ICD-10-PCS | Mod: S$GLB,,, | Performed by: ORTHOPAEDIC SURGERY

## 2020-07-13 PROCEDURE — 99214 OFFICE O/P EST MOD 30 MIN: CPT | Mod: S$GLB,,, | Performed by: ORTHOPAEDIC SURGERY

## 2020-07-13 PROCEDURE — 3075F PR MOST RECENT SYSTOLIC BLOOD PRESS GE 130-139MM HG: ICD-10-PCS | Mod: S$GLB,,, | Performed by: ORTHOPAEDIC SURGERY

## 2020-07-13 PROCEDURE — 3008F PR BODY MASS INDEX (BMI) DOCUMENTED: ICD-10-PCS | Mod: S$GLB,,, | Performed by: ORTHOPAEDIC SURGERY

## 2020-07-13 PROCEDURE — 3008F BODY MASS INDEX DOCD: CPT | Mod: S$GLB,,, | Performed by: ORTHOPAEDIC SURGERY

## 2020-07-13 PROCEDURE — 3075F SYST BP GE 130 - 139MM HG: CPT | Mod: S$GLB,,, | Performed by: ORTHOPAEDIC SURGERY

## 2020-07-13 PROCEDURE — 99999 PR PBB SHADOW E&M-EST. PATIENT-LVL III: ICD-10-PCS | Mod: PBBFAC,,, | Performed by: ORTHOPAEDIC SURGERY

## 2020-07-13 PROCEDURE — 3079F PR MOST RECENT DIASTOLIC BLOOD PRESSURE 80-89 MM HG: ICD-10-PCS | Mod: S$GLB,,, | Performed by: ORTHOPAEDIC SURGERY

## 2020-07-13 PROCEDURE — 99999 PR PBB SHADOW E&M-EST. PATIENT-LVL III: CPT | Mod: PBBFAC,,, | Performed by: ORTHOPAEDIC SURGERY

## 2020-07-13 PROCEDURE — 3079F DIAST BP 80-89 MM HG: CPT | Mod: S$GLB,,, | Performed by: ORTHOPAEDIC SURGERY

## 2020-07-13 NOTE — PROGRESS NOTES
Subjective:          Chief Complaint: Rishi Aranda is a 54 y.o. male who had concerns including Pain of the Right Knee.    Pt presents for 1.5 years post-op evaluation. Pt has no complaints at this time. He is completing his HEP as instructed. He notes that the swelling in his lower leg and ankle has improved. He rates his pain as 0/10. Pt is no longer taking pain medicaition. He is no longer using a cane for ambulation. He has d/c'd taking Celebrex.  Denies fever, chills, discharge from wound site, and N/V.    DATE OF PROCEDURE:  5/14/2019     ATTENDING SURGEON: Surgeon(s) and Role:     * Maxi Chaves MD - Primary     FIRST ASSISTANT:Chaparrita Iraheta PA-C     No resident or fellow was available throughout the entire procedure as a result it was medically necessary for Chaparrita Iraheta PA-C to perform first assistant duties.        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 Negative for chills and fever.   HENT: Negative.  Negative for congestion and sore throat.    Eyes: Negative.  Negative for discharge and double vision.   Cardiovascular: Negative.  Negative for chest pain, palpitations and syncope.   Respiratory: Negative.  Negative for cough and shortness of breath.    Endocrine: Negative.  Negative for cold intolerance and heat intolerance.   Hematologic/Lymphatic: Negative.    Skin: Negative.  Negative for dry skin and rash.   Musculoskeletal: Positive for joint pain and joint swelling.   Gastrointestinal: Negative.  Negative for abdominal pain, nausea and vomiting.   Genitourinary: Negative.    Neurological: Negative.  Negative for focal weakness, numbness and  paresthesias.   Psychiatric/Behavioral: Negative.    Allergic/Immunologic: Negative.        Pain Related Questions  Over the past 3 days, what was your average pain during activity? (I.e. running, jogging, walking, climbing stairs, getting dressed, ect.): 0  Over the past 3 days, what was your highest pain level?: 1  Over the past 3 days, what was your lowest pain level? : 0    Other  How many nights a week are you awakened by your affected body part?: 0  Was the patient's HEIGHT measured or patient reported?: Patient Reported  Was the patient's WEIGHT measured or patient reported?: Measured      Objective:        General: Rishi is well-developed, well-nourished, appears stated age, in no acute distress, alert and oriented to time, place and person.     General    Nursing note and vitals reviewed.  Constitutional: He is oriented to person, place, and time. He appears well-developed and well-nourished. He appears distressed.   HENT:   Head: Normocephalic and atraumatic.   Nose: Nose normal.   Mouth/Throat: No oropharyngeal exudate.   Eyes: Conjunctivae and EOM are normal. Right eye exhibits no discharge. Left eye exhibits no discharge.   Neck: Normal range of motion. Neck supple.   Cardiovascular: Normal rate, regular rhythm and intact distal pulses.    Pulmonary/Chest: Effort normal and breath sounds normal. No respiratory distress.   Abdominal: Soft. Bowel sounds are normal. He exhibits no distension. There is no abdominal tenderness.   Neurological: He is alert and oriented to person, place, and time. He has normal reflexes. No cranial nerve deficit. Coordination normal.   Psychiatric: He has a normal mood and affect. His behavior is normal. Judgment and thought content normal.     General Musculoskeletal Exam   Gait: abnormal       Right Knee Exam     Inspection   Erythema: absent  Scars: present  Swelling: absent  Effusion: absent  Deformity: absent  Bruising: absent    Tenderness   The patient is tender to  palpation of the patellar tendon.    Range of Motion   Extension:  0 normal   Flexion:  130 normal     Tests   Meniscus   Fawn:  Medial - negative Lateral - negative  Ligament Examination Lachman: normal (-1 to 2mm) PCL-Posterior Drawer: normal (0 to 2mm)     MCL - Valgus: normal (0 to 2mm)  LCL - Varus: normalPivot Shift: normal (Equal)Reverse Pivot Shift: normal (Equal)Dial Test at 30 degrees: normal (< 5 degrees)Dial Test at 90 degrees: normal (< 5 degrees)  Posterior Sag Test: negative  Posterolateral Corner: unstable (>15 degrees difference)  Patella   Patellar apprehension: negative  Passive Patellar Tilt: neutral  Patellar Tracking: normal  Patellar Glide (quadrants): Lateral - 1   Medial - 2  Q-Angle at 90 degrees: normal  Patellar Grind: negative  J-Sign: none    Other   Meniscal Cyst: absent  Popliteal (Baker's) Cyst: absent  Sensation: normal    Comments:  Incision clean/dry/intact  Aquacel intact  No sign of infection  Mild swelling  Compartments soft  Neurovascular status intact in extremity      Left Knee Exam     Inspection   Erythema: absent  Scars: absent  Swelling: absent  Effusion: absent  Deformity: absent  Bruising: absent    Tenderness   The patient is experiencing no tenderness.     Range of Motion   Extension:  0 normal   Flexion:  130 normal     Tests   Meniscus   Fawn:  Medial - negative Lateral - negative  Stability Lachman: normal (-1 to 2mm) PCL-Posterior Drawer: normal (0 to 2mm)  MCL - Valgus: normal (0 to 2mm)  LCL - Varus: normal (0 to 2mm)Pivot Shift: normal (Equal)Reverse Pivot Shift: normal (Equal)Dial Test at 30 degrees: normal (< 5 degrees)Dial Test at 90 degrees: normal (< 5 degrees)  Posterior Sag Test: negative  Posterolateral Corner: unstable (>15 degrees difference)  Patella   Patellar apprehension: negative  Passive Patellar Tilt: neutral  Patellar Tracking: normal  Patellar Glide (Quadrants): Lateral - 1 Medial - 2  Q-Angle at 90 degrees: normal  Patellar Grind:  negative  J-Sign: J sign absent    Other   Meniscal Cyst: absent  Popliteal (Baker's) Cyst: absent  Sensation: normal    Right Hip Exam     Tests   Bernabe: negative  Left Hip Exam     Tests   Bernabe: negative          Muscle Strength   Right Lower Extremity   Hip Abduction: 5/5   Quadriceps:  4/5   Hamstrin/5   Left Lower Extremity   Hip Abduction: 5/5   Quadriceps:  5/5   Hamstrin/5     Reflexes     Left Side  Quadriceps:  2+  Achilles:  2+    Right Side   Quadriceps:  2+  Achilles:  2+    Vascular Exam     Right Pulses  Dorsalis Pedis:      2+  Posterior Tibial:      2+        Left Pulses  Dorsalis Pedis:      2+  Posterior Tibial:      2+        Edema  Right Lower Leg: absent  Left Lower Leg: absent    Post op Radiographic Findings:    Right knee now shows postoperative findings of total knee arthroplasty with prostheses in satisfactory position and alignment.  No acute fracture or dislocation is identified.   some air is present in the soft tissues consistent with recent surgery.  External splint is present.    Xrays of the right knee were ordered and reviewed by me today. These findings were discussed and reviewed with the patient.    EXAMINATION:  XR KNEE ORTHO BILAT WITH FLEXION    CLINICAL HISTORY:  Pain in right knee    TECHNIQUE:  AP standing of both knees, PA flexion standing views of both knees, and Merchant views of both knees were performed.  Lateral views of both knees were also performed.    COMPARISON:  Non 2019 e    FINDINGS:  Right knee arthroplasty identified in a satisfactory and unchanged position as compared to the previous study.    Mild DJD involving the left knee with narrowing of the medial tibiofemoral joint space.  Small calcified density noted in the left femoral shaft probably a an enchondroma versus bone infarct.  No acute fracture or dislocation.      Impression       See above             Assessment:       Encounter Diagnoses   Name Primary?    Right knee pain,  unspecified chronicity Yes    S/P total knee arthroplasty, right     BMI 26.0-26.9,adult     Primary osteoarthritis of left knee           Plan:       1.IKDC, SF-12 and KOOS was filled out today in clinic.     RTC in 6 months with Dr. Maxi Chaves. Patient will fill out IKDC, SF-12 and KOOS and bilateral knee series on return.     2. Continue HEP/PT per protocol.  30-40 minutes on the Exercyle    3.  Continue Celebrex BID    4. Activities as tolerated  All of the patient's questions were answered and the patient will contact us if they have any questions or concerns in the interim.                      [facility] patient questionnaires have been collected today.

## 2020-12-07 ENCOUNTER — OFFICE VISIT (OUTPATIENT)
Dept: INTERNAL MEDICINE | Facility: CLINIC | Age: 55
End: 2020-12-07
Payer: MEDICARE

## 2020-12-07 VITALS
OXYGEN SATURATION: 100 % | HEIGHT: 66 IN | TEMPERATURE: 99 F | BODY MASS INDEX: 26.65 KG/M2 | DIASTOLIC BLOOD PRESSURE: 78 MMHG | HEART RATE: 61 BPM | WEIGHT: 165.81 LBS | SYSTOLIC BLOOD PRESSURE: 138 MMHG

## 2020-12-07 DIAGNOSIS — G89.29 CHRONIC NECK PAIN: ICD-10-CM

## 2020-12-07 DIAGNOSIS — Z00.00 ENCOUNTER FOR PREVENTIVE HEALTH EXAMINATION: Primary | ICD-10-CM

## 2020-12-07 DIAGNOSIS — M54.12 CERVICAL RADICULOPATHY: ICD-10-CM

## 2020-12-07 DIAGNOSIS — M47.22 OSTEOARTHRITIS OF SPINE WITH RADICULOPATHY, CERVICAL REGION: ICD-10-CM

## 2020-12-07 DIAGNOSIS — M54.2 CHRONIC NECK PAIN: ICD-10-CM

## 2020-12-07 DIAGNOSIS — I10 HYPERTENSION, UNSPECIFIED TYPE: ICD-10-CM

## 2020-12-07 PROCEDURE — 3008F BODY MASS INDEX DOCD: CPT | Mod: S$GLB,,, | Performed by: NURSE PRACTITIONER

## 2020-12-07 PROCEDURE — 99999 PR PBB SHADOW E&M-EST. PATIENT-LVL IV: CPT | Mod: PBBFAC,,, | Performed by: NURSE PRACTITIONER

## 2020-12-07 PROCEDURE — 3075F SYST BP GE 130 - 139MM HG: CPT | Mod: S$GLB,,, | Performed by: NURSE PRACTITIONER

## 2020-12-07 PROCEDURE — G0439 PR MEDICARE ANNUAL WELLNESS SUBSEQUENT VISIT: ICD-10-PCS | Mod: S$GLB,,, | Performed by: NURSE PRACTITIONER

## 2020-12-07 PROCEDURE — 3078F DIAST BP <80 MM HG: CPT | Mod: S$GLB,,, | Performed by: NURSE PRACTITIONER

## 2020-12-07 PROCEDURE — G0439 PPPS, SUBSEQ VISIT: HCPCS | Mod: S$GLB,,, | Performed by: NURSE PRACTITIONER

## 2020-12-07 PROCEDURE — 3008F PR BODY MASS INDEX (BMI) DOCUMENTED: ICD-10-PCS | Mod: S$GLB,,, | Performed by: NURSE PRACTITIONER

## 2020-12-07 PROCEDURE — 99999 PR PBB SHADOW E&M-EST. PATIENT-LVL IV: ICD-10-PCS | Mod: PBBFAC,,, | Performed by: NURSE PRACTITIONER

## 2020-12-07 PROCEDURE — 1126F PR PAIN SEVERITY QUANTIFIED, NO PAIN PRESENT: ICD-10-PCS | Mod: S$GLB,,, | Performed by: NURSE PRACTITIONER

## 2020-12-07 PROCEDURE — 1126F AMNT PAIN NOTED NONE PRSNT: CPT | Mod: S$GLB,,, | Performed by: NURSE PRACTITIONER

## 2020-12-07 PROCEDURE — 3078F PR MOST RECENT DIASTOLIC BLOOD PRESSURE < 80 MM HG: ICD-10-PCS | Mod: S$GLB,,, | Performed by: NURSE PRACTITIONER

## 2020-12-07 PROCEDURE — 3075F PR MOST RECENT SYSTOLIC BLOOD PRESS GE 130-139MM HG: ICD-10-PCS | Mod: S$GLB,,, | Performed by: NURSE PRACTITIONER

## 2020-12-07 NOTE — PROGRESS NOTES
"  Rishi Aranda presented for a  Medicare AWV and comprehensive Health Risk Assessment today. The following components were reviewed and updated:    · Medical history  · Family History  · Social history  · Allergies and Current Medications  · Health Risk Assessment  · Health Maintenance  · Care Team         ** See Completed Assessments for Annual Wellness Visit within the encounter summary.**         The following assessments were completed:  · Living Situation  · CAGE  · Depression Screening  · Timed Get Up and Go  · Whisper Test  Cognitive Function Screening    ·   · Nutrition Screening  · ADL Screening  · PAQ Screening        Vitals:    12/07/20 0915   BP: 138/78   Pulse: 61   Temp: 98.6 °F (37 °C)   TempSrc: Oral   SpO2: 100%   Weight: 75.2 kg (165 lb 12.6 oz)   Height: 5' 6" (1.676 m)     Body mass index is 26.76 kg/m².  Physical Exam  Vitals signs and nursing note reviewed.   Constitutional:       General: He is not in acute distress.     Appearance: Normal appearance. He is well-developed. He is not ill-appearing, toxic-appearing or diaphoretic.   HENT:      Head: Normocephalic and atraumatic.   Eyes:      Conjunctiva/sclera: Conjunctivae normal.   Cardiovascular:      Rate and Rhythm: Normal rate and regular rhythm.      Heart sounds: Normal heart sounds.   Pulmonary:      Effort: Pulmonary effort is normal. No respiratory distress.      Breath sounds: Normal breath sounds.   Abdominal:      Palpations: Abdomen is soft.   Musculoskeletal: Normal range of motion.      Right lower leg: No edema.      Left lower leg: No edema.   Skin:     General: Skin is warm and dry.   Neurological:      Mental Status: He is alert and oriented to person, place, and time.      Gait: Gait normal.   Psychiatric:         Mood and Affect: Mood normal.         Behavior: Behavior normal.         Thought Content: Thought content normal.         Judgment: Judgment normal.               Diagnoses and health risks identified today and " associated recommendations/orders:    1. Encounter for preventive health examination  Assessment performed. Health maintenance updated. Chart review completed.  Since knee pain is now improved, back pain has started again. Will follow up with PCP.    2. Hypertension, unspecified type  Chronic. Stable. Followed by PCP    3. BMI 26.0-26.9,adult  Noted.    4. Chronic neck pain  Chronic. Continue current regimen. Followed by [facility] Medicine [facility] Orthoped[facility]    5. Cervical radiculopathy, BUE  Chronic. Continue current regimen. Followed by Sports Medicine and Orthopedics    6. Osteoarthritis of spine with radiculopathy, cervical region  Chronic. Continue current regimen. Followed by Sports Medicine and Orthopedics      Provided Rishi with a 5-10 year written screening schedule and personal prevention plan. Recommendations were developed using the USPSTF age appropriate recommendations. Education, counseling, and referrals were provided as needed. After Visit Summary printed and given to patient which includes a list of additional screenings\tests needed.    Follow up for Annual Wellness Visit in 1 year, F/U with PCP as instructed, ;sooner if problems.    HAI Gandhi         I offered to discuss end of life issues, including information on how to make advance directives that the patient could use to name someone who would make medical decisions on their behalf if they became too ill to make themselves.    ___Patient declined  _X_Patient is interested, I provided paper work and offered to discuss.

## 2020-12-07 NOTE — PATIENT INSTRUCTIONS
Counseling and Referral of Other Preventative  (Italic type indicates deductible and co-insurance are waived)    Patient Name: Rishi Aranda  Today's Date: 12/7/2020    Health Maintenance       Date Due Completion Date    HIV Screening 10[birth year]/1979 ---    Shingles Vaccine (1 of 2) 10/11/2014 ---    Influenza Vaccine (1) 06/30/2021 (Originally 8/1/2020) ---    TETANUS VACCINE 12/07/2021 (Originally 10/11/1982) ---    Lipid Panel 09/19/2023 9/19/2018    Colorectal Cancer Screening 05/02/2029 5/2/2019        No orders of the defined types were placed in this encounter.    The following information is provided to all patients.  This information is to help you find resources for any of the problems found today that may be affecting your health:                Living healthy guide: www.[facility].[place].[facility]      Understanding Diabetes: www.diabetes.org      Eating healthy: www.cdc.gov/healthyweight      [facility] home safety checklist: www.cdc.gov/steadi/patient.html      Agency on Aging: www.goea.[place].[facility]      Alcoholics anonymous (AA): www.aa.org      Physical Activity: www.patricia.nih.gov/ai4rfra      Tobacco use: www.quitwithusla.org

## 2020-12-16 ENCOUNTER — LAB VISIT (OUTPATIENT)
Dept: LAB | Facility: HOSPITAL | Age: 55
End: 2020-12-16
Attending: INTERNAL MEDICINE
Payer: MEDICARE

## 2020-12-16 ENCOUNTER — OFFICE VISIT (OUTPATIENT)
Dept: INTERNAL MEDICINE | Facility: CLINIC | Age: 55
End: 2020-12-16
Payer: MEDICARE

## 2020-12-16 VITALS
OXYGEN SATURATION: 96 % | SYSTOLIC BLOOD PRESSURE: 134 MMHG | HEART RATE: 66 BPM | HEIGHT: 66 IN | DIASTOLIC BLOOD PRESSURE: 88 MMHG | WEIGHT: 168 LBS | BODY MASS INDEX: 27 KG/M2

## 2020-12-16 DIAGNOSIS — I10 HYPERTENSION, UNSPECIFIED TYPE: ICD-10-CM

## 2020-12-16 DIAGNOSIS — M47.816 LUMBAR SPONDYLOSIS: ICD-10-CM

## 2020-12-16 DIAGNOSIS — R20.2 PARESTHESIA: ICD-10-CM

## 2020-12-16 DIAGNOSIS — Z00.00 ANNUAL PHYSICAL EXAM: Primary | ICD-10-CM

## 2020-12-16 DIAGNOSIS — J45.909 CHRONIC ASTHMA, UNSPECIFIED ASTHMA SEVERITY, UNSPECIFIED WHETHER COMPLICATED, UNSPECIFIED WHETHER PERSISTENT: ICD-10-CM

## 2020-12-16 DIAGNOSIS — Z12.5 ENCOUNTER FOR SCREENING FOR MALIGNANT NEOPLASM OF PROSTATE: ICD-10-CM

## 2020-12-16 DIAGNOSIS — Z00.00 ANNUAL PHYSICAL EXAM: ICD-10-CM

## 2020-12-16 LAB
ALBUMIN SERPL BCP-MCNC: 3.7 G/DL (ref 3.5–5.2)
ALP SERPL-CCNC: 48 U/L (ref 55–135)
ALT SERPL W/O P-5'-P-CCNC: 14 U/L (ref 10–44)
ANION GAP SERPL CALC-SCNC: 8 MMOL/L (ref 8–16)
AST SERPL-CCNC: 21 U/L (ref 10–40)
BASOPHILS # BLD AUTO: 0.03 K/UL (ref 0–0.2)
BASOPHILS NFR BLD: 0.5 % (ref 0–1.9)
BILIRUB SERPL-MCNC: 0.3 MG/DL (ref 0.1–1)
BUN SERPL-MCNC: 14 MG/DL (ref 6–20)
CALCIUM SERPL-MCNC: 9.1 MG/DL (ref 8.7–10.5)
CHLORIDE SERPL-SCNC: 104 MMOL/L (ref 95–110)
CHOLEST SERPL-MCNC: 170 MG/DL (ref 120–199)
CHOLEST/HDLC SERPL: 2.9 {RATIO} (ref 2–5)
CO2 SERPL-SCNC: 28 MMOL/L (ref 23–29)
COMPLEXED PSA SERPL-MCNC: 1.4 NG/ML (ref 0–4)
CREAT SERPL-MCNC: 1.1 MG/DL (ref 0.5–1.4)
DIFFERENTIAL METHOD: ABNORMAL
EOSINOPHIL # BLD AUTO: 0.1 K/UL (ref 0–0.5)
EOSINOPHIL NFR BLD: 1.3 % (ref 0–8)
ERYTHROCYTE [DISTWIDTH] IN BLOOD BY AUTOMATED COUNT: 15 % (ref 11.5–14.5)
EST. GFR  (AFRICAN AMERICAN): >60 ML/MIN/1.73 M^2
EST. GFR  (NON AFRICAN AMERICAN): >60 ML/MIN/1.73 M^2
GLUCOSE SERPL-MCNC: 87 MG/DL (ref 70–110)
HCT VFR BLD AUTO: 41.5 % (ref 40–54)
HDLC SERPL-MCNC: 58 MG/DL (ref 40–75)
HDLC SERPL: 34.1 % (ref 20–50)
HGB BLD-MCNC: 13.2 G/DL (ref 14–18)
IMM GRANULOCYTES # BLD AUTO: 0.02 K/UL (ref 0–0.04)
IMM GRANULOCYTES NFR BLD AUTO: 0.3 % (ref 0–0.5)
LDLC SERPL CALC-MCNC: 103.6 MG/DL (ref 63–159)
LYMPHOCYTES # BLD AUTO: 2.6 K/UL (ref 1–4.8)
LYMPHOCYTES NFR BLD: 42.4 % (ref 18–48)
MCH RBC QN AUTO: 28.6 PG (ref 27–31)
MCHC RBC AUTO-ENTMCNC: 31.8 G/DL (ref 32–36)
MCV RBC AUTO: 90 FL (ref 82–98)
MONOCYTES # BLD AUTO: 0.6 K/UL (ref 0.3–1)
MONOCYTES NFR BLD: 9.9 % (ref 4–15)
NEUTROPHILS # BLD AUTO: 2.8 K/UL (ref 1.8–7.7)
NEUTROPHILS NFR BLD: 45.6 % (ref 38–73)
NONHDLC SERPL-MCNC: 112 MG/DL
NRBC BLD-RTO: 0 /100 WBC
PLATELET # BLD AUTO: 226 K/UL (ref 150–350)
PMV BLD AUTO: 10.4 FL (ref 9.2–12.9)
POTASSIUM SERPL-SCNC: 4.5 MMOL/L (ref 3.5–5.1)
PROT SERPL-MCNC: 6.5 G/DL (ref 6–8.4)
RBC # BLD AUTO: 4.62 M/UL (ref 4.6–6.2)
SODIUM SERPL-SCNC: 140 MMOL/L (ref 136–145)
TRIGL SERPL-MCNC: 42 MG/DL (ref 30–150)
TSH SERPL DL<=0.005 MIU/L-ACNC: 2.12 UIU/ML (ref 0.4–4)
VIT B12 SERPL-MCNC: 524 PG/ML (ref 210–950)
WBC # BLD AUTO: 6.09 K/UL (ref 3.9–12.7)

## 2020-12-16 PROCEDURE — 84153 ASSAY OF PSA TOTAL: CPT

## 2020-12-16 PROCEDURE — 85025 COMPLETE CBC W/AUTO DIFF WBC: CPT

## 2020-12-16 PROCEDURE — 3075F PR MOST RECENT SYSTOLIC BLOOD PRESS GE 130-139MM HG: ICD-10-PCS | Mod: S$GLB,,, | Performed by: INTERNAL MEDICINE

## 2020-12-16 PROCEDURE — 80053 COMPREHEN METABOLIC PANEL: CPT

## 2020-12-16 PROCEDURE — 82607 VITAMIN B-12: CPT

## 2020-12-16 PROCEDURE — 3079F PR MOST RECENT DIASTOLIC BLOOD PRESSURE 80-89 MM HG: ICD-10-PCS | Mod: S$GLB,,, | Performed by: INTERNAL MEDICINE

## 2020-12-16 PROCEDURE — 3008F BODY MASS INDEX DOCD: CPT | Mod: S$GLB,,, | Performed by: INTERNAL MEDICINE

## 2020-12-16 PROCEDURE — 3079F DIAST BP 80-89 MM HG: CPT | Mod: S$GLB,,, | Performed by: INTERNAL MEDICINE

## 2020-12-16 PROCEDURE — 84443 ASSAY THYROID STIM HORMONE: CPT

## 2020-12-16 PROCEDURE — 99396 PR PREVENTIVE VISIT,EST,40-64: ICD-10-PCS | Mod: S$GLB,,, | Performed by: INTERNAL MEDICINE

## 2020-12-16 PROCEDURE — 1126F PR PAIN SEVERITY QUANTIFIED, NO PAIN PRESENT: ICD-10-PCS | Mod: S$GLB,,, | Performed by: INTERNAL MEDICINE

## 2020-12-16 PROCEDURE — 99999 PR PBB SHADOW E&M-EST. PATIENT-LVL III: CPT | Mod: PBBFAC,,, | Performed by: INTERNAL MEDICINE

## 2020-12-16 PROCEDURE — 99999 PR PBB SHADOW E&M-EST. PATIENT-LVL III: ICD-10-PCS | Mod: PBBFAC,,, | Performed by: INTERNAL MEDICINE

## 2020-12-16 PROCEDURE — 1126F AMNT PAIN NOTED NONE PRSNT: CPT | Mod: S$GLB,,, | Performed by: INTERNAL MEDICINE

## 2020-12-16 PROCEDURE — 99396 PREV VISIT EST AGE 40-64: CPT | Mod: S$GLB,,, | Performed by: INTERNAL MEDICINE

## 2020-12-16 PROCEDURE — 36415 COLL VENOUS BLD VENIPUNCTURE: CPT

## 2020-12-16 PROCEDURE — 3008F PR BODY MASS INDEX (BMI) DOCUMENTED: ICD-10-PCS | Mod: S$GLB,,, | Performed by: INTERNAL MEDICINE

## 2020-12-16 PROCEDURE — 3075F SYST BP GE 130 - 139MM HG: CPT | Mod: S$GLB,,, | Performed by: INTERNAL MEDICINE

## 2020-12-16 PROCEDURE — 80061 LIPID PANEL: CPT

## 2020-12-16 RX ORDER — IRBESARTAN 150 MG/1
150 TABLET ORAL DAILY
Qty: 30 TABLET | Refills: 5 | Status: SHIPPED | OUTPATIENT
Start: 2020-12-16 | End: 2021-12-01

## 2020-12-16 RX ORDER — MELOXICAM 15 MG/1
15 TABLET ORAL DAILY
Qty: 30 TABLET | Refills: 0 | Status: SHIPPED | OUTPATIENT
Start: 2020-12-16 | End: 2020-12-16

## 2020-12-16 NOTE — PROGRESS NOTES
MEDICAL HISTORY:  Hypertension.  Chronic asthma.  Osteoarthritis of the knee, right knee surgery with previous medial meniscectomy and chondroplasty in right total knee arthroplasty  Cervical degenerative disk disease with left cervical foraminotomy at C6-7.  Lumbar degenerative disk disease with neural foraminal stenosis and facet arthropathy.        SOCIAL HISTORY:  Tobacco use, none.  Alcohol use, 6-12 beers week.  Exercise occasional riding stationary bike    FAMILY HISTORY:  Mother is alive, history of hypertension and stroke.  Father is , unknown.  One sister, one brother living.  Brother has hypertension.  One brother     Medication  Albuterol as needed  Irbesartan 75 mg daily    Screening  Colonoscopy May 2019 normal      55-year-old male  Annual visit  Overall in general is felt well but he reports since having his right total knee arthroplasty in May 2019 which he responded well, he has developed recurring left lower back pain, sometimes makes extend t to the buttocks    Also there been times in which a sharp pain will developed, at various parts of his body, that is very transient.  It happens randomly and not under any particular circumstances    Review of symptoms    Negative for chest pain, palpitations, shortness of breath, abdominal pain, indigestion heartburn, headaches  Regular bowel function no difficulty urinating    Examination  Weight 168  Pulse 64  Blood pressure by me 150/82  HEENT exam no abnormal findings  Neck no thyromegaly no masses  Chest clear breath sounds  Heart regular rate and rhythm  Abdominal exam is bowel sounds soft nontender no hepatosplenomegaly abdominal masses  Pulses 2+ carotid pulses no bruits 2+ pedal pulses  Extremities no edema  Lymph gland palpable adenopathy  Genitalia scrotal masses no hernias  Digital rectal exam stool is brown prostate minimally enlarged    Impression  General examination  Hypertension  Allergic asthma clinically stable  Lumbar  pain    Plan  Routine labs  Increase irbesartan 150 mg daily  Digital hypertension medicine program  Referred to physical therapy  For 1 month trial Mobic 15 mg once a day    In the past an MRI and radiograph of the lumbar spine  reveal degenerative changes

## 2021-01-26 ENCOUNTER — CLINICAL SUPPORT (OUTPATIENT)
Dept: REHABILITATION | Facility: HOSPITAL | Age: 56
End: 2021-01-26
Payer: MEDICARE

## 2021-01-26 DIAGNOSIS — R52 PAIN: ICD-10-CM

## 2021-01-26 PROCEDURE — 97110 THERAPEUTIC EXERCISES: CPT | Performed by: PHYSICAL THERAPIST

## 2021-01-26 PROCEDURE — 97161 PT EVAL LOW COMPLEX 20 MIN: CPT | Performed by: PHYSICAL THERAPIST

## 2021-06-02 RX ORDER — ALBUTEROL SULFATE 90 UG/1
AEROSOL, METERED RESPIRATORY (INHALATION)
Qty: 25.5 G | Refills: 1 | Status: SHIPPED | OUTPATIENT
Start: 2021-06-02 | End: 2022-04-20 | Stop reason: SDUPTHER

## 2021-07-16 ENCOUNTER — NURSE TRIAGE (OUTPATIENT)
Dept: ADMINISTRATIVE | Facility: CLINIC | Age: 56
End: 2021-07-16

## 2021-09-30 ENCOUNTER — NURSE TRIAGE (OUTPATIENT)
Dept: ADMINISTRATIVE | Facility: CLINIC | Age: 56
End: 2021-09-30

## 2021-10-01 ENCOUNTER — TELEPHONE (OUTPATIENT)
Dept: INTERNAL MEDICINE | Facility: CLINIC | Age: 56
End: 2021-10-01

## 2021-10-04 ENCOUNTER — LAB VISIT (OUTPATIENT)
Dept: LAB | Facility: HOSPITAL | Age: 56
End: 2021-10-04
Attending: INTERNAL MEDICINE
Payer: MEDICARE

## 2021-10-04 ENCOUNTER — OFFICE VISIT (OUTPATIENT)
Dept: INTERNAL MEDICINE | Facility: CLINIC | Age: 56
End: 2021-10-04
Payer: MEDICARE

## 2021-10-04 VITALS
HEIGHT: 66 IN | SYSTOLIC BLOOD PRESSURE: 138 MMHG | DIASTOLIC BLOOD PRESSURE: 86 MMHG | HEART RATE: 63 BPM | BODY MASS INDEX: 26.2 KG/M2 | WEIGHT: 163 LBS

## 2021-10-04 DIAGNOSIS — F43.22 ADJUSTMENT DISORDER WITH ANXIOUS MOOD: ICD-10-CM

## 2021-10-04 DIAGNOSIS — I10 PRIMARY HYPERTENSION: Primary | ICD-10-CM

## 2021-10-04 DIAGNOSIS — I10 PRIMARY HYPERTENSION: ICD-10-CM

## 2021-10-04 PROCEDURE — 85025 COMPLETE CBC W/AUTO DIFF WBC: CPT | Performed by: INTERNAL MEDICINE

## 2021-10-04 PROCEDURE — 80053 COMPREHEN METABOLIC PANEL: CPT | Performed by: INTERNAL MEDICINE

## 2021-10-04 PROCEDURE — 99999 PR PBB SHADOW E&M-EST. PATIENT-LVL III: CPT | Mod: PBBFAC,,, | Performed by: INTERNAL MEDICINE

## 2021-10-04 PROCEDURE — 3079F PR MOST RECENT DIASTOLIC BLOOD PRESSURE 80-89 MM HG: ICD-10-PCS | Mod: S$GLB,,, | Performed by: INTERNAL MEDICINE

## 2021-10-04 PROCEDURE — 99214 PR OFFICE/OUTPT VISIT, EST, LEVL IV, 30-39 MIN: ICD-10-PCS | Mod: S$GLB,,, | Performed by: INTERNAL MEDICINE

## 2021-10-04 PROCEDURE — 4010F PR ACE/ARB THEARPY RXD/TAKEN: ICD-10-PCS | Mod: S$GLB,,, | Performed by: INTERNAL MEDICINE

## 2021-10-04 PROCEDURE — 3008F PR BODY MASS INDEX (BMI) DOCUMENTED: ICD-10-PCS | Mod: S$GLB,,, | Performed by: INTERNAL MEDICINE

## 2021-10-04 PROCEDURE — 84443 ASSAY THYROID STIM HORMONE: CPT | Performed by: INTERNAL MEDICINE

## 2021-10-04 PROCEDURE — 99999 PR PBB SHADOW E&M-EST. PATIENT-LVL III: ICD-10-PCS | Mod: PBBFAC,,, | Performed by: INTERNAL MEDICINE

## 2021-10-04 PROCEDURE — 1159F PR MEDICATION LIST DOCUMENTED IN MEDICAL RECORD: ICD-10-PCS | Mod: S$GLB,,, | Performed by: INTERNAL MEDICINE

## 2021-10-04 PROCEDURE — 3079F DIAST BP 80-89 MM HG: CPT | Mod: S$GLB,,, | Performed by: INTERNAL MEDICINE

## 2021-10-04 PROCEDURE — 3075F PR MOST RECENT SYSTOLIC BLOOD PRESS GE 130-139MM HG: ICD-10-PCS | Mod: S$GLB,,, | Performed by: INTERNAL MEDICINE

## 2021-10-04 PROCEDURE — 99214 OFFICE O/P EST MOD 30 MIN: CPT | Mod: S$GLB,,, | Performed by: INTERNAL MEDICINE

## 2021-10-04 PROCEDURE — 3075F SYST BP GE 130 - 139MM HG: CPT | Mod: S$GLB,,, | Performed by: INTERNAL MEDICINE

## 2021-10-04 PROCEDURE — 3008F BODY MASS INDEX DOCD: CPT | Mod: S$GLB,,, | Performed by: INTERNAL MEDICINE

## 2021-10-04 PROCEDURE — 1159F MED LIST DOCD IN RCRD: CPT | Mod: S$GLB,,, | Performed by: INTERNAL MEDICINE

## 2021-10-04 PROCEDURE — 4010F ACE/ARB THERAPY RXD/TAKEN: CPT | Mod: S$GLB,,, | Performed by: INTERNAL MEDICINE

## 2021-10-04 PROCEDURE — 36415 COLL VENOUS BLD VENIPUNCTURE: CPT | Performed by: INTERNAL MEDICINE

## 2021-10-05 LAB
ALBUMIN SERPL BCP-MCNC: 4 G/DL (ref 3.5–5.2)
ALP SERPL-CCNC: 42 U/L (ref 55–135)
ALT SERPL W/O P-5'-P-CCNC: 12 U/L (ref 10–44)
ANION GAP SERPL CALC-SCNC: 11 MMOL/L (ref 8–16)
AST SERPL-CCNC: 17 U/L (ref 10–40)
BASOPHILS # BLD AUTO: 0.03 K/UL (ref 0–0.2)
BASOPHILS NFR BLD: 0.4 % (ref 0–1.9)
BILIRUB SERPL-MCNC: 0.6 MG/DL (ref 0.1–1)
BUN SERPL-MCNC: 10 MG/DL (ref 6–20)
CALCIUM SERPL-MCNC: 9.7 MG/DL (ref 8.7–10.5)
CHLORIDE SERPL-SCNC: 104 MMOL/L (ref 95–110)
CO2 SERPL-SCNC: 24 MMOL/L (ref 23–29)
CREAT SERPL-MCNC: 1 MG/DL (ref 0.5–1.4)
DIFFERENTIAL METHOD: ABNORMAL
EOSINOPHIL # BLD AUTO: 0 K/UL (ref 0–0.5)
EOSINOPHIL NFR BLD: 0.5 % (ref 0–8)
ERYTHROCYTE [DISTWIDTH] IN BLOOD BY AUTOMATED COUNT: 16 % (ref 11.5–14.5)
EST. GFR  (AFRICAN AMERICAN): >60 ML/MIN/1.73 M^2
EST. GFR  (NON AFRICAN AMERICAN): >60 ML/MIN/1.73 M^2
GLUCOSE SERPL-MCNC: 88 MG/DL (ref 70–110)
HCT VFR BLD AUTO: 42.5 % (ref 40–54)
HGB BLD-MCNC: 13.5 G/DL (ref 14–18)
IMM GRANULOCYTES # BLD AUTO: 0.01 K/UL (ref 0–0.04)
IMM GRANULOCYTES NFR BLD AUTO: 0.1 % (ref 0–0.5)
LYMPHOCYTES # BLD AUTO: 2.7 K/UL (ref 1–4.8)
LYMPHOCYTES NFR BLD: 36.7 % (ref 18–48)
MCH RBC QN AUTO: 28.8 PG (ref 27–31)
MCHC RBC AUTO-ENTMCNC: 31.8 G/DL (ref 32–36)
MCV RBC AUTO: 91 FL (ref 82–98)
MONOCYTES # BLD AUTO: 0.7 K/UL (ref 0.3–1)
MONOCYTES NFR BLD: 9.2 % (ref 4–15)
NEUTROPHILS # BLD AUTO: 3.9 K/UL (ref 1.8–7.7)
NEUTROPHILS NFR BLD: 53.1 % (ref 38–73)
NRBC BLD-RTO: 0 /100 WBC
PLATELET # BLD AUTO: 220 K/UL (ref 150–450)
PMV BLD AUTO: 10.8 FL (ref 9.2–12.9)
POTASSIUM SERPL-SCNC: 4.8 MMOL/L (ref 3.5–5.1)
PROT SERPL-MCNC: 6.7 G/DL (ref 6–8.4)
RBC # BLD AUTO: 4.69 M/UL (ref 4.6–6.2)
SODIUM SERPL-SCNC: 139 MMOL/L (ref 136–145)
TSH SERPL DL<=0.005 MIU/L-ACNC: 1.31 UIU/ML (ref 0.4–4)
WBC # BLD AUTO: 7.3 K/UL (ref 3.9–12.7)

## 2021-12-01 ENCOUNTER — TELEPHONE (OUTPATIENT)
Dept: INTERNAL MEDICINE | Facility: CLINIC | Age: 56
End: 2021-12-01
Payer: MEDICARE

## 2021-12-01 RX ORDER — IRBESARTAN 300 MG/1
300 TABLET ORAL NIGHTLY
Qty: 90 TABLET | Refills: 3 | Status: SHIPPED | OUTPATIENT
Start: 2021-12-01 | End: 2022-06-15 | Stop reason: SDUPTHER

## 2022-04-20 ENCOUNTER — NURSE TRIAGE (OUTPATIENT)
Dept: ADMINISTRATIVE | Facility: CLINIC | Age: 57
End: 2022-04-20
Payer: MEDICARE

## 2022-04-20 RX ORDER — ALBUTEROL SULFATE 90 UG/1
AEROSOL, METERED RESPIRATORY (INHALATION)
Qty: 25.5 G | Refills: 0 | Status: SHIPPED | OUTPATIENT
Start: 2022-04-20 | End: 2022-06-15 | Stop reason: SDUPTHER

## 2022-04-20 NOTE — TELEPHONE ENCOUNTER
Need refill on albuterol inhaler. States he called pharmacy for refill and they told him he needed a refill so he reached out to office he says but hasn't heard back. He is requesting refill tonight as he is completely out. On call provider approves one time refill, he will need additional refills sent in by his pcp. Advised triage nurse would send message to office for additional refill request.    Reason for Disposition   [1] Prescription refill request for ESSENTIAL medicine (i.e., likelihood of harm to patient if not taken) AND [2] triager unable to refill per department policy    Protocols used: MEDICATION QUESTION CALL-A-AH

## 2022-06-10 RX ORDER — IRBESARTAN 150 MG/1
TABLET ORAL
Qty: 30 TABLET | Refills: 5 | OUTPATIENT
Start: 2022-06-10

## 2022-06-10 NOTE — TELEPHONE ENCOUNTER
No new care gaps identified.  [facility] Embedded Care Gaps. Reference number: [id number]. 6/10/2022   2:43:08 PM CDT

## 2022-06-10 NOTE — TELEPHONE ENCOUNTER
Refill Authorization Note   Rishi Aranda  is requesting a refill authorization.  Brief Assessment and Rationale for Refill:  Quick Discontinue     Medication Therapy Plan:  now on 300mg    Medication Reconciliation Completed: No   Comments:     No Care Gaps recommended.     Note composed:4:20 PM 06/10/2022

## 2022-07-30 ENCOUNTER — HOSPITAL ENCOUNTER (EMERGENCY)
Facility: HOSPITAL | Age: 57
Discharge: HOME | End: 2022-07-30
Attending: EMERGENCY MEDICINE
Payer: MEDICARE

## 2022-07-30 VITALS
WEIGHT: 180 LBS | HEIGHT: 66 IN | HEART RATE: 62 BPM | TEMPERATURE: 98 F | BODY MASS INDEX: 28.93 KG/M2 | OXYGEN SATURATION: 99 % | DIASTOLIC BLOOD PRESSURE: 82 MMHG | RESPIRATION RATE: 18 BRPM | SYSTOLIC BLOOD PRESSURE: 140 MMHG

## 2022-07-30 DIAGNOSIS — R07.89 RIGHT-SIDED CHEST WALL PAIN: Primary | ICD-10-CM

## 2022-07-30 DIAGNOSIS — R07.9 CHEST PAIN: ICD-10-CM

## 2022-07-30 LAB
ALBUMIN SERPL BCP-MCNC: 3.8 G/DL (ref 3.5–5.2)
ALP SERPL-CCNC: 47 U/L (ref 55–135)
ALT SERPL W/O P-5'-P-CCNC: 14 U/L (ref 10–44)
ANION GAP SERPL CALC-SCNC: 8 MMOL/L (ref 8–16)
AST SERPL-CCNC: 24 U/L (ref 10–40)
BASOPHILS # BLD AUTO: 0.03 K/UL (ref 0–0.2)
BASOPHILS NFR BLD: 0.5 % (ref 0–1.9)
BILIRUB SERPL-MCNC: 0.4 MG/DL (ref 0.1–1)
BNP SERPL-MCNC: 20 PG/ML (ref 0–99)
BUN SERPL-MCNC: 13 MG/DL (ref 6–20)
CALCIUM SERPL-MCNC: 9.2 MG/DL (ref 8.7–10.5)
CHLORIDE SERPL-SCNC: 105 MMOL/L (ref 95–110)
CO2 SERPL-SCNC: 25 MMOL/L (ref 23–29)
CREAT SERPL-MCNC: 0.9 MG/DL (ref 0.5–1.4)
DIFFERENTIAL METHOD: ABNORMAL
EOSINOPHIL # BLD AUTO: 0.1 K/UL (ref 0–0.5)
EOSINOPHIL NFR BLD: 1.5 % (ref 0–8)
ERYTHROCYTE [DISTWIDTH] IN BLOOD BY AUTOMATED COUNT: 14.6 % (ref 11.5–14.5)
EST. GFR  (AFRICAN AMERICAN): >60 ML/MIN/1.73 M^2
EST. GFR  (NON AFRICAN AMERICAN): >60 ML/MIN/1.73 M^2
GLUCOSE SERPL-MCNC: 92 MG/DL (ref 70–110)
HCT VFR BLD AUTO: 40.2 % (ref 40–54)
HGB BLD-MCNC: 13.2 G/DL (ref 14–18)
IMM GRANULOCYTES # BLD AUTO: 0.02 K/UL (ref 0–0.04)
IMM GRANULOCYTES NFR BLD AUTO: 0.3 % (ref 0–0.5)
LYMPHOCYTES # BLD AUTO: 2.2 K/UL (ref 1–4.8)
LYMPHOCYTES NFR BLD: 37.8 % (ref 18–48)
MCH RBC QN AUTO: 28.3 PG (ref 27–31)
MCHC RBC AUTO-ENTMCNC: 32.8 G/DL (ref 32–36)
MCV RBC AUTO: 86 FL (ref 82–98)
MONOCYTES # BLD AUTO: 0.5 K/UL (ref 0.3–1)
MONOCYTES NFR BLD: 8.1 % (ref 4–15)
NEUTROPHILS # BLD AUTO: 3 K/UL (ref 1.8–7.7)
NEUTROPHILS NFR BLD: 51.8 % (ref 38–73)
NRBC BLD-RTO: 0 /100 WBC
PLATELET # BLD AUTO: 243 K/UL (ref 150–450)
PMV BLD AUTO: 9.3 FL (ref 9.2–12.9)
POTASSIUM SERPL-SCNC: 4.6 MMOL/L (ref 3.5–5.1)
PROT SERPL-MCNC: 7 G/DL (ref 6–8.4)
RBC # BLD AUTO: 4.66 M/UL (ref 4.6–6.2)
SODIUM SERPL-SCNC: 138 MMOL/L (ref 136–145)
TROPONIN I SERPL DL<=0.01 NG/ML-MCNC: 0.01 NG/ML (ref 0–0.03)
WBC # BLD AUTO: 5.82 K/UL (ref 3.9–12.7)

## 2022-07-30 PROCEDURE — 99285 EMERGENCY DEPT VISIT HI MDM: CPT | Mod: 25

## 2022-07-30 PROCEDURE — 85025 COMPLETE CBC W/AUTO DIFF WBC: CPT | Performed by: EMERGENCY MEDICINE

## 2022-07-30 PROCEDURE — 99285 PR EMERGENCY DEPT VISIT,LEVEL V: ICD-10-PCS | Mod: ,,, | Performed by: EMERGENCY MEDICINE

## 2022-07-30 PROCEDURE — 93005 ELECTROCARDIOGRAM TRACING: CPT

## 2022-07-30 PROCEDURE — 93010 ELECTROCARDIOGRAM REPORT: CPT | Mod: ,,, | Performed by: INTERNAL MEDICINE

## 2022-07-30 PROCEDURE — 93010 EKG 12-LEAD: ICD-10-PCS | Mod: ,,, | Performed by: INTERNAL MEDICINE

## 2022-07-30 PROCEDURE — 87389 HIV-1 AG W/HIV-1&-2 AB AG IA: CPT | Performed by: PHYSICIAN ASSISTANT

## 2022-07-30 PROCEDURE — 86803 HEPATITIS C AB TEST: CPT | Performed by: PHYSICIAN ASSISTANT

## 2022-07-30 PROCEDURE — 25000003 PHARM REV CODE 250: Performed by: EMERGENCY MEDICINE

## 2022-07-30 PROCEDURE — 80053 COMPREHEN METABOLIC PANEL: CPT | Performed by: EMERGENCY MEDICINE

## 2022-07-30 PROCEDURE — 83880 ASSAY OF NATRIURETIC PEPTIDE: CPT | Performed by: EMERGENCY MEDICINE

## 2022-07-30 PROCEDURE — 99285 EMERGENCY DEPT VISIT HI MDM: CPT | Mod: ,,, | Performed by: EMERGENCY MEDICINE

## 2022-07-30 PROCEDURE — 84484 ASSAY OF TROPONIN QUANT: CPT | Performed by: EMERGENCY MEDICINE

## 2022-07-30 RX ORDER — NAPROXEN 375 MG/1
375 TABLET ORAL 2 TIMES DAILY WITH MEALS
Qty: 10 TABLET | Refills: 0 | Status: SHIPPED | OUTPATIENT
Start: 2022-07-30 | End: 2022-08-04

## 2022-07-30 RX ORDER — LIDOCAINE 50 MG/G
1 PATCH TOPICAL
Status: DISCONTINUED | OUTPATIENT
Start: 2022-07-30 | End: 2022-07-30 | Stop reason: HOSPADM

## 2022-07-30 RX ORDER — KETOROLAC TROMETHAMINE 10 MG/1
10 TABLET, FILM COATED ORAL
Status: COMPLETED | OUTPATIENT
Start: 2022-07-30 | End: 2022-07-30

## 2022-07-30 RX ADMIN — LIDOCAINE 1 PATCH: 50 PATCH CUTANEOUS at 09:07

## 2022-07-30 RX ADMIN — KETOROLAC TROMETHAMINE 10 MG: 10 TABLET, FILM COATED ORAL at 09:07

## 2022-07-30 NOTE — ED TRIAGE NOTES
Pt complains of intermittent right side chest pain and pain to back under right shoulder blade that started two days ago and is worse on certain movements

## 2022-07-30 NOTE — DISCHARGE INSTRUCTIONS
Today, your evaluation for your chest wall pain that began 2 days ago did not show any concerning findings on your ECG, chest x-ray or labs.  You do have some risk factors for heart disease which include hypertension.  We recommend close follow-up with your primary care within 1 week for re-evaluation for cardiac risk factors were you may schedule an outpatient stress test if needed.  You may take the anti-inflammatory as prescribed for 5 days.  You may also use massage pads, heating pads or Tylenol.    Our goal in the emergency department is to always give you outstanding care and exceptional service. You may receive a survey by mail or e-mail in the next week regarding your experience in our ED. We would greatly appreciate your completing and returning the survey. Your feedback provides us with a way to recognize our staff who give very good care and it helps us learn how to improve when your experience was below our aspiration of excellence.

## 2022-07-30 NOTE — ED NOTES
Pt identifiers Rishi Aranda were checked and are correct  LOC: The patient is awake, alert, aware of environment with an appropriate affect. Oriented x4, speaking appropriately  APPEARANCE: Pt rates right side chest pain a 6/10 , in no acute distress, pt is clean and well groomed, clothing properly fastened  SKIN: Skin warm, dry and intact, normal skin turgor, moist mucus membranes  RESPIRATORY: Airway is open and patent, respirations are spontaneous, even and unlabored, normal effort and rate Breath sounds clear alberto to all lung fields on auscultation  CARDIAC: Normal rate and rhythm, no peripheral edema noted, capillary refill < 3 seconds, bilateral radial pulses 2+  ABDOMEN: Soft, nontender, nondistended. Bowel sounds present to all four quad of abd on auscultation  NEUROLOGIC: PERRL, facial expression is symmetrical, patient moving all extremities spontaneously, normal sensation in all extremities when touched with a finger.  Follows all commands appropriately  MUSCULOSKELETAL: No obvious deformities.

## 2022-07-30 NOTE — ED PROVIDER NOTES
Encounter Date: 7/30/2022       History     Chief Complaint   Patient presents with    Chest Pain     On r side     HPI   Rishi Aranda is a 56-year-old male with a history of hypertension, cervical radiculopathy, arthritis, asthma presenting with right-sided chest wall pain.  Patient states that he woke up 2 days ago with right-sided chest wall pain, worse with movement and direct palpation.  Patient states that he has been working in the lawn work, using a weed eater and doing lawn work for the last several days.  He denies any associated pleuritic pain, shortness of breath, nausea, vomiting, lightheadedness, dizziness, falls, trauma, visual changes, headache, arm pain, jaw pain, diarrhea, hematochezia, melena, modest or any other symptoms.  No other aggravating or alleviating factors.  Pain is mild to moderate in severity.  He has not taken any anti-inflammatory medications prior to arrival.    Review of patient's allergies indicates:   Allergen Reactions    Eric-dur     Theophylline      Other reaction(s): Hives     Past Medical History:   Diagnosis Date    Arthritis     Asthma     Cervical radiculopathy, BUE 8/17/2012    Chronic LBP 8/17/2012    Chronic neck pain 8/17/2012    HTN (hypertension) 8/17/2012    Hypertension     Surgery follow-up examination 7/3/2019     Past Surgical History:   Procedure Laterality Date    COLONOSCOPY N/A 5/2/2019    Procedure: COLONOSCOPY;  Surgeon: Noemí Boss MD;  Location: [address];  Service: Endoscopy;  Laterality: N/A;    KNEE SURGERY      NECK SURGERY      TOTAL KNEE ARTHROPLASTY Right 5/14/2019    Procedure: ARTHROPLASTY, KNEE, TOTAL,Medial and Lateral compartment;  Surgeon: Maxi Chaves MD;  Location: [facility];  Service: Orthopedics;  Laterality: Right;  Regional w/o Catheter,Adductor,Exparel-Bupivacaine Liposome Injection 30cc,Clonidine/Epi/Ketorolac/Ropivacaine Injection 30cc     Family History   Problem Relation Age of Onset     Hypertension Mother     Stroke Mother     No Known Problems Sister     Hypertension Brother      Social History     Tobacco Use    Smoking status: Never Smoker    Smokeless tobacco: Never Used   Substance Use Topics    Alcohol use: Yes     Alcohol/week: 12.0 standard drinks     Types: 12 Cans of beer per week     Comment: occasionally    Drug use: No     Review of Systems   Constitutional: Negative for chills and fatigue.   HENT: Negative for congestion and sore throat.    Eyes: Negative for photophobia and visual disturbance.   Respiratory: Negative for chest tightness and shortness of breath.    Cardiovascular: Positive for chest pain (Right chest wall pain). Negative for palpitations.   Gastrointestinal: Negative for abdominal pain, nausea and vomiting.   Genitourinary: Negative for decreased urine volume and flank pain.   Musculoskeletal: Positive for myalgias. Negative for back pain, neck pain and neck stiffness.   Skin: Negative for color change and wound.   Neurological: Negative for facial asymmetry, light-headedness and headaches.   Psychiatric/Behavioral: Negative for confusion. The patient is not nervous/anxious.        Physical Exam     Initial Vitals [07/30/22 0838]   BP Pulse Resp Temp SpO2   131/80 (!) 57 18 98.4 °F (36.9 °C) 100 %      MAP       --         Physical Exam    Nursing note and vitals reviewed.      Gen/Constitutional: Interactive. No acute distress  Head: Normocephalic, Atraumatic  Neck: supple, no masses or LAD, no JVD  Eyes: PERRLA, conjunctiva clear  Ears, Nose and Throat: No rhinorrhea or stridor.  Cardiac:  Regular rate, Reg Rhythm, No murmur  Chest:  Right-sided chest wall tenderness to palpation, worse with arm movement, no overlying contusion  Pulmonary: CTA Bilat, no wheezes, rhonchi, rales.  No increased work of breathing.  GI: Abdomen soft, non-tender, non-distended; no rebound or guarding  : No CVA tenderness.  Musculoskeletal: Extremities warm, well perfused, no  erythema, no edema  Skin: No rashes, cyanosis or jaundice.  Neuro: Alert and Oriented x 3; No focal motor or sensory deficits.    Psych: Normal affect      ED Course   Procedures  Labs Reviewed   CBC W/ AUTO DIFFERENTIAL - Abnormal; Notable for the following components:       Result Value    Hemoglobin 13.2 (*)     RDW 14.6 (*)     All other components within normal limits   COMPREHENSIVE METABOLIC PANEL - Abnormal; Notable for the following components:    Alkaline Phosphatase 47 (*)     All other components within normal limits   TROPONIN I   B-TYPE NATRIURETIC PEPTIDE   HIV 1 / 2 ANTIBODY   HEPATITIS C ANTIBODY     EKG Readings: (Independently Interpreted)   Initial Reading: No STEMI. Previous EKG: Compared with most recent EKG Rhythm: Sinus Bradycardia. Heart Rate: 53. Ectopy: No Ectopy. ST Segments: Normal ST Segments.     ECG Results          EKG 12-lead (Final result)  Result time 07/30/22 10:06:21    Final result by Interface, Lab In [facility] (07/30/22 10:06:21)                 Narrative:    Test Reason : R07.9,    Vent. Rate : 053 BPM     Atrial Rate : 053 BPM     P-R Int : 154 ms          QRS Dur : 082 ms      QT Int : 376 ms       P-R-T Axes : 073 072 057 degrees     QTc Int : 352 ms    Sinus bradycardia  Otherwise normal ECG  When compared with ECG of 07-MAY-2019 07:24,  Premature ventricular complexes are no longer Present  Confirmed by Carlos VANEGAS MD ([id number]) on 7/30/2022 10:06:12 AM    Referred By: KANDI POOLE           Confirmed By:Carlos VANEGAS MD                            Imaging Results          X-Ray Chest PA And Lateral (Final result)  Result time 07/30/22 09:51:44    Final result by Raiza Rinaldi MD (07/30/22 09:51:44)                 Impression:      No acute cardiopulmonary process is identified.      Electronically signed by: Raiza Rinaldi MD  Date:    07/30/2022  Time:    09:51             Narrative:    EXAMINATION:  XR CHEST PA AND LATERAL    CLINICAL HISTORY:  Chest  Pain;    TECHNIQUE:  PA and lateral views of the chest were performed.    COMPARISON:  August 30, 2011    FINDINGS:  The lungs are clear, with normal appearance of pulmonary vasculature and no pleural effusion or pneumothorax.    The cardiac silhouette is normal in size. The hilar and mediastinal contours are unremarkable.    There are degenerative changes of the spine with slight loss of height of 1 of the lower thoracic vertebral bodies, unchanged.                              X-Rays:   Independently Interpreted Readings:   Chest X-Ray: Normal heart size.  No infiltrates.  No acute abnormalities. No pneumothorax or free air     Medications   ketorolac tablet 10 mg (10 mg Oral Given 7/30/22 0936)     Medical Decision Making:   History:   Old Medical Records: I decided to obtain old medical records.  Initial Assessment:   Rishi Aranda is a 56-year-old male with a history of hypertension, cervical radiculopathy, arthritis, asthma presenting with right-sided chest wall pain.   Differential Diagnosis:   Chest wall pain, musculoskeletal, ACS, PE, pneumothorax, pneumonia, esophageal perforation, dissection, pericarditis, tamponade  Independently Interpreted Test(s):   I have ordered and independently interpreted X-rays - see prior notes.  I have ordered and independently interpreted EKG Reading(s) - see prior notes  Clinical Tests:   Lab Tests: Ordered and Reviewed  Radiological Study: Ordered and Reviewed  Medical Tests: Ordered and Reviewed    Afebrile vital signs stable.  Physical exam findings remarkable for chest wall tenderness on the right chest wall and rib cage worse with palpation and arm movement.  There is a discrete area of tenderness.  The remainder exam unremarkable with no shortness of breath, no JVD, no lower extremity swelling, fevers, chills or high risk features.  No focal neurologic deficits or abdominal peritoneal findings.  Workup including ECG with no signs of ischemia or STEMI on my read.   Previous EKG also shows sinus bradycardia with PVCs however today's EKG with no PVCs, no signs of ischemia or STEMI when compared to previous ECG.  Symptoms have been ongoing for 2 days, will obtain single cardiac biomarker, labs with low suspicion for ACS based on exam, history and findings.  Chest x-ray obtained which shows no acute findings on my read.  Troponin negative, labs without leukocytosis, doubt pneumonia.  Will treat with NSAIDs for short course and outpatient follow-up.  Heart score of 2, wells score of 0, doubt ACS or PE. Patient agreeable to discharge plan. Strict ED precautions and return instructions discussed at length and patient verbalized understanding. All questions were answered and ample time was given for questions.      Complexity: High - level 5    Additional MDM:   Heart Score:    History:          Slightly suspicious.  ECG:             Normal  Age:               45-65 years  Risk factors: 1-2 risk factors  Troponin:       Less than or equal to normal limit  Final Score: 2                       Clinical Impression:   Final diagnoses:  [R07.9] Chest pain  [R07.89] Right-sided chest wall pain (Primary)          ED Disposition Condition    Discharge Stable        ED Prescriptions     Medication Sig Dispense Start Date End Date Auth. Provider    naproxen (NAPROSYN) 375 MG tablet Take 1 tablet (375 mg total) by mouth 2 (two) times daily with meals. for 5 days 10 tablet 7/30/2022 8/4/2022 Leo Hutchins DO        Follow-up Information     Follow up With Specialties Details Why Contact Info    Colten Carlisle MD Internal Medicine Schedule an appointment as soon as possible for a visit in 1 week For further cardiac risk evaluation [address]  [place] [address]  [phone]             Leo Hutchins DO, FAAEM  Emergency Staff Physician   Dept of Emergency Medicine   [facility]  Spectralink: [id number]        Disclaimer: This note has been generated using  voice-recognition software. There may be typographical errors that have been missed during proof-reading.       Leo Hutchins,   07/30/22 7352

## 2022-07-30 NOTE — PROVIDER PROGRESS NOTES - EMERGENCY DEPT.
Encounter Date: 7/30/2022    ED Physician Progress Notes         EKG - STEMI Decision  Initial Reading: No STEMI present.  Response: No Action Needed.

## 2022-08-02 LAB
HCV AB SERPL QL IA: NEGATIVE
HIV 1+2 AB+HIV1 P24 AG SERPL QL IA: NEGATIVE

## 2022-09-06 NOTE — TELEPHONE ENCOUNTER
Care Due:                  Date            Visit Type   Department     Provider  --------------------------------------------------------------------------------                                EP -                              PRIMARY      [facility] PRIMARY  Last Visit: 10-      CARE ([facility])   MERRITT Carlisle  Next Visit: None Scheduled  None         None Found                                                            Last  Test          Frequency    Reason                     Performed    Due Date  --------------------------------------------------------------------------------    Office Visit  12 months..  irbesartan...............  10-   09-    [facility] Embedded Care Gaps. Reference number: [id number]. 9/06/2022   11:07:32 AM CDT

## 2022-09-09 RX ORDER — ALBUTEROL SULFATE 90 UG/1
AEROSOL, METERED RESPIRATORY (INHALATION)
Qty: 25.5 G | Refills: 0 | OUTPATIENT
Start: 2022-09-09

## 2022-09-13 ENCOUNTER — PATIENT MESSAGE (OUTPATIENT)
Dept: INTERNAL MEDICINE | Facility: CLINIC | Age: 57
End: 2022-09-13
Payer: MEDICARE

## 2022-09-13 RX ORDER — METHYLPREDNISOLONE 4 MG/1
TABLET ORAL
Qty: 1 EACH | Refills: 0 | Status: SHIPPED | OUTPATIENT
Start: 2022-09-13 | End: 2022-10-04

## 2022-09-13 RX ORDER — DOXYCYCLINE HYCLATE 100 MG
100 TABLET ORAL 2 TIMES DAILY
Qty: 20 TABLET | Refills: 0 | Status: SHIPPED | OUTPATIENT
Start: 2022-09-13 | End: 2022-09-23

## 2022-09-22 ENCOUNTER — OFFICE VISIT (OUTPATIENT)
Dept: URGENT CARE | Facility: CLINIC | Age: 57
End: 2022-09-22
Payer: MEDICARE

## 2022-09-22 ENCOUNTER — NURSE TRIAGE (OUTPATIENT)
Dept: ADMINISTRATIVE | Facility: CLINIC | Age: 57
End: 2022-09-22
Payer: MEDICARE

## 2022-09-22 VITALS
OXYGEN SATURATION: 96 % | DIASTOLIC BLOOD PRESSURE: 87 MMHG | HEIGHT: 66 IN | BODY MASS INDEX: 25.71 KG/M2 | SYSTOLIC BLOOD PRESSURE: 142 MMHG | TEMPERATURE: 98 F | HEART RATE: 52 BPM | RESPIRATION RATE: 14 BRPM | WEIGHT: 160 LBS

## 2022-09-22 DIAGNOSIS — R07.9 CHEST PAIN, UNSPECIFIED TYPE: Primary | ICD-10-CM

## 2022-09-22 DIAGNOSIS — R00.1 BRADYCARDIA: ICD-10-CM

## 2022-09-22 PROCEDURE — 3008F PR BODY MASS INDEX (BMI) DOCUMENTED: ICD-10-PCS | Mod: CPTII,S$GLB,, | Performed by: NURSE PRACTITIONER

## 2022-09-22 PROCEDURE — 93005 ELECTROCARDIOGRAM TRACING: CPT | Mod: S$GLB,,, | Performed by: NURSE PRACTITIONER

## 2022-09-22 PROCEDURE — 3079F DIAST BP 80-89 MM HG: CPT | Mod: CPTII,S$GLB,, | Performed by: NURSE PRACTITIONER

## 2022-09-22 PROCEDURE — 99213 OFFICE O/P EST LOW 20 MIN: CPT | Mod: S$GLB,,, | Performed by: NURSE PRACTITIONER

## 2022-09-22 PROCEDURE — 1159F PR MEDICATION LIST DOCUMENTED IN MEDICAL RECORD: ICD-10-PCS | Mod: CPTII,S$GLB,, | Performed by: NURSE PRACTITIONER

## 2022-09-22 PROCEDURE — 4010F PR ACE/ARB THEARPY RXD/TAKEN: ICD-10-PCS | Mod: CPTII,S$GLB,, | Performed by: NURSE PRACTITIONER

## 2022-09-22 PROCEDURE — 71046 X-RAY EXAM CHEST 2 VIEWS: CPT | Mod: FY,S$GLB,, | Performed by: RADIOLOGY

## 2022-09-22 PROCEDURE — 93005 EKG 12-LEAD: ICD-10-PCS | Mod: S$GLB,,, | Performed by: NURSE PRACTITIONER

## 2022-09-22 PROCEDURE — 93010 EKG 12-LEAD: ICD-10-PCS | Mod: S$GLB,,, | Performed by: INTERNAL MEDICINE

## 2022-09-22 PROCEDURE — 3008F BODY MASS INDEX DOCD: CPT | Mod: CPTII,S$GLB,, | Performed by: NURSE PRACTITIONER

## 2022-09-22 PROCEDURE — 3077F SYST BP >= 140 MM HG: CPT | Mod: CPTII,S$GLB,, | Performed by: NURSE PRACTITIONER

## 2022-09-22 PROCEDURE — 4010F ACE/ARB THERAPY RXD/TAKEN: CPT | Mod: CPTII,S$GLB,, | Performed by: NURSE PRACTITIONER

## 2022-09-22 PROCEDURE — 3079F PR MOST RECENT DIASTOLIC BLOOD PRESSURE 80-89 MM HG: ICD-10-PCS | Mod: CPTII,S$GLB,, | Performed by: NURSE PRACTITIONER

## 2022-09-22 PROCEDURE — 1160F RVW MEDS BY RX/DR IN RCRD: CPT | Mod: CPTII,S$GLB,, | Performed by: NURSE PRACTITIONER

## 2022-09-22 PROCEDURE — 1160F PR REVIEW ALL MEDS BY PRESCRIBER/CLIN PHARMACIST DOCUMENTED: ICD-10-PCS | Mod: CPTII,S$GLB,, | Performed by: NURSE PRACTITIONER

## 2022-09-22 PROCEDURE — 71046 XR CHEST PA AND LATERAL: ICD-10-PCS | Mod: FY,S$GLB,, | Performed by: RADIOLOGY

## 2022-09-22 PROCEDURE — 93010 ELECTROCARDIOGRAM REPORT: CPT | Mod: S$GLB,,, | Performed by: INTERNAL MEDICINE

## 2022-09-22 PROCEDURE — 1159F MED LIST DOCD IN RCRD: CPT | Mod: CPTII,S$GLB,, | Performed by: NURSE PRACTITIONER

## 2022-09-22 PROCEDURE — 99213 PR OFFICE/OUTPT VISIT, EST, LEVL III, 20-29 MIN: ICD-10-PCS | Mod: S$GLB,,, | Performed by: NURSE PRACTITIONER

## 2022-09-22 PROCEDURE — 3077F PR MOST RECENT SYSTOLIC BLOOD PRESSURE >= 140 MM HG: ICD-10-PCS | Mod: CPTII,S$GLB,, | Performed by: NURSE PRACTITIONER

## 2022-09-22 NOTE — TELEPHONE ENCOUNTER
Reason for Disposition   Pain also in shoulder(s) or arm(s) or jaw    Additional Information   Negative: SEVERE difficulty breathing (e.g., struggling for each breath, speaks in single words)   Negative: Passed out (i.e., fainted, collapsed and was not responding)   Negative: Difficult to awaken or acting confused (e.g., disoriented, slurred speech)   Negative: Shock suspected (e.g., cold/pale/clammy skin, too weak to stand, low BP, rapid pulse)   Negative: Chest pain lasting longer than 5 minutes and ANY of the following:* Over 44 years old* Over 30 years old and at least one cardiac risk factor (e.g., diabetes mellitus, high blood pressure, high cholesterol, smoker, or strong family history of heart disease)* History of heart disease (i.e., angina, heart attack, heart failure, bypass surgery, takes nitroglycerin)* Pain is crushing, pressure-like, or heavy   Negative: Heart beating < 50 beats per minute OR > 140 beats per minute     Does not know   Negative: Visible sweat on face or sweat dripping down face   Negative: Sounds like a life-threatening emergency to the triager   Negative: Followed an injury to chest   Negative: SEVERE chest pain    Protocols used: Chest Pain-A-[place]    Pt stated he has chest pain that he rates as an 8 on 1/10 pain scale. Stated it radiates from his back and shoulder blade to his chest. Per triage protocol advised to have someone bring to the ED now for evaluation. Pt verbalized understanding.         Note

## 2022-09-22 NOTE — PROGRESS NOTES
"Subjective:       Patient ID: Rishi Aranda is a 56 y.o. male.    Vitals:  height is 5' 6" (1.676 m) and weight is 72.6 kg (160 lb). His oral temperature is 98.2 °F (36.8 °C). His blood pressure is 142/87 (abnormal) and his pulse is 52 (abnormal). His respiration is 14 and oxygen saturation is 96%.     Chief Complaint: Chest Pain    55yo male pt presents with wife.  Reports R-sided chest pain over past 2 days, reports sharp sensation intermittently under R pectoral muscle, occasionally wrapping around to R shoulder blade.  Reports that symptoms started after having difficult time reeling in a fish 3-4 days ago and repeatedly pulling start cord on lawnmower, both with R arm.  Reports similar symptoms in past (July 2022), with ER visit, was told he had chest wall pain and recommended NSAIDs.  Reports temporary relief with ibuprofen and ice packs.  Reports worsening of pain with arm movement, palpation, and with bending forward.  Denies crushing sensation, denies SOB, denies cough, denies numbness or tingling to upper extremities, denies lt-headedness or dizziness, denies confusion or mental status changes.    Chest Pain   This is a new problem. The current episode started in the past 7 days. The onset quality is gradual. The problem occurs 2 to 4 times per day. The problem has been gradually worsening. The pain is present in the lateral region. The pain is at a severity of 8/10. The pain is severe. The quality of the pain is described as sharp, squeezing, tightness, crushing and stabbing. The pain radiates to the precordial region. Associated symptoms include back pain and shortness of breath (with pain, denies currently). Pertinent negatives include no cough, dizziness, fever, numbness, palpitations or syncope. The pain is aggravated by movement, lifting and walking. He has tried acetaminophen for the symptoms. The treatment provided mild relief.     Constitution: Negative for chills, fatigue and fever. "   Cardiovascular:  Positive for chest pain. Negative for palpitations, sob on exertion and passing out.   Respiratory:  Positive for shortness of breath (with pain, denies currently). Negative for cough and wheezing.    Musculoskeletal:  Positive for back pain.   Neurological:  Negative for dizziness, light-headedness, passing out, coordination disturbances, loss of balance, disorientation, altered mental status, loss of consciousness, numbness and tingling.   Psychiatric/Behavioral:  Negative for altered mental status and disorientation.      Objective:      Physical Exam   Constitutional: He is oriented to person, place, and time. He appears well-developed. He is cooperative.  Non-toxic appearance. He does not appear ill. No distress.   HENT:   Head: Normocephalic and atraumatic.   Ears:   Right Ear: Hearing, tympanic membrane, external ear and ear canal normal.   Left Ear: Hearing, tympanic membrane, external ear and ear canal normal.   Nose: Nose normal. No mucosal edema, rhinorrhea or nasal deformity. No epistaxis. Right sinus exhibits no maxillary sinus tenderness and no frontal sinus tenderness. Left sinus exhibits no maxillary sinus tenderness and no frontal sinus tenderness.   Mouth/Throat: Uvula is midline, oropharynx is clear and moist and mucous membranes are normal. No trismus in the jaw. Normal dentition. No uvula swelling. No posterior oropharyngeal erythema.   Eyes: Conjunctivae and lids are normal. Right eye exhibits no discharge. Left eye exhibits no discharge. No scleral icterus.   Neck: Trachea normal and phonation normal. Neck supple.   Cardiovascular: Regular rhythm, S1 normal, S2 normal, normal heart sounds and normal pulses. Bradycardia present. PMI is not displaced.   No murmur heard.  Pulses:       Radial pulses are 2+ on the right side and 2+ on the left side. Exam reveals no gallop, no S3, no S4 and no friction rub. No thrill  Pulmonary/Chest: Effort normal and breath sounds normal. No  accessory muscle usage or stridor. No tachypnea. No respiratory distress. He has no decreased breath sounds. He has no wheezes. He has no rhonchi. He has no rales. He exhibits tenderness (to palpation of R chest wall, near axilla). He exhibits no mass, no laceration, no crepitus, no edema, no deformity, no swelling and no retraction.       Abdominal: Normal appearance and bowel sounds are normal. He exhibits no distension and no mass. Soft. There is no abdominal tenderness.   Musculoskeletal: Normal range of motion.         General: No deformity. Normal range of motion.      Right lower leg: No edema.      Left lower leg: No edema.   Neurological: He is alert and oriented to person, place, and time. He exhibits normal muscle tone. Coordination normal.   Skin: Skin is warm, dry, intact, not diaphoretic and not pale.   Psychiatric: His speech is normal and behavior is normal. Judgment and thought content normal.   Nursing note and vitals reviewed.    The EKG shows a abnormal, regular Sinus Rhythm, at a rate of 47bpm, there are not ST Changes. There is a previous EKG for comparison. This EKG was interpreted by me (similar to previous EKG in July 2022, sinus bradycardia with sinus arrhythmia).    XR CHEST PA AND LATERAL    Result Date: 9/22/2022  EXAMINATION: XR CHEST PA AND LATERAL CLINICAL HISTORY: Chest pain, unspecified TECHNIQUE: PA and lateral views of the chest were performed. COMPARISON: 07/30/2022 FINDINGS: The lungs are clear, with normal appearance of pulmonary vasculature and no pleural effusion or pneumothorax. The cardiac silhouette is normal in size. The hilar and mediastinal contours are unremarkable. Visualized osseous structures show no acute abnormalities.     No acute radiographic findings in the chest. Electronically signed by: Salty Alvarado MD Date:    09/22/2022 Time:    13:39           Assessment:       1. Chest pain, unspecified type    2. Bradycardia          Plan:       Discussed EKG and final  chest x-ray results, explained that symptoms do not appear to be cardiac or pulmonary-related, recommended similar course of treatment to last ER visit, NSAIDs, gentle stretching, rest, and cool compresses to help relieve pain.  Provided cardiology referral to discuss persistent bradycardia, provided  number.  Provided education on return/ER precautions, urged immediate ER visit for worsening chest pain, SOB, radiation of pain or numbness/tingling to upper extremities, lt-headedness, dizziness, or confusion.  Pt and wife both verbalized understanding and agreed to plan.    Chest pain, unspecified type  -     IN OFFICE EKG 12-LEAD (to [place])  -     XR CHEST PA AND LATERAL; Future; Expected date: 09/22/2022    Bradycardia  -     Ambulatory referral/consult to Cardiology       Patient Instructions   You have been provided a referral to Cardiology.  Please call [phone] to schedule an appointment at your convenience.    -----    If your condition worsens or fails to improve, we recommend that you receive another evaluation at the ER immediately contact your PCP to discuss your concerns, or return here.  You must understand that you've received an urgent care treatment only, and that you may be released before all your medical problems are known or treated.  You, the patient, will arrange for follow-up care as instructed.      Go to ER immediately if chest pain worsens, SOB develops, or if light-headedness, dizziness, or vision problems develop with associated chest pain.

## 2022-09-22 NOTE — PATIENT INSTRUCTIONS
You have been provided a referral to Cardiology.  Please call [phone] to schedule an appointment at your convenience.    -----    If your condition worsens or fails to improve, we recommend that you receive another evaluation at the ER immediately contact your PCP to discuss your concerns, or return here.  You must understand that you've received an urgent care treatment only, and that you may be released before all your medical problems are known or treated.  You, the patient, will arrange for follow-up care as instructed.      Go to ER immediately if chest pain worsens, SOB develops, or if light-headedness, dizziness, or vision problems develop with associated chest pain.

## 2022-09-22 NOTE — TELEPHONE ENCOUNTER
Reason for Disposition   Pain also in shoulder(s) or arm(s) or jaw    Additional Information   Negative: SEVERE difficulty breathing (e.g., struggling for each breath, speaks in single words)   Negative: Passed out (i.e., fainted, collapsed and was not responding)   Negative: Difficult to awaken or acting confused (e.g., disoriented, slurred speech)   Negative: Shock suspected (e.g., cold/pale/clammy skin, too weak to stand, low BP, rapid pulse)   Negative: Chest pain lasting longer than 5 minutes and ANY of the following:* Over 44 years old* Over 30 years old and at least one cardiac risk factor (e.g., diabetes mellitus, high blood pressure, high cholesterol, smoker, or strong family history of heart disease)* History of heart disease (i.e., angina, heart attack, heart failure, bypass surgery, takes nitroglycerin)* Pain is crushing, pressure-like, or heavy   Negative: Heart beating < 50 beats per minute OR > 140 beats per minute     Does not know   Negative: Visible sweat on face or sweat dripping down face   Negative: Sounds like a life-threatening emergency to the triager   Negative: Followed an injury to chest   Negative: SEVERE chest pain    Protocols used: Chest Pain-A-[place]    Pt stated he has chest pain that he rates as an 8 on 1/10 pain scale. Stated it radiates from his back and shoulder blade to his chest. Per triage protocol advised to have someone bring to the ED now for evaluation. Pt verbalized understanding.

## 2022-10-04 ENCOUNTER — LAB VISIT (OUTPATIENT)
Dept: LAB | Facility: HOSPITAL | Age: 57
End: 2022-10-04
Attending: INTERNAL MEDICINE
Payer: MEDICARE

## 2022-10-04 ENCOUNTER — OFFICE VISIT (OUTPATIENT)
Dept: INTERNAL MEDICINE | Facility: CLINIC | Age: 57
End: 2022-10-04
Payer: MEDICARE

## 2022-10-04 VITALS
HEIGHT: 66 IN | BODY MASS INDEX: 26.43 KG/M2 | OXYGEN SATURATION: 94 % | WEIGHT: 164.44 LBS | DIASTOLIC BLOOD PRESSURE: 80 MMHG | SYSTOLIC BLOOD PRESSURE: 124 MMHG | HEART RATE: 54 BPM

## 2022-10-04 DIAGNOSIS — Z00.00 ANNUAL PHYSICAL EXAM: Primary | ICD-10-CM

## 2022-10-04 DIAGNOSIS — Z12.5 PROSTATE CANCER SCREENING: ICD-10-CM

## 2022-10-04 DIAGNOSIS — J45.909 CHRONIC ASTHMA, UNSPECIFIED ASTHMA SEVERITY, UNSPECIFIED WHETHER COMPLICATED, UNSPECIFIED WHETHER PERSISTENT: ICD-10-CM

## 2022-10-04 DIAGNOSIS — I10 PRIMARY HYPERTENSION: ICD-10-CM

## 2022-10-04 DIAGNOSIS — R00.1 SINUS BRADYCARDIA: ICD-10-CM

## 2022-10-04 DIAGNOSIS — Z00.00 ANNUAL PHYSICAL EXAM: ICD-10-CM

## 2022-10-04 LAB
CHOLEST SERPL-MCNC: 162 MG/DL (ref 120–199)
CHOLEST/HDLC SERPL: 2.7 {RATIO} (ref 2–5)
COMPLEXED PSA SERPL-MCNC: 1.5 NG/ML (ref 0–4)
HDLC SERPL-MCNC: 61 MG/DL (ref 40–75)
HDLC SERPL: 37.7 % (ref 20–50)
LDLC SERPL CALC-MCNC: 91.4 MG/DL (ref 63–159)
NONHDLC SERPL-MCNC: 101 MG/DL
TRIGL SERPL-MCNC: 48 MG/DL (ref 30–150)
TSH SERPL DL<=0.005 MIU/L-ACNC: 2.01 UIU/ML (ref 0.4–4)

## 2022-10-04 PROCEDURE — 3074F SYST BP LT 130 MM HG: CPT | Mod: CPTII,S$GLB,, | Performed by: INTERNAL MEDICINE

## 2022-10-04 PROCEDURE — 84443 ASSAY THYROID STIM HORMONE: CPT | Performed by: INTERNAL MEDICINE

## 2022-10-04 PROCEDURE — 80061 LIPID PANEL: CPT | Performed by: INTERNAL MEDICINE

## 2022-10-04 PROCEDURE — 3008F BODY MASS INDEX DOCD: CPT | Mod: CPTII,S$GLB,, | Performed by: INTERNAL MEDICINE

## 2022-10-04 PROCEDURE — 99999 PR PBB SHADOW E&M-EST. PATIENT-LVL III: CPT | Mod: PBBFAC,,, | Performed by: INTERNAL MEDICINE

## 2022-10-04 PROCEDURE — 1159F MED LIST DOCD IN RCRD: CPT | Mod: CPTII,S$GLB,, | Performed by: INTERNAL MEDICINE

## 2022-10-04 PROCEDURE — 4010F ACE/ARB THERAPY RXD/TAKEN: CPT | Mod: CPTII,S$GLB,, | Performed by: INTERNAL MEDICINE

## 2022-10-04 PROCEDURE — 3074F PR MOST RECENT SYSTOLIC BLOOD PRESSURE < 130 MM HG: ICD-10-PCS | Mod: CPTII,S$GLB,, | Performed by: INTERNAL MEDICINE

## 2022-10-04 PROCEDURE — 4010F PR ACE/ARB THEARPY RXD/TAKEN: ICD-10-PCS | Mod: CPTII,S$GLB,, | Performed by: INTERNAL MEDICINE

## 2022-10-04 PROCEDURE — 84153 ASSAY OF PSA TOTAL: CPT | Performed by: INTERNAL MEDICINE

## 2022-10-04 PROCEDURE — 1159F PR MEDICATION LIST DOCUMENTED IN MEDICAL RECORD: ICD-10-PCS | Mod: CPTII,S$GLB,, | Performed by: INTERNAL MEDICINE

## 2022-10-04 PROCEDURE — 3008F PR BODY MASS INDEX (BMI) DOCUMENTED: ICD-10-PCS | Mod: CPTII,S$GLB,, | Performed by: INTERNAL MEDICINE

## 2022-10-04 PROCEDURE — 3079F PR MOST RECENT DIASTOLIC BLOOD PRESSURE 80-89 MM HG: ICD-10-PCS | Mod: CPTII,S$GLB,, | Performed by: INTERNAL MEDICINE

## 2022-10-04 PROCEDURE — 99396 PREV VISIT EST AGE 40-64: CPT | Mod: GZ,S$GLB,, | Performed by: INTERNAL MEDICINE

## 2022-10-04 PROCEDURE — 3079F DIAST BP 80-89 MM HG: CPT | Mod: CPTII,S$GLB,, | Performed by: INTERNAL MEDICINE

## 2022-10-04 PROCEDURE — 99999 PR PBB SHADOW E&M-EST. PATIENT-LVL III: ICD-10-PCS | Mod: PBBFAC,,, | Performed by: INTERNAL MEDICINE

## 2022-10-04 PROCEDURE — 36415 COLL VENOUS BLD VENIPUNCTURE: CPT | Performed by: INTERNAL MEDICINE

## 2022-10-04 PROCEDURE — 99396 PR PREVENTIVE VISIT,EST,40-64: ICD-10-PCS | Mod: GZ,S$GLB,, | Performed by: INTERNAL MEDICINE

## 2022-10-04 RX ORDER — MONTELUKAST SODIUM 10 MG/1
10 TABLET ORAL DAILY
Qty: 30 TABLET | Refills: 2 | Status: SHIPPED | OUTPATIENT
Start: 2022-10-04 | End: 2023-01-07

## 2022-10-04 NOTE — PROGRESS NOTES
MEDICAL HISTORY:  Hypertension.  Chronic asthma.  Osteoarthritis of the knee, right knee surgery with previous medial meniscectomy and chondroplasty in right total knee arthroplasty  Cervical degenerative disk disease with left cervical foraminotomy at C6-7.  Lumbar degenerative disk disease with neural foraminal stenosis and facet arthropathy.          SOCIAL HISTORY:  Tobacco use, none.  Alcohol use, 6-12 beers week.  Exercise occasional riding stationary bike     FAMILY HISTORY:  Mother disease, history of hypertension and stroke.  Father , unknown.  One sister, one brother living.  Brother has hypertension.  One brother      Medication  Albuterol as needed  Irbesartan 300 mg daily    Answers submitted by the patient for this visit:  Review of Systems Questionnaire (Submitted on 10/3/2022)  activity change: No  unexpected weight change: No  neck pain: Yes  hearing loss: No  rhinorrhea: No  trouble swallowing: No  eye discharge: No  visual disturbance: No  chest tightness: No  wheezing: Yes  chest pain: No  palpitations: No  constipation: No  vomiting: No  diarrhea: No  polydipsia: No  polyuria: No  difficulty urinating: No  urgency: No  hematuria: No  joint swelling: No  arthralgias: No  headaches: Yes  weakness: No  confusion: No  dysphoric mood: No       56-year-old male   Presents for an annual routine visit     For the past few weeks he has been having wheezing off and on ,been using albuterol about twice a day.  Usually is happen 20 as weather changes.  He does not really feel particularly short of breath and has no chest congestion or cough    He was recently seen in the emergency room as well as urgent care for back pain and right-sided chest pain.  It was noted that he had bradycardia with heart rate 52 beats because of such he was referred to Cardiology after his urgent care visit    Review of symptoms  Reports no chest pain, palpitations, shortness of breath, abdominal pain.  Regular  bowel function.  No difficulty urinating.  No nocturia urgency.  No indigestion heartburn.  No headaches  Presently no arthralgias    Recent labs July 30th, normal chemistry CBC and ECG reveals sinus bradycardia no ischemic changes    Examination   Weight 164   Pulse 52   Blood pressure 124/72  HEENT exam no abnormal findings   Neck no thyromegaly no masses   Chest clear breath sounds good effort   Heart regular rate rhythm   Abdominal exam soft nontender no hepatosplenomegaly abdominal masses  Pulses 2+ carotid pulses no bruits 2+ pedal pulses  Extremities no edema    Impression   General exam   Hypertension  Allergic asthma   Sinus bradycardia  Prostate cancer screening      Plan  Labs of PSA, lipid profile, TSH  Trial Singulair 10 mg once a day for few months he is let me know if he finds it helpful in regard to his allergic asthma

## 2022-10-07 ENCOUNTER — OFFICE VISIT (OUTPATIENT)
Dept: CARDIOLOGY | Facility: CLINIC | Age: 57
End: 2022-10-07
Payer: MEDICARE

## 2022-10-07 VITALS
WEIGHT: 160.25 LBS | HEART RATE: 55 BPM | BODY MASS INDEX: 25.15 KG/M2 | SYSTOLIC BLOOD PRESSURE: 167 MMHG | DIASTOLIC BLOOD PRESSURE: 85 MMHG | HEIGHT: 67 IN

## 2022-10-07 DIAGNOSIS — R00.1 BRADYCARDIA, SINUS: ICD-10-CM

## 2022-10-07 DIAGNOSIS — I10 PRIMARY HYPERTENSION: Primary | ICD-10-CM

## 2022-10-07 PROCEDURE — 3079F PR MOST RECENT DIASTOLIC BLOOD PRESSURE 80-89 MM HG: ICD-10-PCS | Mod: CPTII,S$GLB,, | Performed by: INTERNAL MEDICINE

## 2022-10-07 PROCEDURE — 3077F SYST BP >= 140 MM HG: CPT | Mod: CPTII,S$GLB,, | Performed by: INTERNAL MEDICINE

## 2022-10-07 PROCEDURE — 3079F DIAST BP 80-89 MM HG: CPT | Mod: CPTII,S$GLB,, | Performed by: INTERNAL MEDICINE

## 2022-10-07 PROCEDURE — 99204 OFFICE O/P NEW MOD 45 MIN: CPT | Mod: 25,S$GLB,, | Performed by: INTERNAL MEDICINE

## 2022-10-07 PROCEDURE — 99999 PR PBB SHADOW E&M-EST. PATIENT-LVL III: ICD-10-PCS | Mod: PBBFAC,,,

## 2022-10-07 PROCEDURE — 1159F PR MEDICATION LIST DOCUMENTED IN MEDICAL RECORD: ICD-10-PCS | Mod: CPTII,S$GLB,, | Performed by: INTERNAL MEDICINE

## 2022-10-07 PROCEDURE — 99204 PR OFFICE/OUTPT VISIT, NEW, LEVL IV, 45-59 MIN: ICD-10-PCS | Mod: 25,S$GLB,, | Performed by: INTERNAL MEDICINE

## 2022-10-07 PROCEDURE — 3008F PR BODY MASS INDEX (BMI) DOCUMENTED: ICD-10-PCS | Mod: CPTII,S$GLB,, | Performed by: INTERNAL MEDICINE

## 2022-10-07 PROCEDURE — 4010F PR ACE/ARB THEARPY RXD/TAKEN: ICD-10-PCS | Mod: CPTII,S$GLB,, | Performed by: INTERNAL MEDICINE

## 2022-10-07 PROCEDURE — 1159F MED LIST DOCD IN RCRD: CPT | Mod: CPTII,S$GLB,, | Performed by: INTERNAL MEDICINE

## 2022-10-07 PROCEDURE — 4010F ACE/ARB THERAPY RXD/TAKEN: CPT | Mod: CPTII,S$GLB,, | Performed by: INTERNAL MEDICINE

## 2022-10-07 PROCEDURE — 3077F PR MOST RECENT SYSTOLIC BLOOD PRESSURE >= 140 MM HG: ICD-10-PCS | Mod: CPTII,S$GLB,, | Performed by: INTERNAL MEDICINE

## 2022-10-07 PROCEDURE — 99999 PR PBB SHADOW E&M-EST. PATIENT-LVL III: CPT | Mod: PBBFAC,,,

## 2022-10-07 PROCEDURE — 3008F BODY MASS INDEX DOCD: CPT | Mod: CPTII,S$GLB,, | Performed by: INTERNAL MEDICINE

## 2022-10-07 NOTE — PROGRESS NOTES
Subjective:       Patient ID:  Rishi Aranda is a 56 y.o. male with history of asthma and hypertension who presents for evaluation of Establish Care (F/u from ER visits) and Bradycardia      Patient was referred by primary care for evaluation of asymptomatic bradycardia. Patient denies SOB, CP, n/v/d, syncope, dizziness, fatigue, lightheadedness    Patient was initially evaluated in the ED 2 months ago for right sided back pain that radiated to the right chest. Patient was concerned this was cardiac, went to ED, who did a full cardiac workup which was negative. EKG at the time of evaluation showed sinus bradycardia. Patient pain was relieved by naproxen. He subsequently returned to the urgent care a few weeks after this episode for similar back pain.    Patient remains very active, cutting grass often, riding his bike 10+ miles multiple time a week. He does not report at SOB or CP or fatigue during these activities. He has a family history of HTN, but no known cardiac disease.    Patient hypertension is reportedly under control, however during today's evaluation his pressure was 167/85. Patient does not record his pressures at home consistently, and is unsure of what his home pressures are normally. He is currently on irbesartan 300mg daily.  Review of patient's allergies indicates:   Allergen Reactions    Eric-dur     Theophylline      Other reaction(s): Hives      Lab Results   Component Value Date     07/30/2022    K 4.6 07/30/2022     07/30/2022    CO2 25 07/30/2022    BUN 13 07/30/2022    CREATININE 0.9 07/30/2022    GLU 92 07/30/2022    AST 24 07/30/2022    ALT 14 07/30/2022    ALBUMIN 3.8 07/30/2022    PROT 7.0 07/30/2022    BILITOT 0.4 07/30/2022    WBC 5.82 07/30/2022    HGB 13.2 (L) 07/30/2022    HCT 40.2 07/30/2022    MCV 86 07/30/2022     07/30/2022    PSA 1.5 10/04/2022    TSH 2.009 10/04/2022         Lab Results   Component Value Date    CHOL 162 10/04/2022    HDL 61 10/04/2022     TRIG 48 10/04/2022       Lab Results   Component Value Date    LDLCALC 91.4 10/04/2022       Past Medical History:   Diagnosis Date    Arthritis     Asthma     Cervical radiculopathy, BUE 8/17/2012    Chronic LBP 8/17/2012    Chronic neck pain 8/17/2012    HTN (hypertension) 8/17/2012    Hypertension     Surgery follow-up examination 7/3/2019     Past Surgical History:   Procedure Laterality Date    COLONOSCOPY N/A 5/2/2019    Procedure: COLONOSCOPY;  Surgeon: Noemí Boss MD;  Location: [facility] ENDO (4TH FLR);  Service: Endoscopy;  Laterality: N/A;    KNEE SURGERY      NECK SURGERY      TOTAL KNEE ARTHROPLASTY Right 5/14/2019    Procedure: ARTHROPLASTY, KNEE, TOTAL,Medial and Lateral compartment;  Surgeon: Maxi Chaves MD;  Location: [facility];  Service: Orthopedics;  Laterality: Right;  Regional w/o Catheter,Adductor,Exparel-Bupivacaine Liposome Injection 30cc,Clonidine/Epi/Ketorolac/Ropivacaine Injection 30cc      Tobacco Use    Smoking status: Never    Smokeless tobacco: Never   Substance and Sexual Activity    Alcohol use: Yes     Alcohol/week: 12.0 standard drinks     Types: 12 Cans of beer per week     Comment: occasionally    Drug use: No    Sexual activity: Yes      Family History   Problem Relation Age of Onset    Hypertension Mother     Stroke Mother     No Known Problems Sister     Hypertension Brother         Current Outpatient Medications:     albuterol (PROVENTIL/VENTOLIN HFA) 90 mcg/actuation inhaler, INHALE 2 PUFFS BY MOUTH FOUR TIMES DAILY AS NEEDED FOR WHEEZE, Disp: 25.5 g, Rfl: 3    irbesartan (AVAPRO) 300 MG tablet, Take 1 tablet (300 mg total) by mouth every evening., Disp: 90 tablet, Rfl: 3    meloxicam (MOBIC) 15 MG tablet, Take 1 tablet (15 mg total) by mouth once daily. (Patient taking differently: Take 15 mg by mouth daily as needed.), Disp: 90 tablet, Rfl: 0    montelukast (SINGULAIR) 10 mg tablet, Take 1 tablet (10 mg total) by mouth once daily., Disp: 30 tablet, Rfl: 2    VENTOLIN HFA  "90 mcg/actuation inhaler, INHALE 2 PUFFS INTO LUNGS FOUR TIMES DAILY AS NEEDED FOR WHEEZE, Disp: 18 g, Rfl: 0          Review of Systems   Constitutional: Negative for chills and fever.   HENT:  Negative for odynophagia and sore throat.    Eyes:  Negative for blurred vision, vision loss in left eye and vision loss in right eye.   Cardiovascular:  Positive for leg swelling (bilateral, occasional). Negative for chest pain, dyspnea on exertion, orthopnea, palpitations and syncope.   Respiratory:  Negative for cough, shortness of breath, sputum production and wheezing.    Musculoskeletal:  Negative for back pain and joint pain.   Gastrointestinal:  Negative for abdominal pain, constipation, diarrhea, dysphagia, melena, nausea and vomiting.   Neurological:  Negative for dizziness, headaches, light-headedness and weakness.   Psychiatric/Behavioral:  Negative for altered mental status and substance abuse.       Objective:BP (!) 167/85 (BP Location: Left arm, Patient Position: Sitting, BP Method: Medium (Automatic))   Pulse (!) 55   Ht 5' 7" (1.702 m)   Wt 72.7 kg (160 lb 4.4 oz)   BMI 25.10 kg/m²             Physical Exam  Constitutional:       Appearance: Normal appearance. He is not ill-appearing.   HENT:      Head: Normocephalic and atraumatic.      Right Ear: External ear normal.      Left Ear: External ear normal.      Nose: Nose normal. No congestion or rhinorrhea.   Eyes:      General: No scleral icterus.        Right eye: No discharge.         Left eye: No discharge.      Extraocular Movements: Extraocular movements intact.      Conjunctiva/sclera: Conjunctivae normal.   Cardiovascular:      Rate and Rhythm: Regular rhythm. Bradycardia present.      Pulses: Normal pulses.      Heart sounds: Normal heart sounds. No murmur heard.  Pulmonary:      Effort: Pulmonary effort is normal. No respiratory distress.      Breath sounds: Wheezing (minor inspiratory) present.   Abdominal:      General: There is no distension. "      Palpations: There is no mass.      Tenderness: There is no abdominal tenderness.   Musculoskeletal:      Cervical back: No rigidity.      Right lower leg: Edema (1+) present.      Left lower leg: Edema (1+) present.   Skin:     General: Skin is warm.      Findings: No bruising.   Neurological:      General: No focal deficit present.      Mental Status: He is alert and oriented to person, place, and time.   Psychiatric:         Mood and Affect: Mood normal.         Behavior: Behavior normal.       Assessment:       1. Primary hypertension    2. Bradycardia, sinus         Plan:       Rishi was seen today for establish care and bradycardia. It is unclear weather his blood pressure is well controlled. His bradycardia is asymptomatic and likely physiologic/idiopathic    Diagnoses and all orders for this visit:    Primary hypertension  Unclear if hypertension is controlled  Continue irbesartan 300 daily  Patient instructed to document BP 2 daily for next week and report back; will re-evaluate medical management     -     Hypertension Digital Medicine (HDMP) Enrollment Order  -     Hypertension Digital Medicine (HDMP): Assign Onboarding Questionnaires    Bradycardia, sinus  TSH normal, no history or evidence of previous cardiac ischemia.   While patient remains asymptomatic, further workup not warranted at this time        Michael Leal MD  Department of Internal Medicine  [facility]

## 2022-10-09 NOTE — PROGRESS NOTES
I have reviewed the patient's chart and the resident's clinic note, as well as discussed the case with the resident. I have personally spoken with and examined the patient in the clinic. I agree with the findings, assessment, and plan.      Bandar Wilcox MD  [facility]  .

## 2022-10-31 NOTE — PROGRESS NOTES
See prior attestation to this note     Bandar Wilcox MD  [facility]  .

## 2022-11-23 ENCOUNTER — TELEPHONE (OUTPATIENT)
Dept: ADMINISTRATIVE | Facility: HOSPITAL | Age: 57
End: 2022-11-23
Payer: MEDICARE

## 2022-11-23 NOTE — TELEPHONE ENCOUNTER
Called pt requesting home blood pressure. Pt stated that he has a cuff but has not taken it in a while. Sent portal message to reply to the next time he gets a reading.

## 2022-11-28 ENCOUNTER — PATIENT MESSAGE (OUTPATIENT)
Dept: ADMINISTRATIVE | Facility: HOSPITAL | Age: 57
End: 2022-11-28
Payer: MEDICARE

## 2022-11-28 ENCOUNTER — TELEPHONE (OUTPATIENT)
Dept: INTERNAL MEDICINE | Facility: CLINIC | Age: 57
End: 2022-11-28
Payer: MEDICARE

## 2022-11-28 VITALS — SYSTOLIC BLOOD PRESSURE: 144 MMHG | DIASTOLIC BLOOD PRESSURE: 84 MMHG

## 2023-01-06 ENCOUNTER — LAB VISIT (OUTPATIENT)
Dept: LAB | Facility: HOSPITAL | Age: 58
End: 2023-01-06
Attending: INTERNAL MEDICINE
Payer: MEDICARE

## 2023-01-06 ENCOUNTER — OFFICE VISIT (OUTPATIENT)
Dept: INTERNAL MEDICINE | Facility: CLINIC | Age: 58
End: 2023-01-06
Payer: MEDICARE

## 2023-01-06 VITALS
HEART RATE: 60 BPM | WEIGHT: 160 LBS | DIASTOLIC BLOOD PRESSURE: 82 MMHG | SYSTOLIC BLOOD PRESSURE: 146 MMHG | OXYGEN SATURATION: 98 % | HEIGHT: 67 IN | BODY MASS INDEX: 25.11 KG/M2

## 2023-01-06 DIAGNOSIS — M48.02 SPINAL STENOSIS, CERVICAL REGION: ICD-10-CM

## 2023-01-06 DIAGNOSIS — R20.2 PARESTHESIA OF LEFT UPPER AND LOWER EXTREMITY: ICD-10-CM

## 2023-01-06 DIAGNOSIS — J31.0 CHRONIC RHINITIS: ICD-10-CM

## 2023-01-06 DIAGNOSIS — S16.1XXA CERVICAL MYOFASCIAL STRAIN, INITIAL ENCOUNTER: ICD-10-CM

## 2023-01-06 DIAGNOSIS — S39.012A STRAIN OF LUMBAR REGION, INITIAL ENCOUNTER: ICD-10-CM

## 2023-01-06 DIAGNOSIS — R20.2 PARESTHESIA OF LEFT UPPER AND LOWER EXTREMITY: Primary | ICD-10-CM

## 2023-01-06 PROCEDURE — 1159F MED LIST DOCD IN RCRD: CPT | Mod: CPTII,S$GLB,, | Performed by: INTERNAL MEDICINE

## 2023-01-06 PROCEDURE — 85025 COMPLETE CBC W/AUTO DIFF WBC: CPT | Performed by: INTERNAL MEDICINE

## 2023-01-06 PROCEDURE — 3077F PR MOST RECENT SYSTOLIC BLOOD PRESSURE >= 140 MM HG: ICD-10-PCS | Mod: CPTII,S$GLB,, | Performed by: INTERNAL MEDICINE

## 2023-01-06 PROCEDURE — 1159F PR MEDICATION LIST DOCUMENTED IN MEDICAL RECORD: ICD-10-PCS | Mod: CPTII,S$GLB,, | Performed by: INTERNAL MEDICINE

## 2023-01-06 PROCEDURE — 3079F PR MOST RECENT DIASTOLIC BLOOD PRESSURE 80-89 MM HG: ICD-10-PCS | Mod: CPTII,S$GLB,, | Performed by: INTERNAL MEDICINE

## 2023-01-06 PROCEDURE — 3079F DIAST BP 80-89 MM HG: CPT | Mod: CPTII,S$GLB,, | Performed by: INTERNAL MEDICINE

## 2023-01-06 PROCEDURE — 3077F SYST BP >= 140 MM HG: CPT | Mod: CPTII,S$GLB,, | Performed by: INTERNAL MEDICINE

## 2023-01-06 PROCEDURE — 3008F PR BODY MASS INDEX (BMI) DOCUMENTED: ICD-10-PCS | Mod: CPTII,S$GLB,, | Performed by: INTERNAL MEDICINE

## 2023-01-06 PROCEDURE — 80048 BASIC METABOLIC PNL TOTAL CA: CPT | Performed by: INTERNAL MEDICINE

## 2023-01-06 PROCEDURE — 99214 OFFICE O/P EST MOD 30 MIN: CPT | Mod: S$GLB,,, | Performed by: INTERNAL MEDICINE

## 2023-01-06 PROCEDURE — 3008F BODY MASS INDEX DOCD: CPT | Mod: CPTII,S$GLB,, | Performed by: INTERNAL MEDICINE

## 2023-01-06 PROCEDURE — 99999 PR PBB SHADOW E&M-EST. PATIENT-LVL III: CPT | Mod: PBBFAC,,, | Performed by: INTERNAL MEDICINE

## 2023-01-06 PROCEDURE — 82607 VITAMIN B-12: CPT | Performed by: INTERNAL MEDICINE

## 2023-01-06 PROCEDURE — 99999 PR PBB SHADOW E&M-EST. PATIENT-LVL III: ICD-10-PCS | Mod: PBBFAC,,, | Performed by: INTERNAL MEDICINE

## 2023-01-06 PROCEDURE — 82746 ASSAY OF FOLIC ACID SERUM: CPT | Performed by: INTERNAL MEDICINE

## 2023-01-06 PROCEDURE — 99214 PR OFFICE/OUTPT VISIT, EST, LEVL IV, 30-39 MIN: ICD-10-PCS | Mod: S$GLB,,, | Performed by: INTERNAL MEDICINE

## 2023-01-06 PROCEDURE — 36415 COLL VENOUS BLD VENIPUNCTURE: CPT | Performed by: INTERNAL MEDICINE

## 2023-01-06 RX ORDER — MELOXICAM 15 MG/1
15 TABLET ORAL DAILY
Qty: 30 TABLET | Refills: 1 | Status: SHIPPED | OUTPATIENT
Start: 2023-01-06 | End: 2023-05-10

## 2023-01-06 RX ORDER — TIZANIDINE 4 MG/1
4 TABLET ORAL NIGHTLY
Qty: 30 TABLET | Refills: 0 | Status: SHIPPED | OUTPATIENT
Start: 2023-01-06 | End: 2023-05-10

## 2023-01-06 RX ORDER — MOMETASONE FUROATE 50 UG/1
2 SPRAY, METERED NASAL DAILY
Qty: 17 G | Refills: 1 | Status: SHIPPED | OUTPATIENT
Start: 2023-01-06

## 2023-01-06 NOTE — PROGRESS NOTES
MEDICAL HISTORY:  Hypertension.  Chronic asthma.  Osteoarthritis of the knee, right knee surgery with previous medial meniscectomy and chondroplasty in right total knee arthroplasty  Cervical degenerative disk disease with left cervical foraminotomy at C6-7.  Lumbar degenerative disk disease with neural foraminal stenosis and facet arthropathy.           SOCIAL HISTORY:  Tobacco use, none.  Alcohol use, 6-12 beers week.  Exercise occasional riding stationary bike     FAMILY HISTORY:  Mother disease, history of hypertension and stroke.  Father , unknown.  One sister, one brother living.  Brother has hypertension.  One brother      Medication  Albuterol as needed  Irbesartan 300 mg daily      57-year-old male  Reason for visit is that he is having ongoing abnormal sensation , irritated feeling that involves his left lower back lateral side but now he feels a similar sensation involving his neck , his left arm and left lower extremity.  He feels stiffness in the back of his neck.  As well as a headache in the back of his occipital region    Also within the past 2 weeks has been a congestion feeling in his head and sometimes is chest blowing all coughing up clear mucus.  He is not short of breath no wheezing.  Reports no chest pain or abdominal pain.  Regular bowel urine function    Back in October montelukast was initiated.  Eventually stop the medication because of current symptoms but he is not certain if is was that helpful    Examination   Weight 160  Pulse 64  Blood pressure 130 over 72   Chest clear breath sounds   Neck no thyromegaly no masses  Heart regular rate rhythm   Abdominal exam nontender soft no hepatosplenomegaly abdominal masses  Two to 3+ biceps triceps knee and ankle jerk reflexes  Not tender while palpate over the neck  Continues cysts noted over the right lateral neck  He feels a degree of stiffness in his left lumbar and thoracic back when lying down    Impression   Left upper  lower extremity paresthesia  Myofascial pain involving the thoracic lumbar region  Chronic rhinitis      Routine labs including B12 folate  MRI the cervical vertebrae  Meloxicam 15 mg once a day for 1 month with 1 refill along with tizanidine 4 mg at bedtime  Flonase 2 puffs each nostril once a day   for now continue put a hold on montelukastAnswers submitted by the patient for this visit:  Review of Systems Questionnaire (Submitted on 1/6/2023)  activity change: No  unexpected weight change: No  neck pain: Yes  hearing loss: No  rhinorrhea: No  trouble swallowing: No  eye discharge: No  visual disturbance: No  chest tightness: No  wheezing: Yes  chest pain: Yes  palpitations: No  blood in stool: No  constipation: No  vomiting: No  diarrhea: No  polydipsia: No  polyuria: No  difficulty urinating: No  urgency: No  hematuria: No  joint swelling: No  arthralgias: No  headaches: Yes  weakness: Yes  confusion: No  dysphoric mood: Yes

## 2023-01-07 LAB
ANION GAP SERPL CALC-SCNC: 8 MMOL/L (ref 8–16)
BASOPHILS # BLD AUTO: 0.02 K/UL (ref 0–0.2)
BASOPHILS NFR BLD: 0.3 % (ref 0–1.9)
BUN SERPL-MCNC: 12 MG/DL (ref 6–20)
CALCIUM SERPL-MCNC: 9.7 MG/DL (ref 8.7–10.5)
CHLORIDE SERPL-SCNC: 103 MMOL/L (ref 95–110)
CO2 SERPL-SCNC: 28 MMOL/L (ref 23–29)
CREAT SERPL-MCNC: 1 MG/DL (ref 0.5–1.4)
DIFFERENTIAL METHOD: ABNORMAL
EOSINOPHIL # BLD AUTO: 0.1 K/UL (ref 0–0.5)
EOSINOPHIL NFR BLD: 0.7 % (ref 0–8)
ERYTHROCYTE [DISTWIDTH] IN BLOOD BY AUTOMATED COUNT: 15.1 % (ref 11.5–14.5)
EST. GFR  (NO RACE VARIABLE): >60 ML/MIN/1.73 M^2
FOLATE SERPL-MCNC: 14.2 NG/ML (ref 4–24)
GLUCOSE SERPL-MCNC: 82 MG/DL (ref 70–110)
HCT VFR BLD AUTO: 42.1 % (ref 40–54)
HGB BLD-MCNC: 13.3 G/DL (ref 14–18)
IMM GRANULOCYTES # BLD AUTO: 0.01 K/UL (ref 0–0.04)
IMM GRANULOCYTES NFR BLD AUTO: 0.1 % (ref 0–0.5)
LYMPHOCYTES # BLD AUTO: 2.9 K/UL (ref 1–4.8)
LYMPHOCYTES NFR BLD: 39 % (ref 18–48)
MCH RBC QN AUTO: 29.1 PG (ref 27–31)
MCHC RBC AUTO-ENTMCNC: 31.6 G/DL (ref 32–36)
MCV RBC AUTO: 92 FL (ref 82–98)
MONOCYTES # BLD AUTO: 0.7 K/UL (ref 0.3–1)
MONOCYTES NFR BLD: 9.5 % (ref 4–15)
NEUTROPHILS # BLD AUTO: 3.8 K/UL (ref 1.8–7.7)
NEUTROPHILS NFR BLD: 50.4 % (ref 38–73)
NRBC BLD-RTO: 0 /100 WBC
PLATELET # BLD AUTO: 259 K/UL (ref 150–450)
PMV BLD AUTO: 9.9 FL (ref 9.2–12.9)
POTASSIUM SERPL-SCNC: 4.5 MMOL/L (ref 3.5–5.1)
RBC # BLD AUTO: 4.57 M/UL (ref 4.6–6.2)
SODIUM SERPL-SCNC: 139 MMOL/L (ref 136–145)
VIT B12 SERPL-MCNC: 623 PG/ML (ref 210–950)
WBC # BLD AUTO: 7.49 K/UL (ref 3.9–12.7)

## 2023-01-07 NOTE — PROGRESS NOTES
The test results look fine    Continue as instructed with the use of the prescription medicines meloxicam and tizanidine that was sent in and will get back with you after review of the MRI the cervical spine

## 2023-01-10 ENCOUNTER — PES CALL (OUTPATIENT)
Dept: ADMINISTRATIVE | Facility: CLINIC | Age: 58
End: 2023-01-10
Payer: MEDICARE

## 2023-01-11 RX ORDER — MONTELUKAST SODIUM 10 MG/1
TABLET ORAL
Qty: 30 TABLET | Refills: 2 | OUTPATIENT
Start: 2023-01-11

## 2023-01-11 NOTE — TELEPHONE ENCOUNTER
Refill Routing Note   Medication(s) are not appropriate for processing by [facility] for the following reason(s):      - Medication not active on medication list    [facility] action(s):  Defer          Medication reconciliation completed: No     Appointments  past 12m or future 3m with PCP    Date Provider   Last Visit   1/6/2023 Colten Carlisle MD   Next Visit   Visit date not found Colten Carlisle MD   ED visits in past 90 days: 0        Note composed:1:50 PM 01/11/2023

## 2023-01-11 NOTE — TELEPHONE ENCOUNTER
No new care gaps identified.  [facility] Embedded Care Gaps. Reference number: [id number]. 1/11/2023   1:13:22 PM CST

## 2023-01-20 ENCOUNTER — HOSPITAL ENCOUNTER (OUTPATIENT)
Dept: RADIOLOGY | Facility: HOSPITAL | Age: 58
Discharge: HOME | End: 2023-01-20
Attending: INTERNAL MEDICINE
Payer: MEDICARE

## 2023-01-20 DIAGNOSIS — R20.2 PARESTHESIA OF LEFT UPPER AND LOWER EXTREMITY: ICD-10-CM

## 2023-01-20 DIAGNOSIS — M48.02 SPINAL STENOSIS, CERVICAL REGION: ICD-10-CM

## 2023-01-20 DIAGNOSIS — S16.1XXA CERVICAL MYOFASCIAL STRAIN, INITIAL ENCOUNTER: ICD-10-CM

## 2023-01-20 PROCEDURE — 72141 MRI CERVICAL SPINE WITHOUT CONTRAST: ICD-10-PCS | Mod: 26,,, | Performed by: INTERNAL MEDICINE

## 2023-01-20 PROCEDURE — 72141 MRI NECK SPINE W/O DYE: CPT | Mod: 26,,, | Performed by: INTERNAL MEDICINE

## 2023-01-20 PROCEDURE — 72141 MRI NECK SPINE W/O DYE: CPT | Mod: TC

## 2023-02-02 ENCOUNTER — OFFICE VISIT (OUTPATIENT)
Dept: INTERNAL MEDICINE | Facility: CLINIC | Age: 58
End: 2023-02-02
Payer: MEDICARE

## 2023-02-02 VITALS
WEIGHT: 165.13 LBS | SYSTOLIC BLOOD PRESSURE: 160 MMHG | HEART RATE: 50 BPM | DIASTOLIC BLOOD PRESSURE: 90 MMHG | OXYGEN SATURATION: 100 % | BODY MASS INDEX: 25.92 KG/M2 | HEIGHT: 67 IN

## 2023-02-02 DIAGNOSIS — M54.12 CERVICAL RADICULOPATHY: ICD-10-CM

## 2023-02-02 DIAGNOSIS — I10 PRIMARY HYPERTENSION: ICD-10-CM

## 2023-02-02 DIAGNOSIS — M48.02 NEURAL FORAMINAL STENOSIS OF CERVICAL SPINE: ICD-10-CM

## 2023-02-02 DIAGNOSIS — M17.12 PRIMARY OSTEOARTHRITIS OF LEFT KNEE: ICD-10-CM

## 2023-02-02 DIAGNOSIS — M47.812 OSTEOARTHRITIS OF CERVICAL SPINE, UNSPECIFIED SPINAL OSTEOARTHRITIS COMPLICATION STATUS: ICD-10-CM

## 2023-02-02 DIAGNOSIS — M54.50 CHRONIC LOW BACK PAIN, UNSPECIFIED BACK PAIN LATERALITY, UNSPECIFIED WHETHER SCIATICA PRESENT: ICD-10-CM

## 2023-02-02 DIAGNOSIS — G89.29 CHRONIC LOW BACK PAIN, UNSPECIFIED BACK PAIN LATERALITY, UNSPECIFIED WHETHER SCIATICA PRESENT: ICD-10-CM

## 2023-02-02 DIAGNOSIS — Z00.00 ENCOUNTER FOR PREVENTIVE HEALTH EXAMINATION: Primary | ICD-10-CM

## 2023-02-02 DIAGNOSIS — E66.3 OVERWEIGHT WITH BODY MASS INDEX (BMI) OF 25 TO 25.9 IN ADULT: ICD-10-CM

## 2023-02-02 DIAGNOSIS — J45.909 CHRONIC ASTHMA, UNSPECIFIED ASTHMA SEVERITY, UNSPECIFIED WHETHER COMPLICATED, UNSPECIFIED WHETHER PERSISTENT: ICD-10-CM

## 2023-02-02 PROCEDURE — 99999 PR PBB SHADOW E&M-EST. PATIENT-LVL IV: ICD-10-PCS | Mod: PBBFAC,,,

## 2023-02-02 PROCEDURE — 1160F PR REVIEW ALL MEDS BY PRESCRIBER/CLIN PHARMACIST DOCUMENTED: ICD-10-PCS | Mod: CPTII,S$GLB,,

## 2023-02-02 PROCEDURE — G0439 PR MEDICARE ANNUAL WELLNESS SUBSEQUENT VISIT: ICD-10-PCS | Mod: S$GLB,,,

## 2023-02-02 PROCEDURE — 1160F RVW MEDS BY RX/DR IN RCRD: CPT | Mod: CPTII,S$GLB,,

## 2023-02-02 PROCEDURE — 1159F PR MEDICATION LIST DOCUMENTED IN MEDICAL RECORD: ICD-10-PCS | Mod: CPTII,S$GLB,,

## 2023-02-02 PROCEDURE — 3080F PR MOST RECENT DIASTOLIC BLOOD PRESSURE >= 90 MM HG: ICD-10-PCS | Mod: CPTII,S$GLB,,

## 2023-02-02 PROCEDURE — G0439 PPPS, SUBSEQ VISIT: HCPCS | Mod: S$GLB,,,

## 2023-02-02 PROCEDURE — 99999 PR PBB SHADOW E&M-EST. PATIENT-LVL IV: CPT | Mod: PBBFAC,,,

## 2023-02-02 PROCEDURE — 3077F PR MOST RECENT SYSTOLIC BLOOD PRESSURE >= 140 MM HG: ICD-10-PCS | Mod: CPTII,S$GLB,,

## 2023-02-02 PROCEDURE — 3008F PR BODY MASS INDEX (BMI) DOCUMENTED: ICD-10-PCS | Mod: CPTII,S$GLB,,

## 2023-02-02 PROCEDURE — 3077F SYST BP >= 140 MM HG: CPT | Mod: CPTII,S$GLB,,

## 2023-02-02 PROCEDURE — 3080F DIAST BP >= 90 MM HG: CPT | Mod: CPTII,S$GLB,,

## 2023-02-02 PROCEDURE — 3008F BODY MASS INDEX DOCD: CPT | Mod: CPTII,S$GLB,,

## 2023-02-02 PROCEDURE — 1159F MED LIST DOCD IN RCRD: CPT | Mod: CPTII,S$GLB,,

## 2023-02-02 RX ORDER — HYDROCODONE BITARTRATE AND ACETAMINOPHEN 5; 325 MG/1; MG/1
1 TABLET ORAL EVERY 6 HOURS
COMMUNITY
Start: 2023-01-24 | End: 2023-02-02

## 2023-02-02 RX ORDER — AMOXICILLIN 500 MG/1
500 CAPSULE ORAL 3 TIMES DAILY
COMMUNITY
Start: 2023-01-24 | End: 2023-02-02

## 2023-02-02 NOTE — PROGRESS NOTES
"  Rishi Aranda presented for a  Medicare AWV and comprehensive Health Risk Assessment today.  He is a patient of Dr. Carlisle and is new to me.  The following components were reviewed and updated:    Medical history  Family History  Social history  Allergies and Current Medications  Health Risk Assessment  Health Maintenance  Care Team     ** See Completed Assessments for Annual Wellness Visit within the encounter summary.**         The following assessments were completed:  Living Situation  CAGE  Depression Screening  Timed Get Up and Go  Whisper Test  Cognitive Function Screening  Nutrition Screening  ADL Screening  PAQ Screening  Review for opioid screen: pt does not have rx for opioid  Review for substance use disorder:  pt does not use substance          Vitals:    02/02/23 1020 02/02/23 1044   BP: (!) 140/96 (!) 160/90   BP Location:  Right arm   Patient Position:  Sitting   Pulse: (!) 50    SpO2: 100%    Weight: 74.9 kg (165 lb 2 oz)    Height: 5' 7" (1.702 m)      Body mass index is 25.86 kg/m².    Physical Exam  Vitals reviewed.   Constitutional:       Appearance: Normal appearance.   HENT:      Head: Normocephalic and atraumatic.   Cardiovascular:      Rate and Rhythm: Normal rate and regular rhythm.      Pulses: Normal pulses.           Radial pulses are 2+ on the right side and 2+ on the left side.        Dorsalis pedis pulses are 2+ on the right side and 2+ on the left side.        Posterior tibial pulses are 2+ on the right side and 2+ on the left side.      Heart sounds: Normal heart sounds.   Pulmonary:      Effort: Pulmonary effort is normal.      Breath sounds: Normal breath sounds.   Musculoskeletal:      Right lower leg: No edema.      Left lower leg: No edema.   Skin:     General: Skin is warm and dry.      Capillary Refill: Capillary refill takes less than 2 seconds.   Neurological:      General: No focal deficit present.      Mental Status: He is alert and oriented to person, place, and time. "   Psychiatric:         Mood and Affect: Mood normal.         Behavior: Behavior normal.      Diagnoses and health risks identified today and associated recommendations/orders:    1. Encounter for preventive health examination  - Assessment and evaluation performed as stated above    2. Chronic asthma, unspecified asthma severity, unspecified whether complicated, unspecified whether persistent  -stable on albuterol.  Followed by pcp.    3. Primary hypertension  - stable on irbesartan.  Pt stated that he does not take his medication daily.  Pt encouraged to take all medications as prescribed.  Followed by cardiology and pcp.    4. Overweight with body mass index (BMI) of 25 to 25.9 in adult  - stable.  Pt encouraged to increase activity and reduce caloric intake. Followed by pcp.    5. Chronic low back pain, unspecified back pain laterality, unspecified whether sciatica present  6. Neural foraminal stenosis of cervical spine  7. Cervical radiculopathy, BUE  - stable on meloxicam and tizanidine.  Followed by pcp.    8. Osteoarthritis of cervical spine, unspecified spinal osteoarthritis complication status  9. Primary osteoarthritis of left knee  - stable on meloxicam and regular exercise and stretching.  Followed by pcp and sports medicine.      Provided Rishi with a 5-10 year written screening schedule and personal prevention plan. Recommendations were developed using the USPSTF age appropriate recommendations. Education, counseling, and referrals were provided as needed. After Visit Summary printed and given to patient which includes a list of additional screenings\tests needed.    Follow up in about 1 year (around 2/2/2024), or if symptoms worsen or fail to improve.      Sidra Matthew, YANDEL    I offered to discuss advanced care planning, including how to pick a person who would make decisions for you if you were unable to make them for yourself, called a health care power of , and what kind of decisions you  might make such as use of life sustaining treatments such as ventilators and tube feeding when faced with a life limiting illness recorded on a living will that they will need to know. (How you want to be cared for as you near the end of your natural life)     X Patient is interested in learning more about how to make advanced directives.  I provided them paperwork and offered to discuss this with them.

## 2023-02-02 NOTE — PATIENT INSTRUCTIONS
2 week BP diary and send to Mr. Carlisle and myself for BP medication evaluation    Counseling and Referral of Other Preventative  (Italic type indicates deductible and co-insurance are waived)    Patient Name: Rishi Aranda  Today's Date: 2/2/2023    Health Maintenance         Date Due Completion Date    Influenza Vaccine (1) 06/30/2023 (Originally 9/1/2022) ---    TETANUS VACCINE 02/02/2024 (Originally 10/11/1982) ---    Shingles Vaccine (1 of 2) 02/02/2024 (Originally 10/11/2014) ---    COVID-19 Vaccine (4 - Booster for Moderna series) 02/02/2024 (Originally 3/13/2022) 1/16/2022    Lipid Panel 10/04/2027 10/4/2022    Colorectal Cancer Screening 05/02/2029 5/2/2019          No orders of the defined types were placed in this encounter.      The following information is provided to all patients.  This information is to help you find resources for any of the problems found today that may be affecting your health:                Living healthy guide: www.[facility].[place].gov      Understanding Diabetes: www.diabetes.org      Eating healthy: www.cdc.gov/healthyweight      [facility] home safety checklist: www.cdc.gov/steadi/patient.html      Agency on Aging: www.goea.[place].gov      Alcoholics anonymous (AA): www.aa.org      Physical Activity: www.patricia.nih.gov/vl6dtzw      Tobacco use: www.quitwithusla.org

## 2023-03-04 ENCOUNTER — HOSPITAL ENCOUNTER (EMERGENCY)
Facility: HOSPITAL | Age: 58
Discharge: HOME | End: 2023-03-04
Attending: STUDENT IN AN ORGANIZED HEALTH CARE EDUCATION/TRAINING PROGRAM
Payer: MEDICARE

## 2023-03-04 VITALS
HEIGHT: 67 IN | HEART RATE: 56 BPM | BODY MASS INDEX: 25.9 KG/M2 | TEMPERATURE: 99 F | SYSTOLIC BLOOD PRESSURE: 157 MMHG | RESPIRATION RATE: 18 BRPM | WEIGHT: 165 LBS | DIASTOLIC BLOOD PRESSURE: 86 MMHG | OXYGEN SATURATION: 96 %

## 2023-03-04 DIAGNOSIS — R07.9 CHEST PAIN: ICD-10-CM

## 2023-03-04 DIAGNOSIS — R07.89 CHEST DISCOMFORT: ICD-10-CM

## 2023-03-04 LAB
ALBUMIN SERPL BCP-MCNC: 3.8 G/DL (ref 3.5–5.2)
ALP SERPL-CCNC: 53 U/L (ref 55–135)
ALT SERPL W/O P-5'-P-CCNC: 16 U/L (ref 10–44)
ANION GAP SERPL CALC-SCNC: 9 MMOL/L (ref 8–16)
AST SERPL-CCNC: 21 U/L (ref 10–40)
BASOPHILS # BLD AUTO: 0.04 K/UL (ref 0–0.2)
BASOPHILS NFR BLD: 0.6 % (ref 0–1.9)
BILIRUB SERPL-MCNC: 0.5 MG/DL (ref 0.1–1)
BNP SERPL-MCNC: 15 PG/ML (ref 0–99)
BUN SERPL-MCNC: 10 MG/DL (ref 6–20)
CALCIUM SERPL-MCNC: 9.3 MG/DL (ref 8.7–10.5)
CHLORIDE SERPL-SCNC: 107 MMOL/L (ref 95–110)
CO2 SERPL-SCNC: 23 MMOL/L (ref 23–29)
CREAT SERPL-MCNC: 0.9 MG/DL (ref 0.5–1.4)
DIFFERENTIAL METHOD: ABNORMAL
EOSINOPHIL # BLD AUTO: 0.3 K/UL (ref 0–0.5)
EOSINOPHIL NFR BLD: 4.3 % (ref 0–8)
ERYTHROCYTE [DISTWIDTH] IN BLOOD BY AUTOMATED COUNT: 16.4 % (ref 11.5–14.5)
EST. GFR  (NO RACE VARIABLE): >60 ML/MIN/1.73 M^2
GLUCOSE SERPL-MCNC: 84 MG/DL (ref 70–110)
HCT VFR BLD AUTO: 42.9 % (ref 40–54)
HGB BLD-MCNC: 13.6 G/DL (ref 14–18)
IMM GRANULOCYTES # BLD AUTO: 0.01 K/UL (ref 0–0.04)
IMM GRANULOCYTES NFR BLD AUTO: 0.1 % (ref 0–0.5)
LYMPHOCYTES # BLD AUTO: 3.6 K/UL (ref 1–4.8)
LYMPHOCYTES NFR BLD: 51.7 % (ref 18–48)
MCH RBC QN AUTO: 28.8 PG (ref 27–31)
MCHC RBC AUTO-ENTMCNC: 31.7 G/DL (ref 32–36)
MCV RBC AUTO: 91 FL (ref 82–98)
MONOCYTES # BLD AUTO: 0.5 K/UL (ref 0.3–1)
MONOCYTES NFR BLD: 6.5 % (ref 4–15)
NEUTROPHILS # BLD AUTO: 2.6 K/UL (ref 1.8–7.7)
NEUTROPHILS NFR BLD: 36.8 % (ref 38–73)
NRBC BLD-RTO: 0 /100 WBC
PLATELET # BLD AUTO: 231 K/UL (ref 150–450)
PMV BLD AUTO: 10.5 FL (ref 9.2–12.9)
POC CARDIAC TROPONIN I: 0 NG/ML (ref 0–0.08)
POTASSIUM SERPL-SCNC: 4.2 MMOL/L (ref 3.5–5.1)
PROT SERPL-MCNC: 6.6 G/DL (ref 6–8.4)
RBC # BLD AUTO: 4.73 M/UL (ref 4.6–6.2)
SAMPLE: NORMAL
SODIUM SERPL-SCNC: 139 MMOL/L (ref 136–145)
TROPONIN I SERPL DL<=0.01 NG/ML-MCNC: 0.01 NG/ML (ref 0–0.03)
TROPONIN I SERPL DL<=0.01 NG/ML-MCNC: 0.02 NG/ML (ref 0–0.03)
WBC # BLD AUTO: 6.95 K/UL (ref 3.9–12.7)

## 2023-03-04 PROCEDURE — 99284 PR EMERGENCY DEPT VISIT,LEVEL IV: ICD-10-PCS | Mod: FS,,, | Performed by: STUDENT IN AN ORGANIZED HEALTH CARE EDUCATION/TRAINING PROGRAM

## 2023-03-04 PROCEDURE — 85025 COMPLETE CBC W/AUTO DIFF WBC: CPT

## 2023-03-04 PROCEDURE — 99285 EMERGENCY DEPT VISIT HI MDM: CPT | Mod: 25

## 2023-03-04 PROCEDURE — 99284 EMERGENCY DEPT VISIT MOD MDM: CPT | Mod: FS,,, | Performed by: STUDENT IN AN ORGANIZED HEALTH CARE EDUCATION/TRAINING PROGRAM

## 2023-03-04 PROCEDURE — 93010 ELECTROCARDIOGRAM REPORT: CPT | Mod: ,,, | Performed by: INTERNAL MEDICINE

## 2023-03-04 PROCEDURE — 83880 ASSAY OF NATRIURETIC PEPTIDE: CPT

## 2023-03-04 PROCEDURE — 93005 ELECTROCARDIOGRAM TRACING: CPT

## 2023-03-04 PROCEDURE — 25000003 PHARM REV CODE 250

## 2023-03-04 PROCEDURE — 80053 COMPREHEN METABOLIC PANEL: CPT

## 2023-03-04 PROCEDURE — 93010 EKG 12-LEAD: ICD-10-PCS | Mod: ,,, | Performed by: INTERNAL MEDICINE

## 2023-03-04 PROCEDURE — 94761 N-INVAS EAR/PLS OXIMETRY MLT: CPT

## 2023-03-04 PROCEDURE — 84484 ASSAY OF TROPONIN QUANT: CPT | Mod: 91

## 2023-03-04 RX ORDER — ASPIRIN 325 MG
325 TABLET ORAL
Status: COMPLETED | OUTPATIENT
Start: 2023-03-04 | End: 2023-03-04

## 2023-03-04 RX ADMIN — ASPIRIN 325 MG ORAL TABLET 325 MG: 325 PILL ORAL at 04:03

## 2023-03-04 NOTE — ED PROVIDER NOTES
"Encounter Date: 3/4/2023       History     Chief Complaint   Patient presents with    Chest Pain     Started this am, "stinging"; denies cardiac hx, +SOB     49-year-old male PMH of cervical radiculopathy, asthma and hypertension presents to the emergency department for 7/10 nonradiating left-sided chest tingling that has been intermittent in presentation over the last few months.  This presentation onset occurred at 9:00 a.m. however resolved within minutes spontaneously. Patient also endorses left-sided soreness however this has been present for several months.  In reviewing the chart patient has reported similar presentation to his PCP, regarding left-sided soreness.  In January he had an cervical MRI performed, the symptoms are currently being followed by his primary care provider and has been unchanged since he was last seen.  Patient reports while he is experienced these abnormal tingling sensation on the left side of his chest intermittently he presents today because his wife also presented.  Patient denies taking anything for his symptoms  He denies experiencing new onset of shortness of breath, nausea, vomiting, abdominal pain or weakness.    Review of patient's allergies indicates:   Allergen Reactions    Theophylline      Other reaction(s): Hives     Past Medical History:   Diagnosis Date    Arthritis     Asthma     Cervical radiculopathy, BUE 8/17/2012    Chronic LBP 8/17/2012    Chronic neck pain 8/17/2012    HTN (hypertension) 8/17/2012    Hypertension     Surgery follow-up examination 7/3/2019     Past Surgical History:   Procedure Laterality Date    COLONOSCOPY N/A 5/2/2019    Procedure: COLONOSCOPY;  Surgeon: Noemí Boss MD;  Location: [address]);  Service: Endoscopy;  Laterality: N/A;    KNEE SURGERY      NECK SURGERY      TOTAL KNEE ARTHROPLASTY Right 5/14/2019    Procedure: ARTHROPLASTY, KNEE, TOTAL,Medial and Lateral compartment;  Surgeon: Maxi Chaves MD;  Location: [facility] OR;  " Service: Orthopedics;  Laterality: Right;  Regional w/o Catheter,Adductor,Exparel-Bupivacaine Liposome Injection 30cc,Clonidine/Epi/Ketorolac/Ropivacaine Injection 30cc     Family History   Problem Relation Age of Onset    Hypertension Mother     Stroke Mother     No Known Problems Sister     Hypertension Brother      Social History     Tobacco Use    Smoking status: Never    Smokeless tobacco: Never   Substance Use Topics    Alcohol use: Yes     Alcohol/week: 12.0 standard drinks     Types: 12 Cans of beer per week     Comment: occasionally    Drug use: No     Review of Systems   Constitutional:  Negative for activity change and fever.   HENT: Negative.     Respiratory:  Negative for shortness of breath.    Cardiovascular:  Positive for chest pain. Negative for leg swelling.   Gastrointestinal:  Negative for abdominal pain, nausea and vomiting.   Genitourinary: Negative.    Musculoskeletal: Negative.    Skin: Negative.    Neurological:  Negative for dizziness and light-headedness.     Physical Exam     Initial Vitals [03/04/23 1507]   BP Pulse Resp Temp SpO2   135/72 60 18 98.9 °F (37.2 °C) 98 %      MAP       --         Physical Exam    Vitals reviewed.  Constitutional: He appears well-developed and well-nourished.   Well-appearing male does not appear in significant discomfort   HENT:   Head: Normocephalic and atraumatic.   Eyes: Conjunctivae and EOM are normal.   Neck:   Normal range of motion.  Cardiovascular:  Normal rate, regular rhythm and normal heart sounds.           Pulmonary/Chest: Breath sounds normal. No respiratory distress. He has no wheezes. He has no rales. He exhibits tenderness.   Abdominal: Abdomen is soft. He exhibits no distension. There is no abdominal tenderness. There is no rebound.   Musculoskeletal:         General: Normal range of motion.      Cervical back: Normal range of motion.     Neurological: He is alert and oriented to person, place, and time.   Skin: Skin is warm and dry.  "  Psychiatric: He has a normal mood and affect. Thought content normal.       ED Course   Procedures  Labs Reviewed   CBC W/ AUTO DIFFERENTIAL - Abnormal; Notable for the following components:       Result Value    Hemoglobin 13.6 (*)     MCHC 31.7 (*)     RDW 16.4 (*)     Gran % 36.8 (*)     Lymph % 51.7 (*)     All other components within normal limits   COMPREHENSIVE METABOLIC PANEL - Abnormal; Notable for the following components:    Alkaline Phosphatase 53 (*)     All other components within normal limits   TROPONIN I   TROPONIN I   B-TYPE NATRIURETIC PEPTIDE   TROPONIN ISTAT   POCT TROPONIN   POCT TROPONIN          Imaging Results              X-Ray Chest PA And Lateral (Final result)  Result time 03/04/23 17:27:55      Final result by Edgar Juarez MD (03/04/23 17:27:55)                   Impression:      No acute findings.      Electronically signed by: Edgar Juarez  Date:    03/04/2023  Time:    17:27               Narrative:    EXAMINATION:  XR CHEST PA AND LATERAL    CLINICAL HISTORY:  Other chest pain    TECHNIQUE:  PA and lateral views of the chest were performed.    COMPARISON:  09/22/2022    FINDINGS:  Lungs are clear.  No focal consolidation, pleural fluid, or pneumothorax.  Normal heart size.                                       Medications   aspirin tablet 325 mg (325 mg Oral Given 3/4/23 5880)     Medical Decision Making:   Initial Assessment:   57-year-old male presents emergency department for a " sting like sensation" overlying his left chest  Differential Diagnosis:   Differential diagnosis:  Zoster is post herpetic pain, muscular also considered atypical ACS presentation  Clinical Tests:   Lab Tests: Ordered  Radiological Study: Ordered  ED Management:  Labs and x-ray ordered  Patient given aspirin  EKG per my interpretation reveals sinus bradycardia 52 beats per minute.  With early repolarization unchanged from previous EKG.  No concerns of STEMI or ischemia at this time  Both " troponins trended WNL  BNP WNL  CBC with a leukocytosis, hemoglobin stable at 13.6  No electrolyte derangement  CXR did not reveal any acute abnormalities  I will place a referral to Cardiology for outpatient follow-up  Discussed with patient strict return precautions  The way patient described chest pain does seem similar to a nerve like pain however patient does not report history of shingles.  No overlying rash blistering or bones were seen on skin overlying left-sided chest.  Patient had a MRI of the cervical spine in January of this year revealing degenerative changes.  Patient continues to follow up with the symptoms outpatient  Discussed with supervising physician patient will be discharged home  Additional MDM:   Heart Score:    History:          Slightly suspicious.  ECG:             Nonspecific repolarisation disturbance  Age:               45-65 years  Risk factors: 1-2 risk factors  Troponin:       Less than or equal to normal limit  Final Score: 3          ED Course as of 03/04/23 2107   Sat Mar 04, 2023   1515 EKG received/reviewed. No STEMI. [LP]      ED Course User Index  [LP] David Yao III, MD                 Clinical Impression:   Final diagnoses:  [R07.9] Chest pain  [R07.89] Chest discomfort               Hodan Marques PA-C  03/04/23 2106           Hodan Marques PA-C  03/04/23 2114

## 2023-03-04 NOTE — ED TRIAGE NOTES
Pt reports CP that stings on the left side x couple weeks. Pt reports tenderness to left side as well that radiates down to the legs and states it feels like a raw sore. Pt reports no SOB associated with CP.

## 2023-03-04 NOTE — ED NOTES
LOC: The patient is awake, alert, and oriented to self, place, time, and situation. Pt is calm and cooperative. Affect is appropriate.  Speech is appropriate and clear.     APPEARANCE: Patient resting comfortably in no acute distress.  Patient is clean and well groomed.    SKIN: The skin is warm and dry; color consistent with ethnicity.  Patient has normal skin turgor and moist mucus membranes.  Skin intact; no breakdown or bruising noted.     MUSCULOSKELETAL: Patient moving upper and lower extremities without difficulty; Pain noted to left side of body.  Denies weakness.     RESPIRATORY: Airway is open and patent. Respirations spontaneous, even, easy, and non-labored.  Patient has a normal effort and rate.  No accessory muscle use noted. Denies cough. Denies SOB.    CARDIAC:  Normal rate noted.  No peripheral edema noted. Complaints of chest pain that has a stinging sensation.     ABDOMEN: Soft and non tender to palpation.  No distention noted. Pt denies abdominal pain; denies nausea, vomiting, diarrhea, or constipation.    NEUROLOGIC: Eyes open spontaneously.  Behavior appropriate to situation.  Follows commands; facial expression symmetrical.  Purposeful motor response noted; normal sensation in all extremities. Pt denies headache; denies lightheadedness or dizziness; denies visual disturbances; denies loss of balance; denies unilateral weakness.

## 2023-03-05 NOTE — DISCHARGE INSTRUCTIONS
I have placed an urgent referral for follow-up of chest discomfort  Continue to take aspirin when you experience symptoms as this can also help alleviate muscular pain as well  Can continue follow up with your primary care provider for left-sided soreness  If you experience worsening chest pain, shortness a breath, nausea vomiting or dizziness she returns emergency department immediately for re-evaluation.    I have attached the cardiology office phone number you can make an appointment within a week for follow-up

## 2023-04-10 RX ORDER — ALBUTEROL SULFATE 90 UG/1
AEROSOL, METERED RESPIRATORY (INHALATION)
Qty: 18 G | Refills: 0 | Status: CANCELLED | OUTPATIENT
Start: 2023-04-10

## 2023-04-10 RX ORDER — ALBUTEROL SULFATE 90 UG/1
AEROSOL, METERED RESPIRATORY (INHALATION)
Qty: 25.5 G | Refills: 3 | Status: SHIPPED | OUTPATIENT
Start: 2023-04-10 | End: 2024-02-04

## 2023-04-10 RX ORDER — ALBUTEROL SULFATE 90 UG/1
AEROSOL, METERED RESPIRATORY (INHALATION)
Qty: 25.5 G | Refills: 3 | Status: SHIPPED | OUTPATIENT
Start: 2023-04-10 | End: 2024-02-04 | Stop reason: SDUPTHER

## 2023-04-10 NOTE — TELEPHONE ENCOUNTER
No new care gaps identified.  [facility] Embedded Care Gaps. Reference number: [id number]. 4/10/2023   11:21:02 AM ANNALISAT

## 2023-04-11 ENCOUNTER — PATIENT MESSAGE (OUTPATIENT)
Dept: INTERNAL MEDICINE | Facility: CLINIC | Age: 58
End: 2023-04-11
Payer: MEDICARE

## 2023-05-08 PROBLEM — Z00.00 ENCOUNTER FOR PREVENTIVE HEALTH EXAMINATION: Status: RESOLVED | Noted: 2023-02-02 | Resolved: 2023-05-08

## 2023-05-10 ENCOUNTER — OFFICE VISIT (OUTPATIENT)
Dept: INTERNAL MEDICINE | Facility: CLINIC | Age: 58
End: 2023-05-10
Payer: MEDICARE

## 2023-05-10 VITALS
OXYGEN SATURATION: 98 % | HEART RATE: 68 BPM | DIASTOLIC BLOOD PRESSURE: 80 MMHG | SYSTOLIC BLOOD PRESSURE: 130 MMHG | WEIGHT: 162.69 LBS | HEIGHT: 67 IN | BODY MASS INDEX: 25.53 KG/M2

## 2023-05-10 DIAGNOSIS — R42 VERTIGO: Primary | ICD-10-CM

## 2023-05-10 DIAGNOSIS — J45.909 EXTRINSIC ASTHMA, UNSPECIFIED ASTHMA SEVERITY, UNSPECIFIED WHETHER COMPLICATED, UNSPECIFIED WHETHER PERSISTENT: ICD-10-CM

## 2023-05-10 DIAGNOSIS — J30.2 SEASONAL ALLERGIC RHINITIS, UNSPECIFIED TRIGGER: ICD-10-CM

## 2023-05-10 DIAGNOSIS — M48.02 CERVICAL SPINAL STENOSIS: ICD-10-CM

## 2023-05-10 PROCEDURE — 99999 PR PBB SHADOW E&M-EST. PATIENT-LVL III: ICD-10-PCS | Mod: PBBFAC,,, | Performed by: INTERNAL MEDICINE

## 2023-05-10 PROCEDURE — 3008F PR BODY MASS INDEX (BMI) DOCUMENTED: ICD-10-PCS | Mod: CPTII,S$GLB,, | Performed by: INTERNAL MEDICINE

## 2023-05-10 PROCEDURE — 3079F DIAST BP 80-89 MM HG: CPT | Mod: CPTII,S$GLB,, | Performed by: INTERNAL MEDICINE

## 2023-05-10 PROCEDURE — 1159F PR MEDICATION LIST DOCUMENTED IN MEDICAL RECORD: ICD-10-PCS | Mod: CPTII,S$GLB,, | Performed by: INTERNAL MEDICINE

## 2023-05-10 PROCEDURE — 3008F BODY MASS INDEX DOCD: CPT | Mod: CPTII,S$GLB,, | Performed by: INTERNAL MEDICINE

## 2023-05-10 PROCEDURE — 3079F PR MOST RECENT DIASTOLIC BLOOD PRESSURE 80-89 MM HG: ICD-10-PCS | Mod: CPTII,S$GLB,, | Performed by: INTERNAL MEDICINE

## 2023-05-10 PROCEDURE — 1160F RVW MEDS BY RX/DR IN RCRD: CPT | Mod: CPTII,S$GLB,, | Performed by: INTERNAL MEDICINE

## 2023-05-10 PROCEDURE — 3075F SYST BP GE 130 - 139MM HG: CPT | Mod: CPTII,S$GLB,, | Performed by: INTERNAL MEDICINE

## 2023-05-10 PROCEDURE — 99999 PR PBB SHADOW E&M-EST. PATIENT-LVL III: CPT | Mod: PBBFAC,,, | Performed by: INTERNAL MEDICINE

## 2023-05-10 PROCEDURE — 1160F PR REVIEW ALL MEDS BY PRESCRIBER/CLIN PHARMACIST DOCUMENTED: ICD-10-PCS | Mod: CPTII,S$GLB,, | Performed by: INTERNAL MEDICINE

## 2023-05-10 PROCEDURE — 99214 PR OFFICE/OUTPT VISIT, EST, LEVL IV, 30-39 MIN: ICD-10-PCS | Mod: S$GLB,,, | Performed by: INTERNAL MEDICINE

## 2023-05-10 PROCEDURE — 3075F PR MOST RECENT SYSTOLIC BLOOD PRESS GE 130-139MM HG: ICD-10-PCS | Mod: CPTII,S$GLB,, | Performed by: INTERNAL MEDICINE

## 2023-05-10 PROCEDURE — 1159F MED LIST DOCD IN RCRD: CPT | Mod: CPTII,S$GLB,, | Performed by: INTERNAL MEDICINE

## 2023-05-10 PROCEDURE — 99214 OFFICE O/P EST MOD 30 MIN: CPT | Mod: S$GLB,,, | Performed by: INTERNAL MEDICINE

## 2023-05-10 RX ORDER — MECLIZINE HYDROCHLORIDE 25 MG/1
25 TABLET ORAL 3 TIMES DAILY PRN
Qty: 15 TABLET | Refills: 0 | Status: SHIPPED | OUTPATIENT
Start: 2023-05-10

## 2023-05-10 RX ORDER — MONTELUKAST SODIUM 10 MG/1
10 TABLET ORAL NIGHTLY
Qty: 30 TABLET | Refills: 0 | Status: SHIPPED | OUTPATIENT
Start: 2023-05-10 | End: 2023-06-09

## 2023-05-10 NOTE — PROGRESS NOTES
MEDICAL HISTORY:  Hypertension.  Chronic asthma.  Osteoarthritis of the knee, right knee surgery with previous medial meniscectomy and chondroplasty in right total knee arthroplasty  Cervical degenerative disk disease with left cervical foraminotomy at C6-7.  Lumbar degenerative disk disease with neural foraminal stenosis and facet arthropathy.           SOCIAL HISTORY:  Tobacco use, none.  Alcohol use, 6-12 beers week.      Medication  Albuterol as needed  Irbesartan 300 mg daily  Nasonex 2 puffs daily      57-year-old male   Primary reason for visit today is that within the past 4 days he has had episodes of dizziness is if he felt like he was spinning around order environment was spinning.  It would happen when as a change in body position it happened when rolling over in bed.  When bending down to look underneath a car.  When bending over when sitting.  There has been no other symptoms in regard to this in regard to ringing in the ears a nausea.    He has been noticing a flat upper rhinitis symptoms and asthma symptoms with wheezing and nasal congestion although he is not blowing out any phlegm or  mucus        Also another situation is that in reference to his visit with me in January.  At that time he was having various types of abnormal sensations involving the left side of his body.  MRI the cervical spine did take note of severe spinal stenosis at C3-4 and C4-5 and there was suggestion of cord edema.    At present there is stiffness involving his neck there is occasions way feels in abnormal sensation from a shoulder to his forearm and he can feel stinging sensations in the side of his left thoracic abdominal area as well as left lateral leg.  He reports no problems with the use of his O arms or legs.  There is no balance issues with walking no discoordination issues with the use of his hands    Examination   Weight 162 lb   Pulse 68   Blood pressure 130/80   Tympanic membranes normal   Nasal mucosa is  clear  Oropharynx no lesions  Chest clear breath sounds   Heart regular rate rhythm  2+ biceps triceps reflexes  2+ knee and ankle jerk reflexes  Negative Romberg testing   No nystagmus when sitting up at present    Impression  Vertigo   Cervical spinal stenosis, clinically it he does not appear to have any myelopathic signs and symptoms  Allergic rhinitis Allergic asthma    Plan   For 1 month Singulair 10 mg daily   For 5 days Antivert 25 mg 3 times a day   Referred to her surgery in regard to the cervical spine MRI in which at that time significant spinal stenosis was seen

## 2023-05-16 ENCOUNTER — TELEPHONE (OUTPATIENT)
Dept: PHYSICAL MEDICINE AND REHAB | Facility: CLINIC | Age: 58
End: 2023-05-16
Payer: MEDICARE

## 2023-05-16 DIAGNOSIS — M54.2 CERVICAL SPINE PAIN: Primary | ICD-10-CM

## 2023-05-16 NOTE — TELEPHONE ENCOUNTER
Spoke to pt on 5/16/23 @ 1:23pm to inform pt of cervical spine xray scheduled for 5/16/23 @ 8:45 am

## 2023-05-17 ENCOUNTER — HOSPITAL ENCOUNTER (OUTPATIENT)
Dept: RADIOLOGY | Facility: HOSPITAL | Age: 58
Discharge: HOME | End: 2023-05-17
Attending: NEUROLOGICAL SURGERY
Payer: MEDICARE

## 2023-05-17 ENCOUNTER — OFFICE VISIT (OUTPATIENT)
Dept: NEUROSURGERY | Facility: CLINIC | Age: 58
End: 2023-05-17
Payer: MEDICARE

## 2023-05-17 DIAGNOSIS — M54.50 CHRONIC MIDLINE LOW BACK PAIN WITHOUT SCIATICA: ICD-10-CM

## 2023-05-17 DIAGNOSIS — M54.16 ACUTE LEFT LUMBAR RADICULOPATHY: ICD-10-CM

## 2023-05-17 DIAGNOSIS — G89.29 CHRONIC MIDLINE LOW BACK PAIN WITHOUT SCIATICA: Primary | ICD-10-CM

## 2023-05-17 DIAGNOSIS — G89.29 CHRONIC MIDLINE LOW BACK PAIN WITHOUT SCIATICA: ICD-10-CM

## 2023-05-17 DIAGNOSIS — M25.512 CHRONIC LEFT SHOULDER PAIN: Primary | ICD-10-CM

## 2023-05-17 DIAGNOSIS — M54.12 CERVICAL RADICULOPATHY: Primary | ICD-10-CM

## 2023-05-17 DIAGNOSIS — M25.512 LEFT SHOULDER PAIN, UNSPECIFIED CHRONICITY: ICD-10-CM

## 2023-05-17 DIAGNOSIS — M48.02 CERVICAL SPINAL STENOSIS: ICD-10-CM

## 2023-05-17 DIAGNOSIS — M54.50 CHRONIC MIDLINE LOW BACK PAIN WITHOUT SCIATICA: Primary | ICD-10-CM

## 2023-05-17 DIAGNOSIS — G89.29 CHRONIC LEFT SHOULDER PAIN: Primary | ICD-10-CM

## 2023-05-17 DIAGNOSIS — M54.2 CERVICAL SPINE PAIN: ICD-10-CM

## 2023-05-17 PROCEDURE — 72110 XR LUMBAR SPINE AP AND LAT WITH FLEX/EXT: ICD-10-PCS | Mod: 26,,, | Performed by: RADIOLOGY

## 2023-05-17 PROCEDURE — 99205 OFFICE O/P NEW HI 60 MIN: CPT | Mod: ,,, | Performed by: NEUROLOGICAL SURGERY

## 2023-05-17 PROCEDURE — 72110 X-RAY EXAM L-2 SPINE 4/>VWS: CPT | Mod: TC

## 2023-05-17 PROCEDURE — 72040 X-RAY EXAM NECK SPINE 2-3 VW: CPT | Mod: TC

## 2023-05-17 PROCEDURE — 1159F MED LIST DOCD IN RCRD: CPT | Mod: CPTII,,, | Performed by: NEUROLOGICAL SURGERY

## 2023-05-17 PROCEDURE — 99205 PR OFFICE/OUTPT VISIT, NEW, LEVL V, 60-74 MIN: ICD-10-PCS | Mod: ,,, | Performed by: NEUROLOGICAL SURGERY

## 2023-05-17 PROCEDURE — 72040 X-RAY EXAM NECK SPINE 2-3 VW: CPT | Mod: 26,,, | Performed by: RADIOLOGY

## 2023-05-17 PROCEDURE — 72110 X-RAY EXAM L-2 SPINE 4/>VWS: CPT | Mod: 26,,, | Performed by: RADIOLOGY

## 2023-05-17 PROCEDURE — 1159F PR MEDICATION LIST DOCUMENTED IN MEDICAL RECORD: ICD-10-PCS | Mod: CPTII,,, | Performed by: NEUROLOGICAL SURGERY

## 2023-05-17 PROCEDURE — 72040 XR CERVICAL SPINE AP LATERAL: ICD-10-PCS | Mod: 26,,, | Performed by: RADIOLOGY

## 2023-05-17 NOTE — PROGRESS NOTES
NEUROSURGICAL OUTPATIENT CONSULTATION NOTE    DATE OF SERVICE:  05/17/2023    ATTENDING PHYSICIAN:  Shane Singh MD    CONSULT REQUESTED BY:  Colten Carlisle     REASON FOR CONSULT:  Neck and left shoulder pain, left groin and leg pain    HISTORY OF PRESENT ILLNESS:  This is a very pleasant 57 y.o. male who previously had a posterior C7 foraminotomy, presents with worsening neck pain and left shoulder pain.  Shoulder pain is worse with movement of the shoulder.  Sometimes has to hold the arm above his head to improve shoulder pain.  The neck pain is constant.  He does not have radiating pain in the arm.  He has some numbness in the left fingertips which he says has been present since his posterior foraminotomy.  He is referred with an MRI scan showing cervical stenosis.  He denies dropping objects or changes in his handwriting.  He can use buttons.  He denies clumsiness.  He is also having some pain radiating from the low back on the left side into his groin and into his thigh.  No significant weakness or numbness in the lower extremities.    PAST MEDICAL HISTORY:  Active Ambulatory Problems     Diagnosis Date Noted    Chondromalacia of patella 06/01/2011    Neuralgia, neuritis, and radiculitis, unspecified 07/14/2011    Chronic neck pain 08/17/2012    Cervical radiculopathy, BUE 08/17/2012    DJD (degenerative joint disease) of cervical spine 08/17/2012    Neural foraminal stenosis of cervical spine 08/17/2012    Chronic low back pain 08/17/2012    Primary hypertension 08/17/2012    Right knee pain 12/07/2017    BMI 26.0-26.9,adult 12/03/2018    Decreased strength involving knee joint 05/16/2019    S/P total knee arthroplasty, right 07/03/2019    Primary osteoarthritis of left knee 07/03/2019    Pain 01/26/2021    Chronic asthma 10/04/2022     Resolved Ambulatory Problems     Diagnosis Date Noted    Primary osteoarthritis of right knee 02/07/2018    Screening for colon cancer 05/01/2019    Encounter for  screening colonoscopy 05/02/2019    Pain in both knees 05/16/2019    Surgery follow-up examination 07/03/2019    Encounter for preventive health examination 02/02/2023     Past Medical History:   Diagnosis Date    Arthritis     Asthma     Chronic LBP 8/17/2012    HTN (hypertension) 8/17/2012    Hypertension        PAST SURGICAL HISTORY:  Past Surgical History:   Procedure Laterality Date    COLONOSCOPY N/A 5/2/2019    Procedure: COLONOSCOPY;  Surgeon: Noemí Boss MD;  Location: [facility] ENDO [address]);  Service: Endoscopy;  Laterality: N/A;    KNEE SURGERY      NECK SURGERY      TOTAL KNEE ARTHROPLASTY Right 5/14/2019    Procedure: ARTHROPLASTY, KNEE, TOTAL,Medial and Lateral compartment;  Surgeon: Maxi Chaves MD;  Location: [facility];  Service: Orthopedics;  Laterality: Right;  Regional w/o Catheter,Adductor,Exparel-Bupivacaine Liposome Injection 30cc,Clonidine/Epi/Ketorolac/Ropivacaine Injection 30cc       SOCIAL HISTORY:   Social History     Socioeconomic History    Marital status:    Tobacco Use    Smoking status: Never    Smokeless tobacco: Never   Substance and Sexual Activity    Alcohol use: Yes     Alcohol/week: 12.0 standard drinks     Types: 12 Cans of beer per week     Comment: occasionally    Drug use: No    Sexual activity: Yes     Social Determinants of Health     Financial Resource Strain: Low Risk     Difficulty of Paying Living Expenses: Not hard at all   Food Insecurity: No Food Insecurity    Worried About Running Out of Food in the Last Year: Never true    Ran Out of Food in the Last Year: Never true   Transportation Needs: No Transportation Needs    Lack of Transportation (Medical): No    Lack of Transportation (Non-Medical): No   Physical Activity: Sufficiently Active    Days of Exercise per Week: 3 days    Minutes of Exercise per Session: 60 min   Stress: No Stress Concern Present    Feeling of Stress : Not at all   Social Connections: Unknown    Frequency of Communication with  Friends and Family: Once a week    Frequency of Social Gatherings with Friends and Family: Twice a week    Active Member of Clubs or Organizations: No    Attends Club or Organization Meetings: Never    Marital Status:    Housing Stability: Unknown    Unstable Housing in the Last Year: No       FAMILY HISTORY:  Family History   Problem Relation Age of Onset    Hypertension Mother     Stroke Mother     No Known Problems Sister     Hypertension Brother        CURRENTS MEDICATIONS:  Current Outpatient Medications on File Prior to Visit   Medication Sig Dispense Refill    albuterol (PROVENTIL/VENTOLIN HFA) 90 mcg/actuation inhaler INHALE 2 PUFFS BY MOUTH FOUR TIMES DAILY AS NEEDED FOR WHEEZING 25.5 g 3    albuterol (PROVENTIL/VENTOLIN HFA) 90 mcg/actuation inhaler INHALE 2 PUFFS BY MOUTH FOUR TIMES DAILY AS NEEDED FOR WHEEZE 25.5 g 3    irbesartan (AVAPRO) 300 MG tablet Take 1 tablet (300 mg total) by mouth every evening. 90 tablet 3    meclizine (ANTIVERT) 25 mg tablet Take 1 tablet (25 mg total) by mouth 3 (three) times daily as needed for Dizziness. 15 tablet 0    mometasone (NASONEX) 50 mcg/actuation nasal spray 2 sprays by Nasal route once daily. 17 g 1    montelukast (SINGULAIR) 10 mg tablet Take 1 tablet (10 mg total) by mouth every evening. 30 tablet 0     No current facility-administered medications on file prior to visit.       ALLERGIES:  Review of patient's allergies indicates:   Allergen Reactions    Theophylline      Other reaction(s): Hives       REVIEW OF SYSTEMS:  ROS - per HPI    OBJECTIVE:    PHYSICAL EXAMINATION:   There were no vitals filed for this visit.    Bilateral  strength 5/5   Otherwise strength is full   Passive range of motion is painful with the left shoulder   No Ashwin sign is noted   No lower extremity hyperreflexia or clonus  Lower extremity strength is 5/5 and sensation intact    DIAGNOSTIC DATA:  I personally interpreted the following imaging:     X-rays cervical spine  shows extensive spondylosis at C3-4 and C4-5 with anterior and posterior osteophytosis and disc height loss    MRI cervical spine shows moderate to severe stenosis at C3-4 and C4-5 due to posterior disc osteophyte complex at both levels    ASSESMENT:  This is a 57 y.o. male with     Problem List Items Addressed This Visit          Neuro    Cervical radiculopathy, BUE - Primary     Other Visit Diagnoses       Cervical spinal stenosis        Left shoulder pain, unspecified chronicity        Acute left lumbar radiculopathy                PLAN:  Diagnosis:   Cervical stenosis without clinical evidence of myelopathy.  He possibly has some higher cervical radiculopathy contributing to neck and shoulder pain, but he appears to have a lot of mechanical shoulder pain as well.  We discussed starting physical therapy and I offered to send him for an injection but he declined at this time.   Lumbar radicular pain.  Started with lumbar x-rays  Shoulder pain, mechanical.  Refer to orthopedic clinic for evaluation of the left shoulder    Plan:   Ortho referral for mechanical left shoulder pain  Start physical therapy for neck and left shoulder pain  Cervical epidural injection recommended, but declined at this time  X-ray lumbar spine for lumbar radicular pain    Follow-up:  4 weeks    Shane Singh MD, FAANS    Disclaimer: This note was partly generated using dictation software which may occasionally result in transcription errors.

## 2023-05-30 ENCOUNTER — HOSPITAL ENCOUNTER (OUTPATIENT)
Dept: RADIOLOGY | Facility: HOSPITAL | Age: 58
Discharge: HOME | End: 2023-05-30
Attending: NEUROLOGICAL SURGERY
Payer: MEDICARE

## 2023-05-30 ENCOUNTER — OFFICE VISIT (OUTPATIENT)
Dept: ORTHOPEDICS | Facility: CLINIC | Age: 58
End: 2023-05-30
Payer: MEDICARE

## 2023-05-30 VITALS — HEIGHT: 67 IN | WEIGHT: 166.25 LBS | BODY MASS INDEX: 26.09 KG/M2

## 2023-05-30 DIAGNOSIS — M75.42 IMPINGEMENT SYNDROME OF LEFT SHOULDER: Primary | ICD-10-CM

## 2023-05-30 DIAGNOSIS — G89.29 CHRONIC LEFT SHOULDER PAIN: ICD-10-CM

## 2023-05-30 DIAGNOSIS — M25.512 CHRONIC LEFT SHOULDER PAIN: ICD-10-CM

## 2023-05-30 DIAGNOSIS — M75.82 ROTATOR CUFF TENDONITIS, LEFT: ICD-10-CM

## 2023-05-30 PROCEDURE — 1159F MED LIST DOCD IN RCRD: CPT | Mod: CPTII,S$GLB,, | Performed by: PHYSICIAN ASSISTANT

## 2023-05-30 PROCEDURE — 1160F PR REVIEW ALL MEDS BY PRESCRIBER/CLIN PHARMACIST DOCUMENTED: ICD-10-PCS | Mod: CPTII,S$GLB,, | Performed by: PHYSICIAN ASSISTANT

## 2023-05-30 PROCEDURE — 99203 PR OFFICE/OUTPT VISIT, NEW, LEVL III, 30-44 MIN: ICD-10-PCS | Mod: S$GLB,,, | Performed by: PHYSICIAN ASSISTANT

## 2023-05-30 PROCEDURE — 1159F PR MEDICATION LIST DOCUMENTED IN MEDICAL RECORD: ICD-10-PCS | Mod: CPTII,S$GLB,, | Performed by: PHYSICIAN ASSISTANT

## 2023-05-30 PROCEDURE — 1160F RVW MEDS BY RX/DR IN RCRD: CPT | Mod: CPTII,S$GLB,, | Performed by: PHYSICIAN ASSISTANT

## 2023-05-30 PROCEDURE — 99999 PR PBB SHADOW E&M-EST. PATIENT-LVL III: CPT | Mod: PBBFAC,,, | Performed by: PHYSICIAN ASSISTANT

## 2023-05-30 PROCEDURE — 99999 PR PBB SHADOW E&M-EST. PATIENT-LVL III: ICD-10-PCS | Mod: PBBFAC,,, | Performed by: PHYSICIAN ASSISTANT

## 2023-05-30 PROCEDURE — 99203 OFFICE O/P NEW LOW 30 MIN: CPT | Mod: S$GLB,,, | Performed by: PHYSICIAN ASSISTANT

## 2023-05-30 PROCEDURE — 3008F PR BODY MASS INDEX (BMI) DOCUMENTED: ICD-10-PCS | Mod: CPTII,S$GLB,, | Performed by: PHYSICIAN ASSISTANT

## 2023-05-30 PROCEDURE — 73030 X-RAY EXAM OF SHOULDER: CPT | Mod: TC,LT

## 2023-05-30 PROCEDURE — 3008F BODY MASS INDEX DOCD: CPT | Mod: CPTII,S$GLB,, | Performed by: PHYSICIAN ASSISTANT

## 2023-05-30 PROCEDURE — 73030 XR SHOULDER TRAUMA 3 VIEW LEFT: ICD-10-PCS | Mod: 26,LT,, | Performed by: RADIOLOGY

## 2023-05-30 PROCEDURE — 73030 X-RAY EXAM OF SHOULDER: CPT | Mod: 26,LT,, | Performed by: RADIOLOGY

## 2023-05-30 RX ORDER — MELOXICAM 15 MG/1
15 TABLET ORAL DAILY
Qty: 30 TABLET | Refills: 1 | Status: SHIPPED | OUTPATIENT
Start: 2023-05-30

## 2023-05-30 NOTE — PROGRESS NOTES
SUBJECTIVE:     Chief Complaint & History of Present Illness:  Rishi Aranda is a 57 y.o. year old male who presents today with constant left shoulder pain that started a few months ago.  He is Right hand dominant.  There is not a history of injury.  The pain is located in the  lateral aspect of the shoulder.  The pain is described as achy.  It is aggravated by lifting, reaching to the side and back, overhead activity.  Previous treatments include rest and occasional tylenol, ice, home exercises which have provided minimal relief.  There is not a history of previous injury or surgery to the shoulder.      Review of patient's allergies indicates:   Allergen Reactions    Theophylline      Other reaction(s): Hives         Current Outpatient Medications   Medication Sig Dispense Refill    albuterol (PROVENTIL/VENTOLIN HFA) 90 mcg/actuation inhaler INHALE 2 PUFFS BY MOUTH FOUR TIMES DAILY AS NEEDED FOR WHEEZING 25.5 g 3    albuterol (PROVENTIL/VENTOLIN HFA) 90 mcg/actuation inhaler INHALE 2 PUFFS BY MOUTH FOUR TIMES DAILY AS NEEDED FOR WHEEZE 25.5 g 3    irbesartan (AVAPRO) 300 MG tablet Take 1 tablet (300 mg total) by mouth every evening. 90 tablet 3    meclizine (ANTIVERT) 25 mg tablet Take 1 tablet (25 mg total) by mouth 3 (three) times daily as needed for Dizziness. 15 tablet 0    mometasone (NASONEX) 50 mcg/actuation nasal spray 2 sprays by Nasal route once daily. 17 g 1    montelukast (SINGULAIR) 10 mg tablet Take 1 tablet (10 mg total) by mouth every evening. 30 tablet 0     No current facility-administered medications for this visit.       Past Medical History:   Diagnosis Date    Arthritis     Asthma     Cervical radiculopathy, BUE 8/17/2012    Chronic LBP 8/17/2012    Chronic neck pain 8/17/2012    HTN (hypertension) 8/17/2012    Hypertension     Surgery follow-up examination 7/3/2019       Past Surgical History:   Procedure Laterality Date    COLONOSCOPY N/A 5/2/2019    Procedure: COLONOSCOPY;  Surgeon:  Noemí Boss MD;  Location: [facility] ENDO (4TH FLR);  Service: Endoscopy;  Laterality: N/A;    KNEE SURGERY      NECK SURGERY      TOTAL KNEE ARTHROPLASTY Right 5/14/2019    Procedure: ARTHROPLASTY, KNEE, TOTAL,Medial and Lateral compartment;  Surgeon: Maxi Chaves MD;  Location: [facility];  Service: Orthopedics;  Laterality: Right;  Regional w/o Catheter,Adductor,Exparel-Bupivacaine Liposome Injection 30cc,Clonidine/Epi/Ketorolac/Ropivacaine Injection 30cc       Review of Systems:  ROS:  Constitutional: no fever or chills  Eyes: no visual changes  ENT: no nasal congestion or sore throat  Respiratory: no cough or shortness of breath  Musculoskeletal: no arthralgias or myalgias      OBJECTIVE:     PHYSICAL EXAM:    General: Weight: 75.4 kg (166 lb 3.6 oz) Body mass index is 26.03 kg/m².  Patient is alert, awake and oriented to time, place and person. Mood and affect are appropriate.  Patient does not appear to be in any distress, denies any constitutional symptoms and appears stated age.   HEENT: Pupils are equal and round, sclera are not injected. External examination of ears and nose reveals no abnormalities. Cranial nerves II-X are grossly intact  Neck: examination demonstrates painless active range of motion. Spurling's sign is negative  Skin: no rashes, abrasions or open wounds on the affected extremity   Resp: No respiratory distress or audible wheezing   Psych:  normal mood and behavior  CV: 2+  pulses, all extremities warm and well perfused   Left Shoulder   Skin intact  No effusion or warmth  Tenderness: lateral acromial  Range of motion is painful   ROM: forward flexion 165, external rotation 50/50, internal rotation L1  Shoulder Strength: biceps 5/5, triceps 5/5, abduction 5/5, adduction 5/5  positive for impingement sign, sensory exam normal, and motor exam normal  Stability tests: normal  Special Tests:    Crossbody test: positive    Neer's positive  Hawkin's positive    Gene's positive  Drop arm  negative  IMAGING:  X-rays of the left shoulder, personally reviewed by me, demonstrate minimal DJD.  No fracture or dislocation.     ASSESSMENT     1. Impingement syndrome of left shoulder    2. Rotator cuff tendonitis, left      PLAN:     Discussed with the patient at length all the different treatment options available for his left shoulder including anti-inflammatories, acetaminophen, rest, ice, Physical therapy, occasional cortisone injections for temporary relief, and shoulder arthroscopy    - Discussed options including CSI- he declined today  - Starting PT for neck and shoulder soon  - Continue HEP  - Will try course of meloxicam- take 10-14 days then as needed-  - Follow up 6-8 weeks.  Will consider injection if no improvement

## 2023-06-06 ENCOUNTER — CLINICAL SUPPORT (OUTPATIENT)
Dept: REHABILITATION | Facility: HOSPITAL | Age: 58
End: 2023-06-06
Payer: MEDICARE

## 2023-06-06 DIAGNOSIS — R52 PAIN AGGRAVATED BY LIFTING: ICD-10-CM

## 2023-06-06 DIAGNOSIS — M54.12 CERVICAL RADICULOPATHY: ICD-10-CM

## 2023-06-06 DIAGNOSIS — M25.512 LEFT SHOULDER PAIN, UNSPECIFIED CHRONICITY: ICD-10-CM

## 2023-06-06 DIAGNOSIS — M53.82 DECREASED ROM OF INTERVERTEBRAL DISCS OF CERVICAL SPINE: ICD-10-CM

## 2023-06-06 PROCEDURE — 97112 NEUROMUSCULAR REEDUCATION: CPT

## 2023-06-06 PROCEDURE — 97162 PT EVAL MOD COMPLEX 30 MIN: CPT

## 2023-06-13 ENCOUNTER — CLINICAL SUPPORT (OUTPATIENT)
Dept: REHABILITATION | Facility: HOSPITAL | Age: 58
End: 2023-06-13
Payer: MEDICARE

## 2023-06-13 DIAGNOSIS — M53.82 DECREASED ROM OF INTERVERTEBRAL DISCS OF CERVICAL SPINE: Primary | ICD-10-CM

## 2023-06-13 DIAGNOSIS — R52 PAIN AGGRAVATED BY LIFTING: ICD-10-CM

## 2023-06-13 PROCEDURE — 97112 NEUROMUSCULAR REEDUCATION: CPT

## 2023-06-13 PROCEDURE — 97140 MANUAL THERAPY 1/> REGIONS: CPT

## 2023-06-13 NOTE — PLAN OF CARE
"[facility]   Physical Therapy Initial Evaluation      Name: Rishi Aranda  Clinic Number: [id number]    Therapy Diagnosis:   Encounter Diagnoses   Name Primary?    Cervical radiculopathy, BUE     Left shoulder pain, unspecified chronicity     Decreased ROM of intervertebral discs of cervical spine     Pain aggravated by lifting         Physician: Shane Singh MD    Physician Orders: PT Eval and Treat neck and shoulder  Medical Diagnosis from Referral: M54.12 (ICD-10-CM) - Cervical radiculopathy M25.512 (ICD-10-CM) - Left shoulder pain, unspecified chronicity   Evaluation Date: 6/6/2023  Authorization Period Expiration: 12/31/2023  Plan of Care Expiration: 8/31/2023  Progress Note Due: 10th visit  Visit # / Visits authorized: 1/ 1   FOTO: 1/3    Precautions: Standard     Time In: 9:03 AM  Time Out: 9:56 AM  Total Appointment Time (timed & untimed codes): 53 minutes    Subjective     Date of onset: Neck pain about 1 year; Left shoulder pain started within the last 4-6 months    History of current condition - Rishi reports to the clinic with complaints of neck and shoulder pain that all began within the last year with the neck starting first. He reports his neck pain is worse with turning side to side and looking up and down. He describes this pain as "tight". His left shoulder started bothering him about 4-6 months ago which bothers him the most in the evening and with reaching and lifting his arm. Both his neck and shoulder hurt equally. Rishi reports a history of neck surgery but did not recall what type/when this occurred. He also reports a new onset of dizziness over the last 1 month with rolling, getting up/down, or turning his head.     Falls: No falls within the last year    Imaging: See imaging section.     Prior Therapy: Yes for the neck  Social History: lives with their spouse  Occupation: Retired  Prior Level of Function: independent with all activities of daily living "   Current Level of Function: Difficulty with reaching, lifting, and turning his head side/up/down    Pain:  Current 6/10, worst 8/10, best 5/10   Location: left and midline neck  and left shoulder    Description: Aching  Aggravating Factors: Lifting and Reaching and turning head side to side/up and down  Easing Factors: pain medication, ice, lying down, and rest    Patients goals: Decrease neck and shoulder pain     Medical History:   Past Medical History:   Diagnosis Date    Arthritis     Asthma     Cervical radiculopathy, BUE 8/17/2012    Chronic LBP 8/17/2012    Chronic neck pain 8/17/2012    HTN (hypertension) 8/17/2012    Hypertension     Surgery follow-up examination 7/3/2019     Surgical History:   Rishi Aranda  has a past surgical history that includes Knee surgery; Neck surgery; Colonoscopy (N/A, 5/2/2019); and Total knee arthroplasty (Right, 5/14/2019).    Medications:   Rishi has a current medication list which includes the following prescription(s): albuterol, albuterol, irbesartan, meclizine, meloxicam, and mometasone.    Allergies:   Review of patient's allergies indicates:   Allergen Reactions    Theophylline      Other reaction(s): Hives        Objective      Observation: Patient presents to the clinic independently without assistance and pleasant in demeanor.     Posture: Forward head; rounded shoulder; significant gross upper extremity atrophy noted    Cervical Range of Motion:    Degrees   Flexion 32   Extension 53   Right Rotation 28*   Left Rotation 31   Right Side Bending 42   Left Side Bending 42*      Shoulder Range of Motion: Full range of motion noted in bilateral shoulders but pain but abduction, flexion, and internal rotation.     Upper Extremity Strength  (R) UE  (L) UE    Shoulder flexion: 3+/5 Shoulder flexion: 3+/5   Shoulder Abduction: 3+/5 Shoulder abduction: 3+/5   Shoulder ER 3+/5 Shoulder ER 3+/5   Shoulder IR 4/5 Shoulder IR 4/5   Elbow flexion: 4/5 Elbow flexion: 4/5   Elbow  extension: 4/5 Elbow extension: 4/5   Lower Trap 2/5 Lower Trap 2/5   Middle Trap 2/5 Middle Trap 2/5     Special Tests:  Distraction Pos   Spurlings Pos   Vertebral Artery Neg   Honorio Neg   Lateral Flexion Alar Ligament Neg   Transverse Ligament Neg   Shoulder Abduction Test Neg   Quadrant Testing Neg     Cervical joint mobility: hypomobility noted throughout each level of cervical spine and mostly limited with side bending and rotation.       Reflexes:  -Bicep (C5): 3+ bilaterally   -Brachioradialis (C6): 3+ bilaterally  -Tricep (C7): 3+ bilaterally     Sensation: Intact bilaterally     Neural tension:  -ULTT Median: Neg  -ULTT Ulnar: Neg  -ULTT Radial: Neg    Limitation/Restriction for FOTO Shoulder Survey    Therapist reviewed FOTO scores for Rishi Aranda on 6/6/2023.   FOTO documents entered into [employer] - see Media section.    Limitation Score: 47%         Treatment     Total Treatment time (time-based codes) separate from Evaluation: 15 minutes     Rishi received the treatments listed below:      therapeutic exercises to develop strength, endurance, ROM, flexibility, posture, and core stabilization for 00 minutes including:     manual therapy techniques: Joint mobilizations and Manual traction were applied to the: cervical spine and left glenohumeral joint for 00 minutes, including:     neuromuscular re-education activities to improve: Balance, Coordination, Kinesthetic, Sense, Proprioception, Posture, and Motor Control for 15 minutes, including:   Education on plan of care, prognosis, home exercise program, and activity modification  1 x 10 for each home exercise for proper performance    therapeutic activities to improve functional performance for 00 minutes, including:     Patient Education and Home Exercises     Education provided:   - Activity modification  - Prognosis  - Plan of care  - Home exercise program     Written Home Exercises Provided: yes. Exercises were reviewed and Rishi was able to  demonstrate them prior to the end of the session.  Rishi demonstrated good  understanding of the education provided. See EMR under Patient Instructions for exercises provided during therapy sessions.    Assessment     Rishi is a 57 y.o. male referred to outpatient Physical Therapy with a medical diagnosis of M54.12 (ICD-10-CM) - Cervical radiculopathy M25.512 (ICD-10-CM) - Left shoulder pain, unspecified chronicity. Patient presents with decreased cervical range of motion, gross upper extremity strength, deep cervical neck flexor strength, periscapular motor control, and increased pain. Patient is functionally limited in overhead reaching and lifting tasks and turning his head laterally and up/down. Patient prognosis is Fair. Patient will benefit from skilled outpatient Physical Therapy to address the deficits stated above and in the chart below, provide patient /family education, and to maximize patientt's level of independence.     Plan of care discussed with patient: Yes  Patient's spiritual, cultural and educational needs considered and patient is agreeable to the plan of care and goals as stated below:     Anticipated Barriers for therapy: Therapy frequency    Medical Necessity is demonstrated by the following  History  Co-morbidities and personal factors that may impact the plan of care [] LOW: no personal factors / co-morbidities  [] MODERATE: 1-2 personal factors / co-morbidities  [x] HIGH: 3+ personal factors / co-morbidities    Moderate / High Support Documentation:   Co-morbidities affecting plan of care: See past medical history    Personal Factors:   lifestyle     Examination  Body Structures and Functions, activity limitations and participation restrictions that may impact the plan of care [] LOW: addressing 1-2 elements  [] MODERATE: 3+ elements  [x] HIGH: 4+ elements (please support below)    Moderate / High Support Documentation: range of motion, strength, motor control, driving, lifting/reaching      Clinical Presentation [] LOW: stable  [x] MODERATE: Evolving  [] HIGH: Unstable     Decision Making/ Complexity Score: moderate       Goals: Short Term Goals: 4 weeks   1. Patient will reduce maximal pain rating to < 3/10 pain to facilitate ability to sleep through the night and recover from PT interventions.  2. Patient will demonstrate > 15 degrees cervical extension improvement to facilitate overhead activity performance.  3. Patient will demonstrate > 15 degrees cervical flexion improvement to facilitate donning shoes.     Long Term Goals: 8-10 weeks   1. Patient will demonstrate > 4/5 upper quarter and periscapular strength to facilitate pain free lifting tasks.   2. Patient will demonstrate > 50 degrees cervical rotation bilaterally to facilitate driving without restrictions.   3. Patient will improve FOTO limitation score to at least 31% indicating a clinically significant change in function.  4. Patient will report < 0/10 pain with cervical active range of motion in all planes.     Plan     Plan of care Certification: 6/6/2023 to 8/31/2023.    Outpatient Physical Therapy 1-2 times weekly for 8-10 weeks to include the following interventions: Cervical/Lumbar Traction, Manual Therapy, Moist Heat/ Ice, Neuromuscular Re-ed, Patient Education, Self Care, Therapeutic Activities, and Therapeutic Exercise.     Devoar Monk, PT, DPT

## 2023-06-13 NOTE — PROGRESS NOTES
[facility]   Physical Therapy Treatment Note      Name: Rishi Aranda  Clinic Number: [id number]    Therapy Diagnosis:   Encounter Diagnoses   Name Primary?    Decreased ROM of intervertebral discs of cervical spine Yes    Pain aggravated by lifting      Physician: Shane Singh MD    Visit Date: 6/13/2023    Physician Orders: PT Eval and Treat neck and shoulder  Medical Diagnosis from Referral: M54.12 (ICD-10-CM) - Cervical radiculopathy M25.512 (ICD-10-CM) - Left shoulder pain, unspecified chronicity   Evaluation Date: 6/6/2023  Authorization Period Expiration: 6/5/2024  Plan of Care Expiration: 8/31/2023  Progress Note Due: 10th visit  Visit # / Visits authorized: 1/20 + eval  FOTO: 1/3     Precautions: Standard     PTA Visit #: 0/5     Time In: 9:02 AM  Time Out: 9:57 AM  Total Billable Time: 54 minutes    Subjective     Pt reports: Rishi reports that his symptoms remain the same since his initial evaluation.  He was compliant with home exercise program.  Response to previous treatment: No adverse response  Functional change: Ongoing    Pain: 4/10  Location: left neck  and shoulder      Patients goals: Decrease neck and shoulder pain     Objective      Objective Measures updated at progress report unless specified.     Treatment     Rishi received the treatments listed below:      therapeutic exercises to develop strength, endurance, ROM, flexibility, posture, and core stabilization for 00 minutes including:      manual therapy techniques: Joint mobilizations and Manual traction were applied to the: cervical spine and left glenohumeral joint for 8 minutes, including:   Cervical distraction   Cervical joint mobilizations in all planes Grade II-IV    neuromuscular re-education activities to improve: Balance, Coordination, Kinesthetic, Sense, Proprioception, Posture, and Motor Control for 46 minutes, including:   Education on plan of care, prognosis, home exercise program, and  activity modification  Upper body ergometer 4/4 forward and backward for endogenous release of opioids  Standing scapular squeezes Green TB 3 x 10  Seated shoulder extensions Green TB 3 x 10 (requested seated due to increased low back symptoms)  Active internal rotation/external rotation with Blue TB 3 x 10  Seated no money Red TB 3 x 10 with 2 sec hold  Supine chin tucks 5 sec hold x 20 reps  Supine chin tucks with shoulder horizontal abduction Red TB 3 x 10    therapeutic activities to improve functional performance for 00 minutes, including:     Patient Education and Home Exercises       Education provided:   - Activity modification  - Prognosis  - Plan of care  - Home exercise program     Written Home Exercises Provided: Patient instructed to cont prior HEP. Exercises were reviewed and Rishi was able to demonstrate them prior to the end of the session.  Rishi demonstrated good  understanding of the education provided. See EMR under Patient Instructions for exercises provided during therapy sessions    Assessment     Rishi presents to the clinic for first follow up after initial evaluation and subjectively reports similar complaints as initial evaluation. Interventions focused on gross upper extremity strengthening, cervical range of motion and strength, and periscapular motor control. Patient was able to tolerate all initial exercise interventions without any adverse reactions. He requires cueing for proper performance of exercises but can demonstrate independently. Will continue to focus on deficits noted during initial evaluation and progress as tolerated.     Rishi Is progressing well towards his goals.   Pt prognosis is Fair.     Pt will continue to benefit from skilled outpatient physical therapy to address the deficits listed in the problem list box on initial evaluation, provide pt/family education and to maximize pt's level of independence in the home and community environment.     Pt's spiritual,  cultural and educational needs considered and pt agreeable to plan of care and goals.     Anticipated Barriers for therapy: Therapy frequency     Goals: Short Term Goals: 4 weeks   1. Patient will reduce maximal pain rating to < 3/10 pain to facilitate ability to sleep through the night and recover from PT interventions.  2. Patient will demonstrate > 15 degrees cervical extension improvement to facilitate overhead activity performance.  3. Patient will demonstrate > 15 degrees cervical flexion improvement to facilitate donning shoes.      Long Term Goals: 8-10 weeks   1. Patient will demonstrate > 4/5 upper quarter and periscapular strength to facilitate pain free lifting tasks.   2. Patient will demonstrate > 50 degrees cervical rotation bilaterally to facilitate driving without restrictions.   3. Patient will improve FOTO limitation score to at least 31% indicating a clinically significant change in function.  4. Patient will report < 0/10 pain with cervical active range of motion in all planes.     Plan     Plan of care Certification: 6/6/2023 to 8/31/2023.     Outpatient Physical Therapy 1-2 times weekly for 8-10 weeks to include the following interventions: Cervical/Lumbar Traction, Manual Therapy, Moist Heat/ Ice, Neuromuscular Re-ed, Patient Education, Self Care, Therapeutic Activities, and Therapeutic Exercise.      Devora Monk, PT, DPT

## 2023-06-21 ENCOUNTER — OFFICE VISIT (OUTPATIENT)
Dept: NEUROSURGERY | Facility: CLINIC | Age: 58
End: 2023-06-21
Payer: MEDICARE

## 2023-06-21 DIAGNOSIS — M47.816 LUMBAR SPONDYLOSIS: ICD-10-CM

## 2023-06-21 DIAGNOSIS — M54.16 ACUTE LEFT LUMBAR RADICULOPATHY: Primary | ICD-10-CM

## 2023-06-21 DIAGNOSIS — M54.16 LUMBAR RADICULOPATHY: ICD-10-CM

## 2023-06-21 PROCEDURE — 4010F PR ACE/ARB THEARPY RXD/TAKEN: ICD-10-PCS | Mod: CPTII,S$GLB,, | Performed by: NEUROLOGICAL SURGERY

## 2023-06-21 PROCEDURE — 99215 PR OFFICE/OUTPT VISIT, EST, LEVL V, 40-54 MIN: ICD-10-PCS | Mod: S$GLB,,, | Performed by: NEUROLOGICAL SURGERY

## 2023-06-21 PROCEDURE — 99215 OFFICE O/P EST HI 40 MIN: CPT | Mod: S$GLB,,, | Performed by: NEUROLOGICAL SURGERY

## 2023-06-21 PROCEDURE — 1159F PR MEDICATION LIST DOCUMENTED IN MEDICAL RECORD: ICD-10-PCS | Mod: CPTII,S$GLB,, | Performed by: NEUROLOGICAL SURGERY

## 2023-06-21 PROCEDURE — 1159F MED LIST DOCD IN RCRD: CPT | Mod: CPTII,S$GLB,, | Performed by: NEUROLOGICAL SURGERY

## 2023-06-21 PROCEDURE — 4010F ACE/ARB THERAPY RXD/TAKEN: CPT | Mod: CPTII,S$GLB,, | Performed by: NEUROLOGICAL SURGERY

## 2023-06-21 NOTE — PROGRESS NOTES
NEUROSURGICAL OUTPATIENT CONSULTATION NOTE    DATE OF SERVICE:  06/21/2023    ATTENDING PHYSICIAN:  Shane Singh MD    CONSULT REQUESTED BY:        REASON FOR CONSULT:  Follow-up from 5/17    HISTORY OF PRESENT ILLNESS:  Update: PT ongoing.  Symptoms still the same.  XR completed with severe disc height loss L4/5 and L5/S1.  Saw ortho, recommended shoulder injection, declined.    This is a very pleasant 57 y.o. male who previously had a posterior C7 foraminotomy, presents with worsening neck pain and left shoulder pain.  Shoulder pain is worse with movement of the shoulder.  Sometimes has to hold the arm above his head to improve shoulder pain.  The neck pain is constant.  He does not have radiating pain in the arm.  He has some numbness in the left fingertips which he says has been present since his posterior foraminotomy.  He is referred with an MRI scan showing cervical stenosis.  He denies dropping objects or changes in his handwriting.  He can use buttons.  He denies clumsiness.  He is also having some pain radiating from the low back on the left side into his groin and into his thigh.  No significant weakness or numbness in the lower extremities.    PAST MEDICAL HISTORY:  Active Ambulatory Problems     Diagnosis Date Noted    Chondromalacia of patella 06/01/2011    Neuralgia, neuritis, and radiculitis, unspecified 07/14/2011    Chronic neck pain 08/17/2012    Cervical radiculopathy, BUE 08/17/2012    DJD (degenerative joint disease) of cervical spine 08/17/2012    Neural foraminal stenosis of cervical spine 08/17/2012    Chronic low back pain 08/17/2012    Primary hypertension 08/17/2012    Right knee pain 12/07/2017    BMI 26.0-26.9,adult 12/03/2018    Decreased strength involving knee joint 05/16/2019    S/P total knee arthroplasty, right 07/03/2019    Primary osteoarthritis of left knee 07/03/2019    Pain 01/26/2021    Chronic asthma 10/04/2022    Decreased ROM of intervertebral discs of cervical spine  06/13/2023    Pain aggravated by lifting 06/13/2023     Resolved Ambulatory Problems     Diagnosis Date Noted    Primary osteoarthritis of right knee 02/07/2018    Screening for colon cancer 05/01/2019    Encounter for screening colonoscopy 05/02/2019    Pain in both knees 05/16/2019    Surgery follow-up examination 07/03/2019    Encounter for preventive health examination 02/02/2023     Past Medical History:   Diagnosis Date    Arthritis     Asthma     Chronic LBP 8/17/2012    HTN (hypertension) 8/17/2012    Hypertension        PAST SURGICAL HISTORY:  Past Surgical History:   Procedure Laterality Date    COLONOSCOPY N/A 5/2/2019    Procedure: COLONOSCOPY;  Surgeon: Neomí Boss MD;  Location: [facility] ENDO (4TH FLR);  Service: Endoscopy;  Laterality: N/A;    KNEE SURGERY      NECK SURGERY      TOTAL KNEE ARTHROPLASTY Right 5/14/2019    Procedure: ARTHROPLASTY, KNEE, TOTAL,Medial and Lateral compartment;  Surgeon: Maxi Chaves MD;  Location: [facility];  Service: Orthopedics;  Laterality: Right;  Regional w/o Catheter,Adductor,Exparel-Bupivacaine Liposome Injection 30cc,Clonidine/Epi/Ketorolac/Ropivacaine Injection 30cc       SOCIAL HISTORY:   Social History     Socioeconomic History    Marital status:    Tobacco Use    Smoking status: Never    Smokeless tobacco: Never   Substance and Sexual Activity    Alcohol use: Yes     Alcohol/week: 12.0 standard drinks     Types: 12 Cans of beer per week     Comment: occasionally    Drug use: No    Sexual activity: Yes     Social Determinants of Health     Financial Resource Strain: Low Risk     Difficulty of Paying Living Expenses: Not hard at all   Food Insecurity: No Food Insecurity    Worried About Running Out of Food in the Last Year: Never true    Ran Out of Food in the Last Year: Never true   Transportation Needs: No Transportation Needs    Lack of Transportation (Medical): No    Lack of Transportation (Non-Medical): No   Physical Activity: Sufficiently Active     Days of Exercise per Week: 3 days    Minutes of Exercise per Session: 60 min   Stress: No Stress Concern Present    Feeling of Stress : Not at all   Social Connections: Unknown    Frequency of Communication with Friends and Family: Once a week    Frequency of Social Gatherings with Friends and Family: Twice a week    Active Member of Clubs or Organizations: No    Attends Club or Organization Meetings: Never    Marital Status:    Housing Stability: Unknown    Unstable Housing in the Last Year: No       FAMILY HISTORY:  Family History   Problem Relation Age of Onset    Hypertension Mother     Stroke Mother     No Known Problems Sister     Hypertension Brother        CURRENTS MEDICATIONS:  Current Outpatient Medications on File Prior to Visit   Medication Sig Dispense Refill    albuterol (PROVENTIL/VENTOLIN HFA) 90 mcg/actuation inhaler INHALE 2 PUFFS BY MOUTH FOUR TIMES DAILY AS NEEDED FOR WHEEZING 25.5 g 3    albuterol (PROVENTIL/VENTOLIN HFA) 90 mcg/actuation inhaler INHALE 2 PUFFS BY MOUTH FOUR TIMES DAILY AS NEEDED FOR WHEEZE 25.5 g 3    meclizine (ANTIVERT) 25 mg tablet Take 1 tablet (25 mg total) by mouth 3 (three) times daily as needed for Dizziness. 15 tablet 0    meloxicam (MOBIC) 15 MG tablet Take 1 tablet (15 mg total) by mouth once daily. 30 tablet 1    mometasone (NASONEX) 50 mcg/actuation nasal spray 2 sprays by Nasal route once daily. 17 g 1    irbesartan (AVAPRO) 300 MG tablet Take 1 tablet (300 mg total) by mouth every evening. 90 tablet 3     No current facility-administered medications on file prior to visit.       ALLERGIES:  Review of patient's allergies indicates:   Allergen Reactions    Theophylline      Other reaction(s): Hives       REVIEW OF SYSTEMS:  ROS - per HPI    OBJECTIVE:    PHYSICAL EXAMINATION:   There were no vitals filed for this visit.    Bilateral  strength 5/5   Otherwise strength is full   Passive range of motion is painful with the left shoulder   No Ashwin sign  is noted   No lower extremity hyperreflexia or clonus  Lower extremity strength is 5/5 and sensation intact    DIAGNOSTIC DATA:  I personally interpreted the following imaging:     X-rays cervical spine shows extensive spondylosis at C3-4 and C4-5 with anterior and posterior osteophytosis and disc height loss     MRI cervical spine shows moderate to severe stenosis at C3-4 and C4-5 due to posterior disc osteophyte complex at both levels    Lumbar XR 5/17: L4/5 and L5/S1 disc height loss    ASSESMENT:  This is a 57 y.o. male with     Problem List Items Addressed This Visit    None  Visit Diagnoses       Acute left lumbar radiculopathy    -  Primary    Lumbar spondylosis        Lumbar radiculopathy        Relevant Orders    MRI Lumbar Spine Without Contrast            PLAN:  Diagnosis: Cervical stenosis without clinical evidence of myelopathy.    Lumbar radicular pain.  DDD on Lumbar XR  Shoulder pain, mechanical.      Plan:   PT, cervical ESU declined  MRI lumbar spine.  Declined referred for Pain  Per ortho    Follow-up: will contact with results of MRI.      Shane Singh MD, FAANS    Disclaimer: This note was partly generated using dictation software which may occasionally result in transcription errors.

## 2023-06-22 ENCOUNTER — CLINICAL SUPPORT (OUTPATIENT)
Dept: REHABILITATION | Facility: HOSPITAL | Age: 58
End: 2023-06-22
Payer: MEDICARE

## 2023-06-22 DIAGNOSIS — M53.82 DECREASED ROM OF INTERVERTEBRAL DISCS OF CERVICAL SPINE: Primary | ICD-10-CM

## 2023-06-22 DIAGNOSIS — R52 PAIN AGGRAVATED BY LIFTING: ICD-10-CM

## 2023-06-22 PROCEDURE — 97112 NEUROMUSCULAR REEDUCATION: CPT

## 2023-06-22 PROCEDURE — 97140 MANUAL THERAPY 1/> REGIONS: CPT

## 2023-06-22 NOTE — PROGRESS NOTES
"[facility]   Physical Therapy Treatment Note      Name: Rishi Aranda  Clinic Number: [id number]    Therapy Diagnosis:   Encounter Diagnoses   Name Primary?    Decreased ROM of intervertebral discs of cervical spine Yes    Pain aggravated by lifting      Physician: Shane Singh MD    Visit Date: 6/22/2023    Physician Orders: PT Eval and Treat neck and shoulder  Medical Diagnosis from Referral: M54.12 (ICD-10-CM) - Cervical radiculopathy M25.512 (ICD-10-CM) - Left shoulder pain, unspecified chronicity   Evaluation Date: 6/6/2023  Authorization Period Expiration: 6/5/2024  Plan of Care Expiration: 8/31/2023  Progress Note Due: 10th visit  Visit # / Visits authorized: 2/20 + eval  FOTO: 1/3     Precautions: Standard     PTA Visit #: 0/5     Time In: 9:02 AM  Time Out: 9:58 AM  Total Billable Time: 56 minutes    Subjective     Pt reports: Rishi reports that he is feeling a little "tight" in his bilateral neck/shoulders after being on the bus ride for his family vacation and his grand kids pulling on him.   He was compliant with home exercise program.  Response to previous treatment: No adverse response  Functional change: Ongoing    Pain: 4/10  Location: left neck  and shoulder      Patients goals: Decrease neck and shoulder pain     Objective      Objective Measures updated at progress report unless specified.     Treatment     Rishi received the treatments listed below:      therapeutic exercises to develop strength, endurance, ROM, flexibility, posture, and core stabilization for 00 minutes including:      manual therapy techniques: Joint mobilizations and Manual traction were applied to the: cervical spine and left glenohumeral joint for 8 minutes, including:   Cervical distraction   Cervical joint mobilizations in all planes Grade II-IV    neuromuscular re-education activities to improve: Balance, Coordination, Kinesthetic, Sense, Proprioception, Posture, and Motor Control for 48 " minutes, including:   Education on plan of care, prognosis, home exercise program, and activity modification  Upper body ergometer 4/4 forward and backward for endogenous release of opioids  Standing scapular squeezes Green TB 3 x 10  Seated shoulder extensions Green TB 3 x 10  Active internal rotation/external rotation with Blue TB 3 x 10  Seated no money Red TB 3 x 10 with 2 sec hold  Supine chin tucks 5 sec hold x 20 reps  Supine chin tucks with shoulder horizontal abduction Red TB 3 x 10    therapeutic activities to improve functional performance for 00 minutes, including:     Patient Education and Home Exercises       Education provided:   - Activity modification  - Prognosis  - Plan of care  - Home exercise program     Written Home Exercises Provided: Patient instructed to cont prior HEP. Exercises were reviewed and Rishi was able to demonstrate them prior to the end of the session.  Rishi demonstrated good  understanding of the education provided. See EMR under Patient Instructions for exercises provided during therapy sessions    Assessment     Interventions continue to focus on gross upper extremity strengthening, periscapular motor control, and cervical mobility and strengthening to assist with reducing pain/symptoms and improving function. Continues to require cueing to reduce compensatory pattern from bilateral upper trap and with focus on periscapular activation. Improvement in symptoms noted after exercise and manual therapy. Will continue to focus on deficits noted during initial evaluation and progress as tolerated.     Rishi Is progressing well towards his goals.   Pt prognosis is Fair.     Pt will continue to benefit from skilled outpatient physical therapy to address the deficits listed in the problem list box on initial evaluation, provide pt/family education and to maximize pt's level of independence in the home and community environment.     Pt's spiritual, cultural and educational needs  considered and pt agreeable to plan of care and goals.     Anticipated Barriers for therapy: Therapy frequency     Goals: Short Term Goals: 4 weeks   1. Patient will reduce maximal pain rating to < 3/10 pain to facilitate ability to sleep through the night and recover from PT interventions.  2. Patient will demonstrate > 15 degrees cervical extension improvement to facilitate overhead activity performance.  3. Patient will demonstrate > 15 degrees cervical flexion improvement to facilitate donning shoes.      Long Term Goals: 8-10 weeks   1. Patient will demonstrate > 4/5 upper quarter and periscapular strength to facilitate pain free lifting tasks.   2. Patient will demonstrate > 50 degrees cervical rotation bilaterally to facilitate driving without restrictions.   3. Patient will improve FOTO limitation score to at least 31% indicating a clinically significant change in function.  4. Patient will report < 0/10 pain with cervical active range of motion in all planes.     Plan     Plan of care Certification: 6/6/2023 to 8/31/2023.     Outpatient Physical Therapy 1-2 times weekly for 8-10 weeks to include the following interventions: Cervical/Lumbar Traction, Manual Therapy, Moist Heat/ Ice, Neuromuscular Re-ed, Patient Education, Self Care, Therapeutic Activities, and Therapeutic Exercise.      Devora Monk, PT, DPT

## 2023-06-27 ENCOUNTER — CLINICAL SUPPORT (OUTPATIENT)
Dept: REHABILITATION | Facility: HOSPITAL | Age: 58
End: 2023-06-27
Payer: MEDICARE

## 2023-06-27 DIAGNOSIS — R52 PAIN AGGRAVATED BY LIFTING: ICD-10-CM

## 2023-06-27 DIAGNOSIS — M53.82 DECREASED ROM OF INTERVERTEBRAL DISCS OF CERVICAL SPINE: Primary | ICD-10-CM

## 2023-06-27 PROCEDURE — 97112 NEUROMUSCULAR REEDUCATION: CPT

## 2023-06-27 NOTE — PROGRESS NOTES
[facility]   Physical Therapy Treatment Note      Name: Rsihi Aranda  Clinic Number: [id number]    Therapy Diagnosis:   Encounter Diagnoses   Name Primary?    Decreased ROM of intervertebral discs of cervical spine Yes    Pain aggravated by lifting      Physician: Shane Singh MD    Visit Date: 6/27/2023    Physician Orders: PT Eval and Treat neck and shoulder  Medical Diagnosis from Referral: M54.12 (ICD-10-CM) - Cervical radiculopathy M25.512 (ICD-10-CM) - Left shoulder pain, unspecified chronicity   Evaluation Date: 6/6/2023  Authorization Period Expiration: 6/5/2024  Plan of Care Expiration: 8/31/2023  Progress Note Due: 10th visit  Visit # / Visits authorized: 2/20 + eval  FOTO: 1/3     Precautions: Standard     PTA Visit #: 0/5     Time In: 9:05 AM  Time Out: 9:59 AM  Total Treatment Time: 54 minutes  Total Billable time: 30 minutes    Subjective     Pt reports: he has had a rough few days, but has been doing his HEP. Notes point tenderness to anterior shoulder  He was compliant with home exercise program.  Response to previous treatment: No adverse response  Functional change: Ongoing    Pain: 4/10  Location: left neck  and shoulder      Patients goals: Decrease neck and shoulder pain     Objective      Objective Measures updated at progress report unless specified.     TTP along coracoid process    Treatment     Rishi received the treatments listed below:      therapeutic exercises to develop strength, endurance, ROM, flexibility, posture, and core stabilization for 5 minutes including:    pec stretch in doorway 30 seconds, 4x    manual therapy techniques: Joint mobilizations and Manual traction were applied to the: cervical spine and left glenohumeral joint for 00 minutes, including:   Cervical distraction   Cervical joint mobilizations in all planes Grade II-IV    neuromuscular re-education activities to improve: Balance, Coordination, Kinesthetic, Sense, Proprioception,  Posture, and Motor Control for 49 minutes, including:   Education on plan of care, prognosis, home exercise program, and activity modification  Upper body ergometer 4/4 forward and backward for endogenous release of opioids  Modified Y: 5 seconds, 20x  Supine chin tucks with shoulder horizontal abduction Red TB 3 x 10  Supine chin tucks with shoulder OH scaption Red TB 3 x 10  Seated no money Red TB 3 x 10 with 2 sec hold    Resume next visit:   Standing scapular squeezes Green TB 3 x 10  Seated shoulder extensions Green TB 3 x 10  Active internal rotation/external rotation with Blue TB 3 x 10  Supine chin tucks 5 sec hold x 20 reps      therapeutic activities to improve functional performance for 00 minutes, including:     Patient Education and Home Exercises       Education provided:   - Activity modification  - Prognosis  - Plan of care  - Home exercise program     Written Home Exercises Provided: Patient instructed to cont prior HEP. Exercises were reviewed and Rishi was able to demonstrate them prior to the end of the session.  Rishi demonstrated good  understanding of the education provided. See EMR under Patient Instructions for exercises provided during therapy sessions    Assessment     Patient demonstrates difficulty activating periscapular mm; responds well to tactile cues. TTP along coracoid process with tenderness across pectoralis mm. R shoulder pain likely involving pec minor and short head of biceps tendon.      Rishi Is progressing well towards his goals.   Pt prognosis is Fair.     Pt will continue to benefit from skilled outpatient physical therapy to address the deficits listed in the problem list box on initial evaluation, provide pt/family education and to maximize pt's level of independence in the home and community environment.     Pt's spiritual, cultural and educational needs considered and pt agreeable to plan of care and goals.     Anticipated Barriers for therapy: Therapy frequency      Goals: Short Term Goals: 4 weeks   1. Patient will reduce maximal pain rating to < 3/10 pain to facilitate ability to sleep through the night and recover from PT interventions.  2. Patient will demonstrate > 15 degrees cervical extension improvement to facilitate overhead activity performance.  3. Patient will demonstrate > 15 degrees cervical flexion improvement to facilitate donning shoes.      Long Term Goals: 8-10 weeks   1. Patient will demonstrate > 4/5 upper quarter and periscapular strength to facilitate pain free lifting tasks.   2. Patient will demonstrate > 50 degrees cervical rotation bilaterally to facilitate driving without restrictions.   3. Patient will improve FOTO limitation score to at least 31% indicating a clinically significant change in function.  4. Patient will report < 0/10 pain with cervical active range of motion in all planes.     Plan     Plan of care Certification: 6/6/2023 to 8/31/2023.     Outpatient Physical Therapy 1-2 times weekly for 8-10 weeks to include the following interventions: Cervical/Lumbar Traction, Manual Therapy, Moist Heat/ Ice, Neuromuscular Re-ed, Patient Education, Self Care, Therapeutic Activities, and Therapeutic Exercise.      Devora Monk, PT, DPT

## 2023-06-30 ENCOUNTER — CLINICAL SUPPORT (OUTPATIENT)
Dept: REHABILITATION | Facility: HOSPITAL | Age: 58
End: 2023-06-30
Payer: MEDICARE

## 2023-06-30 DIAGNOSIS — M53.82 DECREASED ROM OF INTERVERTEBRAL DISCS OF CERVICAL SPINE: Primary | ICD-10-CM

## 2023-06-30 DIAGNOSIS — R52 PAIN AGGRAVATED BY LIFTING: ICD-10-CM

## 2023-06-30 PROCEDURE — 97112 NEUROMUSCULAR REEDUCATION: CPT

## 2023-06-30 NOTE — PROGRESS NOTES
[facility]   Physical Therapy Treatment Note      Name: Rishi Aranda  Clinic Number: [id number]    Therapy Diagnosis:   Encounter Diagnoses   Name Primary?    Decreased ROM of intervertebral discs of cervical spine Yes    Pain aggravated by lifting      Physician: Shane Singh MD    Visit Date: 6/30/2023    Physician Orders: PT Eval and Treat neck and shoulder  Medical Diagnosis from Referral: M54.12 (ICD-10-CM) - Cervical radiculopathy M25.512 (ICD-10-CM) - Left shoulder pain, unspecified chronicity   Evaluation Date: 6/6/2023  Authorization Period Expiration: 6/5/2024  Plan of Care Expiration: 8/31/2023  Progress Note Due: 10th visit  Visit # / Visits authorized: 2/20 + eval  FOTO: 1/3     Precautions: Standard     PTA Visit #: 0/5     Time In: 9:00 AM  Time Out: 9:59 AM  Total Treatment Time: 59 minutes  Total Billable time: 30 minutes    Subjective     Pt reports: doing okay today; no new complaints. Denies significant soreness after last session.  He was compliant with home exercise program.  Response to previous treatment: No adverse response  Functional change: Ongoing    Pain: 4/10  Location: left neck  and shoulder      Patients goals: Decrease neck and shoulder pain     Objective      Objective Measures updated at progress report unless specified.     TTP along coracoid process    Treatment     Rishi received the treatments listed below:      therapeutic exercises to develop strength, endurance, ROM, flexibility, posture, and core stabilization for 00 minutes including:    pec stretch in doorway 30 seconds, 4x    manual therapy techniques: Joint mobilizations and Manual traction were applied to the: cervical spine and left glenohumeral joint for 00 minutes, including:   Cervical distraction   Cervical joint mobilizations in all planes Grade II-IV    neuromuscular re-education activities to improve: Balance, Coordination, Kinesthetic, Sense, Proprioception, Posture, and  Motor Control for 59 minutes, including:   Education on plan of care, prognosis, home exercise program, and activity modification  Upper body ergometer 4/4 forward and backward for endogenous release of opioids  Modified Y: 5 seconds, 20x  Prone rows with cue for scapular setting: 10 seconds, 20x  Serratus wall slides: 3x5  ER walk outs: green 3x10  IR walk outs blue 3x10     Patient Education and Home Exercises       Education provided:   - Activity modification  - Prognosis  - Plan of care  - Home exercise program     Written Home Exercises Provided: Patient instructed to cont prior HEP. Exercises were reviewed and Rishi was able to demonstrate them prior to the end of the session.  Rishi demonstrated good  understanding of the education provided. See EMR under Patient Instructions for exercises provided during therapy sessions    Assessment     Addition of RTC with emphasis on scapular motor control exercises today with good tolerance. Adequate fatigue achieved.     Rishi Is progressing well towards his goals.   Pt prognosis is Fair.     Pt will continue to benefit from skilled outpatient physical therapy to address the deficits listed in the problem list box on initial evaluation, provide pt/family education and to maximize pt's level of independence in the home and community environment.     Pt's spiritual, cultural and educational needs considered and pt agreeable to plan of care and goals.     Anticipated Barriers for therapy: Therapy frequency     Goals: Short Term Goals: 4 weeks   1. Patient will reduce maximal pain rating to < 3/10 pain to facilitate ability to sleep through the night and recover from PT interventions.  2. Patient will demonstrate > 15 degrees cervical extension improvement to facilitate overhead activity performance.  3. Patient will demonstrate > 15 degrees cervical flexion improvement to facilitate donning shoes.      Long Term Goals: 8-10 weeks   1. Patient will demonstrate > 4/5 upper  quarter and periscapular strength to facilitate pain free lifting tasks.   2. Patient will demonstrate > 50 degrees cervical rotation bilaterally to facilitate driving without restrictions.   3. Patient will improve FOTO limitation score to at least 31% indicating a clinically significant change in function.  4. Patient will report < 0/10 pain with cervical active range of motion in all planes.     Plan     Plan of care Certification: 6/6/2023 to 8/31/2023.     Outpatient Physical Therapy 1-2 times weekly for 8-10 weeks to include the following interventions: Cervical/Lumbar Traction, Manual Therapy, Moist Heat/ Ice, Neuromuscular Re-ed, Patient Education, Self Care, Therapeutic Activities, and Therapeutic Exercise.      Devora Monk, PT, DPT

## 2023-07-03 ENCOUNTER — HOSPITAL ENCOUNTER (OUTPATIENT)
Dept: RADIOLOGY | Facility: HOSPITAL | Age: 58
Discharge: HOME | End: 2023-07-03
Attending: NEUROLOGICAL SURGERY
Payer: MEDICARE

## 2023-07-03 DIAGNOSIS — M54.16 LUMBAR RADICULOPATHY: ICD-10-CM

## 2023-07-03 PROCEDURE — 72148 MRI LUMBAR SPINE WITHOUT CONTRAST: ICD-10-PCS | Mod: 26,,, | Performed by: RADIOLOGY

## 2023-07-03 PROCEDURE — 72148 MRI LUMBAR SPINE W/O DYE: CPT | Mod: TC

## 2023-07-03 PROCEDURE — 72148 MRI LUMBAR SPINE W/O DYE: CPT | Mod: 26,,, | Performed by: RADIOLOGY

## 2023-07-07 ENCOUNTER — CLINICAL SUPPORT (OUTPATIENT)
Dept: REHABILITATION | Facility: HOSPITAL | Age: 58
End: 2023-07-07
Payer: MEDICARE

## 2023-07-07 DIAGNOSIS — R52 PAIN AGGRAVATED BY LIFTING: ICD-10-CM

## 2023-07-07 DIAGNOSIS — M53.82 DECREASED ROM OF INTERVERTEBRAL DISCS OF CERVICAL SPINE: Primary | ICD-10-CM

## 2023-07-07 PROCEDURE — 97112 NEUROMUSCULAR REEDUCATION: CPT | Mod: CQ

## 2023-07-07 NOTE — PROGRESS NOTES
[facility]   Physical Therapy Treatment Note      Name: Rishi Aranda  Clinic Number: [id number]    Therapy Diagnosis:   Encounter Diagnoses   Name Primary?    Decreased ROM of intervertebral discs of cervical spine Yes    Pain aggravated by lifting      Physician: Shane Singh MD    Visit Date: 7/7/2023    Physician Orders: PT Eval and Treat neck and shoulder  Medical Diagnosis from Referral: M54.12 (ICD-10-CM) - Cervical radiculopathy M25.512 (ICD-10-CM) - Left shoulder pain, unspecified chronicity   Evaluation Date: 6/6/2023  Authorization Period Expiration: 12/31/2023  Plan of Care Expiration: 8/31/2023  Progress Note Due: 10th visit  Visit # / Visits authorized: 5 / 20 + eval  FOTO: 1/3     Precautions: Standard     PTA Visit #: 1 / 5     Time In: 0805  Time Out: 0900  Total Treatment Time: 55 minutes  Total Billable Time: 25 minutes (2 NMR)    Subjective     Patient reports: that he feels stiff today.  He was compliant with home exercise program.  Response to previous treatment: appropriate muscle response  Functional change: ongoing    Pain: 4/10, currently  Location: Left neck and shoulder    Patients goals: Decrease neck and shoulder pain     Objective      Objective Measures updated at progress report unless specified.     Treatment     Rishi received the treatments listed below:      therapeutic exercises to develop strength, endurance, ROM, flexibility, posture, and core stabilization for 00 minutes including:   Pec stretch in doorway 30 seconds, 4x    manual therapy techniques: Joint mobilizations and Manual traction were applied to the: cervical spine and left glenohumeral joint for 00 minutes, including:   Cervical distraction   Cervical joint mobilizations in all planes Grade II-IV    neuromuscular re-education activities to improve: Balance, Coordination, Kinesthetic, Sense, Proprioception, Posture, and Motor Control for 55 minutes, including:   Education on plan of  care, prognosis, home exercise program, and activity modification  Upper body ergometer 4/4 forward and backward for endogenous release of opioids  Modified Y: 5 second hold, 20 reps  Prone rows with cue for scapular setting: 10 second hold, 20 reps  Serratus wall slides: 2 x 10 reps  ER walk outs with Green TB; 3 x 10 reps  IR walk outs with Blue TB; 3 x 10 reps     Patient Education and Home Exercises       Education provided:   - Activity modification  - Prognosis  - Plan of care  - Home exercise program     Written Home Exercises Provided: Patient instructed to cont prior HEP. Exercises were reviewed and Rishi was able to demonstrate them prior to the end of the session.  Rishi demonstrated good  understanding of the education provided. See EMR under Patient Instructions for exercises provided during therapy sessions    Assessment     Good tolerance to exercises performed noting appropriate muscle response throughout. He is challenged with RTC and periscap specific exercises. Cueing for scapular setting prior to performing IR/ER walkouts and prone rows. Continue per POC towards treatment goals.   Rishi Is progressing well towards his goals.   Pt prognosis is Fair.     Pt will continue to benefit from skilled outpatient physical therapy to address the deficits listed in the problem list box on initial evaluation, provide pt/family education and to maximize pt's level of independence in the home and community environment.     Pt's spiritual, cultural and educational needs considered and pt agreeable to plan of care and goals.     Anticipated Barriers for therapy: Therapy frequency     Goals: Short Term Goals: 4 weeks   1. Patient will reduce maximal pain rating to < 3/10 pain to facilitate ability to sleep through the night and recover from PT interventions.  2. Patient will demonstrate > 15 degrees cervical extension improvement to facilitate overhead activity performance.  3. Patient will demonstrate > 15  degrees cervical flexion improvement to facilitate donning shoes.      Long Term Goals: 8-10 weeks   1. Patient will demonstrate > 4/5 upper quarter and periscapular strength to facilitate pain free lifting tasks.   2. Patient will demonstrate > 50 degrees cervical rotation bilaterally to facilitate driving without restrictions.   3. Patient will improve FOTO limitation score to at least 31% indicating a clinically significant change in function.  4. Patient will report < 0/10 pain with cervical active range of motion in all planes.     Plan     Plan of care Certification: 6/6/2023 to 8/31/2023.     Outpatient Physical Therapy 1-2 times weekly for 8-10 weeks to include the following interventions: Cervical/Lumbar Traction, Manual Therapy, Moist Heat/ Ice, Neuromuscular Re-ed, Patient Education, Self Care, Therapeutic Activities, and Therapeutic Exercise.      Angeles Leal, PTA

## 2023-07-11 ENCOUNTER — OFFICE VISIT (OUTPATIENT)
Dept: ORTHOPEDICS | Facility: CLINIC | Age: 58
End: 2023-07-11
Payer: MEDICARE

## 2023-07-11 VITALS — WEIGHT: 144.94 LBS | BODY MASS INDEX: 22.75 KG/M2 | HEIGHT: 67 IN

## 2023-07-11 DIAGNOSIS — M75.42 IMPINGEMENT SYNDROME OF LEFT SHOULDER: ICD-10-CM

## 2023-07-11 DIAGNOSIS — M75.82 ROTATOR CUFF TENDONITIS, LEFT: Primary | ICD-10-CM

## 2023-07-11 PROCEDURE — 99999 PR PBB SHADOW E&M-EST. PATIENT-LVL III: CPT | Mod: PBBFAC,,, | Performed by: PHYSICIAN ASSISTANT

## 2023-07-11 PROCEDURE — 1159F MED LIST DOCD IN RCRD: CPT | Mod: CPTII,S$GLB,, | Performed by: PHYSICIAN ASSISTANT

## 2023-07-11 PROCEDURE — 3008F PR BODY MASS INDEX (BMI) DOCUMENTED: ICD-10-PCS | Mod: CPTII,S$GLB,, | Performed by: PHYSICIAN ASSISTANT

## 2023-07-11 PROCEDURE — 4010F PR ACE/ARB THEARPY RXD/TAKEN: ICD-10-PCS | Mod: CPTII,S$GLB,, | Performed by: PHYSICIAN ASSISTANT

## 2023-07-11 PROCEDURE — 99213 OFFICE O/P EST LOW 20 MIN: CPT | Mod: 25,S$GLB,, | Performed by: PHYSICIAN ASSISTANT

## 2023-07-11 PROCEDURE — 3008F BODY MASS INDEX DOCD: CPT | Mod: CPTII,S$GLB,, | Performed by: PHYSICIAN ASSISTANT

## 2023-07-11 PROCEDURE — 1160F PR REVIEW ALL MEDS BY PRESCRIBER/CLIN PHARMACIST DOCUMENTED: ICD-10-PCS | Mod: CPTII,S$GLB,, | Performed by: PHYSICIAN ASSISTANT

## 2023-07-11 PROCEDURE — 4010F ACE/ARB THERAPY RXD/TAKEN: CPT | Mod: CPTII,S$GLB,, | Performed by: PHYSICIAN ASSISTANT

## 2023-07-11 PROCEDURE — 20610 DRAIN/INJ JOINT/BURSA W/O US: CPT | Mod: LT,S$GLB,, | Performed by: PHYSICIAN ASSISTANT

## 2023-07-11 PROCEDURE — 99999 PR PBB SHADOW E&M-EST. PATIENT-LVL III: ICD-10-PCS | Mod: PBBFAC,,, | Performed by: PHYSICIAN ASSISTANT

## 2023-07-11 PROCEDURE — 1160F RVW MEDS BY RX/DR IN RCRD: CPT | Mod: CPTII,S$GLB,, | Performed by: PHYSICIAN ASSISTANT

## 2023-07-11 PROCEDURE — 1159F PR MEDICATION LIST DOCUMENTED IN MEDICAL RECORD: ICD-10-PCS | Mod: CPTII,S$GLB,, | Performed by: PHYSICIAN ASSISTANT

## 2023-07-11 PROCEDURE — 20610 LARGE JOINT ASPIRATION/INJECTION: L SUBACROMIAL BURSA: ICD-10-PCS | Mod: LT,S$GLB,, | Performed by: PHYSICIAN ASSISTANT

## 2023-07-11 PROCEDURE — 99213 PR OFFICE/OUTPT VISIT, EST, LEVL III, 20-29 MIN: ICD-10-PCS | Mod: 25,S$GLB,, | Performed by: PHYSICIAN ASSISTANT

## 2023-07-11 RX ORDER — TRIAMCINOLONE ACETONIDE 40 MG/ML
40 INJECTION, SUSPENSION INTRA-ARTICULAR; INTRAMUSCULAR
Status: DISCONTINUED | OUTPATIENT
Start: 2023-07-11 | End: 2023-07-11 | Stop reason: HOSPADM

## 2023-07-11 RX ADMIN — TRIAMCINOLONE ACETONIDE 40 MG: 40 INJECTION, SUSPENSION INTRA-ARTICULAR; INTRAMUSCULAR at 08:07

## 2023-07-11 NOTE — PROCEDURES
Large Joint Aspiration/Injection: L subacromial bursa    Date/Time: 7/11/2023 8:30 AM  Performed by: Melody Garcia PA-C  Authorized by: Melody Garcia PA-C     Consent Done?:  Yes (Verbal)  Indications:  Pain  Prep: patient was prepped and draped in usual sterile fashion    Local anesthetic:  Topical anesthetic    Details:  Needle Size:  22 G  Approach:  Posterior  Location:  Shoulder  Site:  L subacromial bursa  Medications:  40 mg triamcinolone acetonide 40 mg/mL  Patient tolerance:  Patient tolerated the procedure well with no immediate complications

## 2023-07-11 NOTE — PROGRESS NOTES
"Patient ID: Rishi Aranda is a 57 y.o. male.    Chief Complaint: Pain of the Left Shoulder      HISTORY:  Rishi Aranda is a 57 y.o. male who returns to me today for follow up of left shoulder pain.  He was last seen by me 5/30/2023.  Today he is doing well, but notes the pain in his shoulder is worsening.  He has been in PT.  He recently had cervical MRI done.  He has pain in the left shoulder with lifting, reaching, overhead activity.       PMH/PSH/FamHx/SocHx:    Unchanged from prior visit.    ROS:  Constitution: Negative for chills, fever and weakness.   Respiratory: Negative for cough and shortness of breath.   Musculoskeletal: Positive for left shoulder  Psychiatric/Behavioral: The patient is not nervous/anxious.       PHYSICAL EXAM:   Ht 5' 7.01" (1.702 m)   Wt 65.8 kg (144 lb 15.2 oz)   BMI 22.70 kg/m²   Left shoulder  Skin intact  No effusion or warmth  Tenderness: lateral acromial  Range of motion is painful   ROM: forward flexion 165, external rotation 50/50, internal rotation L1  Shoulder Strength: biceps 5/5, triceps 5/5, abduction 4/5, adduction 5/5  positive for impingement sign, sensory exam normal, and motor exam normal  Stability tests: normal  Special Tests:    Crossbody test: positive    Neer's positive  Hawkin's positive    Gene's positive  Drop arm negative      ASSESSMENT/PLAN:    Rishi was seen today for pain.    Diagnoses and all orders for this visit:    Rotator cuff tendonitis, left    Impingement syndrome of left shoulder  -     Large Joint Aspiration/Injection: L subacromial bursa      - He elected to proceed with diagnostic/therapeutic CSI today.    - He will assess relief he has from the injection in 2-3 weeks.  Although he does have some cervical pathology seen on recent MRI, he is also having rotator cuff pathology and will consider MRI if he does not improve with this injection.  - Follow up as needed    "

## 2023-07-12 ENCOUNTER — CLINICAL SUPPORT (OUTPATIENT)
Dept: REHABILITATION | Facility: HOSPITAL | Age: 58
End: 2023-07-12
Payer: MEDICARE

## 2023-07-12 DIAGNOSIS — R52 PAIN AGGRAVATED BY LIFTING: ICD-10-CM

## 2023-07-12 DIAGNOSIS — M53.82 DECREASED ROM OF INTERVERTEBRAL DISCS OF CERVICAL SPINE: Primary | ICD-10-CM

## 2023-07-12 PROCEDURE — 97112 NEUROMUSCULAR REEDUCATION: CPT

## 2023-07-12 NOTE — PROGRESS NOTES
[facility]   Physical Therapy Treatment Note      Name: Rishi Aranda  Clinic Number: [id number]    Therapy Diagnosis:   Encounter Diagnoses   Name Primary?    Decreased ROM of intervertebral discs of cervical spine Yes    Pain aggravated by lifting      Physician: Shane Singh MD    Visit Date: 7/12/2023    Physician Orders: PT Eval and Treat neck and shoulder  Medical Diagnosis from Referral: M54.12 (ICD-10-CM) - Cervical radiculopathy M25.512 (ICD-10-CM) - Left shoulder pain, unspecified chronicity   Evaluation Date: 6/6/2023  Authorization Period Expiration: 12/31/2023  Plan of Care Expiration: 8/31/2023  Progress Note Due: 10th visit  Visit # / Visits authorized: 6 / 20 + eval  FOTO: 1/3     Precautions: Standard     PTA Visit #: 0 / 5     Time In: 8:03 am  Time Out: 9:10 am  Total Treatment Time: 67 minutes  Total Billable Time: 57 minutes     Subjective     Patient reports: he had a cortisone injection yesterday into left shoulder.  He was compliant with home exercise program.  Response to previous treatment: appropriate muscle response  Functional change: ongoing    Pain: 4/10, currently  Location: Left neck and shoulder    Patients goals: Decrease neck and shoulder pain     Objective      Objective Measures updated at progress report unless specified.     Treatment     Rishi received the treatments listed below:      therapeutic exercises to develop strength, endurance, ROM, flexibility, posture, and core stabilization for 00 minutes including:   Pec stretch in doorway 30 seconds, 4x    manual therapy techniques: Joint mobilizations and Manual traction were applied to the: cervical spine and left glenohumeral joint for 00 minutes, including:   Cervical distraction   Cervical joint mobilizations in all planes Grade II-IV    neuromuscular re-education activities to improve: Balance, Coordination, Kinesthetic, Sense, Proprioception, Posture, and Motor Control for 57 minutes,  including:   Education on plan of care, prognosis, home exercise program, and activity modification  Upper body ergometer 4/4 forward and backward for endogenous release of opioids  Modified Y: 5 second hold, 20 reps - not today  Prone rows with cue for scapular setting: 10 second hold, 20 reps each   Serratus wall slides: 2 x 10 reps- not today  ER walk outs with Green TB; 3 x 10 reps  IR walk outs with Blue TB; 3 x 10 reps     Cold pack to left shoulder 10 minutes.    Patient Education and Home Exercises       Education provided:   - Activity modification  - Prognosis  - Plan of care  - Home exercise program     Written Home Exercises Provided: Patient instructed to cont prior HEP. Exercises were reviewed and Rishi was able to demonstrate them prior to the end of the session.  Rishi demonstrated good  understanding of the education provided. See EMR under Patient Instructions for exercises provided during therapy sessions    Assessment     Modified today's session secondary to recent injection. Observed shoulder shrug for left left IR and ER walk outs; cues to decrease upper trap activation and emphasize periscapular mm activation. Noted decreased neck pain with activity following proper scapular setting.     Rishi Is progressing well towards his goals.   Pt prognosis is Fair.     Pt will continue to benefit from skilled outpatient physical therapy to address the deficits listed in the problem list box on initial evaluation, provide pt/family education and to maximize pt's level of independence in the home and community environment.     Pt's spiritual, cultural and educational needs considered and pt agreeable to plan of care and goals.     Anticipated Barriers for therapy: Therapy frequency     Goals: Short Term Goals: 4 weeks   1. Patient will reduce maximal pain rating to < 3/10 pain to facilitate ability to sleep through the night and recover from PT interventions.  2. Patient will demonstrate > 15 degrees  cervical extension improvement to facilitate overhead activity performance.  3. Patient will demonstrate > 15 degrees cervical flexion improvement to facilitate donning shoes.      Long Term Goals: 8-10 weeks   1. Patient will demonstrate > 4/5 upper quarter and periscapular strength to facilitate pain free lifting tasks.   2. Patient will demonstrate > 50 degrees cervical rotation bilaterally to facilitate driving without restrictions.   3. Patient will improve FOTO limitation score to at least 31% indicating a clinically significant change in function.  4. Patient will report < 0/10 pain with cervical active range of motion in all planes.     Plan     Plan of care Certification: 6/6/2023 to 8/31/2023.     Outpatient Physical Therapy 1-2 times weekly for 8-10 weeks to include the following interventions: Cervical/Lumbar Traction, Manual Therapy, Moist Heat/ Ice, Neuromuscular Re-ed, Patient Education, Self Care, Therapeutic Activities, and Therapeutic Exercise.      Peace Soto, PT

## 2023-07-14 ENCOUNTER — CLINICAL SUPPORT (OUTPATIENT)
Dept: REHABILITATION | Facility: HOSPITAL | Age: 58
End: 2023-07-14
Payer: MEDICARE

## 2023-07-14 DIAGNOSIS — R52 PAIN AGGRAVATED BY LIFTING: ICD-10-CM

## 2023-07-14 DIAGNOSIS — M53.82 DECREASED ROM OF INTERVERTEBRAL DISCS OF CERVICAL SPINE: Primary | ICD-10-CM

## 2023-07-14 PROCEDURE — 97112 NEUROMUSCULAR REEDUCATION: CPT

## 2023-07-14 NOTE — PROGRESS NOTES
"[facility]   Physical Therapy Treatment Note      Name: Rishi Aranda  Clinic Number: [id number]    Therapy Diagnosis:   Encounter Diagnoses   Name Primary?    Decreased ROM of intervertebral discs of cervical spine Yes    Pain aggravated by lifting      Physician: Shane Singh MD    Visit Date: 7/14/2023    Physician Orders: PT Eval and Treat neck and shoulder  Medical Diagnosis from Referral: M54.12 (ICD-10-CM) - Cervical radiculopathy M25.512 (ICD-10-CM) - Left shoulder pain, unspecified chronicity   Evaluation Date: 6/6/2023  Authorization Period Expiration: 12/31/2023  Plan of Care Expiration: 8/31/2023  Progress Note Due: 10th visit  Visit # / Visits authorized: 6 / 20 + eval  FOTO: 1/3     Precautions: Standard     PTA Visit #: 0 / 5     Time In: 8:03 am  Time Out: 9:00 am  Total Treatment Time: 58 minutes  Total Billable Time: 58 minutes     Subjective     Patient reports:some improvement in left shoulder pain. Patient reports "that sharp" pain has mostly resolved.   He was compliant with home exercise program.  Response to previous treatment: appropriate muscle response  Functional change: ongoing    Pain: NT/10, currently  Location: Left neck and shoulder    Patients goals: Decrease neck and shoulder pain     Objective      Objective Measures updated at progress report unless specified.     Treatment     Rishi received the treatments listed below:      therapeutic exercises to develop strength, endurance, ROM, flexibility, posture, and core stabilization for 00 minutes including:   Pec stretch in doorway 30 seconds, 4x    manual therapy techniques: Joint mobilizations and Manual traction were applied to the: cervical spine and left glenohumeral joint for 00 minutes, including:   Cervical distraction   Cervical joint mobilizations in all planes Grade II-IV    neuromuscular re-education activities to improve: Balance, Coordination, Kinesthetic, Sense, Proprioception, Posture, " and Motor Control for 58 minutes, including:   Education on plan of care, prognosis, home exercise program, and activity modification  Upper body ergometer 3/3 forward and backward for endogenous release of opioids  Modified Y: 5 second hold, 20 reps - not today  Prone rows with cue for scapular setting: 10 second hold, 20 reps each   Serratus wall slides: 2 x 10 reps  ER walk outs with Green TB; 3 x 10 reps  IR walk outs with green  TB; 3 x 10 reps   Landmine: 3x8 4 lbs on wooden dowel    Cold pack to left shoulder 10 minutes.    Patient Education and Home Exercises       Education provided:   - Activity modification  - Prognosis  - Plan of care  - Home exercise program     Written Home Exercises Provided: Patient instructed to cont prior HEP. Exercises were reviewed and Rishi was able to demonstrate them prior to the end of the session.  Rishi demonstrated good  understanding of the education provided. See EMR under Patient Instructions for exercises provided during therapy sessions    Assessment     Patient demonstrated improved periscapular endurance during RTC exercises. Good tolerance for serratus slides on wall; addition of landmine for further serratus activation.     Rishi Is progressing well towards his goals.   Pt prognosis is Fair.     Pt will continue to benefit from skilled outpatient physical therapy to address the deficits listed in the problem list box on initial evaluation, provide pt/family education and to maximize pt's level of independence in the home and community environment.     Pt's spiritual, cultural and educational needs considered and pt agreeable to plan of care and goals.     Anticipated Barriers for therapy: Therapy frequency     Goals: Short Term Goals: 4 weeks   1. Patient will reduce maximal pain rating to < 3/10 pain to facilitate ability to sleep through the night and recover from PT interventions.  2. Patient will demonstrate > 15 degrees cervical extension improvement to  facilitate overhead activity performance.  3. Patient will demonstrate > 15 degrees cervical flexion improvement to facilitate donning shoes.      Long Term Goals: 8-10 weeks   1. Patient will demonstrate > 4/5 upper quarter and periscapular strength to facilitate pain free lifting tasks.   2. Patient will demonstrate > 50 degrees cervical rotation bilaterally to facilitate driving without restrictions.   3. Patient will improve FOTO limitation score to at least 31% indicating a clinically significant change in function.  4. Patient will report < 0/10 pain with cervical active range of motion in all planes.     Plan     Plan of care Certification: 6/6/2023 to 8/31/2023.     Outpatient Physical Therapy 1-2 times weekly for 8-10 weeks to include the following interventions: Cervical/Lumbar Traction, Manual Therapy, Moist Heat/ Ice, Neuromuscular Re-ed, Patient Education, Self Care, Therapeutic Activities, and Therapeutic Exercise.      Peace Soto, PT

## 2023-07-26 ENCOUNTER — OFFICE VISIT (OUTPATIENT)
Dept: NEUROSURGERY | Facility: CLINIC | Age: 58
End: 2023-07-26
Payer: MEDICARE

## 2023-07-26 DIAGNOSIS — M47.816 LUMBAR SPONDYLOSIS: ICD-10-CM

## 2023-07-26 DIAGNOSIS — M25.512 LEFT SHOULDER PAIN, UNSPECIFIED CHRONICITY: ICD-10-CM

## 2023-07-26 DIAGNOSIS — M48.02 CERVICAL SPINAL STENOSIS: Primary | ICD-10-CM

## 2023-07-26 PROCEDURE — 4010F ACE/ARB THERAPY RXD/TAKEN: CPT | Mod: CPTII,S$GLB,, | Performed by: NEUROLOGICAL SURGERY

## 2023-07-26 PROCEDURE — 99215 OFFICE O/P EST HI 40 MIN: CPT | Mod: S$GLB,,, | Performed by: NEUROLOGICAL SURGERY

## 2023-07-26 PROCEDURE — 99215 PR OFFICE/OUTPT VISIT, EST, LEVL V, 40-54 MIN: ICD-10-PCS | Mod: S$GLB,,, | Performed by: NEUROLOGICAL SURGERY

## 2023-07-26 PROCEDURE — 4010F PR ACE/ARB THEARPY RXD/TAKEN: ICD-10-PCS | Mod: CPTII,S$GLB,, | Performed by: NEUROLOGICAL SURGERY

## 2023-07-26 PROCEDURE — 99999 PR PBB SHADOW E&M-EST. PATIENT-LVL II: CPT | Mod: PBBFAC,,, | Performed by: NEUROLOGICAL SURGERY

## 2023-07-26 PROCEDURE — 1159F MED LIST DOCD IN RCRD: CPT | Mod: CPTII,S$GLB,, | Performed by: NEUROLOGICAL SURGERY

## 2023-07-26 PROCEDURE — 1159F PR MEDICATION LIST DOCUMENTED IN MEDICAL RECORD: ICD-10-PCS | Mod: CPTII,S$GLB,, | Performed by: NEUROLOGICAL SURGERY

## 2023-07-26 PROCEDURE — 99999 PR PBB SHADOW E&M-EST. PATIENT-LVL II: ICD-10-PCS | Mod: PBBFAC,,, | Performed by: NEUROLOGICAL SURGERY

## 2023-07-26 NOTE — PROGRESS NOTES
"NEUROSURGICAL OUTPATIENT CONSULTATION NOTE    DATE OF SERVICE:  07/26/2023    ATTENDING PHYSICIAN:  Shane Singh MD    CONSULT REQUESTED BY:        REASON FOR CONSULT:  Follow up after PT and lumbar MRI    HISTORY OF PRESENT ILLNESS:  He started PT 2 weeks and feels some benefit.  He reports a "bruised" feeling in the left arm and chest and leg.  No right sided symptoms.  MRI lumbar completed.  Denies claudication or B/B symptoms.  Left shoulder steroid injection completed, good relief.    Review     6/21  Update: PT ongoing.  Symptoms still the same.  XR completed with severe disc height loss L4/5 and L5/S1.  Saw ortho, recommended shoulder injection, declined.     5/17  This is a very pleasant 57 y.o. male who previously had a posterior C7 foraminotomy, presents with worsening neck pain and left shoulder pain.  Shoulder pain is worse with movement of the shoulder.  Sometimes has to hold the arm above his head to improve shoulder pain.  The neck pain is constant.  He does not have radiating pain in the arm.  He has some numbness in the left fingertips which he says has been present since his posterior foraminotomy.  He is referred with an MRI scan showing cervical stenosis.  He denies dropping objects or changes in his handwriting.  He can use buttons.  He denies clumsiness.  He is also having some pain radiating from the low back on the left side into his groin and into his thigh.  No significant weakness or numbness in the lower extremities.      PAST MEDICAL HISTORY:  Active Ambulatory Problems     Diagnosis Date Noted    Chondromalacia of patella 06/01/2011    Neuralgia, neuritis, and radiculitis, unspecified 07/14/2011    Chronic neck pain 08/17/2012    Cervical radiculopathy, BUE 08/17/2012    DJD (degenerative joint disease) of cervical spine 08/17/2012    Neural foraminal stenosis of cervical spine 08/17/2012    Chronic low back pain 08/17/2012    Primary hypertension 08/17/2012    Right knee pain " 12/07/2017    BMI 26.0-26.9,adult 12/03/2018    Decreased strength involving knee joint 05/16/2019    S/P total knee arthroplasty, right 07/03/2019    Primary osteoarthritis of left knee 07/03/2019    Pain 01/26/2021    Chronic asthma 10/04/2022    Decreased ROM of intervertebral discs of cervical spine 06/13/2023    Pain aggravated by lifting 06/13/2023     Resolved Ambulatory Problems     Diagnosis Date Noted    Primary osteoarthritis of right knee 02/07/2018    Screening for colon cancer 05/01/2019    Encounter for screening colonoscopy 05/02/2019    Pain in both knees 05/16/2019    Surgery follow-up examination 07/03/2019    Encounter for preventive health examination 02/02/2023     Past Medical History:   Diagnosis Date    Arthritis     Asthma     Chronic LBP 8/17/2012    HTN (hypertension) 8/17/2012    Hypertension        PAST SURGICAL HISTORY:  Past Surgical History:   Procedure Laterality Date    COLONOSCOPY N/A 5/2/2019    Procedure: COLONOSCOPY;  Surgeon: Noemí Boss MD;  Location: [address]);  Service: Endoscopy;  Laterality: N/A;    KNEE SURGERY      NECK SURGERY      TOTAL KNEE ARTHROPLASTY Right 5/14/2019    Procedure: ARTHROPLASTY, KNEE, TOTAL,Medial and Lateral compartment;  Surgeon: Maxi Chaves MD;  Location: [facility];  Service: Orthopedics;  Laterality: Right;  Regional w/o Catheter,Adductor,Exparel-Bupivacaine Liposome Injection 30cc,Clonidine/Epi/Ketorolac/Ropivacaine Injection 30cc       SOCIAL HISTORY:   Social History     Socioeconomic History    Marital status:    Tobacco Use    Smoking status: Never    Smokeless tobacco: Never   Substance and Sexual Activity    Alcohol use: Yes     Alcohol/week: 12.0 standard drinks     Types: 12 Cans of beer per week     Comment: occasionally    Drug use: No    Sexual activity: Yes     Social Determinants of Health     Financial Resource Strain: Low Risk     Difficulty of Paying Living Expenses: Not hard at all   Food Insecurity: No  Food Insecurity    Worried About Running Out of Food in the Last Year: Never true    Ran Out of Food in the Last Year: Never true   Transportation Needs: No Transportation Needs    Lack of Transportation (Medical): No    Lack of Transportation (Non-Medical): No   Physical Activity: Sufficiently Active    Days of Exercise per Week: 3 days    Minutes of Exercise per Session: 60 min   Stress: No Stress Concern Present    Feeling of Stress : Not at all   Social Connections: Unknown    Frequency of Communication with Friends and Family: Once a week    Frequency of Social Gatherings with Friends and Family: Twice a week    Active Member of Clubs or Organizations: No    Attends Club or Organization Meetings: Never    Marital Status:    Housing Stability: Unknown    Unstable Housing in the Last Year: No       FAMILY HISTORY:  Family History   Problem Relation Age of Onset    Hypertension Mother     Stroke Mother     No Known Problems Sister     Hypertension Brother        CURRENTS MEDICATIONS:  Current Outpatient Medications on File Prior to Visit   Medication Sig Dispense Refill    albuterol (PROVENTIL/VENTOLIN HFA) 90 mcg/actuation inhaler INHALE 2 PUFFS BY MOUTH FOUR TIMES DAILY AS NEEDED FOR WHEEZING 25.5 g 3    albuterol (PROVENTIL/VENTOLIN HFA) 90 mcg/actuation inhaler INHALE 2 PUFFS BY MOUTH FOUR TIMES DAILY AS NEEDED FOR WHEEZE 25.5 g 3    meclizine (ANTIVERT) 25 mg tablet Take 1 tablet (25 mg total) by mouth 3 (three) times daily as needed for Dizziness. 15 tablet 0    meloxicam (MOBIC) 15 MG tablet Take 1 tablet (15 mg total) by mouth once daily. 30 tablet 1    mometasone (NASONEX) 50 mcg/actuation nasal spray 2 sprays by Nasal route once daily. 17 g 1    irbesartan (AVAPRO) 300 MG tablet Take 1 tablet (300 mg total) by mouth every evening. 90 tablet 3     No current facility-administered medications on file prior to visit.       ALLERGIES:  Review of patient's allergies indicates:   Allergen Reactions     Theophylline      Other reaction(s): Hives       REVIEW OF SYSTEMS:  ROS - per HPI    OBJECTIVE:    PHYSICAL EXAMINATION:   There were no vitals filed for this visit.    Bilateral  strength 5/5   Otherwise strength is full   Passive range of motion is painful with the left shoulder    Ashwin sign is noted on left  No lower extremity hyperreflexia or clonus  Lower extremity strength is 5/5 and sensation intact    DIAGNOSTIC DATA:  I personally interpreted the following imaging:     MRI cervical  C3/4 disc osteophytes with moderate to severe central stenosis  C4/5 disc osteophytes with moderate to severe central stenosis    MRI lumbar  L4/5 central and bilateral NFS  L5/S1 central and bilateral NFS    ASSESMENT:  This is a 57 y.o. male with     Problem List Items Addressed This Visit    None  Visit Diagnoses       Cervical spinal stenosis    -  Primary    Left shoulder pain, unspecified chronicity        Lumbar spondylosis                PLAN:  Diagnosis:   Left shoulder arthropathy  Cervical DDD with stenosis and mild myelopathy  Lumbar spondylosis with central and neuroforaminal stenosis    Plan:   Per ortho  I have recommended discectomy and fusion C3-5 for decompression of cervical cord to arrest changes of myelopathy.  At this time he would like to focus on PT.  We discussed that these symptoms commonly progress and that he should contact us with any new neurologic changes.   Does not seem to contribute much at this time.     Follow-up: 3 months    Shane Singh MD, FAANS    Disclaimer: This note was partly generated using dictation software which may occasionally result in transcription errors.

## 2023-08-01 ENCOUNTER — PATIENT MESSAGE (OUTPATIENT)
Dept: REHABILITATION | Facility: HOSPITAL | Age: 58
End: 2023-08-01
Payer: MEDICARE

## 2023-08-18 ENCOUNTER — CLINICAL SUPPORT (OUTPATIENT)
Dept: REHABILITATION | Facility: HOSPITAL | Age: 58
End: 2023-08-18
Payer: MEDICARE

## 2023-08-18 DIAGNOSIS — R52 PAIN AGGRAVATED BY LIFTING: ICD-10-CM

## 2023-08-18 DIAGNOSIS — M53.82 DECREASED ROM OF INTERVERTEBRAL DISCS OF CERVICAL SPINE: Primary | ICD-10-CM

## 2023-08-18 PROCEDURE — 97112 NEUROMUSCULAR REEDUCATION: CPT

## 2023-08-18 NOTE — PROGRESS NOTES
[facility]   Physical Therapy Treatment Note      Name: Rishi Aranda  Clinic Number: [id number]    Therapy Diagnosis:   Encounter Diagnoses   Name Primary?    Decreased ROM of intervertebral discs of cervical spine Yes    Pain aggravated by lifting      Physician: Shane Singh MD    Visit Date: 8/18/2023    Physician Orders: PT Eval and Treat neck and shoulder  Medical Diagnosis from Referral: M54.12 (ICD-10-CM) - Cervical radiculopathy M25.512 (ICD-10-CM) - Left shoulder pain, unspecified chronicity   Evaluation Date: 6/6/2023  Authorization Period Expiration: 12/31/2023  Plan of Care Expiration: 8/31/2023  Progress Note Due: 10th visit  Visit # / Visits authorized: 8 / 20 + eval  FOTO: 1/3     Precautions: Standard     PTA Visit #: 0 / 5     Time In: 8:00 am  Time Out: 8:59 am  Total Treatment Time: 59 minutes  Total Billable Time: 59 minutes     Subjective     Patient reports: MD suggested surgery, but patient declined.  He was compliant with home exercise program.  Response to previous treatment: appropriate muscle response  Functional change: ongoing    Pain: NT/10, currently  Location: Left neck and shoulder    Patients goals: Decrease neck and shoulder pain     Objective      Objective Measures updated at progress report unless specified.     Inverted Supinator sign: + on L and R  Babinski: - bilaterally    Shoulder Range of Motion:   R Active (Passive) L Active (Passive)   Flexion WFL WFL   Abduction WFL WFL   ER WFL WFL   IR WFL WFL       Upper Extremity Strength: manual muscle grades below   Right  Left   Shoulder FLEX 3/5 Shoulder FLEX 3+/5   Shoulder ABD 3/5 Shoulder ABD 3+/5   Shoulder ER 3/5 Shoulder ER 3+/5   Shoulder IR 3/5 Shoulder IR 3+/5   Elbow FLEX 4-/5 Elbow Flex 4-/5   Elbow EXT 3+/5 Elbow EXT 4-/5   Wrist FLEX 5/5 Wrist FLEX 5/5   Wrist EXT 5/5 Wrist EXT 5/5         Treatment     Rishi received the treatments listed below:      therapeutic exercises to develop  strength, endurance, ROM, flexibility, posture, and core stabilization for 00 minutes including:   Pec stretch in doorway 30 seconds, 4x    manual therapy techniques: Joint mobilizations and Manual traction were applied to the: cervical spine and left glenohumeral joint for 00 minutes, including:   Cervical distraction   Cervical joint mobilizations in all planes Grade II-IV    neuromuscular re-education activities to improve: Balance, Coordination, Kinesthetic, Sense, Proprioception, Posture, and Motor Control for 59 minutes, including:   Education on plan of care, prognosis, home exercise program, and activity modification  Upper body ergometer 4/4 forward and backward for endogenous release of opioids  Modified Y: 5 second hold, 20 reps - not today  Prone rows with cue for scapular setting: 10 second hold, 20 reps each +3 lbs  Serratus wall slides: 2 x 10 reps  Cervical retraction + OH flexion: 2x10  ER walk outs with Green TB; 3 x 10 reps  IR walk outs with green  TB; 3 x 10 reps   Landmine: 3x8 4 lbs on wooden dowel    Cold pack to left shoulder 10 minutes.    Patient Education and Home Exercises       Education provided:   - Activity modification  - Prognosis  - Plan of care  - Home exercise program     Written Home Exercises Provided: Patient instructed to cont prior HEP. Exercises were reviewed and Rishi was able to demonstrate them prior to the end of the session.  Rishi demonstrated good  understanding of the education provided. See EMR under Patient Instructions for exercises provided during therapy sessions    Assessment     Significant time spent on education regarding reassessment, positive inverted supinator signs, but negative babinski, and prognosis for myelopathy; patient demonstrated understanding. Patient demonstrates significant weakness of RTC and periscapular mm. Will continue to work on motor control and strength deficits.     Rishi Is progressing well towards his goals.   Pt prognosis is  Fair.     Pt will continue to benefit from skilled outpatient physical therapy to address the deficits listed in the problem list box on initial evaluation, provide pt/family education and to maximize pt's level of independence in the home and community environment.     Pt's spiritual, cultural and educational needs considered and pt agreeable to plan of care and goals.     Anticipated Barriers for therapy: Therapy frequency     Goals: Short Term Goals: 4 weeks   1. Patient will reduce maximal pain rating to < 3/10 pain to facilitate ability to sleep through the night and recover from PT interventions.  2. Patient will demonstrate > 15 degrees cervical extension improvement to facilitate overhead activity performance.  3. Patient will demonstrate > 15 degrees cervical flexion improvement to facilitate donning shoes.      Long Term Goals: 8-10 weeks   1. Patient will demonstrate > 4/5 upper quarter and periscapular strength to facilitate pain free lifting tasks.   2. Patient will demonstrate > 50 degrees cervical rotation bilaterally to facilitate driving without restrictions.   3. Patient will improve FOTO limitation score to at least 31% indicating a clinically significant change in function.  4. Patient will report < 0/10 pain with cervical active range of motion in all planes.     Plan     Plan of care Certification: 6/6/2023 to 8/31/2023.     Outpatient Physical Therapy 1-2 times weekly for 8-10 weeks to include the following interventions: Cervical/Lumbar Traction, Manual Therapy, Moist Heat/ Ice, Neuromuscular Re-ed, Patient Education, Self Care, Therapeutic Activities, and Therapeutic Exercise.      Peace Soto, PT

## 2023-08-30 ENCOUNTER — OFFICE VISIT (OUTPATIENT)
Dept: CARDIOLOGY | Facility: CLINIC | Age: 58
End: 2023-08-30
Payer: MEDICARE

## 2023-08-30 VITALS
SYSTOLIC BLOOD PRESSURE: 155 MMHG | DIASTOLIC BLOOD PRESSURE: 94 MMHG | RESPIRATION RATE: 20 BRPM | HEIGHT: 67 IN | WEIGHT: 158 LBS | HEART RATE: 56 BPM | BODY MASS INDEX: 24.8 KG/M2

## 2023-08-30 DIAGNOSIS — I10 PRIMARY HYPERTENSION: Primary | ICD-10-CM

## 2023-08-30 DIAGNOSIS — R07.9 CHEST PAIN: ICD-10-CM

## 2023-08-30 PROCEDURE — 3077F SYST BP >= 140 MM HG: CPT | Mod: CPTII,S$GLB,, | Performed by: INTERNAL MEDICINE

## 2023-08-30 PROCEDURE — 1159F PR MEDICATION LIST DOCUMENTED IN MEDICAL RECORD: ICD-10-PCS | Mod: CPTII,S$GLB,, | Performed by: INTERNAL MEDICINE

## 2023-08-30 PROCEDURE — 99999 PR PBB SHADOW E&M-EST. PATIENT-LVL III: CPT | Mod: PBBFAC,,, | Performed by: INTERNAL MEDICINE

## 2023-08-30 PROCEDURE — 99204 OFFICE O/P NEW MOD 45 MIN: CPT | Mod: S$GLB,,, | Performed by: INTERNAL MEDICINE

## 2023-08-30 PROCEDURE — 4010F PR ACE/ARB THEARPY RXD/TAKEN: ICD-10-PCS | Mod: CPTII,S$GLB,, | Performed by: INTERNAL MEDICINE

## 2023-08-30 PROCEDURE — 3008F PR BODY MASS INDEX (BMI) DOCUMENTED: ICD-10-PCS | Mod: CPTII,S$GLB,, | Performed by: INTERNAL MEDICINE

## 2023-08-30 PROCEDURE — 3080F DIAST BP >= 90 MM HG: CPT | Mod: CPTII,S$GLB,, | Performed by: INTERNAL MEDICINE

## 2023-08-30 PROCEDURE — 1159F MED LIST DOCD IN RCRD: CPT | Mod: CPTII,S$GLB,, | Performed by: INTERNAL MEDICINE

## 2023-08-30 PROCEDURE — 1160F PR REVIEW ALL MEDS BY PRESCRIBER/CLIN PHARMACIST DOCUMENTED: ICD-10-PCS | Mod: CPTII,S$GLB,, | Performed by: INTERNAL MEDICINE

## 2023-08-30 PROCEDURE — 4010F ACE/ARB THERAPY RXD/TAKEN: CPT | Mod: CPTII,S$GLB,, | Performed by: INTERNAL MEDICINE

## 2023-08-30 PROCEDURE — 3077F PR MOST RECENT SYSTOLIC BLOOD PRESSURE >= 140 MM HG: ICD-10-PCS | Mod: CPTII,S$GLB,, | Performed by: INTERNAL MEDICINE

## 2023-08-30 PROCEDURE — 1160F RVW MEDS BY RX/DR IN RCRD: CPT | Mod: CPTII,S$GLB,, | Performed by: INTERNAL MEDICINE

## 2023-08-30 PROCEDURE — 3080F PR MOST RECENT DIASTOLIC BLOOD PRESSURE >= 90 MM HG: ICD-10-PCS | Mod: CPTII,S$GLB,, | Performed by: INTERNAL MEDICINE

## 2023-08-30 PROCEDURE — 99999 PR PBB SHADOW E&M-EST. PATIENT-LVL III: ICD-10-PCS | Mod: PBBFAC,,, | Performed by: INTERNAL MEDICINE

## 2023-08-30 PROCEDURE — 3008F BODY MASS INDEX DOCD: CPT | Mod: CPTII,S$GLB,, | Performed by: INTERNAL MEDICINE

## 2023-08-30 PROCEDURE — 99204 PR OFFICE/OUTPT VISIT, NEW, LEVL IV, 45-59 MIN: ICD-10-PCS | Mod: S$GLB,,, | Performed by: INTERNAL MEDICINE

## 2023-08-30 RX ORDER — AMLODIPINE BESYLATE 10 MG/1
10 TABLET ORAL DAILY
Qty: 90 TABLET | Refills: 3 | Status: SHIPPED | OUTPATIENT
Start: 2023-08-30

## 2023-08-30 NOTE — PROGRESS NOTES
HISTORY:    57-year-old male with a history of hypertension, asthma, osteoarthritis, and degenerative disc disease presenting for initial evaluation by me.    Pt with some non-specific left sided chest discomfort. Intermittent stinging sensation. Sometimes has a soreness. Sometimes a fullness. He does have cervical disc disease and is not certain if his symptoms are secondary to radiculopathy. Symptoms intermittent for months.     No SOB or ARREOLA.    Activity levels reasonable. Rides his bicycle and plays golf without issue.     Tolerates irbesartan 300 x 1. Bps above goal.     PHYSICAL EXAM:    Vitals:    08/30/23 0838   BP: (!) 155/94   Pulse: (!) 56   Resp: 20       NAD, A+Ox3.  No jvd, no bruit.  RRR nml s1,s2. No murmurs.  CTA B no wheezes or crackles.  No edema.    LABS/STUDIES (imaging reviewed during clinic visit):    March 2023 hemoglobin 13.6.  Creatinine 0.9 with BUN of 10.  BNP and troponin normal.  2022 LDL 91 and HDL 61.  Triglycerides 48.  EKG March 2023 sinus rhythm with no Q-waves or ST changes.      ASSESSMENT & PLAN:    1. Primary hypertension    2. Chest pain        Orders Placed This Encounter    Exercise Stress - EKG    Echo    amLODIPine (NORVASC) 10 MG tablet        Pt with non-specific CP. Proceed with TST.    Hypertension above goal. Cont irbesartan 300x1. Would add amlodipine 10x1. Check TTE.     Follow up in about 3 months (around 11/30/2023).      Darnell Lugo MD

## 2023-09-01 ENCOUNTER — CLINICAL SUPPORT (OUTPATIENT)
Dept: REHABILITATION | Facility: HOSPITAL | Age: 58
End: 2023-09-01
Payer: MEDICARE

## 2023-09-01 DIAGNOSIS — M53.82 DECREASED ROM OF INTERVERTEBRAL DISCS OF CERVICAL SPINE: Primary | ICD-10-CM

## 2023-09-01 DIAGNOSIS — R52 PAIN AGGRAVATED BY LIFTING: ICD-10-CM

## 2023-09-01 PROCEDURE — 97112 NEUROMUSCULAR REEDUCATION: CPT

## 2023-09-05 ENCOUNTER — HOSPITAL ENCOUNTER (OUTPATIENT)
Dept: CARDIOLOGY | Facility: HOSPITAL | Age: 58
Discharge: HOME | End: 2023-09-05
Attending: INTERNAL MEDICINE
Payer: MEDICARE

## 2023-09-05 DIAGNOSIS — R07.9 CHEST PAIN: ICD-10-CM

## 2023-09-05 LAB
CV STRESS BASE HR: 54 BPM
DIASTOLIC BLOOD PRESSURE: 84 MMHG
OHS CV CPX 1 MINUTE RECOVERY HEART RATE: 118 BPM
OHS CV CPX 85 PERCENT MAX PREDICTED HEART RATE MALE: 138
OHS CV CPX ESTIMATED METS: 12
OHS CV CPX MAX PREDICTED HEART RATE: 162
OHS CV CPX PATIENT IS FEMALE: 0
OHS CV CPX PATIENT IS MALE: 1
OHS CV CPX PEAK DIASTOLIC BLOOD PRESSURE: 72 MMHG
OHS CV CPX PEAK HEAR RATE: 150 BPM
OHS CV CPX PEAK RATE PRESSURE PRODUCT: NORMAL
OHS CV CPX PEAK SYSTOLIC BLOOD PRESSURE: 257 MMHG
OHS CV CPX PERCENT MAX PREDICTED HEART RATE ACHIEVED: 93
OHS CV CPX RATE PRESSURE PRODUCT PRESENTING: 7452
STRESS ECHO POST EXERCISE DUR MIN: 8 MINUTES
STRESS ECHO POST EXERCISE DUR SEC: 23 SECONDS
SYSTOLIC BLOOD PRESSURE: 138 MMHG

## 2023-09-05 PROCEDURE — 93018 CV STRESS TEST I&R ONLY: CPT | Mod: ,,, | Performed by: INTERNAL MEDICINE

## 2023-09-05 PROCEDURE — 93016 CV STRESS TEST SUPVJ ONLY: CPT | Mod: ,,, | Performed by: INTERNAL MEDICINE

## 2023-09-05 PROCEDURE — 93016 EXERCISE STRESS - EKG (CUPID ONLY): ICD-10-PCS | Mod: ,,, | Performed by: INTERNAL MEDICINE

## 2023-09-05 PROCEDURE — 93018 EXERCISE STRESS - EKG (CUPID ONLY): ICD-10-PCS | Mod: ,,, | Performed by: INTERNAL MEDICINE

## 2023-09-05 PROCEDURE — 93017 CV STRESS TEST TRACING ONLY: CPT

## 2023-09-06 ENCOUNTER — CLINICAL SUPPORT (OUTPATIENT)
Dept: REHABILITATION | Facility: HOSPITAL | Age: 58
End: 2023-09-06
Payer: MEDICARE

## 2023-09-06 DIAGNOSIS — R52 PAIN AGGRAVATED BY LIFTING: ICD-10-CM

## 2023-09-06 DIAGNOSIS — M53.82 DECREASED ROM OF INTERVERTEBRAL DISCS OF CERVICAL SPINE: Primary | ICD-10-CM

## 2023-09-06 PROCEDURE — 97112 NEUROMUSCULAR REEDUCATION: CPT

## 2023-09-08 NOTE — PROGRESS NOTES
[facility]   Physical Therapy Treatment Note      Name: Rishi Aranda  Clinic Number: [id number]    Therapy Diagnosis:   Encounter Diagnoses   Name Primary?    Decreased ROM of intervertebral discs of cervical spine Yes    Pain aggravated by lifting      Physician: Shane Singh MD    Visit Date: 9/6/2023    Physician Orders: PT Eval and Treat neck and shoulder  Medical Diagnosis from Referral: M54.12 (ICD-10-CM) - Cervical radiculopathy M25.512 (ICD-10-CM) - Left shoulder pain, unspecified chronicity   Evaluation Date: 6/6/2023  Authorization Period Expiration: 12/31/2023  Updated Plan of Care Expiration: 11/10/2023  Progress Note Due: 10th visit  Visit # / Visits authorized: 10 / 20 + eval  FOTO: 1/3     Precautions: Standard      PTA Visit #: 0 / 5      Time In: 7:00 am  Time Out: 7:56 am  Total Treatment Time: 56 minutes  Total Billable Time: 56 minutes (4 NMR)    PTA Visit #: 0/5       Subjective     Patient reports: doing okay this morning; feels stiff.  He was compliant with home exercise program.  Response to previous treatment: no adverse effects  Functional change: feels a bit stronger    Pain: NT/10  Location: bilateral shoulder      Objective      Objective Measures updated at progress report unless specified.     Treatment     Rishi received the treatments listed below:      neuromuscular re-education activities to improve: Proprioception, Posture, and motor control for 56 minutes. The following activities were included:  Upper body ergometer 4/4 forward and backward for endogenous release of opioids and postural control  Modified Y: 5 second hold, 20 reps  Modified I: 5 seconds, 20x  Shoulder ext with palms up: 3x10, 5 second holds  Prone rows with cue for scapular setting: 10 second hold, 20 reps each +3 lbs  ER walk outs with Green TB; 3 x 10 reps  IR walk outs with green  TB; 3 x 10 reps   Serratus wall slides: 2 x 10 reps    Not today:  Cervical retraction + OH  flexion: 2x10  Landmine: 3x8 4 lbs on wooden dowel    Patient Education and Home Exercises       Education provided:   - HEP    Written Home Exercises Provided: Patient instructed to cont prior HEP. Exercises were reviewed and Rishi was able to demonstrate them prior to the end of the session.  Rishi demonstrated good  understanding of the education provided. See Electronic Medical Record under Patient Instructions for exercises provided during therapy sessions    Assessment     Improving endurance with periscapular motor control activities; however will fatigue during last few reps. Responds well to tactile cues for proper muscle activation.    Rishi Is progressing well towards his goals.   Patient prognosis is Good.     Patient will continue to benefit from skilled outpatient physical therapy to address the deficits listed in the problem list box on initial evaluation, provide pt/family education and to maximize pt's level of independence in the home and community environment.     Patient's spiritual, cultural and educational needs considered and pt agreeable to plan of care and goals.     Anticipated barriers to physical therapy: chronicity    Goals:   New Short Term Goals (4 Weeks):   1. Patient will be independent with home exercise program to supplement therapy sessions in improving pain free mobility.  2. Patient will improve upper extremity manual muscle tests by 1/2 grade in all planes to improve strength for OH activities.  3. Pt will demonstrate improved postural awareness requiring less than 3 VC during therapeutic activity/exercisein order to improve work ergonomics and posture.      New Long Term Goals (8 Weeks):   1. Patient will improve FOTO Survey score to </= 50% limitation to improve perceived limitation with changing and maintaining body positions.  2. Patient will improve upper extremity manual muscle tests 1 grade in all planes to improve strength for lifting and carrying tasks.  3. Patient  will report no pain with cervical active range of motion in all planes to promote unlimited functional mobility.  4. Pt will demonstrate improved perscapular strength and endurance to show improved OH mobility and activity tolerance.  5. Patient will report reduced intensity and frequency of UE tingling with functional activities to show improved symptom tolerance.      Reasons for Recertification of Therapy: shoulder pain, UE tingling, UE weakness, UE myotome weakness    Plan     Plan of care Certification: 6/6/2023 to 11/10/2023.     Outpatient Physical Therapy 1-2 times weekly for 10 weeks to include the following interventions: Cervical/Lumbar Traction, Manual Therapy, Moist Heat/ Ice, Neuromuscular Re-ed, Patient Education, Self Care, Therapeutic Activities, and Therapeutic Exercise.     Peace Soto, PT

## 2023-09-08 NOTE — PLAN OF CARE
[facility]   Updated Plan of CARE Physical Therapy Treatment Note      Name: Rishi Aranda  Clinic Number: [id number]    Therapy Diagnosis:   Encounter Diagnoses   Name Primary?    Decreased ROM of intervertebral discs of cervical spine Yes    Pain aggravated by lifting          Physician: Shane Singh MD    Visit Date: 9/1/2023    Physician Orders: PT Eval and Treat neck and shoulder  Medical Diagnosis from Referral: M54.12 (ICD-10-CM) - Cervical radiculopathy M25.512 (ICD-10-CM) - Left shoulder pain, unspecified chronicity   Evaluation Date: 6/6/2023  Authorization Period Expiration: 12/31/2023  Updated Plan of Care Expiration: 11/10/2023  Progress Note Due: 10th visit  Visit # / Visits authorized: 10 / 20 + eval  FOTO: 1/3     Precautions: Standard     PTA Visit #: 0 / 5     Time In: 7:00 am  Time Out: 7:59 am  Total Treatment Time: 59 minutes  Total Billable Time: 30 minutes (2 NMR)    Subjective     Patient reports: MD suggested surgery, but patient declined.  He was compliant with home exercise program.  Response to previous treatment: appropriate muscle response  Functional change: ongoing    Pain: NT/10, currently  Location: Left neck and shoulder    Patients goals: Decrease neck and shoulder pain     Objective      Objective Measures updated at progress report unless specified.     8/18/2023  Inverted Supinator sign: + on L and R  Babinski: - bilaterally    Shoulder Range of Motion:   R Active (Passive) L Active (Passive)   Flexion WFL WFL   Abduction WFL WFL   ER WFL WFL   IR WFL WFL       Upper Extremity Strength: manual muscle grades below   Right  Left   Shoulder FLEX 3/5 Shoulder FLEX 3+/5   Shoulder ABD 3/5 Shoulder ABD 3+/5   Shoulder ER 3/5 Shoulder ER 3+/5   Shoulder IR 3/5 Shoulder IR 3+/5   Elbow FLEX 4-/5 Elbow Flex 4-/5   Elbow EXT 3+/5 Elbow EXT 4-/5   Wrist FLEX 5/5 Wrist FLEX 5/5   Wrist EXT 5/5 Wrist EXT 5/5     9/1/2023:    Cervical Range of  Motion:  CERVICAL AROM Pain/Dysfunction with Movement   Flexion 40    Extension 50    Right side bending 40    Left side bending 40    Right rotation 53    Left rotation 45 Notes soreness        Treatment     Rishi received the treatments listed below:      therapeutic exercises to develop strength, endurance, ROM, flexibility, posture, and core stabilization for 00 minutes including:   Pec stretch in doorway 30 seconds, 4x    manual therapy techniques: Joint mobilizations and Manual traction were applied to the: cervical spine and left glenohumeral joint for 00 minutes, including:   Cervical distraction   Cervical joint mobilizations in all planes Grade II-IV    neuromuscular re-education activities to improve: Balance, Coordination, Kinesthetic, Sense, Proprioception, Posture, and Motor Control for 59 minutes, including:   Assessment: see above  Education on plan of care, prognosis, home exercise program, and activity modification  Upper body ergometer 4/4 forward and backward for endogenous release of opioids  Modified Y: 5 second hold, 20 reps  Prone rows with cue for scapular setting: 10 second hold, 20 reps each +3 lbs  Serratus wall slides: 2 x 10 reps  Cervical retraction + OH flexion: 2x10  ER walk outs with Green TB; 3 x 10 reps  IR walk outs with green  TB; 3 x 10 reps   Landmine: 3x8 4 lbs on wooden dowel    Cold pack to left shoulder 10 minutes.    Patient Education and Home Exercises       Education provided:   - Activity modification  - Prognosis  - Plan of care  - Home exercise program     Written Home Exercises Provided: Patient instructed to cont prior HEP. Exercises were reviewed and Rishi was able to demonstrate them prior to the end of the session.  Rishi demonstrated good  understanding of the education provided. See EMR under Patient Instructions for exercises provided during therapy sessions    Assessment     Education regarding reassessment, positive inverted supinator signs, but negative  babinski, and prognosis for myelopathy, plan of care moving forward and expectations; patient demonstrated understanding. Updated goals below. Patient is progressing well over all and demonstrates improving tolerance for therapeutic activities/ NMR activities. Patient continues to progress towards short and longer term lower extremity strength and endurance goals at his time. Base line function is good at this time, however motor control, strength and endurance deficits remain limiting functional mobility when challenged. Rishi would continue to benefit from skilled outpatient physical therapy to address remaining short and long term physical therapy goals.       Rishi Is progressing well towards his goals.   Pt prognosis is Fair.     Pt will continue to benefit from skilled outpatient physical therapy to address the deficits listed in the problem list box on initial evaluation, provide pt/family education and to maximize pt's level of independence in the home and community environment.     Pt's spiritual, cultural and educational needs considered and pt agreeable to plan of care and goals.     Anticipated Barriers for therapy: Therapy frequency   Previous Short and Long Term Goals Status:   discontinue   Goals: Short Term Goals: 4 weeks   1. Patient will reduce maximal pain rating to < 3/10 pain to facilitate ability to sleep through the night and recover from PT interventions.  2. Patient will demonstrate > 15 degrees cervical extension improvement to facilitate overhead activity performance. Progressing, not met  3. Patient will demonstrate > 15 degrees cervical flexion improvement to facilitate donning shoes. Progressing, not met  Long Term Goals: 8-10 weeks   1. Patient will demonstrate > 4/5 upper quarter and periscapular strength to facilitate pain free lifting tasks. Progressing, not met  2. Patient will demonstrate > 50 degrees cervical rotation bilaterally to facilitate driving without restrictions.   3.  Patient will improve FOTO limitation score to at least 31% indicating a clinically significant change in function.  4. Patient will report < 0/10 pain with cervical active range of motion in all planes.         New Short Term Goals (4 Weeks):   1. Patient will be independent with home exercise program to supplement therapy sessions in improving pain free mobility.  2. Patient will improve upper extremity manual muscle tests by 1/2 grade in all planes to improve strength for OH activities.  3. Pt will demonstrate improved postural awareness requiring less than 3 VC during therapeutic activity/exercisein order to improve work ergonomics and posture.      New Long Term Goals (8 Weeks):   1. Patient will improve FOTO Survey score to </= 50% limitation to improve perceived limitation with changing and maintaining body positions.  2. Patient will improve upper extremity manual muscle tests 1 grade in all planes to improve strength for lifting and carrying tasks.  3. Patient will report no pain with cervical active range of motion in all planes to promote unlimited functional mobility.  4. Pt will demonstrate improved perscapular strength and endurance to show improved OH mobility and activity tolerance.  5. Patient will report reduced intensity and frequency of UE tingling with functional activities to show improved symptom tolerance.     Reasons for Recertification of Therapy: shoulder pain, UE tingling, UE weakness, UE myotome weakness    Plan     Plan of care Certification: 6/6/2023 to 11/10/2023.     Outpatient Physical Therapy 1-2 times weekly for 10 weeks to include the following interventions: Cervical/Lumbar Traction, Manual Therapy, Moist Heat/ Ice, Neuromuscular Re-ed, Patient Education, Self Care, Therapeutic Activities, and Therapeutic Exercise.      Peace Soto, PT

## 2023-09-13 ENCOUNTER — CLINICAL SUPPORT (OUTPATIENT)
Dept: REHABILITATION | Facility: HOSPITAL | Age: 58
End: 2023-09-13
Payer: MEDICARE

## 2023-09-13 DIAGNOSIS — R52 PAIN AGGRAVATED BY LIFTING: ICD-10-CM

## 2023-09-13 DIAGNOSIS — M53.82 DECREASED ROM OF INTERVERTEBRAL DISCS OF CERVICAL SPINE: Primary | ICD-10-CM

## 2023-09-13 PROCEDURE — 97112 NEUROMUSCULAR REEDUCATION: CPT

## 2023-09-13 NOTE — PROGRESS NOTES
[facility]   Physical Therapy Treatment Note      Name: Rishi Aranda  Clinic Number: [id number]    Therapy Diagnosis:   Encounter Diagnoses   Name Primary?    Decreased ROM of intervertebral discs of cervical spine Yes    Pain aggravated by lifting      Physician: Shane Singh MD    Visit Date: 9/13/2023    Physician Orders: PT Eval and Treat neck and shoulder  Medical Diagnosis from Referral: M54.12 (ICD-10-CM) - Cervical radiculopathy M25.512 (ICD-10-CM) - Left shoulder pain, unspecified chronicity   Evaluation Date: 6/6/2023  Authorization Period Expiration: 12/31/2023  Updated Plan of Care Expiration: 11/10/2023  Progress Note Due: 10th visit - performed on 8/18/2023; updated POC on 9/1/2023  Visit # / Visits authorized: 11 / 20 + eval  FOTO: 1/3     Precautions: Standard      PTA Visit #: 0 / 5      Time In: 8:00 am  Time Out: 8:56 am  Total Treatment Time: 56 minutes  Total Billable Time: 26 minutes (2 NMR)    PTA Visit #: 0/5       Subjective     Patient reports: doing okay this morning; feels stiff.  He was compliant with home exercise program.  Response to previous treatment: no adverse effects  Functional change: feels a bit stronger    Pain: NT/10  Location: bilateral shoulder      Objective      Objective Measures updated at progress report unless specified.     Treatment     Rishi received the treatments listed below:      neuromuscular re-education activities to improve: Proprioception, Posture, and motor control for 56 minutes. The following activities were included:  Upper body ergometer 4/4 forward and backward for endogenous release of opioids and postural control  Modified Y: 5 second hold, 20 reps  Modified I: 5 seconds, 20x  Shoulder ext with palms up: 3x10, 5 second holds  Prone rows with cue for scapular setting: 10 second hold, 20 reps each +5 lbs  ER walk outs with Green TB; 3 x 10 reps  IR walk outs with green  TB; 3 x 10 reps   Serratus wall slides: 2 x 10  reps    Not today:  Cervical retraction + OH flexion: 2x10  Landmine: 3x8 4 lbs on wooden dowel    Patient Education and Home Exercises       Education provided:   - HEP    Written Home Exercises Provided: Patient instructed to cont prior HEP. Exercises were reviewed and Rishi was able to demonstrate them prior to the end of the session.  Rishi demonstrated good  understanding of the education provided. See Electronic Medical Record under Patient Instructions for exercises provided during therapy sessions    Assessment   Modified activities today due to anterior shoulder pain. Heavy education on modifying ADL's to prevent impingement symptoms.     Rishi Is progressing well towards his goals.   Patient prognosis is Good.     Patient will continue to benefit from skilled outpatient physical therapy to address the deficits listed in the problem list box on initial evaluation, provide pt/family education and to maximize pt's level of independence in the home and community environment.     Patient's spiritual, cultural and educational needs considered and pt agreeable to plan of care and goals.     Anticipated barriers to physical therapy: chronicity    Goals:   New Short Term Goals (4 Weeks):   1. Patient will be independent with home exercise program to supplement therapy sessions in improving pain free mobility.  2. Patient will improve upper extremity manual muscle tests by 1/2 grade in all planes to improve strength for OH activities.  3. Pt will demonstrate improved postural awareness requiring less than 3 VC during therapeutic activity/exercisein order to improve work ergonomics and posture.      New Long Term Goals (8 Weeks):   1. Patient will improve FOTO Survey score to </= 50% limitation to improve perceived limitation with changing and maintaining body positions.  2. Patient will improve upper extremity manual muscle tests 1 grade in all planes to improve strength for lifting and carrying tasks.  3. Patient  will report no pain with cervical active range of motion in all planes to promote unlimited functional mobility.  4. Pt will demonstrate improved perscapular strength and endurance to show improved OH mobility and activity tolerance.  5. Patient will report reduced intensity and frequency of UE tingling with functional activities to show improved symptom tolerance.      Reasons for Recertification of Therapy: shoulder pain, UE tingling, UE weakness, UE myotome weakness    Plan     Plan of care Certification: 6/6/2023 to 11/10/2023.     Outpatient Physical Therapy 1-2 times weekly for 10 weeks to include the following interventions: Cervical/Lumbar Traction, Manual Therapy, Moist Heat/ Ice, Neuromuscular Re-ed, Patient Education, Self Care, Therapeutic Activities, and Therapeutic Exercise.     Peace Soto, PT

## 2023-09-15 ENCOUNTER — CLINICAL SUPPORT (OUTPATIENT)
Dept: REHABILITATION | Facility: HOSPITAL | Age: 58
End: 2023-09-15
Payer: MEDICARE

## 2023-09-15 DIAGNOSIS — R52 PAIN AGGRAVATED BY LIFTING: ICD-10-CM

## 2023-09-15 DIAGNOSIS — M53.82 DECREASED ROM OF INTERVERTEBRAL DISCS OF CERVICAL SPINE: Primary | ICD-10-CM

## 2023-09-15 PROCEDURE — 97112 NEUROMUSCULAR REEDUCATION: CPT

## 2023-09-15 NOTE — PROGRESS NOTES
[facility]   Physical Therapy Treatment Note      Name: Rishi Aranda  Clinic Number: [id number]    Therapy Diagnosis:   Encounter Diagnoses   Name Primary?    Decreased ROM of intervertebral discs of cervical spine Yes    Pain aggravated by lifting      Physician: Shane Singh MD    Visit Date: 9/15/2023    Physician Orders: PT Eval and Treat neck and shoulder  Medical Diagnosis from Referral: M54.12 (ICD-10-CM) - Cervical radiculopathy M25.512 (ICD-10-CM) - Left shoulder pain, unspecified chronicity   Evaluation Date: 6/6/2023  Authorization Period Expiration: 12/31/2023  Updated Plan of Care Expiration: 11/10/2023  Progress Note Due: 10th visit - performed on 8/18/2023; updated POC on 9/1/2023  Visit # / Visits authorized: 11 / 20 + eval  FOTO: 1/3     Precautions: Standard      PTA Visit #: 0 / 5      Time In: 8:00 am  Time Out: 8: 50 am  Total Treatment Time: 50 minutes  Total Billable Time: 23 minutes (2 NMR)    PTA Visit #: 0/5       Subjective     Patient reports: no new complaints  He was compliant with home exercise program.  Response to previous treatment: no adverse effects  Functional change: feels a bit stronger    Pain: NT/10  Location: bilateral shoulder      Objective      Objective Measures updated at progress report unless specified.     Treatment     Rishi received the treatments listed below:      neuromuscular re-education activities to improve: Proprioception, Posture, and motor control for 50 minutes. The following activities were included:  Upper body ergometer 4/4 forward and backward for endogenous release of opioids and postural control  Modified Y: 5 second hold, 20 reps  Modified I: 5 seconds, 20x  Shoulder ext with palms up: 3x10, 5 second holds red  Prone rows with cue for scapular setting: 10 second hold, 20 reps each +5 lbs  ER walk outs with Green TB; 3 x 10 reps - held  IR walk outs with green  TB; 3 x 10 reps  - held  Serratus wall slides: 2 x 10  reps    Not today:  Cervical retraction + OH flexion: 2x10  Landmine: 3x8 4 lbs on wooden dowel    Patient Education and Home Exercises       Education provided:   - HEP    Written Home Exercises Provided: Patient instructed to cont prior HEP. Exercises were reviewed and Rishi was able to demonstrate them prior to the end of the session.  Rishi demonstrated good  understanding of the education provided. See Electronic Medical Record under Patient Instructions for exercises provided during therapy sessions    Assessment   Tolerated today's session well. Occasional cues required, but overall demonstrates good signs of independence with exercise.     Rishi Is progressing well towards his goals.   Patient prognosis is Good.     Patient will continue to benefit from skilled outpatient physical therapy to address the deficits listed in the problem list box on initial evaluation, provide pt/family education and to maximize pt's level of independence in the home and community environment.     Patient's spiritual, cultural and educational needs considered and pt agreeable to plan of care and goals.     Anticipated barriers to physical therapy: chronicity    Goals:   New Short Term Goals (4 Weeks):   1. Patient will be independent with home exercise program to supplement therapy sessions in improving pain free mobility.  2. Patient will improve upper extremity manual muscle tests by 1/2 grade in all planes to improve strength for OH activities.  3. Pt will demonstrate improved postural awareness requiring less than 3 VC during therapeutic activity/exercisein order to improve work ergonomics and posture.      New Long Term Goals (8 Weeks):   1. Patient will improve FOTO Survey score to </= 50% limitation to improve perceived limitation with changing and maintaining body positions.  2. Patient will improve upper extremity manual muscle tests 1 grade in all planes to improve strength for lifting and carrying tasks.  3. Patient  will report no pain with cervical active range of motion in all planes to promote unlimited functional mobility.  4. Pt will demonstrate improved perscapular strength and endurance to show improved OH mobility and activity tolerance.  5. Patient will report reduced intensity and frequency of UE tingling with functional activities to show improved symptom tolerance.      Reasons for Recertification of Therapy: shoulder pain, UE tingling, UE weakness, UE myotome weakness    Plan     Plan of care Certification: 6/6/2023 to 11/10/2023.     Outpatient Physical Therapy 1-2 times weekly for 10 weeks to include the following interventions: Cervical/Lumbar Traction, Manual Therapy, Moist Heat/ Ice, Neuromuscular Re-ed, Patient Education, Self Care, Therapeutic Activities, and Therapeutic Exercise.     Peace Soto, PT

## 2023-09-20 ENCOUNTER — PATIENT MESSAGE (OUTPATIENT)
Dept: ADMINISTRATIVE | Facility: HOSPITAL | Age: 58
End: 2023-09-20
Payer: MEDICARE

## 2023-09-20 ENCOUNTER — CLINICAL SUPPORT (OUTPATIENT)
Dept: REHABILITATION | Facility: HOSPITAL | Age: 58
End: 2023-09-20
Payer: MEDICARE

## 2023-09-20 DIAGNOSIS — M53.82 DECREASED ROM OF INTERVERTEBRAL DISCS OF CERVICAL SPINE: Primary | ICD-10-CM

## 2023-09-20 DIAGNOSIS — R52 PAIN AGGRAVATED BY LIFTING: ICD-10-CM

## 2023-09-20 PROCEDURE — 97112 NEUROMUSCULAR REEDUCATION: CPT

## 2023-09-20 NOTE — PROGRESS NOTES
[facility]   Physical Therapy Treatment Note      Name: Rishi Aranda  Clinic Number: [id number]    Therapy Diagnosis:   Encounter Diagnoses   Name Primary?    Decreased ROM of intervertebral discs of cervical spine Yes    Pain aggravated by lifting      Physician: Shane Singh MD    Visit Date: 9/20/2023    Physician Orders: PT Eval and Treat neck and shoulder  Medical Diagnosis from Referral: M54.12 (ICD-10-CM) - Cervical radiculopathy M25.512 (ICD-10-CM) - Left shoulder pain, unspecified chronicity   Evaluation Date: 6/6/2023  Authorization Period Expiration: 12/31/2023  Updated Plan of Care Expiration: 11/10/2023  Progress Note Due: 10th visit - performed on 8/18/2023; updated POC on 9/1/2023  Visit # / Visits authorized: 11 / 20 + eval  FOTO: 1/3     Precautions: Standard      PTA Visit #: 0 / 5      Time In: 8:00 am - 50  Time Out: 8: am  Total Treatment Time: 56 minutes  Total Billable Time: 26 minutes (2 NMR)    PTA Visit #: 0/5       Subjective     Patient reports: doing okay this morning; feels stiff.  He was compliant with home exercise program.  Response to previous treatment: no adverse effects  Functional change: feels a bit stronger    Pain: NT/10  Location: bilateral shoulder      Objective      Objective Measures updated at progress report unless specified.     Treatment     Rishi received the treatments listed below:      neuromuscular re-education activities to improve: Proprioception, Posture, and motor control for 54 minutes. The following activities were included:  Upper body ergometer 4/4 forward and backward for endogenous release of opioids and postural control  Modified Y: 5 second hold, 20 reps  Modified I: 5 seconds, 20x  Shoulder ext with palms up: 3x10, 5 second holds red  Prone rows with cue for scapular setting: 10 second hold, 20 reps each +5 lbs  ER walk outs with Green TB; 3 x 10 reps  IR walk outs with green  TB; 3 x 10 reps   Serratus wall slides:  2 x 10 reps    Not today:  Cervical retraction + OH flexion: 2x10  Landmine: 3x8 4 lbs on wooden dowel    Patient Education and Home Exercises       Education provided:   - HEP    Written Home Exercises Provided: Patient instructed to cont prior HEP. Exercises were reviewed and Rishi was able to demonstrate them prior to the end of the session.  Rishi demonstrated good  understanding of the education provided. See Electronic Medical Record under Patient Instructions for exercises provided during therapy sessions    Assessment   Progressing well overall. Initial difficulty with activities at beginning of session, however improved mobility and tolerance for exercise towards end of session. Adequate fatigue achieved with periscapular motor control exercises. Cues to prevent cervical extension.     Rishi Is progressing well towards his goals.   Patient prognosis is Good.     Patient will continue to benefit from skilled outpatient physical therapy to address the deficits listed in the problem list box on initial evaluation, provide pt/family education and to maximize pt's level of independence in the home and community environment.     Patient's spiritual, cultural and educational needs considered and pt agreeable to plan of care and goals.     Anticipated barriers to physical therapy: chronicity    Goals:   New Short Term Goals (4 Weeks):   1. Patient will be independent with home exercise program to supplement therapy sessions in improving pain free mobility.  2. Patient will improve upper extremity manual muscle tests by 1/2 grade in all planes to improve strength for OH activities.  3. Pt will demonstrate improved postural awareness requiring less than 3 VC during therapeutic activity/exercisein order to improve work ergonomics and posture.      New Long Term Goals (8 Weeks):   1. Patient will improve FOTO Survey score to </= 50% limitation to improve perceived limitation with changing and maintaining body  positions.  2. Patient will improve upper extremity manual muscle tests 1 grade in all planes to improve strength for lifting and carrying tasks.  3. Patient will report no pain with cervical active range of motion in all planes to promote unlimited functional mobility.  4. Pt will demonstrate improved perscapular strength and endurance to show improved OH mobility and activity tolerance.  5. Patient will report reduced intensity and frequency of UE tingling with functional activities to show improved symptom tolerance.      Reasons for Recertification of Therapy: shoulder pain, UE tingling, UE weakness, UE myotome weakness    Plan     Plan of care Certification: 6/6/2023 to 11/10/2023.     Outpatient Physical Therapy 1-2 times weekly for 10 weeks to include the following interventions: Cervical/Lumbar Traction, Manual Therapy, Moist Heat/ Ice, Neuromuscular Re-ed, Patient Education, Self Care, Therapeutic Activities, and Therapeutic Exercise.     Peace Soto, PT

## 2023-09-22 ENCOUNTER — CLINICAL SUPPORT (OUTPATIENT)
Dept: REHABILITATION | Facility: HOSPITAL | Age: 58
End: 2023-09-22
Payer: MEDICARE

## 2023-09-22 DIAGNOSIS — R52 PAIN AGGRAVATED BY LIFTING: ICD-10-CM

## 2023-09-22 DIAGNOSIS — M53.82 DECREASED ROM OF INTERVERTEBRAL DISCS OF CERVICAL SPINE: Primary | ICD-10-CM

## 2023-09-22 PROCEDURE — 97112 NEUROMUSCULAR REEDUCATION: CPT

## 2023-09-22 NOTE — PROGRESS NOTES
[facility]   Physical Therapy Treatment Note      Name: Rishi Aranda  Clinic Number: [id number]    Therapy Diagnosis:   Encounter Diagnoses   Name Primary?    Decreased ROM of intervertebral discs of cervical spine Yes    Pain aggravated by lifting      Physician: Shane Singh MD    Visit Date: 9/22/2023    Physician Orders: PT Eval and Treat neck and shoulder  Medical Diagnosis from Referral: M54.12 (ICD-10-CM) - Cervical radiculopathy M25.512 (ICD-10-CM) - Left shoulder pain, unspecified chronicity   Evaluation Date: 6/6/2023  Authorization Period Expiration: 12/31/2023  Updated Plan of Care Expiration: 11/10/2023  Progress Note Due: 10th visit - performed on 8/18/2023; updated POC on 9/1/2023  Visit # / Visits authorized: 11 / 20 + eval  FOTO: 1/3     Precautions: Standard      PTA Visit #: 0 / 5      Time In: 8:06 am  Time Out: 9:00 am  Total Treatment Time: 54 minutes  Total Billable Time: 23 minutes (2 NMR)    PTA Visit #: 0/5       Subjective     Patient reports: doing well this morning; no new complaints.  He was compliant with home exercise program.  Response to previous treatment: no adverse effects  Functional change: feels a bit stronger    Pain: NT/10  Location: bilateral shoulder      Objective      Objective Measures updated at progress report unless specified.     Treatment     Rishi received the treatments listed below:      neuromuscular re-education activities to improve: Proprioception, Posture, and motor control for 54 minutes. The following activities were included:  Upper body ergometer 4/4 forward and backward for endogenous release of opioids and postural control  Modified Y: 5 second hold, 20 reps  Modified I: 5 seconds, 20x  Shoulder ext with palms up: 3x10, 5 second holds yellow - to decrease shoulder shrug  Prone rows with cue for scapular setting: 10 second hold, 20 reps each +5 lbs  ER walk outs with Green TB; 3 x 10 reps  IR walk outs with green  TB;  3 x 10 reps   Serratus wall slides: 2 x 10 reps -held    Not today:  Cervical retraction + OH flexion: 2x10  Landmine: 3x8 4 lbs on wooden dowel    Patient Education and Home Exercises       Education provided:   - HEP    Written Home Exercises Provided: Patient instructed to cont prior HEP. Exercises were reviewed and Rishi was able to demonstrate them prior to the end of the session.  Rishi demonstrated good  understanding of the education provided. See Electronic Medical Record under Patient Instructions for exercises provided during therapy sessions    Assessment   Progressing well; demonstrates improved tolerance for activity. Demonstrates improving self awareness for proper performance of exercise.     Rishi Is progressing well towards his goals.   Patient prognosis is Good.     Patient will continue to benefit from skilled outpatient physical therapy to address the deficits listed in the problem list box on initial evaluation, provide pt/family education and to maximize pt's level of independence in the home and community environment.     Patient's spiritual, cultural and educational needs considered and pt agreeable to plan of care and goals.     Anticipated barriers to physical therapy: chronicity    Goals:   New Short Term Goals (4 Weeks):   1. Patient will be independent with home exercise program to supplement therapy sessions in improving pain free mobility.  2. Patient will improve upper extremity manual muscle tests by 1/2 grade in all planes to improve strength for OH activities.  3. Pt will demonstrate improved postural awareness requiring less than 3 VC during therapeutic activity/exercisein order to improve work ergonomics and posture.      New Long Term Goals (8 Weeks):   1. Patient will improve FOTO Survey score to </= 50% limitation to improve perceived limitation with changing and maintaining body positions.  2. Patient will improve upper extremity manual muscle tests 1 grade in all planes to  improve strength for lifting and carrying tasks.  3. Patient will report no pain with cervical active range of motion in all planes to promote unlimited functional mobility.  4. Pt will demonstrate improved perscapular strength and endurance to show improved OH mobility and activity tolerance.  5. Patient will report reduced intensity and frequency of UE tingling with functional activities to show improved symptom tolerance.      Reasons for Recertification of Therapy: shoulder pain, UE tingling, UE weakness, UE myotome weakness    Plan     Plan of care Certification: 6/6/2023 to 11/10/2023.     Outpatient Physical Therapy 1-2 times weekly for 10 weeks to include the following interventions: Cervical/Lumbar Traction, Manual Therapy, Moist Heat/ Ice, Neuromuscular Re-ed, Patient Education, Self Care, Therapeutic Activities, and Therapeutic Exercise.     Peace Soto, PT

## 2023-10-11 RX ORDER — IRBESARTAN 300 MG/1
300 TABLET ORAL NIGHTLY
Qty: 90 TABLET | Refills: 3 | Status: SHIPPED | OUTPATIENT
Start: 2023-10-11 | End: 2024-10-10

## 2023-10-11 NOTE — TELEPHONE ENCOUNTER
No care due was identified.  [facility] Embedded Care Due Messages. Reference number: [id number].   10/11/2023 4:52:02 PM CDT

## 2023-10-11 NOTE — PROGRESS NOTES
MEDICAL HISTORY:  Hypertension.  Chronic asthma.  Osteoarthritis of the knee, right knee surgery with previous medial meniscectomy and chondroplasty in right total knee arthroplasty  Cervical degenerative disk disease with left cervical foraminotomy at C6-7.  Lumbar degenerative disk disease with neural foraminal stenosis and facet arthropathy.           SOCIAL HISTORY:  Tobacco use, none.  Alcohol use, 6-12 beers week.     SOCIAL HISTORY:  Tobacco use, none.  Alcohol use, 6-12 beers week.  Exercise occasional riding stationary bike     FAMILY HISTORY:  Mother disease, history of hypertension and stroke.  Father , unknown.  One sister, one brother living.  Brother has hypertension.  One brother     Medication  Albuterol as needed  Irbesartan 300 mg daily  Nasonex 2 puffs daily    Answers submitted by the patient for this visit:  Review of Systems Questionnaire (Submitted on 10/12/2023)  activity change: No  unexpected weight change: No  neck pain: Yes  hearing loss: No  rhinorrhea: No  trouble swallowing: No  eye discharge: No  visual disturbance: No  chest tightness: No  wheezing: Yes  chest pain: No  palpitations: No  blood in stool: No  constipation: No  vomiting: No  diarrhea: No  polydipsia: No  polyuria: No  difficulty urinating: No  urgency: No  hematuria: No  joint swelling: Yes  arthralgias: Yes  headaches: No  weakness: Yes  dysphoric mood: Yes      58-year-old male   Comes in for routine physical.    Main ongoing problems is pain and abnormal sensation mainly he is has on the left side of his body.  He notices a bruise like feeling a feels in his left upper arm , over the left lower back, left medial aspect of the upper leg and lateral aspect of the lower leg.  Has lower neck lower back pain.  Bilateral shoulder pain worse on the left.    He is known to have significant spinal stenosis C3-4 the C4-5 associated with myelopathic changes and is known to have significant neural foraminal  stenosis at L4-5.  He has met with neuro surgery.  There was discussion about surgery but at present he is declining.  He is undergoing physical therapy in regard to shoulder pain and weakness.  He reports no trouble with balance or walking or the use of his arms hands other than numbness in the fingertips  There is no incontinence     There is no chest pain palpitations does not feel shortness a breath however he uses albuterol twice a day to 3 times a day.  Because of wheezing.  There is no abdominal pain and has regular bowel function.  There is no difficulty with urinating.  Nocturia x2.  No heartburn indigestion  When he looks up he still gets a vertical like feeling at times    In August he met with Cardiology for which stress test was negative for ischemia.  Amlodipine 10 mg was initiated but he is not started the medication      Examination   Weight 163   Pulse 60   Blood pressure 140/82  HEENT exam no abnormal findings  Neck no thyromegaly no masses  Chest clear breath sounds good effort  Heart regular rate and rhythm   Abdominal exam nontender soft no hepatosplenomegaly abdominal masses  Pulses 2+ carotid pulses no bruits 2+ pedal pulses   3+ biceps triceps knee and ankle jerk reflexes  He is able to hold up both arms against resistance at the shoulder joint    Impression   General exam   Hypertension  Chronic asthma   Cervical spondylosis with spinal stenosis and myelopathy   Lumbar spondylosis with neuroforaminal stenosis  Vertigo dizziness  Genitalia no scrotal masses no hernias      Plan    Symbicort 160 mg 2 puffs twice a day was initiate help with chronic asthma   Start the amlodipine prescription that was sent in but can start with half a tablet daily  Gabapentin 100 mg t.i.d. was initiated.  He will keep me informed of the paresthesias that he is having    Labs were ordered which included CBC, chemistry, lipid, TSH, PSA.  All look fine

## 2023-10-11 NOTE — TELEPHONE ENCOUNTER
----- Message from Peace Chaves sent at 10/11/2023  3:55 PM CDT -----  Contact: Joy @[facility] [phone]  Requesting an RX refill or new RX.    Is this a refill or new RX: new script to pharm below    RX name and strength (copy/paste from chart):  irbesartan (AVAPRO) 300 MG tablet    Is this a 30 day or 90 day RX: 90    Pharmacy name and phone # (copy/paste from chart):      [facility] ([facility]) - [place] - [address]  [address]  [place] [id number]  Phone: [phone] Fax: [phone]

## 2023-10-12 ENCOUNTER — OFFICE VISIT (OUTPATIENT)
Dept: INTERNAL MEDICINE | Facility: CLINIC | Age: 58
End: 2023-10-12
Payer: MEDICARE

## 2023-10-12 ENCOUNTER — LAB VISIT (OUTPATIENT)
Dept: LAB | Facility: HOSPITAL | Age: 58
End: 2023-10-12
Attending: INTERNAL MEDICINE
Payer: MEDICARE

## 2023-10-12 VITALS
DIASTOLIC BLOOD PRESSURE: 86 MMHG | OXYGEN SATURATION: 99 % | WEIGHT: 163.81 LBS | BODY MASS INDEX: 25.71 KG/M2 | HEART RATE: 63 BPM | SYSTOLIC BLOOD PRESSURE: 134 MMHG | HEIGHT: 67 IN

## 2023-10-12 DIAGNOSIS — M47.12 CERVICAL SPONDYLOSIS WITH MYELOPATHY AND RADICULOPATHY: ICD-10-CM

## 2023-10-12 DIAGNOSIS — M47.27 LUMBOSACRAL SPONDYLOSIS WITH RADICULOPATHY: ICD-10-CM

## 2023-10-12 DIAGNOSIS — Z00.00 ANNUAL PHYSICAL EXAM: ICD-10-CM

## 2023-10-12 DIAGNOSIS — M47.22 CERVICAL SPONDYLOSIS WITH MYELOPATHY AND RADICULOPATHY: ICD-10-CM

## 2023-10-12 DIAGNOSIS — H81.90 VESTIBULAR DIZZINESS: ICD-10-CM

## 2023-10-12 DIAGNOSIS — Z12.5 PROSTATE CANCER SCREENING: ICD-10-CM

## 2023-10-12 DIAGNOSIS — I10 PRIMARY HYPERTENSION: ICD-10-CM

## 2023-10-12 DIAGNOSIS — Z00.00 ANNUAL PHYSICAL EXAM: Primary | ICD-10-CM

## 2023-10-12 DIAGNOSIS — J45.909 CHRONIC ASTHMA, UNSPECIFIED ASTHMA SEVERITY, UNSPECIFIED WHETHER COMPLICATED, UNSPECIFIED WHETHER PERSISTENT: ICD-10-CM

## 2023-10-12 LAB
ALBUMIN SERPL BCP-MCNC: 4 G/DL (ref 3.5–5.2)
ALP SERPL-CCNC: 48 U/L (ref 55–135)
ALT SERPL W/O P-5'-P-CCNC: 15 U/L (ref 10–44)
ANION GAP SERPL CALC-SCNC: 10 MMOL/L (ref 8–16)
AST SERPL-CCNC: 24 U/L (ref 10–40)
BASOPHILS # BLD AUTO: 0.04 K/UL (ref 0–0.2)
BASOPHILS NFR BLD: 0.7 % (ref 0–1.9)
BILIRUB SERPL-MCNC: 0.7 MG/DL (ref 0.1–1)
BUN SERPL-MCNC: 9 MG/DL (ref 6–20)
CALCIUM SERPL-MCNC: 9.7 MG/DL (ref 8.7–10.5)
CHLORIDE SERPL-SCNC: 103 MMOL/L (ref 95–110)
CHOLEST SERPL-MCNC: 189 MG/DL (ref 120–199)
CHOLEST/HDLC SERPL: 2.7 {RATIO} (ref 2–5)
CO2 SERPL-SCNC: 26 MMOL/L (ref 23–29)
COMPLEXED PSA SERPL-MCNC: 1.8 NG/ML (ref 0–4)
CREAT SERPL-MCNC: 0.9 MG/DL (ref 0.5–1.4)
DIFFERENTIAL METHOD: ABNORMAL
EOSINOPHIL # BLD AUTO: 0 K/UL (ref 0–0.5)
EOSINOPHIL NFR BLD: 0.7 % (ref 0–8)
ERYTHROCYTE [DISTWIDTH] IN BLOOD BY AUTOMATED COUNT: 15.7 % (ref 11.5–14.5)
EST. GFR  (NO RACE VARIABLE): >60 ML/MIN/1.73 M^2
GLUCOSE SERPL-MCNC: 83 MG/DL (ref 70–110)
HCT VFR BLD AUTO: 43 % (ref 40–54)
HDLC SERPL-MCNC: 69 MG/DL (ref 40–75)
HDLC SERPL: 36.5 % (ref 20–50)
HGB BLD-MCNC: 14.2 G/DL (ref 14–18)
IMM GRANULOCYTES # BLD AUTO: 0.01 K/UL (ref 0–0.04)
IMM GRANULOCYTES NFR BLD AUTO: 0.2 % (ref 0–0.5)
LDLC SERPL CALC-MCNC: 109 MG/DL (ref 63–159)
LYMPHOCYTES # BLD AUTO: 2.2 K/UL (ref 1–4.8)
LYMPHOCYTES NFR BLD: 40 % (ref 18–48)
MCH RBC QN AUTO: 30.3 PG (ref 27–31)
MCHC RBC AUTO-ENTMCNC: 33 G/DL (ref 32–36)
MCV RBC AUTO: 92 FL (ref 82–98)
MONOCYTES # BLD AUTO: 0.5 K/UL (ref 0.3–1)
MONOCYTES NFR BLD: 8.7 % (ref 4–15)
NEUTROPHILS # BLD AUTO: 2.7 K/UL (ref 1.8–7.7)
NEUTROPHILS NFR BLD: 49.7 % (ref 38–73)
NONHDLC SERPL-MCNC: 120 MG/DL
NRBC BLD-RTO: 0 /100 WBC
PLATELET # BLD AUTO: 250 K/UL (ref 150–450)
PMV BLD AUTO: 10.4 FL (ref 9.2–12.9)
POTASSIUM SERPL-SCNC: 4 MMOL/L (ref 3.5–5.1)
PROT SERPL-MCNC: 6.9 G/DL (ref 6–8.4)
RBC # BLD AUTO: 4.69 M/UL (ref 4.6–6.2)
SODIUM SERPL-SCNC: 139 MMOL/L (ref 136–145)
TRIGL SERPL-MCNC: 55 MG/DL (ref 30–150)
TSH SERPL DL<=0.005 MIU/L-ACNC: 1.68 UIU/ML (ref 0.4–4)
WBC # BLD AUTO: 5.52 K/UL (ref 3.9–12.7)

## 2023-10-12 PROCEDURE — 99999 PR PBB SHADOW E&M-EST. PATIENT-LVL III: CPT | Mod: PBBFAC,,, | Performed by: INTERNAL MEDICINE

## 2023-10-12 PROCEDURE — 80061 LIPID PANEL: CPT | Performed by: INTERNAL MEDICINE

## 2023-10-12 PROCEDURE — 99396 PREV VISIT EST AGE 40-64: CPT | Mod: GZ,S$GLB,, | Performed by: INTERNAL MEDICINE

## 2023-10-12 PROCEDURE — 1159F MED LIST DOCD IN RCRD: CPT | Mod: CPTII,S$GLB,, | Performed by: INTERNAL MEDICINE

## 2023-10-12 PROCEDURE — 36415 COLL VENOUS BLD VENIPUNCTURE: CPT | Performed by: INTERNAL MEDICINE

## 2023-10-12 PROCEDURE — 84153 ASSAY OF PSA TOTAL: CPT | Performed by: INTERNAL MEDICINE

## 2023-10-12 PROCEDURE — 1160F PR REVIEW ALL MEDS BY PRESCRIBER/CLIN PHARMACIST DOCUMENTED: ICD-10-PCS | Mod: CPTII,S$GLB,, | Performed by: INTERNAL MEDICINE

## 2023-10-12 PROCEDURE — 84443 ASSAY THYROID STIM HORMONE: CPT | Performed by: INTERNAL MEDICINE

## 2023-10-12 PROCEDURE — 3008F BODY MASS INDEX DOCD: CPT | Mod: CPTII,S$GLB,, | Performed by: INTERNAL MEDICINE

## 2023-10-12 PROCEDURE — 3079F PR MOST RECENT DIASTOLIC BLOOD PRESSURE 80-89 MM HG: ICD-10-PCS | Mod: CPTII,S$GLB,, | Performed by: INTERNAL MEDICINE

## 2023-10-12 PROCEDURE — 3008F PR BODY MASS INDEX (BMI) DOCUMENTED: ICD-10-PCS | Mod: CPTII,S$GLB,, | Performed by: INTERNAL MEDICINE

## 2023-10-12 PROCEDURE — 4010F ACE/ARB THERAPY RXD/TAKEN: CPT | Mod: CPTII,S$GLB,, | Performed by: INTERNAL MEDICINE

## 2023-10-12 PROCEDURE — 3075F PR MOST RECENT SYSTOLIC BLOOD PRESS GE 130-139MM HG: ICD-10-PCS | Mod: CPTII,S$GLB,, | Performed by: INTERNAL MEDICINE

## 2023-10-12 PROCEDURE — 99999 PR PBB SHADOW E&M-EST. PATIENT-LVL III: ICD-10-PCS | Mod: PBBFAC,,, | Performed by: INTERNAL MEDICINE

## 2023-10-12 PROCEDURE — 1159F PR MEDICATION LIST DOCUMENTED IN MEDICAL RECORD: ICD-10-PCS | Mod: CPTII,S$GLB,, | Performed by: INTERNAL MEDICINE

## 2023-10-12 PROCEDURE — 1160F RVW MEDS BY RX/DR IN RCRD: CPT | Mod: CPTII,S$GLB,, | Performed by: INTERNAL MEDICINE

## 2023-10-12 PROCEDURE — 3075F SYST BP GE 130 - 139MM HG: CPT | Mod: CPTII,S$GLB,, | Performed by: INTERNAL MEDICINE

## 2023-10-12 PROCEDURE — 3079F DIAST BP 80-89 MM HG: CPT | Mod: CPTII,S$GLB,, | Performed by: INTERNAL MEDICINE

## 2023-10-12 PROCEDURE — 85025 COMPLETE CBC W/AUTO DIFF WBC: CPT | Performed by: INTERNAL MEDICINE

## 2023-10-12 PROCEDURE — 4010F PR ACE/ARB THEARPY RXD/TAKEN: ICD-10-PCS | Mod: CPTII,S$GLB,, | Performed by: INTERNAL MEDICINE

## 2023-10-12 PROCEDURE — 99396 PR PREVENTIVE VISIT,EST,40-64: ICD-10-PCS | Mod: GZ,S$GLB,, | Performed by: INTERNAL MEDICINE

## 2023-10-12 PROCEDURE — 80053 COMPREHEN METABOLIC PANEL: CPT | Performed by: INTERNAL MEDICINE

## 2023-10-12 RX ORDER — GABAPENTIN 100 MG/1
100 CAPSULE ORAL 3 TIMES DAILY
Qty: 90 CAPSULE | Refills: 2 | Status: SHIPPED | OUTPATIENT
Start: 2023-10-12

## 2023-10-12 RX ORDER — BUDESONIDE AND FORMOTEROL FUMARATE DIHYDRATE 160; 4.5 UG/1; UG/1
2 AEROSOL RESPIRATORY (INHALATION) EVERY 12 HOURS
Qty: 10.2 G | Refills: 5 | Status: SHIPPED | OUTPATIENT
Start: 2023-10-12 | End: 2024-10-11

## 2023-10-13 ENCOUNTER — PATIENT MESSAGE (OUTPATIENT)
Dept: INTERNAL MEDICINE | Facility: CLINIC | Age: 58
End: 2023-10-13
Payer: MEDICARE

## 2023-10-20 ENCOUNTER — HOSPITAL ENCOUNTER (OUTPATIENT)
Dept: CARDIOLOGY | Facility: HOSPITAL | Age: 58
Discharge: HOME | End: 2023-10-20
Attending: INTERNAL MEDICINE
Payer: MEDICARE

## 2023-10-20 VITALS
HEART RATE: 54 BPM | WEIGHT: 163 LBS | SYSTOLIC BLOOD PRESSURE: 140 MMHG | HEIGHT: 67 IN | DIASTOLIC BLOOD PRESSURE: 80 MMHG | BODY MASS INDEX: 25.58 KG/M2

## 2023-10-20 DIAGNOSIS — R07.9 CHEST PAIN: ICD-10-CM

## 2023-10-20 DIAGNOSIS — I10 PRIMARY HYPERTENSION: ICD-10-CM

## 2023-10-20 LAB
ASCENDING AORTA: 2.59 CM
AV INDEX (PROSTH): 0.7
AV MEAN GRADIENT: 6 MMHG
AV PEAK GRADIENT: 12 MMHG
AV VALVE AREA BY VELOCITY RATIO: 2.36 CM²
AV VALVE AREA: 2.23 CM²
AV VELOCITY RATIO: 0.74
BSA FOR ECHO PROCEDURE: 1.87 M2
CV ECHO LV RWT: 0.26 CM
DOP CALC AO PEAK VEL: 1.75 M/S
DOP CALC AO VTI: 40.21 CM
DOP CALC LVOT AREA: 3.2 CM2
DOP CALC LVOT DIAMETER: 2.02 CM
DOP CALC LVOT PEAK VEL: 1.29 M/S
DOP CALC LVOT STROKE VOLUME: 89.85 CM3
DOP CALCLVOT PEAK VEL VTI: 28.05 CM
E WAVE DECELERATION TIME: 227.34 MSEC
E/A RATIO: 1.24
E/E' RATIO: 5.58 M/S
ECHO LV POSTERIOR WALL: 0.75 CM (ref 0.6–1.1)
FRACTIONAL SHORTENING: 41 % (ref 28–44)
INTERVENTRICULAR SEPTUM: 0.7 CM (ref 0.6–1.1)
LA MAJOR: 4.53 CM
LA MINOR: 4.75 CM
LA WIDTH: 3.77 CM
LEFT ATRIUM SIZE: 4.09 CM
LEFT ATRIUM VOLUME INDEX MOD: 26.1 ML/M2
LEFT ATRIUM VOLUME INDEX: 32.9 ML/M2
LEFT ATRIUM VOLUME MOD: 48.26 CM3
LEFT ATRIUM VOLUME: 60.78 CM3
LEFT INTERNAL DIMENSION IN SYSTOLE: 3.37 CM (ref 2.1–4)
LEFT VENTRICLE DIASTOLIC VOLUME INDEX: 86.61 ML/M2
LEFT VENTRICLE DIASTOLIC VOLUME: 160.22 ML
LEFT VENTRICLE MASS INDEX: 81 G/M2
LEFT VENTRICLE SYSTOLIC VOLUME INDEX: 25.2 ML/M2
LEFT VENTRICLE SYSTOLIC VOLUME: 46.54 ML
LEFT VENTRICULAR INTERNAL DIMENSION IN DIASTOLE: 5.7 CM (ref 3.5–6)
LEFT VENTRICULAR MASS: 150.64 G
LV LATERAL E/E' RATIO: 4.84 M/S
LV SEPTAL E/E' RATIO: 6.57 M/S
MV A" WAVE DURATION": 15.22 MSEC
MV PEAK A VEL: 0.74 M/S
MV PEAK E VEL: 0.92 M/S
MV STENOSIS PRESSURE HALF TIME: 65.93 MS
MV VALVE AREA P 1/2 METHOD: 3.34 CM2
PISA MRMAX VEL: 0.06 M/S
PISA TR MAX VEL: 2.77 M/S
PULM VEIN S/D RATIO: 1.09
PV PEAK D VEL: 0.56 M/S
PV PEAK S VEL: 0.61 M/S
RA MAJOR: 3.98 CM
RA PRESSURE ESTIMATED: 3 MMHG
RA WIDTH: 3.22 CM
RIGHT VENTRICULAR END-DIASTOLIC DIMENSION: 2.59 CM
RV TB RVSP: 6 MMHG
SINUS: 2.45 CM
STJ: 2.35 CM
TDI LATERAL: 0.19 M/S
TDI SEPTAL: 0.14 M/S
TDI: 0.17 M/S
TR MAX PG: 31 MMHG
TRICUSPID ANNULAR PLANE SYSTOLIC EXCURSION: 1.95 CM
TV REST PULMONARY ARTERY PRESSURE: 34 MMHG
Z-SCORE OF LEFT VENTRICULAR DIMENSION IN END DIASTOLE: 1.04
Z-SCORE OF LEFT VENTRICULAR DIMENSION IN END SYSTOLE: 0.48

## 2023-10-20 PROCEDURE — 93306 ECHO (CUPID ONLY): ICD-10-PCS | Mod: 26,,, | Performed by: INTERNAL MEDICINE

## 2023-10-20 PROCEDURE — 93306 TTE W/DOPPLER COMPLETE: CPT

## 2023-10-20 PROCEDURE — 93306 TTE W/DOPPLER COMPLETE: CPT | Mod: 26,,, | Performed by: INTERNAL MEDICINE

## 2023-11-07 ENCOUNTER — CLINICAL SUPPORT (OUTPATIENT)
Dept: REHABILITATION | Facility: HOSPITAL | Age: 58
End: 2023-11-07
Payer: MEDICARE

## 2023-11-07 DIAGNOSIS — R52 PAIN AGGRAVATED BY LIFTING: ICD-10-CM

## 2023-11-07 DIAGNOSIS — M53.82 DECREASED ROM OF INTERVERTEBRAL DISCS OF CERVICAL SPINE: Primary | ICD-10-CM

## 2023-11-07 PROCEDURE — 97112 NEUROMUSCULAR REEDUCATION: CPT

## 2023-11-07 NOTE — PROGRESS NOTES
[facility]   Physical Therapy Treatment Note / Discharge Summary     Name: Rishi Aranda  Clinic Number: [id number]    Therapy Diagnosis:   Encounter Diagnoses   Name Primary?    Decreased ROM of intervertebral discs of cervical spine Yes    Pain aggravated by lifting      Physician: Shane Singh MD    Visit Date: 11/7/2023    Physician Orders: PT Eval and Treat neck and shoulder  Medical Diagnosis from Referral: M54.12 (ICD-10-CM) - Cervical radiculopathy M25.512 (ICD-10-CM) - Left shoulder pain, unspecified chronicity   Evaluation Date: 6/6/2023  Authorization Period Expiration: 12/31/2023  Updated Plan of Care Expiration: 11/10/2023  Progress Note Due: 10th visit - performed on 8/18/2023; updated POC on 9/1/2023  Visit # / Visits authorized: 15 / 20 + eval  FOTO: 1/3     Precautions: Standard      Date of Last visit: 11/8/2023  Total Visits Received: 15    PTA Visit #: 0 / 5      Time In: 7:00 am  Time Out: 7:59 am  Total Treatment Time: 59 minutes  Total Billable Time: 59 minutes (4 NMR)    PTA Visit #: 0/5       Subjective     Patient reports: his life has been very busy watching his grand kids. Patient reports he has been having his heart checked out. Patient reports he has been independent with HEP and agreeable to discharge.  He was compliant with home exercise program.  Response to previous treatment: no adverse effects  Functional change: feels a bit stronger    Pain: NT/10  Location: bilateral shoulder        Objective      Objective Measures updated at progress report unless specified.     Treatment     Rishi received the treatments listed below:      neuromuscular re-education activities to improve: Proprioception, Posture, and motor control for 59 minutes. The following activities were included:  Upper body ergometer 4/4 forward and backward for endogenous release of opioids and postural control  Standing Y with yellow therband  Shoulder ext with palms up: 3x10, 5  second holds yellow   Modified Y: 5 second hold, 20 reps  Prone rows with cue for scapular setting: 10 second hold, 20 reps each +5 lbs  ER walk outs with Green TB; 3 x 10 reps  IR walk outs with green  TB; 3 x 10 reps   Serratus wall slides: 2 x 10 reps    Not today:  Cervical retraction + OH flexion: 2x10  Landmine: 3x8 4 lbs on wooden dowel    Patient Education and Home Exercises       Education provided:   - HEP    Written Home Exercises Provided: Patient instructed to cont prior HEP. Exercises were reviewed and Rishi was able to demonstrate them prior to the end of the session.  Rishi demonstrated good  understanding of the education provided. See Electronic Medical Record under Patient Instructions for exercises provided during therapy sessions    Assessment   Patient presents to PT since last visit on 9/22/2023. Patient has returned to full independence with all household and personal ADL's at this time. Patient will be discharged with an updated HEP.   Patient encouraged to schedule follow up appointment with referring MD.      Discharge FOTO Score: see media section  Discharge reason: Patient has reached the maximum rehab potential for the present time      Rishi Is progressing well towards his goals.   Patient prognosis is Good.     Patient will continue to benefit from skilled outpatient physical therapy to address the deficits listed in the problem list box on initial evaluation, provide pt/family education and to maximize pt's level of independence in the home and community environment.     Patient's spiritual, cultural and educational needs considered and pt agreeable to plan of care and goals.     Anticipated barriers to physical therapy: chronicity    Goals:   New Short Term Goals (4 Weeks):   1. Patient will be independent with home exercise program to supplement therapy sessions in improving pain free mobility.MET  2. Patient will improve upper extremity manual muscle tests by 1/2 grade in all  planes to improve strength for OH activities.MET on left UE  3. Pt will demonstrate improved postural awareness requiring less than 3 VC during therapeutic activity/exercisein order to improve work ergonomics and posture. MET     New Long Term Goals (8 Weeks):   1. Patient will improve FOTO Survey score to </= 50% limitation to improve perceived limitation with changing and maintaining body positions. MET  2. Patient will improve upper extremity manual muscle tests 1 grade in all planes to improve strength for lifting and carrying tasks.MET  3. Patient will report no pain with cervical active range of motion in all planes to promote unlimited functional mobility. MET  4. Pt will demonstrate improved perscapular strength and endurance to show improved OH mobility and activity tolerance. MET  5. Patient will report reduced intensity and frequency of UE tingling with functional activities to show improved symptom tolerance. NOT MET     Reasons for Recertification of Therapy: shoulder pain, UE tingling, UE weakness, UE myotome weakness    Plan   This patient is discharged from Physical Therapy.    Peace Soto, PT

## 2023-12-06 ENCOUNTER — OFFICE VISIT (OUTPATIENT)
Dept: CARDIOLOGY | Facility: CLINIC | Age: 58
End: 2023-12-06
Payer: MEDICARE

## 2023-12-06 VITALS
DIASTOLIC BLOOD PRESSURE: 72 MMHG | HEIGHT: 67 IN | HEART RATE: 54 BPM | SYSTOLIC BLOOD PRESSURE: 114 MMHG | WEIGHT: 164.69 LBS | BODY MASS INDEX: 25.85 KG/M2

## 2023-12-06 DIAGNOSIS — I10 PRIMARY HYPERTENSION: Primary | ICD-10-CM

## 2023-12-06 PROCEDURE — 99214 PR OFFICE/OUTPT VISIT, EST, LEVL IV, 30-39 MIN: ICD-10-PCS | Mod: S$GLB,,, | Performed by: INTERNAL MEDICINE

## 2023-12-06 PROCEDURE — 1159F PR MEDICATION LIST DOCUMENTED IN MEDICAL RECORD: ICD-10-PCS | Mod: CPTII,S$GLB,, | Performed by: INTERNAL MEDICINE

## 2023-12-06 PROCEDURE — 3074F PR MOST RECENT SYSTOLIC BLOOD PRESSURE < 130 MM HG: ICD-10-PCS | Mod: CPTII,S$GLB,, | Performed by: INTERNAL MEDICINE

## 2023-12-06 PROCEDURE — 99999 PR PBB SHADOW E&M-EST. PATIENT-LVL III: CPT | Mod: PBBFAC,,, | Performed by: INTERNAL MEDICINE

## 2023-12-06 PROCEDURE — 3008F BODY MASS INDEX DOCD: CPT | Mod: CPTII,S$GLB,, | Performed by: INTERNAL MEDICINE

## 2023-12-06 PROCEDURE — 4010F ACE/ARB THERAPY RXD/TAKEN: CPT | Mod: CPTII,S$GLB,, | Performed by: INTERNAL MEDICINE

## 2023-12-06 PROCEDURE — 99214 OFFICE O/P EST MOD 30 MIN: CPT | Mod: S$GLB,,, | Performed by: INTERNAL MEDICINE

## 2023-12-06 PROCEDURE — 99999 PR PBB SHADOW E&M-EST. PATIENT-LVL III: ICD-10-PCS | Mod: PBBFAC,,, | Performed by: INTERNAL MEDICINE

## 2023-12-06 PROCEDURE — 3074F SYST BP LT 130 MM HG: CPT | Mod: CPTII,S$GLB,, | Performed by: INTERNAL MEDICINE

## 2023-12-06 PROCEDURE — 3078F DIAST BP <80 MM HG: CPT | Mod: CPTII,S$GLB,, | Performed by: INTERNAL MEDICINE

## 2023-12-06 PROCEDURE — 1159F MED LIST DOCD IN RCRD: CPT | Mod: CPTII,S$GLB,, | Performed by: INTERNAL MEDICINE

## 2023-12-06 PROCEDURE — 3078F PR MOST RECENT DIASTOLIC BLOOD PRESSURE < 80 MM HG: ICD-10-PCS | Mod: CPTII,S$GLB,, | Performed by: INTERNAL MEDICINE

## 2023-12-06 PROCEDURE — 3008F PR BODY MASS INDEX (BMI) DOCUMENTED: ICD-10-PCS | Mod: CPTII,S$GLB,, | Performed by: INTERNAL MEDICINE

## 2023-12-06 PROCEDURE — 4010F PR ACE/ARB THEARPY RXD/TAKEN: ICD-10-PCS | Mod: CPTII,S$GLB,, | Performed by: INTERNAL MEDICINE

## 2023-12-06 NOTE — PROGRESS NOTES
HISTORY:    58-year-old male with a history of hypertension, asthma, osteoarthritis, and degenerative disc disease presenting for follow-up.    Pt initially evaluated for atypical CP that has resolved. Nml TST/TTE.    No CP, SOB, or ARREOLA.    Activity levels reasonable. Rides his bicycle and plays golf without issue.     Tolerates irbesartan 300 x 1 and amlodipine 10 x 1. Bps improved at home.     PHYSICAL EXAM:    Vitals:    12/06/23 0915   BP: 114/72   Pulse: (!) 54         NAD, A+Ox3.  No jvd, no bruit.  RRR nml s1,s2. No murmurs.  CTA B no wheezes or crackles.  No edema.    LABS/STUDIES (imaging reviewed during clinic visit):    March 2023 hemoglobin 13.6.  Creatinine 0.9 with BUN of 10.  BNP and troponin normal.  2022 LDL 91 and HDL 61.  Triglycerides 48.  EKG September 2023 sinus rhythm with no Q-waves or ST changes.  PVC.  TST September 2023 8 minutes 23 seconds on a high ramp protocol.  No EKG changes or symptoms.  PVCs noted.  Hypertensive blood pressure response with stress.    TTE normal LV size and thickness with an EF of 60 65%.  Normal diastology.  CVP 3.       ASSESSMENT & PLAN:    1. Primary hypertension    2. BMI 26.0-26.9,adult                  Pt with non-specific C that has resolved. Nml TST/TTE.    Asymptomatic PVCs noted.    Hypertension better controlled on irbesartan 300x1/amlodipine 10x1. If Bps above goal in future, consider beta blocker given PVCs.     Can consider statin tx for RF reduction.     Has done great with weight loss over the years. Continue to eat healthy and exercise.     Follow up if symptoms worsen or fail to improve.      Darnell Lugo MD

## 2023-12-28 ENCOUNTER — OFFICE VISIT (OUTPATIENT)
Dept: ORTHOPEDICS | Facility: CLINIC | Age: 58
End: 2023-12-28
Payer: MEDICARE

## 2023-12-28 VITALS — BODY MASS INDEX: 25.11 KG/M2 | WEIGHT: 160 LBS | HEIGHT: 67 IN

## 2023-12-28 DIAGNOSIS — G89.29 CHRONIC LEFT SHOULDER PAIN: Primary | ICD-10-CM

## 2023-12-28 DIAGNOSIS — M75.82 ROTATOR CUFF TENDONITIS, LEFT: ICD-10-CM

## 2023-12-28 DIAGNOSIS — M75.42 IMPINGEMENT SYNDROME OF LEFT SHOULDER: ICD-10-CM

## 2023-12-28 DIAGNOSIS — M25.512 CHRONIC LEFT SHOULDER PAIN: Primary | ICD-10-CM

## 2023-12-28 PROCEDURE — 99999 PR PBB SHADOW E&M-EST. PATIENT-LVL III: ICD-10-PCS | Mod: PBBFAC,,, | Performed by: PHYSICIAN ASSISTANT

## 2023-12-28 PROCEDURE — 99999 PR PBB SHADOW E&M-EST. PATIENT-LVL III: CPT | Mod: PBBFAC,,, | Performed by: PHYSICIAN ASSISTANT

## 2023-12-28 PROCEDURE — 1159F MED LIST DOCD IN RCRD: CPT | Mod: CPTII,S$GLB,, | Performed by: PHYSICIAN ASSISTANT

## 2023-12-28 PROCEDURE — 1160F RVW MEDS BY RX/DR IN RCRD: CPT | Mod: CPTII,S$GLB,, | Performed by: PHYSICIAN ASSISTANT

## 2023-12-28 PROCEDURE — 3008F PR BODY MASS INDEX (BMI) DOCUMENTED: ICD-10-PCS | Mod: CPTII,S$GLB,, | Performed by: PHYSICIAN ASSISTANT

## 2023-12-28 PROCEDURE — 1160F PR REVIEW ALL MEDS BY PRESCRIBER/CLIN PHARMACIST DOCUMENTED: ICD-10-PCS | Mod: CPTII,S$GLB,, | Performed by: PHYSICIAN ASSISTANT

## 2023-12-28 PROCEDURE — 3008F BODY MASS INDEX DOCD: CPT | Mod: CPTII,S$GLB,, | Performed by: PHYSICIAN ASSISTANT

## 2023-12-28 PROCEDURE — 4010F PR ACE/ARB THEARPY RXD/TAKEN: ICD-10-PCS | Mod: CPTII,S$GLB,, | Performed by: PHYSICIAN ASSISTANT

## 2023-12-28 PROCEDURE — 1159F PR MEDICATION LIST DOCUMENTED IN MEDICAL RECORD: ICD-10-PCS | Mod: CPTII,S$GLB,, | Performed by: PHYSICIAN ASSISTANT

## 2023-12-28 PROCEDURE — 99213 PR OFFICE/OUTPT VISIT, EST, LEVL III, 20-29 MIN: ICD-10-PCS | Mod: S$GLB,,, | Performed by: PHYSICIAN ASSISTANT

## 2023-12-28 PROCEDURE — 99213 OFFICE O/P EST LOW 20 MIN: CPT | Mod: S$GLB,,, | Performed by: PHYSICIAN ASSISTANT

## 2023-12-28 PROCEDURE — 4010F ACE/ARB THERAPY RXD/TAKEN: CPT | Mod: CPTII,S$GLB,, | Performed by: PHYSICIAN ASSISTANT

## 2023-12-28 NOTE — PROGRESS NOTES
"Patient ID: Rishi Aranda is a 58 y.o. male.    Chief Complaint: Pain of the Left Shoulder      HISTORY:  Rishi Aranda is a 58 y.o. male who returns to me today for follow up of left shoulder pain.  He was last seen by me 7/11/2023.  Since then his shoulder improved with PT and CSI but never resolved. He re-injured his shoulder 1-2 months ago when wrapping and lifting and extension cord.  His pain seems to be getting worse. He is also reporting some weakness in his arm.  He is having difficulty sleeping.      PMH/PSH/FamHx/SocHx:    Unchanged from prior visit.    ROS:  Constitution: Negative for chills, fever and weakness.   Respiratory: Negative for cough and shortness of breath.   Musculoskeletal: Positive for left shoulder pain  Psychiatric/Behavioral: The patient is not nervous/anxious.       PHYSICAL EXAM:   Ht 5' 7.01" (1.702 m)   Wt 72.6 kg (160 lb)   BMI 25.05 kg/m²   Left shoulder  Skin intact  , ER 40, IR L5  + impingement  + zamora  4/5 supraspinatus    ASSESSMENT/PLAN:    Rishi was seen today for pain.    Diagnoses and all orders for this visit:    Chronic left shoulder pain  -     MRI Shoulder Without Contrast Left; Future    Rotator cuff tendonitis, left  -     MRI Shoulder Without Contrast Left; Future    Impingement syndrome of left shoulder      - Chronic left shoulder pain and weakness concerning for rotator cuff injury.  MRI left shoulder ordered today  - will call to discuss results and further treatment recommendations      "

## 2024-01-12 DIAGNOSIS — Z00.00 ENCOUNTER FOR MEDICARE ANNUAL WELLNESS EXAM: ICD-10-CM

## 2024-01-13 ENCOUNTER — HOSPITAL ENCOUNTER (OUTPATIENT)
Dept: RADIOLOGY | Facility: OTHER | Age: 59
Discharge: HOME | End: 2024-01-13
Attending: PHYSICIAN ASSISTANT
Payer: MEDICARE

## 2024-01-13 DIAGNOSIS — M25.512 CHRONIC LEFT SHOULDER PAIN: ICD-10-CM

## 2024-01-13 DIAGNOSIS — M75.82 ROTATOR CUFF TENDONITIS, LEFT: ICD-10-CM

## 2024-01-13 DIAGNOSIS — G89.29 CHRONIC LEFT SHOULDER PAIN: ICD-10-CM

## 2024-01-13 PROCEDURE — 73221 MRI JOINT UPR EXTREM W/O DYE: CPT | Mod: TC,LT

## 2024-01-13 PROCEDURE — 73221 MRI JOINT UPR EXTREM W/O DYE: CPT | Mod: 26,LT,, | Performed by: RADIOLOGY

## 2024-01-17 ENCOUNTER — TELEPHONE (OUTPATIENT)
Dept: ORTHOPEDICS | Facility: CLINIC | Age: 59
End: 2024-01-17
Payer: MEDICARE

## 2024-01-17 NOTE — TELEPHONE ENCOUNTER
Called patient to discuss MRI  Will proceed with continued conservative treatment  He will follow up tomorrow for repeat injection

## 2024-01-18 ENCOUNTER — OFFICE VISIT (OUTPATIENT)
Dept: ORTHOPEDICS | Facility: CLINIC | Age: 59
End: 2024-01-18
Payer: MEDICARE

## 2024-01-18 DIAGNOSIS — M75.112 PARTIAL NONTRAUMATIC TEAR OF LEFT ROTATOR CUFF: ICD-10-CM

## 2024-01-18 DIAGNOSIS — M75.42 IMPINGEMENT SYNDROME OF LEFT SHOULDER: ICD-10-CM

## 2024-01-18 DIAGNOSIS — M75.82 ROTATOR CUFF TENDONITIS, LEFT: Primary | ICD-10-CM

## 2024-01-18 PROCEDURE — 20610 DRAIN/INJ JOINT/BURSA W/O US: CPT | Mod: LT,S$GLB,, | Performed by: PHYSICIAN ASSISTANT

## 2024-01-18 PROCEDURE — 99213 OFFICE O/P EST LOW 20 MIN: CPT | Mod: 25,S$GLB,, | Performed by: PHYSICIAN ASSISTANT

## 2024-01-18 PROCEDURE — 99999 PR PBB SHADOW E&M-EST. PATIENT-LVL II: CPT | Mod: PBBFAC,,, | Performed by: PHYSICIAN ASSISTANT

## 2024-01-18 PROCEDURE — 1159F MED LIST DOCD IN RCRD: CPT | Mod: CPTII,S$GLB,, | Performed by: PHYSICIAN ASSISTANT

## 2024-01-18 PROCEDURE — 1160F RVW MEDS BY RX/DR IN RCRD: CPT | Mod: CPTII,S$GLB,, | Performed by: PHYSICIAN ASSISTANT

## 2024-01-18 RX ADMIN — LIDOCAINE HYDROCHLORIDE 2 ML: 10 INJECTION INFILTRATION; PERINEURAL at 05:01

## 2024-01-18 RX ADMIN — TRIAMCINOLONE ACETONIDE 40 MG: 40 INJECTION, SUSPENSION INTRA-ARTICULAR; INTRAMUSCULAR at 05:01

## 2024-01-22 RX ORDER — TRIAMCINOLONE ACETONIDE 40 MG/ML
40 INJECTION, SUSPENSION INTRA-ARTICULAR; INTRAMUSCULAR
Status: DISCONTINUED | OUTPATIENT
Start: 2024-01-18 | End: 2024-01-22 | Stop reason: HOSPADM

## 2024-01-22 RX ORDER — LIDOCAINE HYDROCHLORIDE 10 MG/ML
2 INJECTION INFILTRATION; PERINEURAL
Status: DISCONTINUED | OUTPATIENT
Start: 2024-01-18 | End: 2024-01-22 | Stop reason: HOSPADM

## 2024-01-22 NOTE — PROGRESS NOTES
Patient ID: Rishi Aranda is a 58 y.o. male.    Chief Complaint: Pain and Follow-up of the Left Shoulder      HISTORY:  Rishi Aranda is a 58 y.o. male who returns to me today for follow up of left shoulder pain.  He recently had an MRI done of his shoulder and he is here to try another injection.  He has pain worse with any activity and at rest.  He has continued HEP he did in PT.    PMH/PSH/FamHx/SocHx:    Unchanged from prior visit.    ROS:  Constitution: Negative for chills, fever and weakness.   Respiratory: Negative for cough and shortness of breath.   Musculoskeletal: Positive for left shoulder  Psychiatric/Behavioral: The patient is not nervous/anxious.       PHYSICAL EXAM:   Left shoulder  Skin intact  No warmth  ROM is painful  , ER 40, IR hip  + impingement  + zamora    ASSESSMENT/PLAN:    Rishi was seen today for pain and follow-up.    Diagnoses and all orders for this visit:    Rotator cuff tendonitis, left  -     Large Joint Aspiration/Injection: L subacromial bursa    Impingement syndrome of left shoulder  -     Large Joint Aspiration/Injection: L subacromial bursa    Partial nontraumatic tear of left rotator cuff      - Left shoulder CSI today  - MRI reviewed  - Continue HEP, NSAIDS as needed  - Referral to sports if he does not seem to improve after this injection

## 2024-02-04 RX ORDER — ALBUTEROL SULFATE 90 UG/1
2 AEROSOL, METERED RESPIRATORY (INHALATION) 4 TIMES DAILY PRN
Qty: 25.5 G | Refills: 2 | Status: SHIPPED | OUTPATIENT
Start: 2024-02-04

## 2024-02-04 NOTE — TELEPHONE ENCOUNTER
Refill Decision Note   Rishi Rosi  is requesting a refill authorization.  Brief Assessment and Rationale for Refill:  Approve     Medication Therapy Plan:         Comments:     Note composed:4:13 PM 02/04/2024

## 2024-02-04 NOTE — TELEPHONE ENCOUNTER
No care due was identified.  [facility] Embedded Care Due Messages. Reference number: [id number].   2/04/2024 3:54:44 PM CST

## 2024-06-10 ENCOUNTER — PATIENT MESSAGE (OUTPATIENT)
Dept: INTERNAL MEDICINE | Facility: CLINIC | Age: 59
End: 2024-06-10
Payer: MEDICARE

## 2024-06-22 NOTE — PROGRESS NOTES
MEDICAL HISTORY:  Hypertension.  Chronic asthma.  Osteoarthritis of the knee, right knee surgery with previous medial meniscectomy and chondroplasty in right total knee arthroplasty  Cervical degenerative disk disease with left cervical foraminotomy at C6-7.  Spinal stenosis C4-C5 on imaging   Lumbar degenerative disk disease with neural foraminal stenosis and facet arthropathy.           SOCIAL HISTORY:  Tobacco use, none.  Alcohol use, 6-12 beers week.      Medication  Albuterol as needed  Irbesartan 300 mg daily  Amlodipine 10 mg half a tablet  Gabapentin 100 mg t.i.d. mainly takes it 1 at night most nights  Meloxicam 15 mg daily that is as needed  Nasonex 2 puffs daily    Answers submitted by the patient for this visit:  Review of Systems Questionnaire (Submitted on 6/23/2024)  activity change: No  unexpected weight change: No  neck pain: Yes  hearing loss: No  rhinorrhea: No  trouble swallowing: No  eye discharge: No  visual disturbance: No  chest tightness: No  wheezing: Yes  chest pain: No  palpitations: No  blood in stool: No  constipation: No  vomiting: No  diarrhea: No  polydipsia: No  polyuria: No  difficulty urinating: No  urgency: No  hematuria: No  joint swelling: No  arthralgias: No  headaches: No  weakness: Yes  confusion: No  dysphoric mood: No      58-year-old male   Comes in for regular follow-up visit       He still has ongoing symptoms of nerve pain as he puts it down in his left side of his body.  It was on his legs.  His upper to lower back.  From what I can tell he was not taking gabapentin 3 times a day maybe at most at night and maybe most nights.  Also will take meloxicam intermittently.    In regard to asthma he is just taking albuterol maybe 3 times a week.  He did not get the prescription Symbicort because of price.  Uses Nasonex intermittently    Review of symptoms   Reports no chest discomfort but on occasion might feel a sharp pain in his left lateral chest wall that may last couple  minutes happens randomly  Presently no chronic cough or wheezing.  Reports no abdominal pain regular bowel function.  No difficulty urinating no incontinence.  No heartburn indigestion    Examination   Weight 162 lb   Pulse 60   Blood pressure 110/62   Neck no thyromegaly no masses  Chest clear breath sounds   Heart regular rate rhythm no murmurs   Abdominal exam soft nontender no hepatosplenomegaly abdominal masses  2+ carotid pulses no bruits 2+ dorsalis pedal pulses  2 to 3+ biceps triceps knee and ankle jerk reflexes    Impression   Hypertension  Allergic asthma clinically stable   Cervical spondylosis with spinal stenosis   Lumbar spondylosis with neuroforaminal stenosis and facet arthropathy  Rotator cuff arthropathy left shoulder    Plan   Labs of chemistry CBC lipid.  Patient was routine check in October 2023  Since he feels a nerve pain in his by his getting worse repeating MRI cervical vertebrae

## 2024-06-24 ENCOUNTER — PATIENT MESSAGE (OUTPATIENT)
Dept: INTERNAL MEDICINE | Facility: CLINIC | Age: 59
End: 2024-06-24

## 2024-06-24 ENCOUNTER — OFFICE VISIT (OUTPATIENT)
Dept: INTERNAL MEDICINE | Facility: CLINIC | Age: 59
End: 2024-06-24
Payer: MEDICARE

## 2024-06-24 ENCOUNTER — LAB VISIT (OUTPATIENT)
Dept: LAB | Facility: HOSPITAL | Age: 59
End: 2024-06-24
Attending: INTERNAL MEDICINE
Payer: MEDICARE

## 2024-06-24 VITALS
WEIGHT: 162.5 LBS | BODY MASS INDEX: 25.51 KG/M2 | HEART RATE: 58 BPM | DIASTOLIC BLOOD PRESSURE: 70 MMHG | SYSTOLIC BLOOD PRESSURE: 110 MMHG | OXYGEN SATURATION: 95 % | HEIGHT: 67 IN

## 2024-06-24 DIAGNOSIS — M47.22 CERVICAL SPONDYLOSIS WITH MYELOPATHY AND RADICULOPATHY: ICD-10-CM

## 2024-06-24 DIAGNOSIS — I10 PRIMARY HYPERTENSION: Primary | ICD-10-CM

## 2024-06-24 DIAGNOSIS — I10 PRIMARY HYPERTENSION: ICD-10-CM

## 2024-06-24 DIAGNOSIS — M47.12 CERVICAL SPONDYLOSIS WITH MYELOPATHY AND RADICULOPATHY: ICD-10-CM

## 2024-06-24 DIAGNOSIS — J45.909 CHRONIC ASTHMA, UNSPECIFIED ASTHMA SEVERITY, UNSPECIFIED WHETHER COMPLICATED, UNSPECIFIED WHETHER PERSISTENT: ICD-10-CM

## 2024-06-24 DIAGNOSIS — M47.27 LUMBOSACRAL SPONDYLOSIS WITH RADICULOPATHY: ICD-10-CM

## 2024-06-24 DIAGNOSIS — M48.02 SPINAL STENOSIS, CERVICAL REGION: ICD-10-CM

## 2024-06-24 DIAGNOSIS — M48.02 CERVICAL SPINAL STENOSIS: ICD-10-CM

## 2024-06-24 LAB
ANION GAP SERPL CALC-SCNC: 7 MMOL/L (ref 8–16)
BASOPHILS # BLD AUTO: 0.04 K/UL (ref 0–0.2)
BASOPHILS NFR BLD: 0.6 % (ref 0–1.9)
BUN SERPL-MCNC: 15 MG/DL (ref 6–20)
CALCIUM SERPL-MCNC: 9.4 MG/DL (ref 8.7–10.5)
CHLORIDE SERPL-SCNC: 105 MMOL/L (ref 95–110)
CHOLEST SERPL-MCNC: 171 MG/DL (ref 120–199)
CHOLEST/HDLC SERPL: 3.1 {RATIO} (ref 2–5)
CO2 SERPL-SCNC: 27 MMOL/L (ref 23–29)
CREAT SERPL-MCNC: 1.1 MG/DL (ref 0.5–1.4)
DIFFERENTIAL METHOD BLD: ABNORMAL
EOSINOPHIL # BLD AUTO: 0.1 K/UL (ref 0–0.5)
EOSINOPHIL NFR BLD: 2 % (ref 0–8)
ERYTHROCYTE [DISTWIDTH] IN BLOOD BY AUTOMATED COUNT: 15 % (ref 11.5–14.5)
EST. GFR  (NO RACE VARIABLE): >60 ML/MIN/1.73 M^2
GLUCOSE SERPL-MCNC: 87 MG/DL (ref 70–110)
HCT VFR BLD AUTO: 43.5 % (ref 40–54)
HDLC SERPL-MCNC: 56 MG/DL (ref 40–75)
HDLC SERPL: 32.7 % (ref 20–50)
HGB BLD-MCNC: 13.8 G/DL (ref 14–18)
IMM GRANULOCYTES # BLD AUTO: 0.02 K/UL (ref 0–0.04)
IMM GRANULOCYTES NFR BLD AUTO: 0.3 % (ref 0–0.5)
LDLC SERPL CALC-MCNC: 102.6 MG/DL (ref 63–159)
LYMPHOCYTES # BLD AUTO: 2.2 K/UL (ref 1–4.8)
LYMPHOCYTES NFR BLD: 31 % (ref 18–48)
MCH RBC QN AUTO: 28.8 PG (ref 27–31)
MCHC RBC AUTO-ENTMCNC: 31.7 G/DL (ref 32–36)
MCV RBC AUTO: 91 FL (ref 82–98)
MONOCYTES # BLD AUTO: 0.7 K/UL (ref 0.3–1)
MONOCYTES NFR BLD: 9.9 % (ref 4–15)
NEUTROPHILS # BLD AUTO: 3.9 K/UL (ref 1.8–7.7)
NEUTROPHILS NFR BLD: 56.2 % (ref 38–73)
NONHDLC SERPL-MCNC: 115 MG/DL
NRBC BLD-RTO: 0 /100 WBC
PLATELET # BLD AUTO: 293 K/UL (ref 150–450)
PMV BLD AUTO: 9.8 FL (ref 9.2–12.9)
POTASSIUM SERPL-SCNC: 4.6 MMOL/L (ref 3.5–5.1)
RBC # BLD AUTO: 4.79 M/UL (ref 4.6–6.2)
SODIUM SERPL-SCNC: 139 MMOL/L (ref 136–145)
TRIGL SERPL-MCNC: 62 MG/DL (ref 30–150)
WBC # BLD AUTO: 7 K/UL (ref 3.9–12.7)

## 2024-06-24 PROCEDURE — 99999 PR PBB SHADOW E&M-EST. PATIENT-LVL III: CPT | Mod: PBBFAC,,, | Performed by: INTERNAL MEDICINE

## 2024-06-24 PROCEDURE — 3074F SYST BP LT 130 MM HG: CPT | Mod: CPTII,S$GLB,, | Performed by: INTERNAL MEDICINE

## 2024-06-24 PROCEDURE — 3008F BODY MASS INDEX DOCD: CPT | Mod: CPTII,S$GLB,, | Performed by: INTERNAL MEDICINE

## 2024-06-24 PROCEDURE — 80061 LIPID PANEL: CPT | Performed by: INTERNAL MEDICINE

## 2024-06-24 PROCEDURE — 99214 OFFICE O/P EST MOD 30 MIN: CPT | Mod: S$GLB,,, | Performed by: INTERNAL MEDICINE

## 2024-06-24 PROCEDURE — 80048 BASIC METABOLIC PNL TOTAL CA: CPT | Performed by: INTERNAL MEDICINE

## 2024-06-24 PROCEDURE — 1160F RVW MEDS BY RX/DR IN RCRD: CPT | Mod: CPTII,S$GLB,, | Performed by: INTERNAL MEDICINE

## 2024-06-24 PROCEDURE — 36415 COLL VENOUS BLD VENIPUNCTURE: CPT | Performed by: INTERNAL MEDICINE

## 2024-06-24 PROCEDURE — 85025 COMPLETE CBC W/AUTO DIFF WBC: CPT | Performed by: INTERNAL MEDICINE

## 2024-06-24 PROCEDURE — 3078F DIAST BP <80 MM HG: CPT | Mod: CPTII,S$GLB,, | Performed by: INTERNAL MEDICINE

## 2024-06-24 PROCEDURE — 1159F MED LIST DOCD IN RCRD: CPT | Mod: CPTII,S$GLB,, | Performed by: INTERNAL MEDICINE

## 2024-08-06 ENCOUNTER — PATIENT MESSAGE (OUTPATIENT)
Dept: INTERNAL MEDICINE | Facility: CLINIC | Age: 59
End: 2024-08-06
Payer: MEDICARE

## 2024-08-26 RX ORDER — ALBUTEROL SULFATE 90 UG/1
2 INHALANT RESPIRATORY (INHALATION) 4 TIMES DAILY PRN
Qty: 25.5 G | Refills: 3 | Status: SHIPPED | OUTPATIENT
Start: 2024-08-26

## 2024-08-26 NOTE — TELEPHONE ENCOUNTER
No care due was identified.  [facility] Embedded Care Due Messages. Reference number: [id number].   8/26/2024 12:28:26 PM CDT

## 2024-08-27 NOTE — TELEPHONE ENCOUNTER
Refill Decision Note   Rishi Rosi  is requesting a refill authorization.  Brief Assessment and Rationale for Refill:  Approve     Medication Therapy Plan:         Comments:     Note composed:10:21 PM 08/26/2024

## 2024-09-24 RX ORDER — IRBESARTAN 300 MG/1
TABLET ORAL
Qty: 90 TABLET | Refills: 2 | Status: SHIPPED | OUTPATIENT
Start: 2024-09-24

## 2024-09-24 NOTE — TELEPHONE ENCOUNTER
Refill Decision Note   Rishi Rosi  is requesting a refill authorization.  Brief Assessment and Rationale for Refill:  Approve     Medication Therapy Plan:         Comments:     Note composed:2:53 PM 09/24/2024

## 2024-09-24 NOTE — TELEPHONE ENCOUNTER
No care due was identified.  [facility] Embedded Care Due Messages. Reference number: [id number].   9/24/2024 8:13:34 AM CDT

## 2024-09-26 ENCOUNTER — OFFICE VISIT (OUTPATIENT)
Dept: ORTHOPEDICS | Facility: CLINIC | Age: 59
End: 2024-09-26
Payer: MEDICARE

## 2024-09-26 DIAGNOSIS — G89.29 CHRONIC LEFT SHOULDER PAIN: Primary | ICD-10-CM

## 2024-09-26 DIAGNOSIS — M75.112 PARTIAL NONTRAUMATIC TEAR OF LEFT ROTATOR CUFF: ICD-10-CM

## 2024-09-26 DIAGNOSIS — M25.512 CHRONIC LEFT SHOULDER PAIN: Primary | ICD-10-CM

## 2024-09-26 PROCEDURE — 99999 PR PBB SHADOW E&M-EST. PATIENT-LVL II: CPT | Mod: PBBFAC,,, | Performed by: PHYSICIAN ASSISTANT

## 2024-09-26 RX ORDER — LIDOCAINE HYDROCHLORIDE 10 MG/ML
2 INJECTION, SOLUTION INFILTRATION; PERINEURAL
Status: DISCONTINUED | OUTPATIENT
Start: 2024-09-26 | End: 2024-09-26 | Stop reason: HOSPADM

## 2024-09-26 RX ORDER — TRIAMCINOLONE ACETONIDE 40 MG/ML
40 INJECTION, SUSPENSION INTRA-ARTICULAR; INTRAMUSCULAR
Status: DISCONTINUED | OUTPATIENT
Start: 2024-09-26 | End: 2024-09-26 | Stop reason: HOSPADM

## 2024-09-26 RX ADMIN — LIDOCAINE HYDROCHLORIDE 2 ML: 10 INJECTION, SOLUTION INFILTRATION; PERINEURAL at 02:09

## 2024-09-26 RX ADMIN — TRIAMCINOLONE ACETONIDE 40 MG: 40 INJECTION, SUSPENSION INTRA-ARTICULAR; INTRAMUSCULAR at 02:09

## 2024-09-26 NOTE — PROGRESS NOTES
Patient ID: Rishi Aranda is a 58 y.o. male.    HISTORY:  Rishi Aranda is a 58 y.o. male who returns to me today for follow up of left shoulder pain.   CSI done July 2023 and January 2024.  He continues to have great relief with the injections.  His pain only recently started to return.  He is not ready for surgery at this time.      PMH/PSH/FamHx/SocHx:    Unchanged from prior visit.    ROS:  Constitution: Negative for chills, fever and weakness.   Respiratory: Negative for cough and shortness of breath.   Musculoskeletal: Positive for left shoulder pain  Psychiatric/Behavioral: The patient is not nervous/anxious.       PHYSICAL EXAM:   Left shoulder  Skin intact  No warmth or effusion  ROM painful    ER 50  + impingement  + zamora      ASSESSMENT/PLAN:    Diagnoses and all orders for this visit:    Chronic left shoulder pain    Partial nontraumatic tear of left rotator cuff  -     Large Joint Aspiration/Injection: L subacromial bursa      - Left shoulder CSI performed- would not recommend any further injections after this  - He may consider follow up at sports if his symptoms return after this next year  - Follow up if symptoms worsen or fail to improve

## 2024-09-26 NOTE — PROCEDURES
Large Joint Aspiration/Injection: L subacromial bursa    Date/Time: 9/26/2024 2:30 PM    Performed by: Melody Garcia PA-C  Authorized by: Melody Garcia PA-C    Consent Done?:  Yes (Verbal)  Indications:  Pain  Timeout: prior to procedure the correct patient, procedure, and site was verified    Prep: patient was prepped and draped in usual sterile fashion    Local anesthetic:  Topical anesthetic    Details:  Needle Size:  22 G  Approach:  Posterior  Location:  Shoulder  Site:  L subacromial bursa  Medications:  40 mg triamcinolone acetonide 40 mg/mL; 2 mL LIDOcaine HCL 10 mg/ml (1%) 10 mg/mL (1 %)  Patient tolerance:  Patient tolerated the procedure well with no immediate complications

## 2024-10-24 ENCOUNTER — TELEPHONE (OUTPATIENT)
Dept: INTERNAL MEDICINE | Facility: CLINIC | Age: 59
End: 2024-10-24
Payer: MEDICARE

## 2024-10-24 ENCOUNTER — OFFICE VISIT (OUTPATIENT)
Dept: INTERNAL MEDICINE | Facility: CLINIC | Age: 59
End: 2024-10-24
Payer: MEDICARE

## 2024-10-24 ENCOUNTER — PATIENT MESSAGE (OUTPATIENT)
Dept: INTERNAL MEDICINE | Facility: CLINIC | Age: 59
End: 2024-10-24
Payer: MEDICARE

## 2024-10-24 VITALS
TEMPERATURE: 99 F | HEIGHT: 67 IN | RESPIRATION RATE: 18 BRPM | SYSTOLIC BLOOD PRESSURE: 126 MMHG | DIASTOLIC BLOOD PRESSURE: 70 MMHG | BODY MASS INDEX: 24.63 KG/M2 | HEART RATE: 58 BPM | WEIGHT: 156.94 LBS | OXYGEN SATURATION: 94 %

## 2024-10-24 DIAGNOSIS — J45.901 ASTHMA WITH ACUTE EXACERBATION, UNSPECIFIED ASTHMA SEVERITY, UNSPECIFIED WHETHER PERSISTENT: ICD-10-CM

## 2024-10-24 DIAGNOSIS — J10.1 INFLUENZA B: Primary | ICD-10-CM

## 2024-10-24 LAB
CTP QC/QA: YES
CTP QC/QA: YES
POC MOLECULAR INFLUENZA A AGN: NEGATIVE
POC MOLECULAR INFLUENZA B AGN: POSITIVE
SARS-COV-2 RDRP RESP QL NAA+PROBE: NEGATIVE

## 2024-10-24 PROCEDURE — 99999 PR PBB SHADOW E&M-EST. PATIENT-LVL IV: CPT | Mod: PBBFAC,,, | Performed by: NURSE PRACTITIONER

## 2024-10-24 RX ORDER — CETIRIZINE HYDROCHLORIDE 10 MG/1
10 TABLET ORAL DAILY
Qty: 30 TABLET | Refills: 0 | Status: SHIPPED | OUTPATIENT
Start: 2024-10-24 | End: 2025-10-24

## 2024-10-24 RX ORDER — OSELTAMIVIR PHOSPHATE 75 MG/1
75 CAPSULE ORAL 2 TIMES DAILY
Qty: 10 EACH | Refills: 0 | Status: SHIPPED | OUTPATIENT
Start: 2024-10-24 | End: 2024-10-29

## 2024-10-24 RX ORDER — ALBUTEROL SULFATE 0.83 MG/ML
2.5 SOLUTION RESPIRATORY (INHALATION)
Status: COMPLETED | OUTPATIENT
Start: 2024-10-24 | End: 2024-10-24

## 2024-10-24 RX ORDER — PREDNISONE 20 MG/1
40 TABLET ORAL DAILY
Qty: 10 TABLET | Refills: 0 | Status: SHIPPED | OUTPATIENT
Start: 2024-10-24 | End: 2024-10-29

## 2024-10-24 RX ADMIN — ALBUTEROL SULFATE 2.5 MG: 0.83 SOLUTION RESPIRATORY (INHALATION) at 01:10

## 2024-10-24 NOTE — LETTER
October 24, 2024      [facility]  [address]  [facility] [address]  Phone: [phone]  Fax: [phone]       Patient: Rishi Aranda   YOB: 1965  Date of Visit: 10/24/2024    To Whom It May Concern:    Emir Aranda  was at [facility] on 10/24/2024. The patient may return to work/school on 10/28/24 as long as he has been fever free for at least 24 hours without fever reducing medications and has had symptom improvement. If you have any questions or concerns, or if I can be of further assistance, please do not hesitate to contact me.    Sincerely,     Peace Salas NP

## 2024-10-24 NOTE — TELEPHONE ENCOUNTER
----- Message from Mackenzie sent at 10/24/2024 10:51 AM CDT -----  Contact: Mathieu Garcia [phone]  Patient is returning a phone call.    Who left a message for the patient: --Christen--    Does patient know what this is regarding:  -- bad cough, shortness of breath and congestion. --    Would you like a call back, or a response through your [facility] portal?: --Call back--    Comments: Please call to advise.

## 2024-10-24 NOTE — TELEPHONE ENCOUNTER
Spoke with pt, he took Covid test and it is negative he does not think it is strep but stated that he has asthma issues and believe it is that and want a rx sent to the pharmacy. I informed pt that PCP is out of office and that another Physician need to evaluate/test him prior to sending in any medication. Scheduled same day appt with Peace Salas NP on 10/24/2024 at 1300.

## 2024-10-24 NOTE — PROGRESS NOTES
INTERNAL MEDICINE PROGRESS/URGENT CARE NOTE    CHIEF COMPLAINT     No chief complaint on file.      HPI     Rishi Aranda is a 59 y.o. male who presents for an urgent visit today.    Started 2 days ago with chills, body aches, cough, wheezing. Worsened last night with wheezing and sob. Cough nonproductive. Using albuterol every 6 hrs. No ill contacts to his knowledge. No fevers that he's noticed. Hasn't checked temperature.       Patient Active Problem List   Diagnosis    Chondromalacia of patella    Neuralgia, neuritis, and radiculitis, unspecified    Chronic neck pain    Cervical radiculopathy, BUE    DJD (degenerative joint disease) of cervical spine    Neural foraminal stenosis of cervical spine    Chronic low back pain    Primary hypertension    Right knee pain    BMI 26.0-26.9,adult    Decreased strength involving knee joint    S/P total knee arthroplasty, right    Primary osteoarthritis of left knee    Pain    Chronic asthma    Decreased ROM of intervertebral discs of cervical spine    Pain aggravated by lifting        Past Medical History:  Past Medical History:   Diagnosis Date    Arthritis     Asthma     Cervical radiculopathy, BUE 8/17/2012    Chronic LBP 8/17/2012    Chronic neck pain 8/17/2012    HTN (hypertension) 8/17/2012    Hypertension     Surgery follow-up examination 7/3/2019        Past Surgical History:  Past Surgical History:   Procedure Laterality Date    COLONOSCOPY N/A 5/2/2019    Procedure: COLONOSCOPY;  Surgeon: Noemí Boss MD;  Location: [address];  Service: Endoscopy;  Laterality: N/A;    KNEE SURGERY      NECK SURGERY      TOTAL KNEE ARTHROPLASTY Right 5/14/2019    Procedure: ARTHROPLASTY, KNEE, TOTAL,Medial and Lateral compartment;  Surgeon: Maxi Chaves MD;  Location: [facility];  Service: Orthopedics;  Laterality: Right;  Regional w/o Catheter,Adductor,Exparel-Bupivacaine Liposome Injection 30cc,Clonidine/Epi/Ketorolac/Ropivacaine Injection 30cc         Allergies:  Review of patient's allergies indicates:   Allergen Reactions    Theophylline      Other reaction(s): Hives       Home Medications:    Current Outpatient Medications:     albuterol (PROVENTIL/VENTOLIN HFA) 90 mcg/actuation inhaler, Inhale 2 puffs into the lungs 4 (four) times daily as needed for Wheezing., Disp: 25.5 g, Rfl: 3    amLODIPine (NORVASC) 10 MG tablet, Take 1 tablet (10 mg total) by mouth once daily., Disp: 90 tablet, Rfl: 3    gabapentin (NEURONTIN) 100 MG capsule, Take 1 capsule (100 mg total) by mouth 3 (three) times daily., Disp: 90 capsule, Rfl: 2    irbesartan (AVAPRO) 300 MG tablet, TAKE ONE Tablet BY MOUTH ONCE DAILY IN THE EVENING, Disp: 90 tablet, Rfl: 2    meloxicam (MOBIC) 15 MG tablet, Take 1 tablet (15 mg total) by mouth once daily., Disp: 30 tablet, Rfl: 1    mometasone (NASONEX) 50 mcg/actuation nasal spray, 2 sprays by Nasal route once daily., Disp: 17 g, Rfl: 1    cetirizine (ZYRTEC) 10 MG tablet, Take 1 tablet (10 mg total) by mouth once daily., Disp: 30 tablet, Rfl: 0    meclizine (ANTIVERT) 25 mg tablet, Take 1 tablet (25 mg total) by mouth 3 (three) times daily as needed for Dizziness. (Patient not taking: Reported on 10/24/2024), Disp: 15 tablet, Rfl: 0    oseltamivir (TAMIFLU) 75 MG capsule, Take 1 capsule (75 mg total) by mouth 2 (two) times daily. for 5 days, Disp: 10 each, Rfl: 0    predniSONE (DELTASONE) 20 MG tablet, Take 2 tablets (40 mg total) by mouth once daily. for 5 days, Disp: 10 tablet, Rfl: 0  No current facility-administered medications for this visit.     Review of Systems:  Review of Systems   Constitutional:  Positive for chills and fatigue. Negative for fever.   HENT:  Positive for congestion. Negative for ear pain, mouth sores, sore throat and trouble swallowing.    Eyes:  Negative for discharge, redness and itching.   Respiratory:  Positive for cough, shortness of breath and wheezing. Negative for stridor.    Cardiovascular:  Negative for chest  "pain.   Gastrointestinal:  Negative for abdominal pain, diarrhea, nausea and vomiting.   Musculoskeletal:  Positive for myalgias.   Neurological:  Positive for headaches. Negative for dizziness and weakness.         PHYSICAL EXAM     Vitals:    10/24/24 1308   BP: 126/70   BP Location: Right arm   Patient Position: Sitting   Pulse: (!) 58   Resp: 18   Temp: 99 °F (37.2 °C)   TempSrc: Oral   SpO2: (!) 94%   Weight: 71.2 kg (156 lb 15.5 oz)   Height: 5' 7" (1.702 m)      Body mass index is 24.58 kg/m².     Physical Exam  Vitals reviewed.   Constitutional:       Appearance: Normal appearance.   HENT:      Head: Normocephalic and atraumatic.      Right Ear: Tympanic membrane and ear canal normal.      Left Ear: Tympanic membrane and ear canal normal.      Nose: Congestion present.      Mouth/Throat:      Mouth: Mucous membranes are moist.      Pharynx: Oropharynx is clear. Uvula midline. No oropharyngeal exudate, posterior oropharyngeal erythema or uvula swelling.   Eyes:      Conjunctiva/sclera: Conjunctivae normal.      Pupils: Pupils are equal, round, and reactive to light.   Cardiovascular:      Rate and Rhythm: Normal rate and regular rhythm.   Pulmonary:      Effort: Pulmonary effort is normal.      Comments: Wheezes throughout lung fields. No rhonchi or rales.   Musculoskeletal:      Cervical back: Normal range of motion and neck supple.   Lymphadenopathy:      Cervical: No cervical adenopathy.   Skin:     General: Skin is warm and dry.      Findings: No rash.   Neurological:      General: No focal deficit present.      Mental Status: He is alert.   Psychiatric:         Mood and Affect: Mood normal.         Behavior: Behavior normal.         LABS     No results found for: "LABA1C", "HGBA1C"  CMP  Sodium   Date Value Ref Range Status   06/24/2024 139 136 - 145 mmol/L Final     Potassium   Date Value Ref Range Status   06/24/2024 4.6 3.5 - 5.1 mmol/L Final     Chloride   Date Value Ref Range Status   06/24/2024 105 " 95 - 110 mmol/L Final     CO2   Date Value Ref Range Status   06/24/2024 27 23 - 29 mmol/L Final     Glucose   Date Value Ref Range Status   06/24/2024 87 70 - 110 mg/dL Final     BUN   Date Value Ref Range Status   06/24/2024 15 6 - 20 mg/dL Final     Creatinine   Date Value Ref Range Status   06/24/2024 1.1 0.5 - 1.4 mg/dL Final     Calcium   Date Value Ref Range Status   06/24/2024 9.4 8.7 - 10.5 mg/dL Final     Total Protein   Date Value Ref Range Status   10/12/2023 6.9 6.0 - 8.4 g/dL Final     Albumin   Date Value Ref Range Status   10/12/2023 4.0 3.5 - 5.2 g/dL Final     Total Bilirubin   Date Value Ref Range Status   10/12/2023 0.7 0.1 - 1.0 mg/dL Final     Comment:     For infants and newborns, interpretation of results should be based  on gestational age, weight and in agreement with clinical  observations.    Premature Infant recommended reference ranges:  Up to 24 hours.............<8.0 mg/dL  Up to 48 hours............<12.0 mg/dL  3-5 days..................<15.0 mg/dL  6-29 days.................<15.0 mg/dL       Alkaline Phosphatase   Date Value Ref Range Status   10/12/2023 48 (L) 55 - 135 U/L Final     AST   Date Value Ref Range Status   10/12/2023 24 10 - 40 U/L Final     ALT   Date Value Ref Range Status   10/12/2023 15 10 - 44 U/L Final     Anion Gap   Date Value Ref Range Status   06/24/2024 7 (L) 8 - 16 mmol/L Final     eGFR if    Date Value Ref Range Status   07/30/2022 >60.0 >60 mL/min/1.73 m^2 Final     eGFR if non    Date Value Ref Range Status   07/30/2022 >60.0 >60 mL/min/1.73 m^2 Final     Comment:     Calculation used to obtain the estimated glomerular filtration  rate (eGFR) is the CKD-EPI equation.        Lab Results   Component Value Date    WBC 7.00 06/24/2024    HGB 13.8 (L) 06/24/2024    HCT 43.5 06/24/2024    MCV 91 06/24/2024     06/24/2024     Lab Results   Component Value Date    CHOL 171 06/24/2024    CHOL 189 10/12/2023    CHOL 162  10/04/2022     Lab Results   Component Value Date    HDL 56 06/24/2024    HDL 69 10/12/2023    HDL 61 10/04/2022     Lab Results   Component Value Date    LDLCALC 102.6 06/24/2024    LDLCALC 109.0 10/12/2023    LDLCALC 91.4 10/04/2022     Lab Results   Component Value Date    TRIG 62 06/24/2024    TRIG 55 10/12/2023    TRIG 48 10/04/2022     Lab Results   Component Value Date    CHOLHDL 32.7 06/24/2024    CHOLHDL 36.5 10/12/2023    CHOLHDL 37.7 10/04/2022     Lab Results   Component Value Date    TSH 1.677 10/12/2023       ASSESSMENT     1. Influenza B    2. Asthma with acute exacerbation, unspecified asthma severity, unspecified whether persistent           PLAN  Pt much improved after 2 neb treatments. Oxygen up to 96%. Reports much relief of sob and chest tightness. Only very mild expiratory wheeze in bases after 2 nebs. Will start him on prednisone for asthma and tamiflu sent.   Discussed to get robitussin DM or Mucinex DM otc.   Tylenol or ibu prn pain or fever.   Rest and hydrate well.   Continue albuterol   Isolate until fever free without fever reducing meds for 24 hrs and symptoms have improved.    ER if any worsening or failure to improve in the next 1-2 days.     1. Asthma with acute exacerbation, unspecified asthma severity, unspecified whether persistent  - albuterol nebulizer solution 2.5 mg  - POCT Influenza A/B Molecular  - POCT COVID-19 Rapid Screening  - predniSONE (DELTASONE) 20 MG tablet; Take 2 tablets (40 mg total) by mouth once daily. for 5 days  Dispense: 10 tablet; Refill: 0  - albuterol nebulizer solution 2.5 mg    2. Influenza B  - oseltamivir (TAMIFLU) 75 MG capsule; Take 1 capsule (75 mg total) by mouth 2 (two) times daily. for 5 days  Dispense: 10 each; Refill: 0  - cetirizine (ZYRTEC) 10 MG tablet; Take 1 tablet (10 mg total) by mouth once daily.  Dispense: 30 tablet; Refill: 0       Patient was counseled on when to seek emergent care. Patient's plan/treatment was discussed including  medications and possible side effects. Verbalized understanding of all instructions.     This note was partly generated with [employer] voice recognition software. I apologize for any possible typographical errors.          BELLA Bailey  Department of Internal Medicine - [facility]  10/24/2024

## 2025-02-22 DIAGNOSIS — Z00.00 ENCOUNTER FOR MEDICARE ANNUAL WELLNESS EXAM: ICD-10-CM

## 2025-04-11 ENCOUNTER — HOSPITAL ENCOUNTER (EMERGENCY)
Facility: HOSPITAL | Age: 60
Discharge: HOME | End: 2025-04-11
Attending: EMERGENCY MEDICINE
Payer: MEDICARE

## 2025-04-11 ENCOUNTER — PATIENT MESSAGE (OUTPATIENT)
Dept: INTERNAL MEDICINE | Facility: CLINIC | Age: 60
End: 2025-04-11
Payer: MEDICARE

## 2025-04-11 ENCOUNTER — OFFICE VISIT (OUTPATIENT)
Dept: PRIMARY CARE CLINIC | Facility: CLINIC | Age: 60
End: 2025-04-11
Payer: MEDICARE

## 2025-04-11 VITALS
TEMPERATURE: 99 F | DIASTOLIC BLOOD PRESSURE: 92 MMHG | RESPIRATION RATE: 16 BRPM | OXYGEN SATURATION: 98 % | HEIGHT: 69 IN | SYSTOLIC BLOOD PRESSURE: 152 MMHG | WEIGHT: 140 LBS | HEART RATE: 51 BPM | BODY MASS INDEX: 20.73 KG/M2

## 2025-04-11 VITALS — HEART RATE: 62 BPM | DIASTOLIC BLOOD PRESSURE: 68 MMHG | SYSTOLIC BLOOD PRESSURE: 142 MMHG | OXYGEN SATURATION: 99 %

## 2025-04-11 DIAGNOSIS — M79.89 LEG SWELLING: ICD-10-CM

## 2025-04-11 DIAGNOSIS — S82.831S OTHER CLOSED FRACTURE OF DISTAL END OF RIGHT FIBULA, SEQUELA: Primary | ICD-10-CM

## 2025-04-11 DIAGNOSIS — R60.0 EDEMA OF RIGHT LOWER LEG: Primary | ICD-10-CM

## 2025-04-11 DIAGNOSIS — M25.571 ACUTE RIGHT ANKLE PAIN: ICD-10-CM

## 2025-04-11 PROCEDURE — 99284 EMERGENCY DEPT VISIT MOD MDM: CPT | Mod: 25

## 2025-04-11 PROCEDURE — 99999 PR PBB SHADOW E&M-EST. PATIENT-LVL III: CPT | Mod: PBBFAC,,, | Performed by: NURSE PRACTITIONER

## 2025-04-11 NOTE — PATIENT INSTRUCTIONS
"Swelling and itching in arm may be related to kidney function. Use the steroid cream twice daily to help with the itching. The bruising and blood pooling is caused by the blood being "thin" from the clopidogrel. We are checking your blood to be sure you are not more anemic or have been losing blood. If your arms start getting redder, feel hot to touch and/or you develop a fever then you will need to go to the ER to be evaluated for cellulitis. You may need to go to the ER if your anemia has worsened as well. See attached handout about cellulitis.  "

## 2025-04-11 NOTE — ED TRIAGE NOTES
Rishi Aranda, a 59 y.o. male presents to the ED w/ complaint of ankle pain. Pt arrives to ED via personal vehicle with co ankle pain. Pt reports sprain ankle x2 weeks ago, states he stepped onto uneven ground.. Pt endorses worsening swelling and pain to right leg. Hx total knee replacement in 2019.     Triage note:  Chief Complaint   Patient presents with    Ankle Pain     Right ankle pain, states sprained 2 weeks ago, ankle continuing to swell, went to [facility] was told to come to ED for x-ray, states originally injured by stepping on uneven ground     Review of patient's allergies indicates:   Allergen Reactions    Theophylline      Other reaction(s): Hives     Past Medical History:   Diagnosis Date    Arthritis     Asthma     Cervical radiculopathy, BUE 8/17/2012    Chronic LBP 8/17/2012    Chronic neck pain 8/17/2012    HTN (hypertension) 8/17/2012    Hypertension     Surgery follow-up examination 7/3/2019   \

## 2025-04-11 NOTE — ED PROVIDER NOTES
Encounter Date: 4/11/2025       History     Chief Complaint   Patient presents with    Ankle Pain     Right ankle pain, states sprained 2 weeks ago, ankle continuing to swell, went to  was told to come to ED for x-ray, states originally injured by stepping on uneven ground     59 year old male with a past medical history of asthma, hypertension, and chronic back pain presents to the emergency department complaining of right leg swelling.  2 weeks ago, he was stepping backwards off of a ladder and accidentally planted the ball of his foot on a curb, which put his right foot into hyperflexion. He states he initially had pain and swelling to the medial and lateral aspects of his hindfoot. He was seen in urgent care at that time who diagnosed him with an ankle sprain. No imaging of the ankle was done at this time. He has been icing and elevating the foot and encouraging gentle activity as tolerated for the past 2 weeks. He is now able to move his ankle and bear weight without pain. He is denying any pain today. He states that his foot and ankle swelling is not improving, but instead, has been getting worse. His swelling has now extended up his right leg to his knee. He does not have any calf tenderness and has not noticed any skin changes or warmth. He denies history of DVT or prior venous insufficiency. He denies fever, chills, chest pain, shortness of breath, palpitations, hemoptysis, or cough.     The history is provided by the patient.     Review of patient's allergies indicates:   Allergen Reactions    Theophylline      Other reaction(s): Hives     Past Medical History:   Diagnosis Date    Arthritis     Asthma     Cervical radiculopathy, BUE 8/17/2012    Chronic LBP 8/17/2012    Chronic neck pain 8/17/2012    HTN (hypertension) 8/17/2012    Hypertension     Surgery follow-up examination 7/3/2019     Past Surgical History:   Procedure Laterality Date    COLONOSCOPY N/A 5/2/2019    Procedure: COLONOSCOPY;  Surgeon:  Noemí Boss MD;  Location: [facility] (4TH FLR);  Service: Endoscopy;  Laterality: N/A;    KNEE SURGERY      NECK SURGERY      TOTAL KNEE ARTHROPLASTY Right 5/14/2019    Procedure: ARTHROPLASTY, KNEE, TOTAL,Medial and Lateral compartment;  Surgeon: Maxi Chaves MD;  Location: [facility];  Service: Orthopedics;  Laterality: Right;  Regional w/o Catheter,Adductor,Exparel-Bupivacaine Liposome Injection 30cc,Clonidine/Epi/Ketorolac/Ropivacaine Injection 30cc     Family History   Problem Relation Name Age of Onset    Hypertension Mother      Stroke Mother      No Known Problems Sister      Hypertension Brother       Social History[1]  Review of Systems    Physical Exam     Initial Vitals [04/11/25 1321]   BP Pulse Resp Temp SpO2   (!) 148/94 60 18 98.2 °F (36.8 °C) 98 %      MAP       --         Physical Exam    Nursing note and vitals reviewed.  Constitutional: He appears well-nourished. He is not diaphoretic. No distress.   HENT:   Head: Normocephalic and atraumatic.   Eyes: Conjunctivae are normal.   Neck:   Normal range of motion.  Cardiovascular:  Normal rate and regular rhythm.           No murmur heard.  Unable to assess dorsalis pedis pulses 2/2 swelling, good perfusion noted by lack of cyanosis, pallor, and capillary refill <2s.   Pulmonary/Chest: Breath sounds normal. No respiratory distress. He has no wheezes.   Musculoskeletal:      Cervical back: Normal range of motion.      Comments: 3+ pitting edema to right lower leg from midfoot to the level of the tibial tuberosity. Compartments do not feel firm. Calf is non-tender. Patient able to flex and extend his right knee and ankle. Inversion/eversion of the right foot intact. No tenderness to palpation of the foot or ankle.      Neurological: He is alert and oriented to person, place, and time. GCS score is 15. GCS eye subscore is 4. GCS verbal subscore is 5. GCS motor subscore is 6.   Skin: Skin is warm. Capillary refill takes less than 2 seconds.   No  lower extremity erythema or warmth.   Psychiatric: He has a normal mood and affect.         ED Course   Procedures  Labs Reviewed - No data to display       Imaging Results              US Lower Extremity Veins Right (Final result)  Result time 04/11/25 16:35:25      Final result by Paul Aparicio MD (04/11/25 16:35:25)                   Impression:      No evidence of deep venous thrombosis in the right lower extremity.      Electronically signed by: Paul Aparicio MD  Date:    04/11/2025  Time:    16:35               Narrative:    EXAMINATION:  US LOWER EXTREMITY VEINS RIGHT    CLINICAL HISTORY:  Other specified soft tissue disorders    TECHNIQUE:  Duplex and color flow Doppler evaluation and graded compression of the right lower extremity veins was performed.    COMPARISON:  None    FINDINGS:  Duplex and color flow Doppler evaluation does not reveal any evidence of acute venous thrombosis in the common femoral, superficial femoral, greater saphenous, popliteal, peroneal, anterior tibial and posterior tibial veins of the right lower extremity.  There is no reflux to suggest valvular incompetence.                                       X-Ray Tibia Fibula 2 View Right (Final result)  Result time 04/11/25 15:05:28      Final result by Paul Aparicio MD (04/11/25 15:05:28)                   Impression:      1. Obliquely oriented fracture involving the distal aspect of the fibula, correlation is advised.  There is diffuse edema about the lower extremity.      Electronically signed by: Pual Aparicio MD  Date:    04/11/2025  Time:    15:05               Narrative:    EXAMINATION:  XR TIBIA FIBULA 2 VIEW RIGHT    CLINICAL HISTORY:  Other specified soft tissue disorders    TECHNIQUE:  AP and lateral views of the right tibia and fibula were performed.    COMPARISON:  None.    FINDINGS:  Four views right tibia fibula.    Right knee arthroplasty appears intact.  There is some degree of osteopenia.  There is edema  about the lower extremity.  There is vascular calcification.  There is obliquely oriented fracture involving the distal aspect of the fibula.  The ankle mortise is intact.                                       X-Ray Ankle Complete Right (Final result)  Result time 04/11/25 15:02:21      Final result by Arya Zhang MD (04/11/25 15:02:21)                   Impression:      See above      Electronically signed by: Arya Zhang MD  Date:    04/11/2025  Time:    15:02               Narrative:    EXAMINATION:  XR ANKLE COMPLETE 3 VIEW RIGHT    CLINICAL HISTORY:  Other specified soft tissue disorders    TECHNIQUE:  AP, lateral, and oblique images of the right ankle were performed.    COMPARISON:  None    FINDINGS:  No fracture or dislocation.  No bone destruction identified.  Mild DJD.                                       Medications - No data to display  Medical Decision Making  Patient is a 59 year-old male who presents to the emergency department with complaints of worsening right lower leg swelling 2 weeks after an ankle injury. Patient is non-toxic appearing, afebrile, and hemodynamically stable.     Differential diagnosis includes but is not limited to DVT, ankle sprain/strain, ligamentous injury, neurovascular injury, fracture, dislocation, cellulitis, chronic venous insufficiency    Xray with obliquely oriented fracture of the distal fibula.  US without evidence of DVT.    Patient was placed in walking boot and given crutches. Instructed to minimize weight bearing until he could be evaluated by orthopedics.    Disposition   Patient was discharged home with referral placed to orthopedics for further management. Instructed on proper use of crutches. Educated to continue to rest, ice and elevate the leg to help with recovery. Discussed return precautions. Patient reassessed, discussed dispo, given opportunity to ask additional questions prior to disposition.    Amount and/or Complexity of Data  Reviewed  Radiology: ordered. Decision-making details documented in ED Course.               ED Course as of 04/11/25 1644   Fri Apr 11, 2025   1537 X-Ray Ankle Complete Right  No fracture or dislocation.  No bone destruction identified.  Mild DJD. [NK]   1538 X-Ray Tibia Fibula 2 View Right  Obliquely oriented fracture involving the distal aspect of the fibula. There is diffuse edema about the lower extremity. [NK]   1638 US Lower Extremity Veins Right  No evidence of deep venous thrombosis in the right lower extremity. [NK]      ED Course User Index  [NK] Tee Leal PA-C                           Clinical Impression:  Final diagnoses:  [M79.89] Leg swelling  [S82.831S] Other closed fracture of distal end of right fibula, sequela (Primary)          ED Disposition Condition    Discharge Stable          ED Prescriptions    None       Follow-up Information       Follow up With Specialties Details Why Contact Info    Colten Carlisle MD Internal Medicine Schedule an appointment as soon as possible for a visit   [address]  [place] [address]  [phone]      [facility] - Emergency Dept Emergency Medicine Go to  If symptoms worsen [address] [facility] [id number] [phone]               [1]   Social History  Tobacco Use    Smoking status: Never    Smokeless tobacco: Never   Substance Use Topics    Alcohol use: Yes     Alcohol/week: 12.0 standard drinks of alcohol     Types: 12 Cans of beer per week     Comment: occasionally    Drug use: No        Tee Leal PA-C  04/11/25 [id number]

## 2025-04-11 NOTE — PROGRESS NOTES
[facility] Primary [facility]    Linwood Duval, HAI      Assessment/Plan     Rishi Aranda is a pleasant 59 y.o.male. The following was addressed today.    Edema of right lower leg   Patient presents with marked swelling (3+) of the right lower extremity 2 weeks after a fall, injuring his ankle. Cannot rule out DVT. Patient's problem is not appropriate for further evaluation in this clinic. Needs immediate evaluation to rule out DVT. May also have a fracture with acute sequelae that needs immediate attention. Compartment syndrome considered: right DP pulse 2+, no paralysis, pallor, increasing pain, complaints of numbness. Patient to travel via private vehicle.    Acute right ankle pain     Follow up if symptoms worsen or fail to improve.          Subjective      Patient ID:  Rishi Aranda is a 59 y.o. male who is here today for:   Chief Complaint   Patient presents with    Follow-up       History of Present Illness:    Dorsiflexion injury of right ankle 2 weeks ago while stepping down from a ladder. After the injury he rested, iced, and elevated the leg for a couple of days and then began to increase activity. Pain in ankle has improved but swelling started in the foot and ankle and is migrating up his leg. Now at his knee.    Problem list:    Active Problem List with Overview Notes    Diagnosis Date Noted    Decreased ROM of intervertebral discs of cervical spine 06/13/2023    Pain aggravated by lifting 06/13/2023    Chronic asthma 10/04/2022    Pain 01/26/2021    S/P total knee arthroplasty, right 07/03/2019    Primary osteoarthritis of left knee 07/03/2019    Decreased strength involving knee joint 05/16/2019    BMI 26.0-26.9,adult 12/03/2018    Right knee pain 12/07/2017    Chronic neck pain 08/17/2012    Cervical radiculopathy, BUE 08/17/2012    DJD (degenerative joint disease) of cervical spine 08/17/2012     mostly C3-C4      Neural foraminal stenosis of cervical spine 08/17/2012     Mostly at C3-C4, mild to  moderate      Chronic low back pain 08/17/2012    Primary hypertension 08/17/2012    Neuralgia, neuritis, and radiculitis, unspecified 07/14/2011    Chondromalacia of patella 06/01/2011        Medications:    Current Outpatient Medications   Medication Instructions    albuterol (PROVENTIL/VENTOLIN HFA) 90 mcg/actuation inhaler 2 puffs, Inhalation, 4 times daily PRN    amLODIPine (NORVASC) 10 mg, Oral, Daily    cetirizine (ZYRTEC) 10 mg, Oral, Daily    gabapentin (NEURONTIN) 100 mg, Oral, 3 times daily    irbesartan (AVAPRO) 300 MG tablet TAKE ONE Tablet BY MOUTH ONCE DAILY IN THE EVENING    meclizine (ANTIVERT) 25 mg, Oral, 3 times daily PRN    meloxicam (MOBIC) 15 mg, Oral, Daily    mometasone (NASONEX) 50 mcg/actuation nasal spray 2 sprays, Nasal, Daily         Allergies:    Review of patient's allergies indicates:   Allergen Reactions    Theophylline      Other reaction(s): Hives           Objective     Vital Signs:      Vital Signs  Pulse: 62  SpO2: 99 %  BP: (!) 142/68  BP Location: Right arm  Patient Position: Sitting  Pain Score: 0-No pain]          Physical Exam:    Physical Exam  Vitals reviewed.   HENT:      Head: Normocephalic.   Eyes:      Conjunctiva/sclera: Conjunctivae normal.   Pulmonary:      Effort: Pulmonary effort is normal.   Musculoskeletal:      Right lower leg: 3+ Edema present.      Right ankle: Decreased range of motion.      Comments: Right lower leg, ankle and foot with 3+ pitting edema. Swelling is marked. Right pedal pulse is present. No pallor. No paralysis.   Neurological:      Mental Status: He is alert and oriented to person, place, and time.   Psychiatric:         Mood and Affect: Mood normal.

## 2025-04-11 NOTE — DISCHARGE INSTRUCTIONS
It was a pleasure taking care of you today!  You have a fracture of a bone in your right leg called the fibula.  There is no blood clot in your leg.    Home Care:   - wear boot and use crutches to minimize weight-bearing on the affected leg.  This will help you to recover without complications.  - continue to ice and elevate the area of swelling.    Follow-Up Plan:  - A referral was placed to orthopedics.  They should call you to schedule an appointment.   - Follow-up with primary care doctor within 7-10 days to discuss your recent ER visit and any additional concerns that you may have.  - Additional testing and/or evaluation as directed by your primary doctor    Return to the Emergency Department for symptoms including but not limited to: persistence or worsening of symptoms, shortness of breath or chest pain, inability to drink without vomiting, passing out/fainting/ loss of consciousness, or if you have other concerns.

## 2025-04-14 ENCOUNTER — TELEPHONE (OUTPATIENT)
Dept: ORTHOPEDICS | Facility: CLINIC | Age: 60
End: 2025-04-14
Payer: MEDICARE

## 2025-04-17 ENCOUNTER — OFFICE VISIT (OUTPATIENT)
Dept: ORTHOPEDICS | Facility: CLINIC | Age: 60
End: 2025-04-17
Payer: MEDICARE

## 2025-04-17 ENCOUNTER — HOSPITAL ENCOUNTER (OUTPATIENT)
Dept: RADIOLOGY | Facility: HOSPITAL | Age: 60
Discharge: HOME | End: 2025-04-17
Attending: ORTHOPAEDIC SURGERY
Payer: MEDICARE

## 2025-04-17 DIAGNOSIS — S82.874A CLOSED NONDISPLACED PILON FRACTURE OF RIGHT TIBIA, INITIAL ENCOUNTER: ICD-10-CM

## 2025-04-17 DIAGNOSIS — M79.89 LEG SWELLING: ICD-10-CM

## 2025-04-17 DIAGNOSIS — S82.874A CLOSED NONDISPLACED PILON FRACTURE OF RIGHT TIBIA, INITIAL ENCOUNTER: Primary | ICD-10-CM

## 2025-04-17 PROCEDURE — 99999 PR PBB SHADOW E&M-EST. PATIENT-LVL III: CPT | Mod: PBBFAC,,, | Performed by: ORTHOPAEDIC SURGERY

## 2025-04-17 PROCEDURE — 99213 OFFICE O/P EST LOW 20 MIN: CPT | Mod: S$GLB,,, | Performed by: ORTHOPAEDIC SURGERY

## 2025-04-17 PROCEDURE — 73610 X-RAY EXAM OF ANKLE: CPT | Mod: TC,RT

## 2025-04-17 PROCEDURE — 1159F MED LIST DOCD IN RCRD: CPT | Mod: CPTII,S$GLB,, | Performed by: ORTHOPAEDIC SURGERY

## 2025-04-17 PROCEDURE — 73610 X-RAY EXAM OF ANKLE: CPT | Mod: 26,RT,, | Performed by: RADIOLOGY

## 2025-04-17 NOTE — PROGRESS NOTES
"  CC:  Right ankle pain      HISTORY       HPI:  Rishi Aranda is a 59 y.o. male with PMH significant for R TKA with Dr. Chaves, HTN and asthma presenting with right ankle pain after stepping off of a curb around 3 weeks ago. He was seen at urgent care after the injury and diagnosed with an ankle sprain. No imaging done at that time. He was seen in the ED 2 weeks later for RLE swelling. DVT U/S at that time was negative. Xrs obtained and read as "Obliquely oriented fracture involving the distal aspect of the fibula, correlation is advised. There is diffuse edema about the lower extremity." Per ED note, patient was made nonweightbearing, given crutches and boot for immobilization and referred to orthopedics for further evaluation. Patient states he has been able to partially weight bear through the foot without significant pain since the injury. Patient denies numbness and tingling.     Denies any other musculoskeletal pain or injuries. Lives with his wife. He is retired. Walks w/out assisted devices at baseline. No history of MI or stroke. Does not smoke. No hx of DM or cancer.      ROS:  Constitutional: Denies fever/chills  Neurological: Denies numbness/tingling (any exceptions noted in orthopaedic exam)   Psychiatric/Behavioral: Denies change in normal mood  Eyes: Denies change in vision  Cardiovascular: Denies chest pain  Respiratory: Denies shortness of breath  Hematologic/Lymphatic: Denies easy bleeding/bruising   Skin: Denies new rash or skin lesions   Gastrointestinal: Denies nausea/vomitting/diarrhea, change in bowel habits, abdominal pain   Allergic/Immunologic: Denies adverse reactions to current medications  Musculoskeletal: see HPI    PAST MEDICAL HISTORY:   Past Medical History:   Diagnosis Date    Arthritis     Asthma     Cervical radiculopathy, BUE 8/17/2012    Chronic LBP 8/17/2012    Chronic neck pain 8/17/2012    HTN (hypertension) 8/17/2012    Hypertension     Surgery follow-up examination 7/3/2019 "     PAST SURGICAL HISTORY:   Past Surgical History:   Procedure Laterality Date    COLONOSCOPY N/A 5/2/2019    Procedure: COLONOSCOPY;  Surgeon: Noemí Boss MD;  Location: [address]);  Service: Endoscopy;  Laterality: N/A;    KNEE SURGERY      NECK SURGERY      TOTAL KNEE ARTHROPLASTY Right 5/14/2019    Procedure: ARTHROPLASTY, KNEE, TOTAL,Medial and Lateral compartment;  Surgeon: Maxi Chaves MD;  Location: [facility];  Service: Orthopedics;  Laterality: Right;  Regional w/o Catheter,Adductor,Exparel-Bupivacaine Liposome Injection 30cc,Clonidine/Epi/Ketorolac/Ropivacaine Injection 30cc     FAMILY HISTORY:   Family History   Problem Relation Name Age of Onset    Hypertension Mother      Stroke Mother      No Known Problems Sister      Hypertension Brother       SOCIAL HISTORY: Social History[1]  MEDICATIONS: Current Medications[2]  ALLERGIES:   Review of patient's allergies indicates:   Allergen Reactions    Theophylline      Other reaction(s): Hives         EXAM      VITAL SIGNS:   There were no vitals taken for this visit.      PE:  General:  no acute distress, appears stated age    Neuro: alert and oriented x3  Psych: normal mood  Head: normocephalic, atraumatic.   Eyes: no scleral icterus  Mouth: moist mucous membranes  Cardiovascular: extremities warm and well perfused  Lungs: breathing comfortably, equal chest rise bilat  Skin: clean, dry, intact (any exceptions noted in below musculoskeletal exam)    Musculoskeletal: RLE  Well healed anterior knee incision  Moderate swelling to the lower extremity distal to knee  Fires Quad/TA/EHL/GSC  Painless ROM of knee, ankle and foot  SILT  Compartments soft  Brisk cap refill  Palpable DP and PT          XRAYS:  XR R tib/fib: Nondisplaced distal tibia fracture with intraarticular extension. Syndesmosis appears intact. Mortise intact. No distal fibula fracture visualized  (I independently reviewed and interpreted the above imaging)    MEDICAL DECISION MAKING        Encounter Diagnoses   Name Primary?    Leg swelling     Closed nondisplaced pilon fracture of right tibia, initial encounter Yes       Rishi Aranda is a 59M who presents with a nondisplaced right distal tibia fracture with intra-articular extension sustained after stepping off of a curve around 3 weeks ago.  He has been partial weight-bearing to the right lower extremity since the injury.  Repeat R ankle Xrs obtained today in clinic show no interval displacement of fracture.    Discussed injury and non operative versus operative management options.  At this point with no displacement of the fracture patient seems to be doing well with non operative management, I recommend continuing to the same    We will add ASA 81 mg b.i.d. for DVT prophylaxis, to continue for the duration of activity restrictions, until he is walking normally without crutches    --weight bearin% WB RLE with CAM boot and crutches. Will provide compression stocking to RLE  --followup: 2 weeks  --XR at next visit: R ankle      =====================  Hudson Brown MD  Orthopaedic Surgery        I agree with resident note as above and personally evaluated the patient    =====================  Humberto Tom MD  Orthopaedic Surgery             [1]   Social History  Socioeconomic History    Marital status:    Tobacco Use    Smoking status: Never    Smokeless tobacco: Never   Substance and Sexual Activity    Alcohol use: Yes     Alcohol/week: 12.0 standard drinks of alcohol     Types: 12 Cans of beer per week     Comment: occasionally    Drug use: No    Sexual activity: Yes     Social Drivers of Health     Financial Resource Strain: Low Risk  (2025)    Overall Financial Resource Strain (CARDIA)     Difficulty of Paying Living Expenses: Not very hard   Food Insecurity: No Food Insecurity (2025)    Hunger Vital Sign     Worried About Running Out of Food in the Last Year: Never true     Ran Out of Food in the Last Year:  Never true   Transportation Needs: No Transportation Needs (4/11/2025)    PRAPARE - Transportation     Lack of Transportation (Medical): No     Lack of Transportation (Non-Medical): No   Physical Activity: Sufficiently Active (4/11/2025)    Exercise Vital Sign     Days of Exercise per Week: 5 days     Minutes of Exercise per Session: 60 min   Stress: Stress Concern Present (4/11/2025)    [employer] - Occupational Stress Questionnaire     Feeling of Stress : To some extent   Housing Stability: Low Risk  (4/11/2025)    Housing Stability Vital Sign     Unable to Pay for Housing in the Last Year: No     Homeless in the Last Year: No   [2]   Current Outpatient Medications:     albuterol (PROVENTIL/VENTOLIN HFA) 90 mcg/actuation inhaler, Inhale 2 puffs into the lungs 4 (four) times daily as needed for Wheezing., Disp: 25.5 g, Rfl: 3    amLODIPine (NORVASC) 10 MG tablet, Take 1 tablet (10 mg total) by mouth once daily., Disp: 90 tablet, Rfl: 3    cetirizine (ZYRTEC) 10 MG tablet, Take 1 tablet (10 mg total) by mouth once daily., Disp: 30 tablet, Rfl: 0    gabapentin (NEURONTIN) 100 MG capsule, Take 1 capsule (100 mg total) by mouth 3 (three) times daily., Disp: 90 capsule, Rfl: 2    irbesartan (AVAPRO) 300 MG tablet, TAKE ONE Tablet BY MOUTH ONCE DAILY IN THE EVENING, Disp: 90 tablet, Rfl: 2    meclizine (ANTIVERT) 25 mg tablet, Take 1 tablet (25 mg total) by mouth 3 (three) times daily as needed for Dizziness., Disp: 15 tablet, Rfl: 0    meloxicam (MOBIC) 15 MG tablet, Take 1 tablet (15 mg total) by mouth once daily., Disp: 30 tablet, Rfl: 1    mometasone (NASONEX) 50 mcg/actuation nasal spray, 2 sprays by Nasal route once daily., Disp: 17 g, Rfl: 1

## 2025-04-30 DIAGNOSIS — S82.874A CLOSED NONDISPLACED PILON FRACTURE OF RIGHT TIBIA, INITIAL ENCOUNTER: Primary | ICD-10-CM

## 2025-05-01 ENCOUNTER — HOSPITAL ENCOUNTER (OUTPATIENT)
Dept: RADIOLOGY | Facility: HOSPITAL | Age: 60
Discharge: HOME | End: 2025-05-01
Attending: ORTHOPAEDIC SURGERY
Payer: MEDICARE

## 2025-05-01 ENCOUNTER — OFFICE VISIT (OUTPATIENT)
Dept: ORTHOPEDICS | Facility: CLINIC | Age: 60
End: 2025-05-01
Payer: MEDICARE

## 2025-05-01 DIAGNOSIS — S82.874A CLOSED NONDISPLACED PILON FRACTURE OF RIGHT TIBIA, INITIAL ENCOUNTER: Primary | ICD-10-CM

## 2025-05-01 DIAGNOSIS — S82.874A CLOSED NONDISPLACED PILON FRACTURE OF RIGHT TIBIA, INITIAL ENCOUNTER: ICD-10-CM

## 2025-05-01 PROCEDURE — 73610 X-RAY EXAM OF ANKLE: CPT | Mod: 26,RT,, | Performed by: RADIOLOGY

## 2025-05-01 PROCEDURE — 99213 OFFICE O/P EST LOW 20 MIN: CPT | Mod: S$GLB,,, | Performed by: ORTHOPAEDIC SURGERY

## 2025-05-01 PROCEDURE — 73610 X-RAY EXAM OF ANKLE: CPT | Mod: TC,RT

## 2025-05-01 NOTE — PROGRESS NOTES
CC:  Right pilon fracture      HISTORY       HPI:  59-year-old male, injured right ankle when stepping off a curb sometime in early April.    Had right ankle pain, sought medical attention a couple weeks later where x-rays indicated a nondisplaced right ankle pilon fracture.    He is seen in [facility] 04/17/2025  At that time he had been ambulatory in a boot in the fracture remained sustained its nondisplaced nature  We continued non operative management   He is here for follow up     Reports he is doing well utilizing a boot    ROS:  Constitutional: Denies fever/chills  Neurological: Denies numbness/tingling (any exceptions noted in orthopaedic exam)   Psychiatric/Behavioral: Denies change in normal mood  Eyes: Denies change in vision  Cardiovascular: Denies chest pain  Respiratory: Denies shortness of breath  Hematologic/Lymphatic: Denies easy bleeding/bruising   Skin: Denies new rash or skin lesions   Gastrointestinal: Denies nausea/vomitting/diarrhea, change in bowel habits, abdominal pain   Allergic/Immunologic: Denies adverse reactions to current medications  Musculoskeletal: see HPI    PAST MEDICAL HISTORY:   Past Medical History:   Diagnosis Date    Arthritis     Asthma     Cervical radiculopathy, BUE 8/17/2012    Chronic LBP 8/17/2012    Chronic neck pain 8/17/2012    HTN (hypertension) 8/17/2012    Hypertension     Surgery follow-up examination 7/3/2019     PAST SURGICAL HISTORY:   Past Surgical History:   Procedure Laterality Date    COLONOSCOPY N/A 5/2/2019    Procedure: COLONOSCOPY;  Surgeon: Noemí Boss MD;  Location: [address];  Service: Endoscopy;  Laterality: N/A;    KNEE SURGERY      NECK SURGERY      TOTAL KNEE ARTHROPLASTY Right 5/14/2019    Procedure: ARTHROPLASTY, KNEE, TOTAL,Medial and Lateral compartment;  Surgeon: Maxi Chaves MD;  Location: [facility];  Service: Orthopedics;  Laterality: Right;  Regional w/o Catheter,Adductor,Exparel-Bupivacaine Liposome Injection  30cc,Clonidine/Epi/Ketorolac/Ropivacaine Injection 30cc     FAMILY HISTORY:   Family History   Problem Relation Name Age of Onset    Hypertension Mother      Stroke Mother      No Known Problems Sister      Hypertension Brother       SOCIAL HISTORY: Social History[1]  MEDICATIONS: Current Medications[2]  ALLERGIES:   Review of patient's allergies indicates:   Allergen Reactions    Theophylline      Other reaction(s): Hives         EXAM      VITAL SIGNS:   There were no vitals taken for this visit.      PE:  General:  no acute distress, appears stated age    Neuro: alert and oriented x3  Psych: normal mood  Head: normocephalic, atraumatic.   Eyes: no scleral icterus  Mouth: moist mucous membranes  Cardiovascular: extremities warm and well perfused  Lungs: breathing comfortably, equal chest rise bilat  Skin: clean, dry, intact (any exceptions noted in below musculoskeletal exam)    Musculoskeletal:  Right lower extremity  No open wounds  Mild edema mid leg to ankle  Mild tenderness to palpation about anterior and medial ankle, consistent with known fracture   Ankle range of motion 5 dorsiflexion, 15 plantar flexion   Motor intact deltoid, biceps, triceps, wrist flexion/extension, interossei, finger flexion/extension  Sensation intact to light touch axillary, radial, ulnar, median nerves  Palpable radial pulse, black brisk cap refill        XRAYS:  X-ray right ankle demonstrate nondisplaced pilon fracture, stable alignment compared to prior x-rays  (I independently reviewed and interpreted the above imaging)    MEDICAL DECISION MAKING       Encounter Diagnosis   Name Primary?    Closed nondisplaced pilon fracture of right tibia, initial encounter Yes       59-year-old male, nondisplaced right ankle pilon fracture proximally 1 month ago early April  Has good alignment  Seems to be tolerating the boot getting around with the crutches      Continued non operative management  Calcium, vitamin-D  Aspirin 81 b.i.d. for DVT  prophylaxis    50% weight-bearing right lower extremity, cam boot  Follow up 2 weeks  X-ray right ankle          =====================  Humberto Tom MD  Orthopaedic Surgery                   [1]   Social History  Socioeconomic History    Marital status:    Tobacco Use    Smoking status: Never    Smokeless tobacco: Never   Substance and Sexual Activity    Alcohol use: Yes     Alcohol/week: 12.0 standard drinks of alcohol     Types: 12 Cans of beer per week     Comment: occasionally    Drug use: No    Sexual activity: Yes     Social Drivers of Health     Financial Resource Strain: Low Risk  (4/11/2025)    Overall Financial Resource Strain (CARDIA)     Difficulty of Paying Living Expenses: Not very hard   Food Insecurity: No Food Insecurity (4/11/2025)    Hunger Vital Sign     Worried About Running Out of Food in the Last Year: Never true     Ran Out of Food in the Last Year: Never true   Transportation Needs: No Transportation Needs (4/11/2025)    PRAPARE - Transportation     Lack of Transportation (Medical): No     Lack of Transportation (Non-Medical): No   Physical Activity: Sufficiently Active (4/11/2025)    Exercise Vital Sign     Days of Exercise per Week: 5 days     Minutes of Exercise per Session: 60 min   Stress: Stress Concern Present (4/11/2025)    [employer] [place] [employer] - Occupational Stress Questionnaire     Feeling of Stress : To some extent   Housing Stability: Low Risk  (4/11/2025)    Housing Stability Vital Sign     Unable to Pay for Housing in the Last Year: No     Homeless in the Last Year: No   [2]   Current Outpatient Medications:     albuterol (PROVENTIL/VENTOLIN HFA) 90 mcg/actuation inhaler, Inhale 2 puffs into the lungs 4 (four) times daily as needed for Wheezing., Disp: 25.5 g, Rfl: 3    amLODIPine (NORVASC) 10 MG tablet, Take 1 tablet (10 mg total) by mouth once daily., Disp: 90 tablet, Rfl: 3    cetirizine (ZYRTEC) 10 MG tablet, Take 1 tablet (10 mg total)  by mouth once daily., Disp: 30 tablet, Rfl: 0    gabapentin (NEURONTIN) 100 MG capsule, Take 1 capsule (100 mg total) by mouth 3 (three) times daily., Disp: 90 capsule, Rfl: 2    irbesartan (AVAPRO) 300 MG tablet, TAKE ONE Tablet BY MOUTH ONCE DAILY IN THE EVENING, Disp: 90 tablet, Rfl: 2    meclizine (ANTIVERT) 25 mg tablet, Take 1 tablet (25 mg total) by mouth 3 (three) times daily as needed for Dizziness., Disp: 15 tablet, Rfl: 0    meloxicam (MOBIC) 15 MG tablet, Take 1 tablet (15 mg total) by mouth once daily., Disp: 30 tablet, Rfl: 1    mometasone (NASONEX) 50 mcg/actuation nasal spray, 2 sprays by Nasal route once daily., Disp: 17 g, Rfl: 1

## 2025-05-14 DIAGNOSIS — S82.874A CLOSED NONDISPLACED PILON FRACTURE OF RIGHT TIBIA, INITIAL ENCOUNTER: Primary | ICD-10-CM

## 2025-05-15 ENCOUNTER — OFFICE VISIT (OUTPATIENT)
Dept: ORTHOPEDICS | Facility: CLINIC | Age: 60
End: 2025-05-15
Payer: MEDICARE

## 2025-05-15 ENCOUNTER — HOSPITAL ENCOUNTER (OUTPATIENT)
Dept: RADIOLOGY | Facility: HOSPITAL | Age: 60
Discharge: HOME | End: 2025-05-15
Attending: ORTHOPAEDIC SURGERY
Payer: MEDICARE

## 2025-05-15 DIAGNOSIS — S82.874A CLOSED NONDISPLACED PILON FRACTURE OF RIGHT TIBIA, INITIAL ENCOUNTER: ICD-10-CM

## 2025-05-15 DIAGNOSIS — S82.874D CLOSED NONDISPLACED PILON FRACTURE OF RIGHT TIBIA WITH ROUTINE HEALING, SUBSEQUENT ENCOUNTER: Primary | ICD-10-CM

## 2025-05-15 PROCEDURE — 73610 X-RAY EXAM OF ANKLE: CPT | Mod: TC,RT

## 2025-05-15 PROCEDURE — 99999 PR PBB SHADOW E&M-EST. PATIENT-LVL I: CPT | Mod: PBBFAC,,, | Performed by: ORTHOPAEDIC SURGERY

## 2025-05-15 PROCEDURE — 99212 OFFICE O/P EST SF 10 MIN: CPT | Mod: S$GLB,,, | Performed by: ORTHOPAEDIC SURGERY

## 2025-05-15 NOTE — PROGRESS NOTES
CC:  Right pilon fracture      HISTORY       HPI:  59-year-old male, injured right ankle when stepping off a curb sometime in early April.    Had right ankle pain, sought medical attention a couple weeks later where x-rays indicated a nondisplaced right ankle pilon fracture.    He is seen in Orthopedic Clinic 04/17/2025  At that time he had been ambulatory in a boot in the fracture remained sustained its nondisplaced nature  We continued non operative management   He is here for follow up     Reports he is doing well utilizing a boot    ROS:  Constitutional: Denies fever/chills  Neurological: Denies numbness/tingling (any exceptions noted in orthopaedic exam)   Psychiatric/Behavioral: Denies change in normal mood  Eyes: Denies change in vision  Cardiovascular: Denies chest pain  Respiratory: Denies shortness of breath  Hematologic/Lymphatic: Denies easy bleeding/bruising   Skin: Denies new rash or skin lesions   Gastrointestinal: Denies nausea/vomitting/diarrhea, change in bowel habits, abdominal pain   Allergic/Immunologic: Denies adverse reactions to current medications  Musculoskeletal: see HPI    PAST MEDICAL HISTORY:   Past Medical History:   Diagnosis Date    Arthritis     Asthma     Cervical radiculopathy, BUE 8/17/2012    Chronic LBP 8/17/2012    Chronic neck pain 8/17/2012    HTN (hypertension) 8/17/2012    Hypertension     Surgery follow-up examination 7/3/2019     PAST SURGICAL HISTORY:   Past Surgical History:   Procedure Laterality Date    COLONOSCOPY N/A 5/2/2019    Procedure: COLONOSCOPY;  Surgeon: Noemí Boss MD;  Location: [address];  Service: Endoscopy;  Laterality: N/A;    KNEE SURGERY      NECK SURGERY      TOTAL KNEE ARTHROPLASTY Right 5/14/2019    Procedure: ARTHROPLASTY, KNEE, TOTAL,Medial and Lateral compartment;  Surgeon: Maxi Chaves MD;  Location: [facility];  Service: Orthopedics;  Laterality: Right;  Regional w/o Catheter,Adductor,Exparel-Bupivacaine Liposome Injection  30cc,Clonidine/Epi/Ketorolac/Ropivacaine Injection 30cc     FAMILY HISTORY:   Family History   Problem Relation Name Age of Onset    Hypertension Mother      Stroke Mother      No Known Problems Sister      Hypertension Brother       SOCIAL HISTORY: Social History[1]  MEDICATIONS: Current Medications[2]  ALLERGIES:   Review of patient's allergies indicates:   Allergen Reactions    Theophylline      Other reaction(s): Hives         EXAM      VITAL SIGNS:   There were no vitals taken for this visit.      PE:  General:  no acute distress, appears stated age    Neuro: alert and oriented x3  Psych: normal mood  Head: normocephalic, atraumatic.   Eyes: no scleral icterus  Mouth: moist mucous membranes  Cardiovascular: extremities warm and well perfused  Lungs: breathing comfortably, equal chest rise bilat  Skin: clean, dry, intact (any exceptions noted in below musculoskeletal exam)    Musculoskeletal:  Right lower extremity  No open wounds  Mild edema mid leg to ankle  'NT  Mild tenderness to palpation about anterior and medial ankle, consistent with known fracture   Ankle range of motion 5 dorsiflexion, 15 plantar flexion   Motor intact deltoid, biceps, triceps, wrist flexion/extension, interossei, finger flexion/extension  Sensation intact to light touch axillary, radial, ulnar, median nerves  Palpable radial pulse, black brisk cap refill        XRAYS:  X-ray right ankle demonstrate nondisplaced pilon fracture, stable alignment compared to prior x-rays  (I independently reviewed and interpreted the above imaging)    MEDICAL DECISION MAKING       Encounter Diagnosis   Name Primary?    Closed nondisplaced pilon fracture of right tibia with routine healing, subsequent encounter Yes         59-year-old male, nondisplaced right ankle pilon fracture proximally 1 month ago early April  Has good alignment  Seems to be tolerating the boot getting around with the crutches      Continued non operative management  Calcium,  vitamin-D  Aspirin 81 b.i.d. for DVT prophylaxis    WBAT right lower extremity, cam boot  Can start outpt PT  ROMAT  Follow up 4 weeks  X-ray right ankle          =====================  Humberto Tom MD  Orthopaedic Surgery                     [1]   Social History  Socioeconomic History    Marital status:    Tobacco Use    Smoking status: Never    Smokeless tobacco: Never   Substance and Sexual Activity    Alcohol use: Yes     Alcohol/week: 12.0 standard drinks of alcohol     Types: 12 Cans of beer per week     Comment: occasionally    Drug use: No    Sexual activity: Yes     Social Drivers of Health     Financial Resource Strain: Low Risk  (4/11/2025)    Overall Financial Resource Strain (CARDIA)     Difficulty of Paying Living Expenses: Not very hard   Food Insecurity: No Food Insecurity (4/11/2025)    Hunger Vital Sign     Worried About Running Out of Food in the Last Year: Never true     Ran Out of Food in the Last Year: Never true   Transportation Needs: No Transportation Needs (4/11/2025)    PRAPARE - Transportation     Lack of Transportation (Medical): No     Lack of Transportation (Non-Medical): No   Physical Activity: Sufficiently Active (4/11/2025)    Exercise Vital Sign     Days of Exercise per Week: 5 days     Minutes of Exercise per Session: 60 min   Stress: Stress Concern Present (4/11/2025)    [employer] [employer] - Occupational Stress Questionnaire     Feeling of Stress : To some extent   Housing Stability: Low Risk  (4/11/2025)    Housing Stability Vital Sign     Unable to Pay for Housing in the Last Year: No     Homeless in the Last Year: No   [2]   Current Outpatient Medications:     albuterol (PROVENTIL/VENTOLIN HFA) 90 mcg/actuation inhaler, Inhale 2 puffs into the lungs 4 (four) times daily as needed for Wheezing., Disp: 25.5 g, Rfl: 3    amLODIPine (NORVASC) 10 MG tablet, Take 1 tablet (10 mg total) by mouth once daily., Disp: 90 tablet, Rfl: 3     cetirizine (ZYRTEC) 10 MG tablet, Take 1 tablet (10 mg total) by mouth once daily., Disp: 30 tablet, Rfl: 0    gabapentin (NEURONTIN) 100 MG capsule, Take 1 capsule (100 mg total) by mouth 3 (three) times daily., Disp: 90 capsule, Rfl: 2    irbesartan (AVAPRO) 300 MG tablet, TAKE ONE Tablet BY MOUTH ONCE DAILY IN THE EVENING, Disp: 90 tablet, Rfl: 2    meclizine (ANTIVERT) 25 mg tablet, Take 1 tablet (25 mg total) by mouth 3 (three) times daily as needed for Dizziness., Disp: 15 tablet, Rfl: 0    meloxicam (MOBIC) 15 MG tablet, Take 1 tablet (15 mg total) by mouth once daily., Disp: 30 tablet, Rfl: 1    mometasone (NASONEX) 50 mcg/actuation nasal spray, 2 sprays by Nasal route once daily., Disp: 17 g, Rfl: 1

## 2025-06-02 ENCOUNTER — CLINICAL SUPPORT (OUTPATIENT)
Dept: REHABILITATION | Facility: HOSPITAL | Age: 60
End: 2025-06-02
Attending: ORTHOPAEDIC SURGERY
Payer: MEDICARE

## 2025-06-02 DIAGNOSIS — M25.571 RIGHT ANKLE PAIN, UNSPECIFIED CHRONICITY: Primary | ICD-10-CM

## 2025-06-02 DIAGNOSIS — S82.874D CLOSED NONDISPLACED PILON FRACTURE OF RIGHT TIBIA WITH ROUTINE HEALING, SUBSEQUENT ENCOUNTER: ICD-10-CM

## 2025-06-02 PROCEDURE — 97162 PT EVAL MOD COMPLEX 30 MIN: CPT

## 2025-06-02 PROCEDURE — 97530 THERAPEUTIC ACTIVITIES: CPT

## 2025-06-02 PROCEDURE — 97112 NEUROMUSCULAR REEDUCATION: CPT

## 2025-06-11 ENCOUNTER — CLINICAL SUPPORT (OUTPATIENT)
Dept: REHABILITATION | Facility: HOSPITAL | Age: 60
End: 2025-06-11
Payer: MEDICARE

## 2025-06-11 DIAGNOSIS — M25.571 RIGHT ANKLE PAIN, UNSPECIFIED CHRONICITY: Primary | ICD-10-CM

## 2025-06-11 PROCEDURE — 97112 NEUROMUSCULAR REEDUCATION: CPT

## 2025-06-11 PROCEDURE — 97530 THERAPEUTIC ACTIVITIES: CPT

## 2025-06-11 NOTE — PROGRESS NOTES
"  Outpatient Rehab    Physical Therapy Visit    Patient Name: Rishi Aranda  MRN: [id number]  YOB: 1965  Encounter Date: 6/11/2025    Therapy Diagnosis:   Encounter Diagnosis   Name Primary?    Right ankle pain, unspecified chronicity Yes     Physician: Humberto Tom    Physician Orders: Eval and Treat  Medical Diagnosis: Nondisplaced pilon fracture of right tibia, subsequent encounter for closed fracture with routine healing  Surgical Diagnosis: Not applicable for this Episode   Surgical Date: Not applicable for this Episode    Visit # / Visits Authorized:  1 / 20  Insurance Authorization Period: 5/27/2025 to 5/27/2026  Date of Evaluation: 6/2/2025  Plan of Care Certification: 6/2/2025 to 8/25/2025      PT/PTA:     Number of PTA visits since last PT visit:   Time In: 0900   Time Out: 0953  Total Time (in minutes): 53   Total Billable Time (in minutes): 53    FOTO:  Intake Score:  %  Survey Score 2:  %  Survey Score 3:  %    Precautions:       Subjective   Pt reports he has mild R ankle pain and stiffness currently. He denies any changes in functional status currently..  Pain reported as 2/10.      Objective            Treatment:  Balance/Neuromuscular Re-Education  NMR 1: Seated heel raises with 3 second hold, 3 x 10  NMR 2: Seated toe raises with 3 second hold, 3 x 10  NMR 3: Toe yoga, 3 minutes of reps  NMR 4: Ankle alphabet, 2 sets A- Z  NMR 5: Ankle inversion/eversion on towel, 3 x 10  NMR 6: Standing heel raises, 3 x 10  NMR 7: Standing toe raises, 3 x 10  NMR 9: Slant board gastroc stretch to improve DF during gait, 3 x 30"  NMR 10: Seated gastroc stretch with strap to improve DF during gait, 3 x 45"  Therapeutic Activity  TA 1: Education on anatomy, exercise rationale, motor control, edema management, bone healing timeline  TA 2: Nustep for endogenous release of endorphins, 10 minutes    Time Entry(in minutes):  Neuromuscular Re-Education Time Entry: 38  Therapeutic Activity Time " Entry: 15    Assessment & Plan   Assessment: Pt tolerated session well. He had appropriate muscle response to all interventions. Pt is being progressed as tolerated.       The patient will continue to benefit from skilled outpatient physical therapy in order to address the deficits listed in the problem list on the initial evaluation, provide patient and family education, and maximize the patients level of independence in the home and community environments.     The patient's spiritual, cultural, and educational needs were considered, and the patient is agreeable to the plan of care and goals.           Plan: Continue with established POC.    Goals:   Active       Ambulation/movement       Patient will walk community distances without R ankle pain in order to demonstrate improved functional ability.  (Progressing)       Start:  06/02/25    Expected End:  08/25/25            Patient will navigate 1 flight of stairs in order to demonstrate improved functional ability.  (Progressing)       Start:  06/02/25    Expected End:  08/25/25               Functional outcome       Patient will demonstrate independence in home program for support of progression (Progressing)       Start:  06/02/25    Expected End:  06/30/25               Home activities       Patient will perform household indoor maintenance without R ankle pain in order to demonstrate improved functional ability.  (Progressing)       Start:  06/02/25    Expected End:  08/25/25               Range of Motion       Patient will achieve right ankle dorsiflexion ROM 10 degrees in order to improve gait quality.  (Progressing)       Start:  06/02/25    Expected End:  07/28/25            Patient will achieve right ankle plantar flexion ROM 60 degrees (Progressing)       Start:  06/02/25    Expected End:  07/28/25               Strength       Patient will achieve right ankle plantar flexion strength of >/= 4+/5 in order to normalize gait function.  (Progressing)        Start:  06/02/25    Expected End:  08/25/25                Douglas Hobbs PT

## 2025-06-12 DIAGNOSIS — S82.874D CLOSED NONDISPLACED PILON FRACTURE OF RIGHT TIBIA WITH ROUTINE HEALING, SUBSEQUENT ENCOUNTER: Primary | ICD-10-CM

## 2025-06-16 ENCOUNTER — CLINICAL SUPPORT (OUTPATIENT)
Dept: REHABILITATION | Facility: HOSPITAL | Age: 60
End: 2025-06-16
Payer: MEDICARE

## 2025-06-16 DIAGNOSIS — M25.571 RIGHT ANKLE PAIN, UNSPECIFIED CHRONICITY: Primary | ICD-10-CM

## 2025-06-16 PROCEDURE — 97530 THERAPEUTIC ACTIVITIES: CPT

## 2025-06-16 PROCEDURE — 97112 NEUROMUSCULAR REEDUCATION: CPT

## 2025-06-16 NOTE — PROGRESS NOTES
"  Outpatient Rehab    Physical Therapy Visit    Patient Name: Rishi Aranda  MRN: [id number]  YOB: 1965  Encounter Date: 6/16/2025    Therapy Diagnosis:   Encounter Diagnosis   Name Primary?    Right ankle pain, unspecified chronicity Yes     Physician: Humberto Tom    Physician Orders: Eval and Treat  Medical Diagnosis: Nondisplaced pilon fracture of right tibia, subsequent encounter for closed fracture with routine healing  Surgical Diagnosis: Not applicable for this Episode   Surgical Date: Not applicable for this Episode  Days Since Last Surgery: Not applicable for this Episode    Visit # / Visits Authorized:  2 / 19  Insurance Authorization Period: 6/3/2025 to 12/31/2025  Date of Evaluation: 6/2/2025  Plan of Care Certification: 6/2/2025 to 8/25/2025      PT/PTA:     Number of PTA visits since last PT visit:   Time In: 0800   Time Out: 0855  Total Time (in minutes): 55   Total Billable Time (in minutes): 30    FOTO:  Intake Score:  %  Survey Score 2:  %  Survey Score 3:  %    Precautions:       Subjective   Pt reports he has minor R ankle pain currently. He says he felt fine following last visit. Pt states he feels he is improving..  Pain reported as 1/10.      Objective            Treatment:  Balance/Neuromuscular Re-Education  NMR 1: Seated heel raises with 3 second hold, 3 x 10  NMR 2: Seated toe raises with 3 second hold, 3 x 10  NMR 4: Ankle alphabet, 2 sets A- Z  NMR 6: Standing heel raises, 3 x 10  NMR 7: Standing toe raises, 3 x 10  NMR 9: Slant board gastroc stretch to improve DF during gait, 3 x 30"  NMR 10: Seated gastroc stretch with strap to improve DF during gait, 3 x 45"  Therapeutic Activity  TA 1: Education on anatomy, exercise rationale, motor control, edema management, bone healing timeline  TA 2: Nustep for endogenous release of endorphins, 10 minutes  TA 3: 6 inch step ups, 3 x 10  TA 4: SL Shuttle press, 32 lbs, 3 x 10    Time Entry(in minutes):  Neuromuscular " Re-Education Time Entry: 30  Therapeutic Activity Time Entry: 25    Assessment & Plan   Assessment: Pt tolerated session well. SL shuttle press and step ups initiated this visit. He had appropriate muscle response to all interventions. Pt is being progressed as tolerated.       The patient will continue to benefit from skilled outpatient physical therapy in order to address the deficits listed in the problem list on the initial evaluation, provide patient and family education, and maximize the patients level of independence in the home and community environments.     The patient's spiritual, cultural, and educational needs were considered, and the patient is agreeable to the plan of care and goals.           Plan: Continue with established POC.    Goals:   Active       Ambulation/movement       Patient will walk community distances without R ankle pain in order to demonstrate improved functional ability.  (Progressing)       Start:  06/02/25    Expected End:  08/25/25            Patient will navigate 1 flight of stairs in order to demonstrate improved functional ability.  (Progressing)       Start:  06/02/25    Expected End:  08/25/25               Functional outcome       Patient will demonstrate independence in home program for support of progression (Progressing)       Start:  06/02/25    Expected End:  06/30/25               Home activities       Patient will perform household indoor maintenance without R ankle pain in order to demonstrate improved functional ability.  (Progressing)       Start:  06/02/25    Expected End:  08/25/25               Range of Motion       Patient will achieve right ankle dorsiflexion ROM 10 degrees in order to improve gait quality.  (Progressing)       Start:  06/02/25    Expected End:  07/28/25            Patient will achieve right ankle plantar flexion ROM 60 degrees (Progressing)       Start:  06/02/25    Expected End:  07/28/25               Strength       Patient will achieve  right ankle plantar flexion strength of >/= 4+/5 in order to normalize gait function.  (Progressing)       Start:  06/02/25    Expected End:  08/25/25                Douglas Hobbs PT, DPT

## 2025-06-17 ENCOUNTER — OFFICE VISIT (OUTPATIENT)
Dept: ORTHOPEDICS | Facility: CLINIC | Age: 60
End: 2025-06-17
Payer: MEDICARE

## 2025-06-17 ENCOUNTER — TELEPHONE (OUTPATIENT)
Dept: SPORTS MEDICINE | Facility: CLINIC | Age: 60
End: 2025-06-17
Payer: MEDICARE

## 2025-06-17 ENCOUNTER — HOSPITAL ENCOUNTER (OUTPATIENT)
Dept: RADIOLOGY | Facility: HOSPITAL | Age: 60
Discharge: HOME | End: 2025-06-17
Attending: ORTHOPAEDIC SURGERY
Payer: MEDICARE

## 2025-06-17 DIAGNOSIS — S82.874D CLOSED NONDISPLACED PILON FRACTURE OF RIGHT TIBIA WITH ROUTINE HEALING, SUBSEQUENT ENCOUNTER: ICD-10-CM

## 2025-06-17 DIAGNOSIS — M25.512 CHRONIC LEFT SHOULDER PAIN: ICD-10-CM

## 2025-06-17 DIAGNOSIS — S82.874A CLOSED NONDISPLACED PILON FRACTURE OF RIGHT TIBIA, INITIAL ENCOUNTER: Primary | ICD-10-CM

## 2025-06-17 DIAGNOSIS — G89.29 CHRONIC LEFT SHOULDER PAIN: ICD-10-CM

## 2025-06-17 DIAGNOSIS — S82.874D CLOSED NONDISPLACED PILON FRACTURE OF RIGHT TIBIA WITH ROUTINE HEALING, SUBSEQUENT ENCOUNTER: Primary | ICD-10-CM

## 2025-06-17 PROCEDURE — 4010F ACE/ARB THERAPY RXD/TAKEN: CPT | Mod: CPTII,S$GLB,, | Performed by: ORTHOPAEDIC SURGERY

## 2025-06-17 PROCEDURE — 99999 PR PBB SHADOW E&M-EST. PATIENT-LVL I: CPT | Mod: PBBFAC,,, | Performed by: ORTHOPAEDIC SURGERY

## 2025-06-17 PROCEDURE — 73610 X-RAY EXAM OF ANKLE: CPT | Mod: 26,RT,, | Performed by: RADIOLOGY

## 2025-06-17 PROCEDURE — 73610 X-RAY EXAM OF ANKLE: CPT | Mod: TC,RT

## 2025-06-17 PROCEDURE — 99213 OFFICE O/P EST LOW 20 MIN: CPT | Mod: S$GLB,,, | Performed by: ORTHOPAEDIC SURGERY

## 2025-06-17 NOTE — TELEPHONE ENCOUNTER
----- Message from Med Assistant Suárez sent at 6/17/2025 10:10 AM CDT -----  Regarding: Appointment Needed in July  Good morning,  Dr. Tom requested that this patient be seen by Dr. Field for a left shoulder injection in July 2025.  Please let us know if we can assist with scheduling or provide any additional information.

## 2025-06-17 NOTE — TELEPHONE ENCOUNTER
Called and LVM for the pt to give the clinic a call back in order to schedule an appt with Dr. Field for a left shoulder injection due to a referral from Dr. Duran.

## 2025-06-17 NOTE — PROGRESS NOTES
CC:  Right pilon fracture      HISTORY       HPI:  59-year-old male, injured right ankle when stepping off a curb sometime in early April.    Had right ankle pain, sought medical attention a couple weeks later where x-rays indicated a nondisplaced right ankle pilon fracture.    He was seen in [facility] 04/17/2025  At that time he had been ambulatory in a boot in the fracture remained sustained its nondisplaced nature  We continued non operative management   He is here for follow up     Reports he is doing well   Walking unassisted  No boot  Has been doing PT    Still bothered by L shoulder pain not related to recent injury.  Has relief w/ injections in the past, would like another one  Prior left shoulder MRI indicated some supraspinatus tearing, labral tearing, some tendinosis    ROS:  Constitutional: Denies fever/chills  Neurological: Denies numbness/tingling (any exceptions noted in orthopaedic exam)   Psychiatric/Behavioral: Denies change in normal mood  Eyes: Denies change in vision  Cardiovascular: Denies chest pain  Respiratory: Denies shortness of breath  Hematologic/Lymphatic: Denies easy bleeding/bruising   Skin: Denies new rash or skin lesions   Gastrointestinal: Denies nausea/vomitting/diarrhea, change in bowel habits, abdominal pain   Allergic/Immunologic: Denies adverse reactions to current medications  Musculoskeletal: see HPI    PAST MEDICAL HISTORY:   Past Medical History:   Diagnosis Date    Arthritis     Asthma     Cervical radiculopathy, BUE 8/17/2012    Chronic LBP 8/17/2012    Chronic neck pain 8/17/2012    HTN (hypertension) 8/17/2012    Hypertension     Surgery follow-up examination 7/3/2019     PAST SURGICAL HISTORY:   Past Surgical History:   Procedure Laterality Date    COLONOSCOPY N/A 5/2/2019    Procedure: COLONOSCOPY;  Surgeon: Noemí Boss MD;  Location: [address]);  Service: Endoscopy;  Laterality: N/A;    KNEE SURGERY      NECK SURGERY      TOTAL KNEE  ARTHROPLASTY Right 5/14/2019    Procedure: ARTHROPLASTY, KNEE, TOTAL,Medial and Lateral compartment;  Surgeon: Maxi Chaves MD;  Location: [facility];  Service: Orthopedics;  Laterality: Right;  Regional w/o Catheter,Adductor,Exparel-Bupivacaine Liposome Injection 30cc,Clonidine/Epi/Ketorolac/Ropivacaine Injection 30cc     FAMILY HISTORY:   Family History   Problem Relation Name Age of Onset    Hypertension Mother      Stroke Mother      No Known Problems Sister      Hypertension Brother       SOCIAL HISTORY: Social History[1]  MEDICATIONS: Current Medications[2]  ALLERGIES:   Review of patient's allergies indicates:   Allergen Reactions    Theophylline      Other reaction(s): Hives         EXAM      VITAL SIGNS:   There were no vitals taken for this visit.      PE:  General:  no acute distress, appears stated age    Neuro: alert and oriented x3  Psych: normal mood  Head: normocephalic, atraumatic.   Eyes: no scleral icterus  Mouth: moist mucous membranes  Cardiovascular: extremities warm and well perfused  Lungs: breathing comfortably, equal chest rise bilat  Skin: clean, dry, intact (any exceptions noted in below musculoskeletal exam)    Musculoskeletal:  Right lower extremity  No open wounds  Mild edema mid leg to ankle  'NT  Nontender at distal tibia at known fracture   Ankle range of motion 5 dorsiflexion, 15 plantar flexion   Motor intact deltoid, biceps, triceps, wrist flexion/extension, interossei, finger flexion/extension  Sensation intact to light touch axillary, radial, ulnar, median nerves  Palpable radial pulse, black brisk cap refill    Left upper extremity  Mild pain radiating to lateral deltoid  Range of motion shoulder 160 forward flexion active and passive similar to contralateral side   Positive impingement testing, and supraspinatus testing   Motor  5/5 deltoid, ER/IR, biceps, triceps, wrist flexion/extension, interossei, finger flexion/extension  Sensation intact to light touch axillary, radial,  ulnar, median nerves  Palpable radial pulse, black brisk cap refill        XRAYS:  X-ray right ankle demonstrate nondisplaced pilon fracture, stable alignment compared to prior x-rays, some interval callus formation  (I independently reviewed and interpreted the above imaging)    MEDICAL DECISION MAKING       Encounter Diagnoses   Name Primary?    Closed nondisplaced pilon fracture of right tibia with routine healing, subsequent encounter Yes    Chronic left shoulder pain            59-year-old male, nondisplaced right ankle pilon fracture proximally 1 month ago early April  Has good alignment  Fracture seems to be healing well, he is walking well without any immobilization, doing PT   Counseled him on avoiding high impact activities falling, slipping as he would be at increased risk of repeat injury, displacement of the fracture.    In regards to his left shoulder he is not interested in a surgical discussion at this point, he would like an injection in his he has had prior left shoulder injections with good relief.  We will set him up to see Daisy rubio in the sports medicine clinic      Continued non operative management  Calcium, vitamin-D  Aspirin 81 b.i.d. for DVT prophylaxis    WBAT right lower extremity  Cont outpt PT  ROMAT  Follow up 8 weeks  X-ray right ankle, weightbearing          =====================  Humberto Tom MD  Orthopaedic Surgery                       [1]   Social History  Socioeconomic History    Marital status:    Tobacco Use    Smoking status: Never    Smokeless tobacco: Never   Substance and Sexual Activity    Alcohol use: Yes     Alcohol/week: 12.0 standard drinks of alcohol     Types: 12 Cans of beer per week     Comment: occasionally    Drug use: No    Sexual activity: Yes     Social Drivers of Health     Financial Resource Strain: Low Risk  (4/11/2025)    Overall Financial Resource Strain (CARDIA)     Difficulty of Paying Living Expenses: Not very hard   Food  Insecurity: No Food Insecurity (4/11/2025)    Hunger Vital Sign     Worried About Running Out of Food in the Last Year: Never true     Ran Out of Food in the Last Year: Never true   Transportation Needs: No Transportation Needs (4/11/2025)    PRAPARE - Transportation     Lack of Transportation (Medical): No     Lack of Transportation (Non-Medical): No   Physical Activity: Sufficiently Active (4/11/2025)    Exercise Vital Sign     Days of Exercise per Week: 5 days     Minutes of Exercise per Session: 60 min   Stress: Stress Concern Present (4/11/2025)    [employer] - Occupational Stress Questionnaire     Feeling of Stress : To some extent   Housing Stability: Low Risk  (4/11/2025)    Housing Stability Vital Sign     Unable to Pay for Housing in the Last Year: No     Homeless in the Last Year: No   [2]   Current Outpatient Medications:     albuterol (PROVENTIL/VENTOLIN HFA) 90 mcg/actuation inhaler, Inhale 2 puffs into the lungs 4 (four) times daily as needed for Wheezing., Disp: 25.5 g, Rfl: 3    amLODIPine (NORVASC) 10 MG tablet, Take 1 tablet (10 mg total) by mouth once daily., Disp: 90 tablet, Rfl: 3    cetirizine (ZYRTEC) 10 MG tablet, Take 1 tablet (10 mg total) by mouth once daily., Disp: 30 tablet, Rfl: 0    gabapentin (NEURONTIN) 100 MG capsule, Take 1 capsule (100 mg total) by mouth 3 (three) times daily., Disp: 90 capsule, Rfl: 2    irbesartan (AVAPRO) 300 MG tablet, TAKE ONE Tablet BY MOUTH ONCE DAILY IN THE EVENING, Disp: 90 tablet, Rfl: 2    meclizine (ANTIVERT) 25 mg tablet, Take 1 tablet (25 mg total) by mouth 3 (three) times daily as needed for Dizziness., Disp: 15 tablet, Rfl: 0    meloxicam (MOBIC) 15 MG tablet, Take 1 tablet (15 mg total) by mouth once daily., Disp: 30 tablet, Rfl: 1    mometasone (NASONEX) 50 mcg/actuation nasal spray, 2 sprays by Nasal route once daily., Disp: 17 g, Rfl: 1

## 2025-06-18 ENCOUNTER — CLINICAL SUPPORT (OUTPATIENT)
Dept: REHABILITATION | Facility: HOSPITAL | Age: 60
End: 2025-06-18
Payer: MEDICARE

## 2025-06-18 DIAGNOSIS — M25.571 RIGHT ANKLE PAIN, UNSPECIFIED CHRONICITY: Primary | ICD-10-CM

## 2025-06-18 PROCEDURE — 97530 THERAPEUTIC ACTIVITIES: CPT | Mod: CQ

## 2025-06-18 NOTE — PROGRESS NOTES
"    Outpatient Rehab    Physical Therapy Visit    Patient Name: Rishi Aranda  MRN: [id number]  YOB: 1965  Encounter Date: 6/18/2025    Therapy Diagnosis:   Encounter Diagnosis   Name Primary?    Right ankle pain, unspecified chronicity Yes     Physician: Humberto Tom    Physician Orders: Eval and Treat  Medical Diagnosis: Nondisplaced pilon fracture of right tibia, subsequent encounter for closed fracture with routine healing  Surgical Diagnosis: Not applicable for this Episode   Surgical Date: Not applicable for this Episode  Days Since Last Surgery: Not applicable for this Episode    Visit # / Visits Authorized:  3 / 19  Insurance Authorization Period: 6/3/2025 to 12/31/2025  Date of Evaluation: 6/2/2025  Plan of Care Certification: 6/2/2025 to 8/25/2025      PT/PTA: PTA   Number of PTA visits since last PT visit:1  Time In: 0800   Time Out: 0855  Total Time (in minutes): 55   Total Billable Time (in minutes): 25    FOTO:  Intake Score: 46%  Survey Score 2:  %  Survey Score 3:  %    Precautions: none    Subjective   Patient reports feeling a bit achy today.  Pain reported as 0/10. Right ankle    Objective  Objective Measures updated at progress report unless specified.     Treatment:  Balance/Neuromuscular Re-Education  NMR 1: Seated heel raises with 3 second hold, 3 x 10 reps with 6 lb AW at thigh  NMR 2: Seated toe raises with 3 second hold, 3 x 10 reps with 6 lb AW at thigh  NMR 4: Ankle alphabet, 2 sets A- Z  NMR 6: Standing heel raises, 3 x 10 reps  NMR 7: Standing toe raises, 3 x 10 reps  NMR 9: Slant board gastroc stretch to improve DF during gait, 3 x 30"  Therapeutic Activity  TA 1: Education on anatomy, exercise rationale, motor control, edema management, bone healing timeline  TA 2: Nustep for endogenous release of endorphins, 10 minutes, Level 2  TA 3: 6-inch step ups, 3 x 10 reps  TA 4: SL Shuttle press, 37.5 lbs, 3 x 10 reps    Time Entry(in minutes):  Neuromuscular " Re-Education Time Entry: 25  Therapeutic Activity Time Entry: 30    Assessment & Plan   Assessment: Good tolerance overall noting adequate muscle response throughout. He notes appropriate muscle throughout.  Evaluation/Treatment Tolerance: Patient tolerated treatment well    The patient will continue to benefit from skilled outpatient physical therapy in order to address the deficits listed in the problem list on the initial evaluation, provide patient and family education, and maximize the patients level of independence in the home and community environments.     The patient's spiritual, cultural, and educational needs were considered, and the patient is agreeable to the plan of care and goals.           Plan: Will continue per POC towards treatment goals. PT/PTA met face to face to discuss patient's treatment plan and progress towards established goals. Patient will be seen by physical therapist every sixth visit and minimally once per month.    Goals:   Active       Ambulation/movement       Patient will walk community distances without R ankle pain in order to demonstrate improved functional ability.  (Progressing)       Start:  06/02/25    Expected End:  08/25/25            Patient will navigate 1 flight of stairs in order to demonstrate improved functional ability.  (Progressing)       Start:  06/02/25    Expected End:  08/25/25               Functional outcome       Patient will demonstrate independence in home program for support of progression (Progressing)       Start:  06/02/25    Expected End:  06/30/25               Home activities       Patient will perform household indoor maintenance without R ankle pain in order to demonstrate improved functional ability.  (Progressing)       Start:  06/02/25    Expected End:  08/25/25               Range of Motion       Patient will achieve right ankle dorsiflexion ROM 10 degrees in order to improve gait quality.  (Progressing)       Start:  06/02/25    Expected  End:  07/28/25            Patient will achieve right ankle plantar flexion ROM 60 degrees (Progressing)       Start:  06/02/25    Expected End:  07/28/25               Strength       Patient will achieve right ankle plantar flexion strength of >/= 4+/5 in order to normalize gait function.  (Progressing)       Start:  06/02/25    Expected End:  08/25/25                Angeles Leal, PTA

## 2025-06-24 ENCOUNTER — TELEPHONE (OUTPATIENT)
Dept: SPORTS MEDICINE | Facility: CLINIC | Age: 60
End: 2025-06-24
Payer: MEDICARE

## 2025-06-24 ENCOUNTER — DOCUMENTATION ONLY (OUTPATIENT)
Dept: REHABILITATION | Facility: HOSPITAL | Age: 60
End: 2025-06-24
Payer: MEDICARE

## 2025-06-24 NOTE — TELEPHONE ENCOUNTER
Spoke with pt regarding scheduling a left shoulder injection with Dr. Field after being referred by Dr. Duran. Scheduled pt for 7/15 at the [facility] location and confirmed address with pt. Pt requested to be put on wait list. Pt agreed with the scheduled appt and verbalized understanding.    Copied from CRM #[id number]. Topic: Appointments - Appointment Scheduling  >> Jun 24, 2025 10:22 AM Ashutosh wrote:  Pt is calling in ref toa missed call from Josue to  schedule left shoulder  injection with provider from referral. Patient Requesting Call Back @ [phone]

## 2025-06-24 NOTE — PROGRESS NOTES
Physical Therapy: No show/Cancellation of Visit  Date: 06/24/2025    Patient was a cancel to today's PT appointment. Reason for cancellation: other via [facility]. Patient's next scheduled appointment is Thursday, 6/26/2025 at 9AM.    Cancel: 2 (non-consecutive)  No show: 0     Therapist: Angeles Leal PTA

## 2025-06-26 ENCOUNTER — CLINICAL SUPPORT (OUTPATIENT)
Dept: REHABILITATION | Facility: HOSPITAL | Age: 60
End: 2025-06-26
Payer: MEDICARE

## 2025-06-26 DIAGNOSIS — M25.571 RIGHT ANKLE PAIN, UNSPECIFIED CHRONICITY: Primary | ICD-10-CM

## 2025-06-26 PROCEDURE — 97112 NEUROMUSCULAR REEDUCATION: CPT

## 2025-06-26 PROCEDURE — 97530 THERAPEUTIC ACTIVITIES: CPT

## 2025-06-26 NOTE — PROGRESS NOTES
"    Outpatient Rehab    Physical Therapy Visit    Patient Name: Rishi Aranda  MRN: [id number]  YOB: 1965  Encounter Date: 6/26/2025    Therapy Diagnosis:   Encounter Diagnosis   Name Primary?    Right ankle pain, unspecified chronicity Yes     Physician: Humberto Tom    Physician Orders: Eval and Treat  Medical Diagnosis: Nondisplaced pilon fracture of right tibia, subsequent encounter for closed fracture with routine healing  Surgical Diagnosis: Not applicable for this Episode   Surgical Date: Not applicable for this Episode  Days Since Last Surgery: Not applicable for this Episode    Visit # / Visits Authorized:  4 / 19  Insurance Authorization Period: 6/3/2025 to 12/31/2025  Date of Evaluation: 6/2/2025  Plan of Care Certification: 6/2/2025 to 8/25/2025      PT/PTA:     Number of PTA visits since last PT visit:   Time In: 0900   Time Out: 1000  Total Time (in minutes): 60   Total Billable Time (in minutes): 30    FOTO:  Intake Score:  %  Survey Score 2:  %  Survey Score 3:  %    Precautions:       Subjective   Pt reports he is feeling fine and denies pain currently..  Pain reported as 0/10.      Objective            Treatment:  Balance/Neuromuscular Re-Education  NMR 6: Standing heel raises, 3 x 10 reps  NMR 7: Standing toe raises, 3 x 10 reps  NMR 9: Slant board gastroc stretch to improve DF during gait, 3 x 30"  NMR 10: Standing fitter board, CW, CCW, DF/PF, and  IN/EV - 30 reps each direction  Therapeutic Activity  TA 1: Education on anatomy, exercise rationale, motor control, edema management, bone healing timeline  TA 2: Nustep for endogenous release of endorphins, 10 minutes, Level 2  TA 3: 6-inch step ups, 3 x 10 reps  TA 4: SL Shuttle press, 37 lbs, 3 x 10 reps - DL heel raises with 37 lbs, 3 x 10 reps    Time Entry(in minutes):  Neuromuscular Re-Education Time Entry: 30  Therapeutic Activity Time Entry: 30    Assessment & Plan   Assessment: Pt tolerated treatment well. He still " has occasional antalgic gait pattern which he is able to correct with verbal cueing. Pt continues to have decreased ankle ROM which is improving well. Pt is progressing towards established goals.        The patient will continue to benefit from skilled outpatient physical therapy in order to address the deficits listed in the problem list on the initial evaluation, provide patient and family education, and maximize the patients level of independence in the home and community environments.     The patient's spiritual, cultural, and educational needs were considered, and the patient is agreeable to the plan of care and goals.           Plan: Continue with established POC. Consider incoporating more balance training.    Goals:   Active       Ambulation/movement       Patient will walk community distances without R ankle pain in order to demonstrate improved functional ability.  (Progressing)       Start:  06/02/25    Expected End:  08/25/25            Patient will navigate 1 flight of stairs in order to demonstrate improved functional ability.  (Progressing)       Start:  06/02/25    Expected End:  08/25/25               Functional outcome       Patient will demonstrate independence in home program for support of progression (Progressing)       Start:  06/02/25    Expected End:  06/30/25               Home activities       Patient will perform household indoor maintenance without R ankle pain in order to demonstrate improved functional ability.  (Progressing)       Start:  06/02/25    Expected End:  08/25/25               Range of Motion       Patient will achieve right ankle dorsiflexion ROM 10 degrees in order to improve gait quality.  (Progressing)       Start:  06/02/25    Expected End:  07/28/25            Patient will achieve right ankle plantar flexion ROM 60 degrees (Progressing)       Start:  06/02/25    Expected End:  07/28/25               Strength       Patient will achieve right ankle plantar flexion  strength of >/= 4+/5 in order to normalize gait function.  (Progressing)       Start:  06/02/25    Expected End:  08/25/25                Douglas Hobbs PT, DPT

## 2025-07-01 ENCOUNTER — DOCUMENTATION ONLY (OUTPATIENT)
Dept: REHABILITATION | Facility: HOSPITAL | Age: 60
End: 2025-07-01
Payer: MEDICARE

## 2025-07-01 NOTE — PROGRESS NOTES
Physical Therapy: No show/Cancellation of Visit  Date: 07/01/2025    Patient was a cancel to today's PT appointment. Reason for cancellation: reschedule via phone call. Patient's next scheduled appointment is Wednesday, 7/9/2025 at 8AM.    Cancel: 3 (non-consecutive)  No show: 0     Therapist: Angeles Leal PTA

## 2025-07-02 DIAGNOSIS — I10 PRIMARY HYPERTENSION: ICD-10-CM

## 2025-07-09 ENCOUNTER — CLINICAL SUPPORT (OUTPATIENT)
Dept: REHABILITATION | Facility: HOSPITAL | Age: 60
End: 2025-07-09
Payer: MEDICARE

## 2025-07-09 DIAGNOSIS — M25.571 RIGHT ANKLE PAIN, UNSPECIFIED CHRONICITY: Primary | ICD-10-CM

## 2025-07-09 PROCEDURE — 97530 THERAPEUTIC ACTIVITIES: CPT

## 2025-07-09 PROCEDURE — 97112 NEUROMUSCULAR REEDUCATION: CPT

## 2025-07-10 NOTE — PROGRESS NOTES
"  Outpatient Rehab    Physical Therapy Visit    Patient Name: Rishi Aranda  MRN: [id number]  YOB: 1965  Encounter Date: 7/9/2025    Therapy Diagnosis:   Encounter Diagnosis   Name Primary?    Right ankle pain, unspecified chronicity Yes     Physician: Humberto Tom    Physician Orders: Eval and Treat  Medical Diagnosis: Nondisplaced pilon fracture of right tibia, subsequent encounter for closed fracture with routine healing  Surgical Diagnosis: Not applicable for this Episode   Surgical Date: Not applicable for this Episode  Days Since Last Surgery: Not applicable for this Episode    Visit # / Visits Authorized:  5 / 19  Insurance Authorization Period: 6/3/2025 to 12/31/2025  Date of Evaluation: 6/2/2025  Plan of Care Certification: 6/2/2025 to 8/25/2025      PT/PTA:     Number of PTA visits since last PT visit:   Time In: 0800   Time Out: 0900  Total Time (in minutes): 60   Total Billable Time (in minutes): 30    FOTO:  Intake Score:  %  Survey Score 2:  %  Survey Score 3:  %    Precautions:       Subjective   Pt reports his R ankle is improving. He says that he has occasional "tweaks" when he pivots but feels fine otherwise..  Pain reported as 0/10.      Objective            Treatment:  Balance/Neuromuscular Re-Education  NMR 5: Tandem stance, 3 x 30"  NMR 6: Standing heel raises, 3 x 10 reps  NMR 7: Standing toe raises with half foam, 3 x 10 reps  NMR 8: SLS, 3 x 20"  NMR 9: Slant board gastroc stretch to improve DF during gait, 3 x 30"  Therapeutic Activity  TA 1: Education on anatomy, exercise rationale, motor control, edema management, bone healing timeline  TA 2: Nustep for endogenous release of endorphins, 10 minutes, Level 2  TA 3: 6-inch step ups, 3 x 10 reps  TA 4: SL Shuttle press, 37 lbs, 3 x 10 reps - DL heel raises with 37 lbs, 3 x 10 reps    Time Entry(in minutes):  Neuromuscular Re-Education Time Entry: 30  Therapeutic Activity Time Entry: 30    Assessment & Plan "   Assessment: Pt tolerated treatment well. More balance training initiated this visit to improve ankle stabilization. He is progressing well towards established goals.        The patient will continue to benefit from skilled outpatient physical therapy in order to address the deficits listed in the problem list on the initial evaluation, provide patient and family education, and maximize the patients level of independence in the home and community environments.     The patient's spiritual, cultural, and educational needs were considered, and the patient is agreeable to the plan of care and goals.           Plan: Continue with established POC.    Goals:   Active       Ambulation/movement       Patient will walk community distances without R ankle pain in order to demonstrate improved functional ability.  (Progressing)       Start:  06/02/25    Expected End:  08/25/25            Patient will navigate 1 flight of stairs in order to demonstrate improved functional ability.  (Progressing)       Start:  06/02/25    Expected End:  08/25/25               Functional outcome       Patient will demonstrate independence in home program for support of progression (Progressing)       Start:  06/02/25    Expected End:  06/30/25               Home activities       Patient will perform household indoor maintenance without R ankle pain in order to demonstrate improved functional ability.  (Progressing)       Start:  06/02/25    Expected End:  08/25/25               Range of Motion       Patient will achieve right ankle dorsiflexion ROM 10 degrees in order to improve gait quality.  (Progressing)       Start:  06/02/25    Expected End:  07/28/25            Patient will achieve right ankle plantar flexion ROM 60 degrees (Progressing)       Start:  06/02/25    Expected End:  07/28/25               Strength       Patient will achieve right ankle plantar flexion strength of >/= 4+/5 in order to normalize gait function.  (Progressing)        Start:  06/02/25    Expected End:  08/25/25                Douglas Hobbs PT, DPT

## 2025-07-15 ENCOUNTER — OFFICE VISIT (OUTPATIENT)
Dept: SPORTS MEDICINE | Facility: CLINIC | Age: 60
End: 2025-07-15
Payer: MEDICARE

## 2025-07-15 VITALS
HEIGHT: 69 IN | BODY MASS INDEX: 27.11 KG/M2 | DIASTOLIC BLOOD PRESSURE: 80 MMHG | SYSTOLIC BLOOD PRESSURE: 134 MMHG | WEIGHT: 183 LBS | HEART RATE: 57 BPM

## 2025-07-15 DIAGNOSIS — G89.29 CHRONIC LEFT SHOULDER PAIN: Primary | ICD-10-CM

## 2025-07-15 DIAGNOSIS — M25.512 CHRONIC LEFT SHOULDER PAIN: Primary | ICD-10-CM

## 2025-07-15 DIAGNOSIS — M75.42 SUBACROMIAL IMPINGEMENT, LEFT: ICD-10-CM

## 2025-07-15 PROCEDURE — 1159F MED LIST DOCD IN RCRD: CPT | Mod: CPTII,S$GLB,, | Performed by: STUDENT IN AN ORGANIZED HEALTH CARE EDUCATION/TRAINING PROGRAM

## 2025-07-15 PROCEDURE — 99204 OFFICE O/P NEW MOD 45 MIN: CPT | Mod: 25,S$GLB,, | Performed by: STUDENT IN AN ORGANIZED HEALTH CARE EDUCATION/TRAINING PROGRAM

## 2025-07-15 PROCEDURE — 3008F BODY MASS INDEX DOCD: CPT | Mod: CPTII,S$GLB,, | Performed by: STUDENT IN AN ORGANIZED HEALTH CARE EDUCATION/TRAINING PROGRAM

## 2025-07-15 PROCEDURE — 3075F SYST BP GE 130 - 139MM HG: CPT | Mod: CPTII,S$GLB,, | Performed by: STUDENT IN AN ORGANIZED HEALTH CARE EDUCATION/TRAINING PROGRAM

## 2025-07-15 PROCEDURE — 99999 PR PBB SHADOW E&M-EST. PATIENT-LVL III: CPT | Mod: PBBFAC,,, | Performed by: STUDENT IN AN ORGANIZED HEALTH CARE EDUCATION/TRAINING PROGRAM

## 2025-07-15 PROCEDURE — 20611 DRAIN/INJ JOINT/BURSA W/US: CPT | Mod: LT,S$GLB,, | Performed by: STUDENT IN AN ORGANIZED HEALTH CARE EDUCATION/TRAINING PROGRAM

## 2025-07-15 PROCEDURE — 4010F ACE/ARB THERAPY RXD/TAKEN: CPT | Mod: CPTII,S$GLB,, | Performed by: STUDENT IN AN ORGANIZED HEALTH CARE EDUCATION/TRAINING PROGRAM

## 2025-07-15 PROCEDURE — 1125F AMNT PAIN NOTED PAIN PRSNT: CPT | Mod: CPTII,S$GLB,, | Performed by: STUDENT IN AN ORGANIZED HEALTH CARE EDUCATION/TRAINING PROGRAM

## 2025-07-15 PROCEDURE — 3079F DIAST BP 80-89 MM HG: CPT | Mod: CPTII,S$GLB,, | Performed by: STUDENT IN AN ORGANIZED HEALTH CARE EDUCATION/TRAINING PROGRAM

## 2025-07-15 PROCEDURE — 1160F RVW MEDS BY RX/DR IN RCRD: CPT | Mod: CPTII,S$GLB,, | Performed by: STUDENT IN AN ORGANIZED HEALTH CARE EDUCATION/TRAINING PROGRAM

## 2025-07-15 PROCEDURE — 97110 THERAPEUTIC EXERCISES: CPT | Mod: GP,S$GLB,, | Performed by: STUDENT IN AN ORGANIZED HEALTH CARE EDUCATION/TRAINING PROGRAM

## 2025-07-15 RX ORDER — TRIAMCINOLONE ACETONIDE 40 MG/ML
40 INJECTION, SUSPENSION INTRA-ARTICULAR; INTRAMUSCULAR
Status: DISCONTINUED | OUTPATIENT
Start: 2025-07-15 | End: 2025-07-15 | Stop reason: HOSPADM

## 2025-07-15 RX ADMIN — TRIAMCINOLONE ACETONIDE 40 MG: 40 INJECTION, SUSPENSION INTRA-ARTICULAR; INTRAMUSCULAR at 09:07

## 2025-07-15 NOTE — PROGRESS NOTES
"CC: left shoulder pain    59 y.o. Male presents today for evaluation of his left shoulder pain. Pt was referred by Dr. Tom. Pt localizes pain to anterior shoulder and lateral upper arm. Pt reports pain is 8/10 today. Pt denies mechanical symptoms. Pt reports constant numbness in lateral forearm and into 1-3 fingers of left hand. Pt is right hand dominant.     Attempted treatments: aleve 440mg 1x/day (sometimes), L subacromial bursa Sept 2024 with Ana María Garcia PA-C (had 3 months or more of relief)  Pain score: 8/10  History of trauma/injury: none since last visit with Dr. Tom  Affecting ADLs: yes      REVIEW OF SYSTEMS:   Constitution: Patient denies fever or chills.  Eyes: Patient denies eye pain or vision changes.  HEENT: Patient denies ear pain, sore throat, or nasal discharge.  CVS: Patient denies chest pain.  Lungs: Patient denies shortness of breath or cough.  Skin: Patient denies skin rash or itching.    Musculoskeletal: Patient denies recent falls. See HPI.  Psych: Patient denies any current anxiety or nervousness.    PAST MEDICAL HISTORY:   Past Medical History:   Diagnosis Date    Arthritis     Asthma     Cervical radiculopathy, BUE 8/17/2012    Chronic LBP 8/17/2012    Chronic neck pain 8/17/2012    HTN (hypertension) 8/17/2012    Hypertension     Surgery follow-up examination 7/3/2019       MEDICATIONS:   Current Medications[1]    ALLERGIES:   Review of patient's allergies indicates:   Allergen Reactions    Theophylline      Other reaction(s): Hives        PHYSICAL EXAMINATION:  /80 (BP Location: Left arm, Patient Position: Sitting)   Pulse (!) 57   Ht 5' 9" (1.753 m)   Wt 83 kg (182 lb 15.7 oz)   BMI 27.02 kg/m²   Vitals signs and nursing note have been reviewed.    General: In no acute distress, well developed, well nourished, no diaphoresis  Eyes: EOM full and smooth, no eye redness or discharge  HENT: normocephalic and atraumatic, neck supple, trachea midline, no nasal " discharge  Cardiovascular: no LE edema  Lungs: respirations non-labored, no conversational dyspnea   Neuro: AAOx3, CN2-12 grossly intact  Skin: No rashes, warm and dry  Psychiatric: cooperative, pleasant, mood and affect appropriate for age    Left Shoulder:  INSPECTION / PALPATION:  -Deformity   -Ecchymosis   -Atrophy   -Sulcus sign   +Subacromial bursitis  -No TTP over anatomy including clavicle, scapular spine, ACJ, supraspinatus, infraspinatus, bicipital groove     ROM (* = with pain):    Flex to 160° vs 160° contra   Abduct to 160° vs 160° contra  -Scapular dyskinesis     STRENGTH:    Scaption 5/5   Flexion 5/5   Ext rot 5/5   Int rot 5/5   Sup/Pro 5/5    OTHER:   +Painful arc   -Drop arm   -Int lag  -Ext lag  +Neer's   +Hammond-Yoel   +Yellow Medicine active compression   -Empty Can  +Full Can  -Speed's   -Yergason's  -Apprehension    NECK:   Grossly FROM & painless in neck flex, ext, B/L torsion, B/L lat flexion   -Spurling B/L    Hands NVI B/L    IMAGIN. Shoulder MRI ordered due to left shoulder pain, taken on 24.  2. MRI images were reviewed personally by me and then directly with patient.  3. FINDINGS: ROTATOR CUFF: Low signal intensity foci adjacent to the bursal surface of the anterior supraspinatus footprint with probable intrasubstance extension.  Delaminating interstitial tear of the infraspinatus tendon with retracted fibers at the level of the myotendinous junction.  Subscapularis and teres minor tendons are intact.     LABRUM: Abnormal morphology and signal intensity of the superior labrum extending from anterior to posterior.     BICEPS: Mild thickening of the intra-articular biceps tendon.     BONES: Subcortical marrow edema about the greater tuberosity, likely degenerative.  No fractures.  No avascular necrosis.  No marrow infiltrative process.     AC JOINT: AC joint arthrosis.  Concave morphology of the lateral acromion without undersurface spurring.     CARTILAGE: Suspected generalized  chondral thinning.  No definite full-thickness defects or subchondral edema.     MISCELLANEOUS: No joint effusion.  Superior, middle and inferior glenohumeral ligaments appear intact.  No axillary lymphadenopathy.  Signal hyperintensity within the subacromial/subdeltoid bursa.    4. IMPRESSION: 1. Delaminating interstitial tear of the infraspinatus tendon with retracted fibers at the level of the myotendinous junctions.  2. Supraspinatus calcific tendinitis and overlying calcific bursitis.  3. SLAP tear.  4. Mild biceps tendinosis.  5. AC joint arthrosis.    ASSESSMENT:      ICD-10-CM ICD-9-CM   1. Chronic left shoulder pain  M25.512 719.41    G89.29 338.29   2. Subacromial impingement, left  M75.42 726.19         PLAN:    Based on patient history, physical exam findings, and imaging I believe it is appropriate to move forward with a left subacromial bursa corticosteroid injection under ultrasound guidance.  Patient will also be given a home exercise program.  Pending improvement at 6 weeks, may move forward with left glenohumeral joint injection.    Risks and benefits were discussed with patient prior to receiving injection.  Depending on injection type, risks include the possibility of infection, pain, disruptions in blood pressure and blood sugar, and cosmetic deformity at site of injection.    HOME EXERCISE PROGRAM (HEP): The patient was taught a homegoing physical therapy regimen for subacromial impingement syndrome. The patient demonstrated understanding of the exercises and proper technique of their execution. This interaction took 15 minutes and included demonstration of exercise as well as counseling to encourage patient to do exercises daily.      Future planning includes -  consider glenohumeral joint injection    All questions were answered to the best of my ability and all concerns were addressed at this time.    Follow up in-person in 6 weeks, or sooner if need be.    This note is dictated using the  M*Modal Fluency Direct word recognition program. There are word recognition mistakes that are occasionally missed on review.             [1]   Current Outpatient Medications:     albuterol (PROVENTIL/VENTOLIN HFA) 90 mcg/actuation inhaler, INHALE 2 PUFFS INTO THE LUNGS FOUR TIMES DAILY AS NEEDED FOR WHEEZING, Disp: 25.5 g, Rfl: 0    amLODIPine (NORVASC) 10 MG tablet, Take 1 tablet (10 mg total) by mouth once daily., Disp: 90 tablet, Rfl: 3    cetirizine (ZYRTEC) 10 MG tablet, Take 1 tablet (10 mg total) by mouth once daily., Disp: 30 tablet, Rfl: 0    gabapentin (NEURONTIN) 100 MG capsule, Take 1 capsule (100 mg total) by mouth 3 (three) times daily., Disp: 90 capsule, Rfl: 2    irbesartan (AVAPRO) 300 MG tablet, TAKE ONE Tablet BY MOUTH ONCE DAILY IN THE EVENING, Disp: 90 tablet, Rfl: 2    meloxicam (MOBIC) 15 MG tablet, Take 1 tablet (15 mg total) by mouth once daily., Disp: 30 tablet, Rfl: 1    mometasone (NASONEX) 50 mcg/actuation nasal spray, 2 sprays by Nasal route once daily., Disp: 17 g, Rfl: 1    meclizine (ANTIVERT) 25 mg tablet, Take 1 tablet (25 mg total) by mouth 3 (three) times daily as needed for Dizziness., Disp: 15 tablet, Rfl: 0

## 2025-07-15 NOTE — PROCEDURES
Large Joint Aspiration/Injection: L subacromial bursa    Date/Time: 7/15/2025 9:15 AM    Performed by: Daisy Field MD  Authorized by: Daisy Field MD    Consent Done?:  Yes (Verbal)  Indications:  Pain  Site marked: the procedure site was marked    Timeout: prior to procedure the correct patient, procedure, and site was verified      Local anesthesia used?: Yes    Local anesthetic:  Co-phenylcaine spray    Details:  Needle Size:  21 G  Ultrasonic Guidance for needle placement?: Yes (Ultrasound guidance used to avoid neurovascular injury.)    Images are saved and documented.  Approach:  Lateral  Location:  Shoulder  Site:  L subacromial bursa  Medications:  40 mg triamcinolone acetonide 40 mg/mL  Medications comment:  Ropivacaine 0.2% 2mL  Patient tolerance:  Patient tolerated the procedure well with no immediate complications     TECHNIQUE: Real time ultrasound examination of the left subacromial bursa(e) was performed with SonAmperionte Edge 2, 9-L MHz linear probe(s). Ultrasound guidance was used for needle localization. Images were saved and stored for documentation.  Dynamic visualization of the needle was continuous throughout the procedures and maintained in good position.

## 2025-07-18 ENCOUNTER — DOCUMENTATION ONLY (OUTPATIENT)
Dept: REHABILITATION | Facility: HOSPITAL | Age: 60
End: 2025-07-18
Payer: MEDICARE

## 2025-07-18 NOTE — PROGRESS NOTES
Physical Therapy: No show/Cancellation of Visit  Date: 07/18/2025    Patient was a cancel to today's PT appointment. Reason for cancellation: appointment time no longer works via [facility]. Patient's next scheduled appointment is 7/22/2025.    Cancel: 5 (non-consecutive)  No show: 0     Therapist: Douglas Hobbs, PT, DPT

## 2025-07-22 ENCOUNTER — CLINICAL SUPPORT (OUTPATIENT)
Dept: REHABILITATION | Facility: HOSPITAL | Age: 60
End: 2025-07-22
Payer: MEDICARE

## 2025-07-22 DIAGNOSIS — M25.571 RIGHT ANKLE PAIN, UNSPECIFIED CHRONICITY: Primary | ICD-10-CM

## 2025-07-22 PROCEDURE — 97110 THERAPEUTIC EXERCISES: CPT

## 2025-07-22 PROCEDURE — 97530 THERAPEUTIC ACTIVITIES: CPT

## 2025-07-22 PROCEDURE — 97112 NEUROMUSCULAR REEDUCATION: CPT

## 2025-07-22 NOTE — PROGRESS NOTES
Outpatient Rehab    Physical Therapy Visit    Patient Name: Rishi Aranda  MRN: [id number]  YOB: 1965  Encounter Date: 7/22/2025    Therapy Diagnosis:   Encounter Diagnosis   Name Primary?    Right ankle pain, unspecified chronicity Yes     Physician: Humberto Tom    Physician Orders: Eval and Treat  Medical Diagnosis: Nondisplaced pilon fracture of right tibia, subsequent encounter for closed fracture with routine healing  Surgical Diagnosis: Not applicable for this Episode   Surgical Date: Not applicable for this Episode  Days Since Last Surgery: Not applicable for this Episode    Visit # / Visits Authorized:  6 / 19  Insurance Authorization Period: 6/3/2025 to 12/31/2025  Date of Evaluation: 6/2/2025  Plan of Care Certification: 6/2/2025 to 8/25/2025      PT/PTA:     Number of PTA visits since last PT visit:   Time In: 0800   Time Out: 0853  Total Time (in minutes): 53   Total Billable Time (in minutes): 53    FOTO:  Intake Score (%): 46  Survey Score 2 (%): Not applicable for this Episode  Survey Score 3 (%): Not applicable for this Episode    Precautions:         Subjective   Pt reports he has been able to perform all ADLs with no difficulty. He reports some mild swelling following mowing his lawn a week ago but denies pain with this activity. Pt states that his R ankle has been feeling good..  Pain reported as 0/10.      Objective      Ankle/Foot Palpation     TTP at distal peroneal muscle bellies. Denies TTP elsewhere in ankle complex                  Ankle/Foot Range of Motion   Right Ankle/Foot   Active (deg) Passive (deg) Pain   Dorsiflexion (KE) 12       Dorsiflexion (KF)         Plantar Flexion 45       Ankle Inversion 35       Ankle Eversion 8 12     Subtalar Inversion         Subtalar Eversion         Great Toe MTP Flexion         Great Toe MTP Extension         Great Toe IP Flexion                              Ankle/Foot Strength - Planes of Motion   Right Strength Right  "Pain Left Strength Left  Pain   Dorsiflexion (L4) 5         Plantar Flexion (S1)           Inversion 5         Eversion 5         Great Toe Flexion           Great Toe Extension (L5)           Lesser Toes Flexion           Lesser Toes Extension                     Four Stage Balance Test                 Single Leg Stand - Right Foot: 17 sec  Single Leg Stand - Left Foot: 11 sec       Timed Up & Go (TUG)  Time: 9 seconds     An older adult who takes >=12 seconds to complete the TUG is at risk for falling.       Sit to Stand Testing      The patient completed 11 repetitions of a sit to stand transfer in 30 seconds.           Gait Analysis  Base of Support: Normal                   Treatment:  Therapeutic Exercise  TE 1: ROM/MMT/TUG/30STS  Balance/Neuromuscular Re-Education  NMR 6: Standing heel raises with half foam, 3 x 10 reps  NMR 7: Standing toe raises with half foam, 3 x 10 reps  NMR 9: Slant board gastroc stretch to improve DF during gait, 3 x 30"  Therapeutic Activity  TA 1: Education on anatomy, exercise rationale, motor control, edema management, bone healing timeline  TA 2: Recumbent bike for endogenous release of endorphins, 10 minutes, Level 1  TA 3: 6-inch step ups, 3 x 10 reps  TA 4: SL Shuttle press, 37 lbs, 3 x 10 reps - DL heel raises with 37 lbs, 3 x 10 reps    Time Entry(in minutes):  Neuromuscular Re-Education Time Entry: 20  Therapeutic Activity Time Entry: 23  Therapeutic Exercise Time Entry: 10    Assessment & Plan   Assessment: Pt has attended 6 PT visit since initial evaluation. He is no longer reporting any functional limitations and has little to no pain. His R ankle ROM and strength are both WFL. Pt has expressed readiness for discharge. He is discharged to [facility].        The patient will continue to benefit from skilled outpatient physical therapy in order to address the deficits listed in the problem list on the initial evaluation, provide patient and family education, and maximize the " patients level of independence in the home and community environments.     The patient's spiritual, cultural, and educational needs were considered, and the patient is agreeable to the plan of care and goals.           Plan: Pt is discharged to [facility].    Goals:   Active       Home activities       Patient will perform household indoor maintenance without R ankle pain in order to demonstrate improved functional ability.  (Met)       Start:  06/02/25    Expected End:  08/25/25    Resolved:  07/22/25            Range of Motion       Patient will achieve right ankle dorsiflexion ROM 10 degrees in order to improve gait quality.  (Met)       Start:  06/02/25    Expected End:  07/28/25    Resolved:  07/22/25         Patient will achieve right ankle plantar flexion ROM 60 degrees (Unable to Meet)       Start:  06/02/25    Expected End:  07/28/25               Strength       Patient will achieve right ankle plantar flexion strength of >/= 4+/5 in order to normalize gait function.  (Met)       Start:  06/02/25    Expected End:  08/25/25    Resolved:  07/22/25           Resolved       Ambulation/movement       Patient will walk community distances without R ankle pain in order to demonstrate improved functional ability.  (Met)       Start:  06/02/25    Expected End:  08/25/25    Resolved:  07/22/25         Patient will navigate 1 flight of stairs in order to demonstrate improved functional ability.  (Met)       Start:  06/02/25    Expected End:  08/25/25    Resolved:  07/22/25            Functional outcome       Patient will demonstrate independence in home program for support of progression (Met)       Start:  06/02/25    Expected End:  06/30/25    Resolved:  07/22/25             Douglas Hobbs PT, DPT

## 2025-07-22 NOTE — PROGRESS NOTES
Outpatient Rehab    Physical Therapy Discharge    Patient Name: Rishi Aranda  MRN: [id number]  YOB: 1965  Encounter Date: 7/22/2025    Therapy Diagnosis:   Encounter Diagnosis   Name Primary?    Right ankle pain, unspecified chronicity Yes     Physician: Humberto Tom    Physician Orders: Eval and Treat  Medical Diagnosis: Nondisplaced pilon fracture of right tibia, subsequent encounter for closed fracture with routine healing  Surgical Diagnosis: Not applicable for this Episode   Surgical Date: Not applicable for this Episode  Days Since Last Surgery: Not applicable for this Episode    Visit # / Visits Authorized:  6 / 19  Insurance Authorization Period: 6/3/2025 to 12/31/2025  Date of Evaluation: 6/2/2025  Plan of Care Certification: 6/2/2025 to 8/25/2025      PT/PTA:     Number of PTA visits since last PT visit:   Time In: 0800   Time Out: 0853  Total Time (in minutes): 53   Total Billable Time (in minutes): 53    FOTO:  Intake Score (%): 46  Survey Score 2 (%): Not applicable for this Episode  Survey Score 3 (%): Not applicable for this Episode    Precautions:       Subjective   Pt reports he has been able to perform all ADLs with no difficulty. He reports some mild swelling following mowing his lawn a week ago but denies pain with this activity. Pt states that his R ankle has been feeling good..  Pain reported as 0/10.      Objective      Ankle/Foot Palpation     TTP at distal peroneal muscle bellies. Denies TTP elsewhere in ankle complex                  Ankle/Foot Range of Motion   Right Ankle/Foot   Active (deg) Passive (deg) Pain   Dorsiflexion (KE) 12       Dorsiflexion (KF)         Plantar Flexion 45       Ankle Inversion 35       Ankle Eversion 8 12     Subtalar Inversion         Subtalar Eversion         Great Toe MTP Flexion         Great Toe MTP Extension         Great Toe IP Flexion                              Ankle/Foot Strength - Planes of Motion   Right Strength Right  "Pain Left Strength Left  Pain   Dorsiflexion (L4) 5         Plantar Flexion (S1)           Inversion 5         Eversion 5         Great Toe Flexion           Great Toe Extension (L5)           Lesser Toes Flexion           Lesser Toes Extension                     Four Stage Balance Test                 Single Leg Stand - Right Foot: 17 sec  Single Leg Stand - Left Foot: 11 sec       Timed Up & Go (TUG)  Time: 9 seconds     An older adult who takes >=12 seconds to complete the TUG is at risk for falling.       Sit to Stand Testing      The patient completed 11 repetitions of a sit to stand transfer in 30 seconds.           Gait Analysis  Base of Support: Normal                   Treatment:  Therapeutic Exercise  TE 1: ROM/MMT/TUG/30STS  Balance/Neuromuscular Re-Education  NMR 6: Standing heel raises with half foam, 3 x 10 reps  NMR 7: Standing toe raises with half foam, 3 x 10 reps  NMR 9: Slant board gastroc stretch to improve DF during gait, 3 x 30"  Therapeutic Activity  TA 1: Education on anatomy, exercise rationale, motor control, edema management, bone healing timeline  TA 2: Recumbent bike for endogenous release of endorphins, 10 minutes, Level 1  TA 3: 6-inch step ups, 3 x 10 reps  TA 4: SL Shuttle press, 37 lbs, 3 x 10 reps - DL heel raises with 37 lbs, 3 x 10 reps    Time Entry(in minutes):  Neuromuscular Re-Education Time Entry: 20  Therapeutic Activity Time Entry: 23  Therapeutic Exercise Time Entry: 10    Assessment & Plan   Assessment: Pt has attended 6 PT visit since initial evaluation. He is no longer reporting any functional limitations and has little to no pain. His R ankle ROM and strength are both WFL. Pt has expressed readiness for discharge. He is discharged to [facility].        The patient's spiritual, cultural, and educational needs were considered, and the patient is agreeable to the plan of care and goals.           Plan: Pt is discharged to [facility].    Goals:   Active       Home activities       " Patient will perform household indoor maintenance without R ankle pain in order to demonstrate improved functional ability.  (Met)       Start:  06/02/25    Expected End:  08/25/25    Resolved:  07/22/25            Range of Motion       Patient will achieve right ankle dorsiflexion ROM 10 degrees in order to improve gait quality.  (Met)       Start:  06/02/25    Expected End:  07/28/25    Resolved:  07/22/25         Patient will achieve right ankle plantar flexion ROM 60 degrees (Unable to Meet)       Start:  06/02/25    Expected End:  07/28/25               Strength       Patient will achieve right ankle plantar flexion strength of >/= 4+/5 in order to normalize gait function.  (Met)       Start:  06/02/25    Expected End:  08/25/25    Resolved:  07/22/25           Resolved       Ambulation/movement       Patient will walk community distances without R ankle pain in order to demonstrate improved functional ability.  (Met)       Start:  06/02/25    Expected End:  08/25/25    Resolved:  07/22/25         Patient will navigate 1 flight of stairs in order to demonstrate improved functional ability.  (Met)       Start:  06/02/25    Expected End:  08/25/25    Resolved:  07/22/25            Functional outcome       Patient will demonstrate independence in home program for support of progression (Met)       Start:  06/02/25    Expected End:  06/30/25    Resolved:  07/22/25             Douglas Hobbs PT, DPT

## 2025-07-30 NOTE — TELEPHONE ENCOUNTER
No care due was identified.  [facility] Embedded Care Due Messages. Reference number: [id number].   7/30/2025 3:53:01 PM CDT

## 2025-07-31 RX ORDER — IRBESARTAN 300 MG/1
TABLET ORAL
Qty: 90 TABLET | Refills: 2 | Status: SHIPPED | OUTPATIENT
Start: 2025-07-31

## 2025-07-31 NOTE — TELEPHONE ENCOUNTER
Refill Routing Note   Medication(s) are not appropriate for processing by [facility] for the following reason(s):        Required labs outdated  ED/Hospital Visit since last OV with provider    [facility] action(s):  Defer               Appointments  past 12m or future 3m with PCP    Date Provider   Last Visit   6/24/2024 Colten Carlisle MD   Next Visit   Visit date not found Colten Carlisle MD   ED visits in past 90 days: 0        Note composed:7:49 PM 07/30/2025

## 2025-08-18 DIAGNOSIS — S82.874A CLOSED NONDISPLACED PILON FRACTURE OF RIGHT TIBIA, INITIAL ENCOUNTER: Primary | ICD-10-CM

## 2025-08-19 ENCOUNTER — HOSPITAL ENCOUNTER (OUTPATIENT)
Dept: RADIOLOGY | Facility: HOSPITAL | Age: 60
Discharge: HOME | End: 2025-08-19
Attending: ORTHOPAEDIC SURGERY
Payer: MEDICARE

## 2025-08-19 ENCOUNTER — OFFICE VISIT (OUTPATIENT)
Dept: ORTHOPEDICS | Facility: CLINIC | Age: 60
End: 2025-08-19
Payer: MEDICARE

## 2025-08-19 DIAGNOSIS — S82.874D CLOSED NONDISPLACED PILON FRACTURE OF RIGHT TIBIA WITH ROUTINE HEALING, SUBSEQUENT ENCOUNTER: Primary | ICD-10-CM

## 2025-08-19 DIAGNOSIS — S82.874A CLOSED NONDISPLACED PILON FRACTURE OF RIGHT TIBIA, INITIAL ENCOUNTER: ICD-10-CM

## 2025-08-19 PROCEDURE — 4010F ACE/ARB THERAPY RXD/TAKEN: CPT | Mod: CPTII,S$GLB,, | Performed by: ORTHOPAEDIC SURGERY

## 2025-08-19 PROCEDURE — 73610 X-RAY EXAM OF ANKLE: CPT | Mod: 26,RT,, | Performed by: RADIOLOGY

## 2025-08-19 PROCEDURE — 99213 OFFICE O/P EST LOW 20 MIN: CPT | Mod: S$GLB,,, | Performed by: ORTHOPAEDIC SURGERY

## 2025-08-19 PROCEDURE — 73610 X-RAY EXAM OF ANKLE: CPT | Mod: TC,RT

## 2025-08-26 ENCOUNTER — HOSPITAL ENCOUNTER (OUTPATIENT)
Dept: RADIOLOGY | Facility: HOSPITAL | Age: 60
Discharge: HOME | End: 2025-08-26
Attending: STUDENT IN AN ORGANIZED HEALTH CARE EDUCATION/TRAINING PROGRAM
Payer: MEDICARE

## 2025-08-26 ENCOUNTER — OFFICE VISIT (OUTPATIENT)
Dept: SPORTS MEDICINE | Facility: CLINIC | Age: 60
End: 2025-08-26
Payer: MEDICARE

## 2025-08-26 ENCOUNTER — TELEPHONE (OUTPATIENT)
Dept: SPORTS MEDICINE | Facility: CLINIC | Age: 60
End: 2025-08-26

## 2025-08-26 ENCOUNTER — TELEPHONE (OUTPATIENT)
Dept: PHYSICAL MEDICINE AND REHAB | Facility: CLINIC | Age: 60
End: 2025-08-26
Payer: MEDICARE

## 2025-08-26 VITALS
HEIGHT: 69 IN | BODY MASS INDEX: 26.35 KG/M2 | WEIGHT: 177.94 LBS | SYSTOLIC BLOOD PRESSURE: 149 MMHG | HEART RATE: 69 BPM | DIASTOLIC BLOOD PRESSURE: 82 MMHG

## 2025-08-26 DIAGNOSIS — R20.2 ARM PARESTHESIA, LEFT: ICD-10-CM

## 2025-08-26 DIAGNOSIS — G89.29 CHRONIC LEFT SHOULDER PAIN: Primary | ICD-10-CM

## 2025-08-26 DIAGNOSIS — M75.112 INCOMPLETE TEAR OF LEFT ROTATOR CUFF, UNSPECIFIED WHETHER TRAUMATIC: ICD-10-CM

## 2025-08-26 DIAGNOSIS — M47.12 OSTEOARTHRITIS OF CERVICAL SPINE WITH MYELOPATHY: ICD-10-CM

## 2025-08-26 DIAGNOSIS — M25.512 CHRONIC LEFT SHOULDER PAIN: Primary | ICD-10-CM

## 2025-08-26 DIAGNOSIS — M75.42 SUBACROMIAL IMPINGEMENT, LEFT: ICD-10-CM

## 2025-08-26 PROCEDURE — 99999 PR PBB SHADOW E&M-EST. PATIENT-LVL III: CPT | Mod: PBBFAC,,, | Performed by: STUDENT IN AN ORGANIZED HEALTH CARE EDUCATION/TRAINING PROGRAM

## 2025-08-26 PROCEDURE — 1160F RVW MEDS BY RX/DR IN RCRD: CPT | Mod: CPTII,S$GLB,, | Performed by: STUDENT IN AN ORGANIZED HEALTH CARE EDUCATION/TRAINING PROGRAM

## 2025-08-26 PROCEDURE — 72052 X-RAY EXAM NECK SPINE 6/>VWS: CPT | Mod: 26,,, | Performed by: RADIOLOGY

## 2025-08-26 PROCEDURE — 3079F DIAST BP 80-89 MM HG: CPT | Mod: CPTII,S$GLB,, | Performed by: STUDENT IN AN ORGANIZED HEALTH CARE EDUCATION/TRAINING PROGRAM

## 2025-08-26 PROCEDURE — 3008F BODY MASS INDEX DOCD: CPT | Mod: CPTII,S$GLB,, | Performed by: STUDENT IN AN ORGANIZED HEALTH CARE EDUCATION/TRAINING PROGRAM

## 2025-08-26 PROCEDURE — 1125F AMNT PAIN NOTED PAIN PRSNT: CPT | Mod: CPTII,S$GLB,, | Performed by: STUDENT IN AN ORGANIZED HEALTH CARE EDUCATION/TRAINING PROGRAM

## 2025-08-26 PROCEDURE — 1159F MED LIST DOCD IN RCRD: CPT | Mod: CPTII,S$GLB,, | Performed by: STUDENT IN AN ORGANIZED HEALTH CARE EDUCATION/TRAINING PROGRAM

## 2025-08-26 PROCEDURE — 4010F ACE/ARB THERAPY RXD/TAKEN: CPT | Mod: CPTII,S$GLB,, | Performed by: STUDENT IN AN ORGANIZED HEALTH CARE EDUCATION/TRAINING PROGRAM

## 2025-08-26 PROCEDURE — 99214 OFFICE O/P EST MOD 30 MIN: CPT | Mod: S$GLB,,, | Performed by: STUDENT IN AN ORGANIZED HEALTH CARE EDUCATION/TRAINING PROGRAM

## 2025-08-26 PROCEDURE — 72052 X-RAY EXAM NECK SPINE 6/>VWS: CPT | Mod: TC,PN

## 2025-08-26 PROCEDURE — 3077F SYST BP >= 140 MM HG: CPT | Mod: CPTII,S$GLB,, | Performed by: STUDENT IN AN ORGANIZED HEALTH CARE EDUCATION/TRAINING PROGRAM

## 2025-09-03 ENCOUNTER — OFFICE VISIT (OUTPATIENT)
Dept: INTERNAL MEDICINE | Facility: CLINIC | Age: 60
End: 2025-09-03
Payer: MEDICARE

## 2025-09-03 ENCOUNTER — HOSPITAL ENCOUNTER (OUTPATIENT)
Dept: RADIOLOGY | Facility: HOSPITAL | Age: 60
Discharge: HOME | End: 2025-09-03
Attending: STUDENT IN AN ORGANIZED HEALTH CARE EDUCATION/TRAINING PROGRAM
Payer: MEDICARE

## 2025-09-03 VITALS
WEIGHT: 173.31 LBS | OXYGEN SATURATION: 98 % | BODY MASS INDEX: 25.67 KG/M2 | DIASTOLIC BLOOD PRESSURE: 70 MMHG | HEART RATE: 59 BPM | HEIGHT: 69 IN | SYSTOLIC BLOOD PRESSURE: 108 MMHG

## 2025-09-03 DIAGNOSIS — M75.112 INCOMPLETE TEAR OF LEFT ROTATOR CUFF, UNSPECIFIED WHETHER TRAUMATIC: ICD-10-CM

## 2025-09-03 DIAGNOSIS — Z96.651 S/P TOTAL KNEE ARTHROPLASTY, RIGHT: ICD-10-CM

## 2025-09-03 DIAGNOSIS — I10 PRIMARY HYPERTENSION: ICD-10-CM

## 2025-09-03 DIAGNOSIS — M17.12 PRIMARY OSTEOARTHRITIS OF LEFT KNEE: ICD-10-CM

## 2025-09-03 DIAGNOSIS — J45.909 CHRONIC ASTHMA, UNSPECIFIED ASTHMA SEVERITY, UNSPECIFIED WHETHER COMPLICATED, UNSPECIFIED WHETHER PERSISTENT: ICD-10-CM

## 2025-09-03 DIAGNOSIS — Z00.00 ENCOUNTER FOR MEDICARE ANNUAL WELLNESS EXAM: Primary | ICD-10-CM

## 2025-09-03 DIAGNOSIS — M25.512 CHRONIC LEFT SHOULDER PAIN: ICD-10-CM

## 2025-09-03 DIAGNOSIS — G89.29 CHRONIC LEFT SHOULDER PAIN: ICD-10-CM

## 2025-09-03 DIAGNOSIS — M48.02 NEURAL FORAMINAL STENOSIS OF CERVICAL SPINE: ICD-10-CM

## 2025-09-03 DIAGNOSIS — M47.812 OSTEOARTHRITIS OF CERVICAL SPINE, UNSPECIFIED SPINAL OSTEOARTHRITIS COMPLICATION STATUS: ICD-10-CM

## 2025-09-03 PROCEDURE — 73221 MRI JOINT UPR EXTREM W/O DYE: CPT | Mod: TC,LT

## 2025-09-03 PROCEDURE — 73221 MRI JOINT UPR EXTREM W/O DYE: CPT | Mod: 26,LT,, | Performed by: RADIOLOGY

## 2025-09-03 PROCEDURE — 99999 PR PBB SHADOW E&M-EST. PATIENT-LVL IV: CPT | Mod: PBBFAC,,, | Performed by: NURSE PRACTITIONER

## (undated) DEVICE — GLOVE BIOGEL SKINSENSE PI 7.0

## (undated) DEVICE — SUT VICRYL PLUS 3-0 SH 18IN

## (undated) DEVICE — COVER BACK TABLE 72X21

## (undated) DEVICE — DRAPE STERI INSTRUMENT 1018

## (undated) DEVICE — SEE MEDLINE ITEM 153151

## (undated) DEVICE — CONTAINER SPECIMEN STRL 4OZ

## (undated) DEVICE — APPLICATOR CHLORAPREP ORN 26ML

## (undated) DEVICE — DRESSING AQUACEL ADH 4X10IN

## (undated) DEVICE — SEALER BIPOLAR TISSUE 6.0

## (undated) DEVICE — SUT VICRYL 3-0 27 SH

## (undated) DEVICE — SEE MEDLINE ITEM 152530

## (undated) DEVICE — MIXER BONE CEMENT

## (undated) DEVICE — PAD KNEE POLAR XL

## (undated) DEVICE — SUT STRATAFIX 1 PDS CT-1

## (undated) DEVICE — GLOVE BIOGEL SKINSENSE PI 8.5

## (undated) DEVICE — PAD ABD 8X10 STERILE

## (undated) DEVICE — POSITIONER IV ARMBOARD FOAM

## (undated) DEVICE — SPONGE LAP 18X18 PREWASHED

## (undated) DEVICE — TOURNIQUET SB QC DP 34X4IN

## (undated) DEVICE — SEE MEDLINE ITEM 152622

## (undated) DEVICE — PULSAVAC ZIMMER

## (undated) DEVICE — PAD ELECTRODE STER 1.5X3

## (undated) DEVICE — SEE MEDLINE ITEM 152186

## (undated) DEVICE — SOL NACL 0.9% INJ 250ML BG

## (undated) DEVICE — UNDERGLOVES BIOGEL PI SIZE 7.5

## (undated) DEVICE — SOL IRR NACL .9% 3000ML

## (undated) DEVICE — TAPE SILK 3IN

## (undated) DEVICE — ADHESIVE DERMABOND ADVANCED

## (undated) DEVICE — UNDERGLOVES BIOGEL PI SZ 7 LF

## (undated) DEVICE — SEE MEDLINE ITEM 154981

## (undated) DEVICE — Device

## (undated) DEVICE — SOL 9P NACL IRR PIC IL

## (undated) DEVICE — SUT MCRYL PLUS 4-0 PS2 27IN

## (undated) DEVICE — MASK FLYTE HOOD PEEL AWAY

## (undated) DEVICE — SYR 30CC LUER LOCK

## (undated) DEVICE — UNDERGLOVES BIOGEL PI SIZE 8.5

## (undated) DEVICE — DRAPE PLASTIC U 60X72

## (undated) DEVICE — PAD ABDOMINAL 5X9 STERILE

## (undated) DEVICE — DRESSING COVER AQUACEL AG SURG

## (undated) DEVICE — SEE MEDLINE ITEM 157150

## (undated) DEVICE — SUT VICRYL+ 1 CT1 18IN

## (undated) DEVICE — ELECTRODE REM PLYHSV RETURN 9

## (undated) DEVICE — SEE MEDLINE ITEM 152523

## (undated) DEVICE — GOWN B1 X-LG X-LONG

## (undated) DEVICE — SEE MEDLINE ITEM 146298

## (undated) DEVICE — DRAPE STERI U-SHAPED 47X51IN

## (undated) DEVICE — PADDING CAST SPECIALIST 6X4YD

## (undated) DEVICE — BLADE SAG DUAL 18MMX1.27MMX90M

## (undated) DEVICE — NDL SAFETY 22G X 1.5 ECLIPSE